# Patient Record
Sex: MALE | Race: WHITE | NOT HISPANIC OR LATINO | Employment: PART TIME | URBAN - METROPOLITAN AREA
[De-identification: names, ages, dates, MRNs, and addresses within clinical notes are randomized per-mention and may not be internally consistent; named-entity substitution may affect disease eponyms.]

---

## 2017-01-13 ENCOUNTER — ALLSCRIPTS OFFICE VISIT (OUTPATIENT)
Dept: OTHER | Facility: OTHER | Age: 62
End: 2017-01-13

## 2017-01-17 ENCOUNTER — ALLSCRIPTS OFFICE VISIT (OUTPATIENT)
Dept: OTHER | Facility: OTHER | Age: 62
End: 2017-01-17

## 2017-01-17 ENCOUNTER — HOSPITAL ENCOUNTER (OUTPATIENT)
Dept: RADIOLOGY | Facility: CLINIC | Age: 62
Discharge: HOME | End: 2017-01-17
Payer: MEDICARE

## 2017-01-17 DIAGNOSIS — M25.511 PAIN IN RIGHT SHOULDER: ICD-10-CM

## 2017-01-17 PROCEDURE — 73030 X-RAY EXAM OF SHOULDER: CPT

## 2017-01-19 ENCOUNTER — APPOINTMENT (OUTPATIENT)
Dept: PHYSICAL THERAPY | Facility: CLINIC | Age: 62
End: 2017-01-19
Payer: MEDICARE

## 2017-01-19 ENCOUNTER — GENERIC CONVERSION - ENCOUNTER (OUTPATIENT)
Dept: OTHER | Facility: OTHER | Age: 62
End: 2017-01-19

## 2017-01-19 PROCEDURE — G8979 MOBILITY GOAL STATUS: HCPCS

## 2017-01-19 PROCEDURE — G8978 MOBILITY CURRENT STATUS: HCPCS

## 2017-01-19 PROCEDURE — 97110 THERAPEUTIC EXERCISES: CPT

## 2017-01-19 PROCEDURE — 97162 PT EVAL MOD COMPLEX 30 MIN: CPT

## 2017-01-20 ENCOUNTER — ALLSCRIPTS OFFICE VISIT (OUTPATIENT)
Dept: OTHER | Facility: OTHER | Age: 62
End: 2017-01-20

## 2017-01-23 ENCOUNTER — APPOINTMENT (OUTPATIENT)
Dept: PHYSICAL THERAPY | Facility: CLINIC | Age: 62
End: 2017-01-23
Payer: MEDICARE

## 2017-01-25 ENCOUNTER — APPOINTMENT (OUTPATIENT)
Dept: PHYSICAL THERAPY | Facility: CLINIC | Age: 62
End: 2017-01-25
Payer: MEDICARE

## 2017-01-27 ENCOUNTER — ALLSCRIPTS OFFICE VISIT (OUTPATIENT)
Dept: OTHER | Facility: OTHER | Age: 62
End: 2017-01-27

## 2017-01-30 ENCOUNTER — APPOINTMENT (OUTPATIENT)
Dept: PHYSICAL THERAPY | Facility: CLINIC | Age: 62
End: 2017-01-30
Payer: MEDICARE

## 2017-02-10 ENCOUNTER — ALLSCRIPTS OFFICE VISIT (OUTPATIENT)
Dept: OTHER | Facility: OTHER | Age: 62
End: 2017-02-10

## 2017-05-15 ENCOUNTER — ALLSCRIPTS OFFICE VISIT (OUTPATIENT)
Dept: OTHER | Facility: OTHER | Age: 62
End: 2017-05-15

## 2017-05-20 ENCOUNTER — ALLSCRIPTS OFFICE VISIT (OUTPATIENT)
Dept: OTHER | Facility: OTHER | Age: 62
End: 2017-05-20

## 2017-05-25 ENCOUNTER — GENERIC CONVERSION - ENCOUNTER (OUTPATIENT)
Dept: OTHER | Facility: OTHER | Age: 62
End: 2017-05-25

## 2017-07-11 ENCOUNTER — HOSPITAL ENCOUNTER (INPATIENT)
Facility: HOSPITAL | Age: 62
LOS: 2 days | Discharge: HOME | DRG: 292 | End: 2017-07-14
Attending: EMERGENCY MEDICINE | Admitting: INTERNAL MEDICINE
Payer: MEDICARE

## 2017-07-11 ENCOUNTER — APPOINTMENT (EMERGENCY)
Dept: RADIOLOGY | Facility: HOSPITAL | Age: 62
DRG: 292 | End: 2017-07-11
Payer: MEDICARE

## 2017-07-11 DIAGNOSIS — R07.9 CHEST PAIN: ICD-10-CM

## 2017-07-11 DIAGNOSIS — R60.0 BILATERAL EDEMA OF LOWER EXTREMITY: ICD-10-CM

## 2017-07-11 DIAGNOSIS — R60.0 LEG EDEMA: ICD-10-CM

## 2017-07-11 DIAGNOSIS — R60.9 EDEMA: ICD-10-CM

## 2017-07-11 DIAGNOSIS — R06.00 DYSPNEA: Primary | ICD-10-CM

## 2017-07-11 LAB
BASOPHILS # BLD AUTO: 0 THOUSANDS/ΜL (ref 0–0.1)
BASOPHILS NFR BLD AUTO: 1 % (ref 0–1)
EOSINOPHIL # BLD AUTO: 0.1 THOUSAND/ΜL (ref 0–0.61)
EOSINOPHIL NFR BLD AUTO: 2 % (ref 0–6)
ERYTHROCYTE [DISTWIDTH] IN BLOOD BY AUTOMATED COUNT: 13.9 % (ref 11.6–15.1)
HCT VFR BLD AUTO: 41.1 % (ref 42–52)
HGB BLD-MCNC: 14.1 G/DL (ref 14–18)
LYMPHOCYTES # BLD AUTO: 2.6 THOUSANDS/ΜL (ref 0.6–4.47)
LYMPHOCYTES NFR BLD AUTO: 33 % (ref 14–44)
MCH RBC QN AUTO: 32.8 PG (ref 27–31)
MCHC RBC AUTO-ENTMCNC: 34.4 G/DL (ref 31.4–37.4)
MCV RBC AUTO: 96 FL (ref 82–98)
MONOCYTES # BLD AUTO: 0.6 THOUSAND/ΜL (ref 0.17–1.22)
MONOCYTES NFR BLD AUTO: 8 % (ref 4–12)
NEUTROPHILS # BLD AUTO: 4.4 THOUSANDS/ΜL (ref 1.85–7.62)
NEUTS SEG NFR BLD AUTO: 57 % (ref 43–75)
NRBC BLD AUTO-RTO: 0 /100 WBCS
PLATELET # BLD AUTO: 208 THOUSANDS/UL (ref 130–400)
PMV BLD AUTO: 7.9 FL (ref 8.9–12.7)
RBC # BLD AUTO: 4.3 MILLION/UL (ref 4.7–6.1)
WBC # BLD AUTO: 7.8 THOUSAND/UL (ref 4.8–10.8)

## 2017-07-11 PROCEDURE — 83735 ASSAY OF MAGNESIUM: CPT | Performed by: EMERGENCY MEDICINE

## 2017-07-11 PROCEDURE — 80053 COMPREHEN METABOLIC PANEL: CPT | Performed by: EMERGENCY MEDICINE

## 2017-07-11 PROCEDURE — 85730 THROMBOPLASTIN TIME PARTIAL: CPT | Performed by: EMERGENCY MEDICINE

## 2017-07-11 PROCEDURE — 71010 HB CHEST X-RAY 1 VIEW FRONTAL (PORTABLE): CPT

## 2017-07-11 PROCEDURE — 83880 ASSAY OF NATRIURETIC PEPTIDE: CPT | Performed by: EMERGENCY MEDICINE

## 2017-07-11 PROCEDURE — 85610 PROTHROMBIN TIME: CPT | Performed by: EMERGENCY MEDICINE

## 2017-07-11 PROCEDURE — 84484 ASSAY OF TROPONIN QUANT: CPT | Performed by: EMERGENCY MEDICINE

## 2017-07-11 PROCEDURE — 36415 COLL VENOUS BLD VENIPUNCTURE: CPT | Performed by: EMERGENCY MEDICINE

## 2017-07-11 PROCEDURE — 85025 COMPLETE CBC W/AUTO DIFF WBC: CPT | Performed by: EMERGENCY MEDICINE

## 2017-07-11 PROCEDURE — 93005 ELECTROCARDIOGRAM TRACING: CPT | Performed by: EMERGENCY MEDICINE

## 2017-07-11 RX ORDER — SERTRALINE HYDROCHLORIDE 100 MG/1
100 TABLET, FILM COATED ORAL 2 TIMES DAILY
COMMUNITY
End: 2018-06-12 | Stop reason: SDUPTHER

## 2017-07-11 RX ORDER — OXCARBAZEPINE 150 MG/1
150 TABLET, FILM COATED ORAL 2 TIMES DAILY
COMMUNITY
End: 2018-04-23 | Stop reason: SDUPTHER

## 2017-07-11 RX ORDER — ASPIRIN 81 MG/1
81 TABLET ORAL EVERY EVENING
COMMUNITY

## 2017-07-11 RX ORDER — ENALAPRIL MALEATE 10 MG/1
10 TABLET ORAL DAILY
COMMUNITY
End: 2018-02-27 | Stop reason: SDUPTHER

## 2017-07-11 RX ORDER — ATENOLOL 50 MG/1
50 TABLET ORAL DAILY
COMMUNITY
End: 2018-05-07 | Stop reason: SDUPTHER

## 2017-07-12 ENCOUNTER — APPOINTMENT (INPATIENT)
Dept: RADIOLOGY | Facility: HOSPITAL | Age: 62
DRG: 292 | End: 2017-07-12
Payer: MEDICARE

## 2017-07-12 ENCOUNTER — APPOINTMENT (INPATIENT)
Dept: NON INVASIVE DIAGNOSTICS | Facility: HOSPITAL | Age: 62
DRG: 292 | End: 2017-07-12
Payer: MEDICARE

## 2017-07-12 PROBLEM — I71.40 AAA (ABDOMINAL AORTIC ANEURYSM): Status: ACTIVE | Noted: 2017-07-12

## 2017-07-12 PROBLEM — I35.1 AORTIC REGURGITATION: Status: ACTIVE | Noted: 2017-07-12

## 2017-07-12 PROBLEM — R73.9 HYPERGLYCEMIA: Status: ACTIVE | Noted: 2017-07-12

## 2017-07-12 PROBLEM — F31.9 BIPOLAR 1 DISORDER (HCC): Status: ACTIVE | Noted: 2017-07-12

## 2017-07-12 PROBLEM — R07.9 CHEST PAIN: Status: ACTIVE | Noted: 2017-07-12

## 2017-07-12 PROBLEM — R60.0 BILATERAL EDEMA OF LOWER EXTREMITY: Status: ACTIVE | Noted: 2017-07-12

## 2017-07-12 PROBLEM — I71.4 AAA (ABDOMINAL AORTIC ANEURYSM) (HCC): Status: ACTIVE | Noted: 2017-07-12

## 2017-07-12 PROBLEM — R06.00 DYSPNEA: Status: ACTIVE | Noted: 2017-07-12

## 2017-07-12 PROBLEM — I10 HTN (HYPERTENSION): Status: ACTIVE | Noted: 2017-07-12

## 2017-07-12 PROBLEM — E66.01 MORBID OBESITY WITH BMI OF 40.0-44.9, ADULT (HCC): Status: ACTIVE | Noted: 2017-07-12

## 2017-07-12 PROBLEM — R60.0 LEG EDEMA: Status: ACTIVE | Noted: 2017-07-12

## 2017-07-12 PROBLEM — R53.81 MALAISE AND FATIGUE: Status: ACTIVE | Noted: 2017-07-12

## 2017-07-12 PROBLEM — E78.5 HYPERLIPIDEMIA: Status: ACTIVE | Noted: 2017-07-12

## 2017-07-12 PROBLEM — R53.83 MALAISE AND FATIGUE: Status: ACTIVE | Noted: 2017-07-12

## 2017-07-12 LAB
ALBUMIN SERPL BCP-MCNC: 3.5 G/DL (ref 3.5–5)
ALP SERPL-CCNC: 164 U/L (ref 46–116)
ALT SERPL W P-5'-P-CCNC: 26 U/L (ref 12–78)
ANION GAP SERPL CALCULATED.3IONS-SCNC: 8 MMOL/L (ref 4–13)
APTT PPP: 25 SECONDS (ref 24–33)
AST SERPL W P-5'-P-CCNC: 18 U/L (ref 5–45)
ATRIAL RATE: 71 BPM
BILIRUB SERPL-MCNC: 0.3 MG/DL (ref 0.2–1)
BILIRUB UR QL STRIP: NEGATIVE
BUN SERPL-MCNC: 15 MG/DL (ref 5–25)
CALCIUM SERPL-MCNC: 8.3 MG/DL (ref 8.3–10.1)
CHLORIDE SERPL-SCNC: 107 MMOL/L (ref 100–108)
CHOLEST SERPL-MCNC: 164 MG/DL (ref 50–200)
CLARITY UR: CLEAR
CO2 SERPL-SCNC: 25 MMOL/L (ref 21–32)
COLOR UR: YELLOW
CREAT SERPL-MCNC: 1.04 MG/DL (ref 0.6–1.3)
EST. AVERAGE GLUCOSE BLD GHB EST-MCNC: 105 MG/DL
GFR SERPL CREATININE-BSD FRML MDRD: >60 ML/MIN/1.73SQ M
GLUCOSE SERPL-MCNC: 154 MG/DL (ref 65–140)
GLUCOSE UR STRIP-MCNC: NEGATIVE MG/DL
HBA1C MFR BLD: 5.3 % (ref 4.2–6.3)
HDLC SERPL-MCNC: 29 MG/DL (ref 40–60)
HGB UR QL STRIP.AUTO: NEGATIVE
INR PPP: 0.94 (ref 0.86–1.16)
KETONES UR STRIP-MCNC: NEGATIVE MG/DL
LDLC SERPL CALC-MCNC: 105 MG/DL (ref 0–100)
LEUKOCYTE ESTERASE UR QL STRIP: NEGATIVE
MAGNESIUM SERPL-MCNC: 2 MG/DL (ref 1.6–2.6)
NITRITE UR QL STRIP: NEGATIVE
NT-PROBNP SERPL-MCNC: 98 PG/ML
P AXIS: -8 DEGREES
PH UR STRIP.AUTO: 5 [PH] (ref 5–9)
POTASSIUM SERPL-SCNC: 3.8 MMOL/L (ref 3.5–5.3)
PR INTERVAL: 160 MS
PROT SERPL-MCNC: 6.5 G/DL (ref 6.4–8.2)
PROT UR STRIP-MCNC: NEGATIVE MG/DL
PROTHROMBIN TIME: 9.9 SECONDS (ref 9.4–11.7)
QRS AXIS: 52 DEGREES
QRSD INTERVAL: 104 MS
QT INTERVAL: 410 MS
QTC INTERVAL: 445 MS
SODIUM SERPL-SCNC: 140 MMOL/L (ref 136–145)
SP GR UR STRIP.AUTO: 1.02 (ref 1–1.03)
T WAVE AXIS: 42 DEGREES
T4 FREE SERPL-MCNC: 0.75 NG/DL (ref 0.76–1.46)
TRIGL SERPL-MCNC: 152 MG/DL
TROPONIN I SERPL-MCNC: <0.02 NG/ML
TSH SERPL DL<=0.05 MIU/L-ACNC: 2.8 UIU/ML (ref 0.36–3.74)
UROBILINOGEN UR QL STRIP.AUTO: 0.2 E.U./DL
VENTRICULAR RATE: 71 BPM

## 2017-07-12 PROCEDURE — 83036 HEMOGLOBIN GLYCOSYLATED A1C: CPT | Performed by: NURSE PRACTITIONER

## 2017-07-12 PROCEDURE — 99285 EMERGENCY DEPT VISIT HI MDM: CPT

## 2017-07-12 PROCEDURE — 81003 URINALYSIS AUTO W/O SCOPE: CPT | Performed by: NURSE PRACTITIONER

## 2017-07-12 PROCEDURE — 87081 CULTURE SCREEN ONLY: CPT | Performed by: NURSE PRACTITIONER

## 2017-07-12 PROCEDURE — G0009 ADMIN PNEUMOCOCCAL VACCINE: HCPCS | Performed by: INTERNAL MEDICINE

## 2017-07-12 PROCEDURE — 84439 ASSAY OF FREE THYROXINE: CPT | Performed by: NURSE PRACTITIONER

## 2017-07-12 PROCEDURE — 84443 ASSAY THYROID STIM HORMONE: CPT | Performed by: NURSE PRACTITIONER

## 2017-07-12 PROCEDURE — 84484 ASSAY OF TROPONIN QUANT: CPT | Performed by: NURSE PRACTITIONER

## 2017-07-12 PROCEDURE — 93970 EXTREMITY STUDY: CPT

## 2017-07-12 PROCEDURE — 90732 PPSV23 VACC 2 YRS+ SUBQ/IM: CPT | Performed by: INTERNAL MEDICINE

## 2017-07-12 PROCEDURE — 80061 LIPID PANEL: CPT | Performed by: NURSE PRACTITIONER

## 2017-07-12 PROCEDURE — 71275 CT ANGIOGRAPHY CHEST: CPT

## 2017-07-12 PROCEDURE — 93306 TTE W/DOPPLER COMPLETE: CPT

## 2017-07-12 RX ORDER — ASPIRIN 81 MG/1
81 TABLET ORAL DAILY
Status: DISCONTINUED | OUTPATIENT
Start: 2017-07-12 | End: 2017-07-14 | Stop reason: HOSPADM

## 2017-07-12 RX ORDER — MAGNESIUM HYDROXIDE/ALUMINUM HYDROXICE/SIMETHICONE 120; 1200; 1200 MG/30ML; MG/30ML; MG/30ML
30 SUSPENSION ORAL EVERY 6 HOURS PRN
Status: DISCONTINUED | OUTPATIENT
Start: 2017-07-12 | End: 2017-07-14 | Stop reason: HOSPADM

## 2017-07-12 RX ORDER — OXCARBAZEPINE 150 MG/1
150 TABLET, FILM COATED ORAL 2 TIMES DAILY
Status: DISCONTINUED | OUTPATIENT
Start: 2017-07-12 | End: 2017-07-14 | Stop reason: HOSPADM

## 2017-07-12 RX ORDER — ACETAMINOPHEN 325 MG/1
650 TABLET ORAL EVERY 6 HOURS PRN
Status: DISCONTINUED | OUTPATIENT
Start: 2017-07-12 | End: 2017-07-14 | Stop reason: HOSPADM

## 2017-07-12 RX ORDER — ENALAPRIL MALEATE 10 MG/1
10 TABLET ORAL DAILY
Status: DISCONTINUED | OUTPATIENT
Start: 2017-07-12 | End: 2017-07-14 | Stop reason: HOSPADM

## 2017-07-12 RX ORDER — ONDANSETRON 2 MG/ML
4 INJECTION INTRAMUSCULAR; INTRAVENOUS EVERY 6 HOURS PRN
Status: DISCONTINUED | OUTPATIENT
Start: 2017-07-12 | End: 2017-07-14 | Stop reason: HOSPADM

## 2017-07-12 RX ORDER — FUROSEMIDE 10 MG/ML
20 INJECTION INTRAMUSCULAR; INTRAVENOUS ONCE
Status: COMPLETED | OUTPATIENT
Start: 2017-07-12 | End: 2017-07-12

## 2017-07-12 RX ORDER — SERTRALINE HYDROCHLORIDE 100 MG/1
100 TABLET, FILM COATED ORAL 2 TIMES DAILY
Status: DISCONTINUED | OUTPATIENT
Start: 2017-07-12 | End: 2017-07-14 | Stop reason: HOSPADM

## 2017-07-12 RX ORDER — FLUTICASONE PROPIONATE 50 MCG
2 SPRAY, SUSPENSION (ML) NASAL DAILY
COMMUNITY
End: 2018-06-04 | Stop reason: SDUPTHER

## 2017-07-12 RX ORDER — ALBUTEROL SULFATE 90 UG/1
2 AEROSOL, METERED RESPIRATORY (INHALATION) EVERY 4 HOURS PRN
COMMUNITY
End: 2018-05-15 | Stop reason: SDUPTHER

## 2017-07-12 RX ORDER — IBUPROFEN 600 MG/1
600 TABLET ORAL EVERY 8 HOURS PRN
COMMUNITY
End: 2017-07-14 | Stop reason: HOSPADM

## 2017-07-12 RX ORDER — ALBUTEROL SULFATE 2.5 MG/3ML
2.5 SOLUTION RESPIRATORY (INHALATION) EVERY 4 HOURS PRN
Status: DISCONTINUED | OUTPATIENT
Start: 2017-07-12 | End: 2017-07-14 | Stop reason: HOSPADM

## 2017-07-12 RX ORDER — ATENOLOL 50 MG/1
50 TABLET ORAL DAILY
Status: DISCONTINUED | OUTPATIENT
Start: 2017-07-12 | End: 2017-07-14 | Stop reason: HOSPADM

## 2017-07-12 RX ADMIN — ACETAMINOPHEN 650 MG: 325 TABLET, FILM COATED ORAL at 20:58

## 2017-07-12 RX ADMIN — ASPIRIN 81 MG: 81 TABLET, COATED ORAL at 10:18

## 2017-07-12 RX ADMIN — ATENOLOL 50 MG: 50 TABLET ORAL at 10:19

## 2017-07-12 RX ADMIN — SERTRALINE HYDROCHLORIDE 100 MG: 100 TABLET, FILM COATED ORAL at 10:19

## 2017-07-12 RX ADMIN — ENOXAPARIN SODIUM 40 MG: 40 INJECTION SUBCUTANEOUS at 10:20

## 2017-07-12 RX ADMIN — OXCARBAZEPINE 150 MG: 150 TABLET ORAL at 10:19

## 2017-07-12 RX ADMIN — SERTRALINE HYDROCHLORIDE 100 MG: 100 TABLET, FILM COATED ORAL at 17:03

## 2017-07-12 RX ADMIN — FUROSEMIDE 20 MG: 10 INJECTION, SOLUTION INTRAMUSCULAR; INTRAVENOUS at 17:03

## 2017-07-12 RX ADMIN — ENALAPRIL MALEATE 10 MG: 10 TABLET ORAL at 10:19

## 2017-07-12 RX ADMIN — IOHEXOL 85 ML: 350 INJECTION, SOLUTION INTRAVENOUS at 02:31

## 2017-07-12 RX ADMIN — OXCARBAZEPINE 150 MG: 150 TABLET ORAL at 17:03

## 2017-07-12 RX ADMIN — PNEUMOCOCCAL VACCINE POLYVALENT 0.5 ML
25; 25; 25; 25; 25; 25; 25; 25; 25; 25; 25; 25; 25; 25; 25; 25; 25; 25; 25; 25; 25; 25; 25 INJECTION, SOLUTION INTRAMUSCULAR; SUBCUTANEOUS at 06:29

## 2017-07-13 ENCOUNTER — APPOINTMENT (INPATIENT)
Dept: RADIOLOGY | Facility: HOSPITAL | Age: 62
DRG: 292 | End: 2017-07-13
Payer: MEDICARE

## 2017-07-13 ENCOUNTER — APPOINTMENT (INPATIENT)
Dept: NON INVASIVE DIAGNOSTICS | Facility: HOSPITAL | Age: 62
DRG: 292 | End: 2017-07-13
Payer: MEDICARE

## 2017-07-13 LAB
ANION GAP SERPL CALCULATED.3IONS-SCNC: 8 MMOL/L (ref 4–13)
BUN SERPL-MCNC: 13 MG/DL (ref 5–25)
CALCIUM SERPL-MCNC: 9.2 MG/DL (ref 8.3–10.1)
CHLORIDE SERPL-SCNC: 107 MMOL/L (ref 100–108)
CO2 SERPL-SCNC: 28 MMOL/L (ref 21–32)
CREAT SERPL-MCNC: 0.91 MG/DL (ref 0.6–1.3)
GFR SERPL CREATININE-BSD FRML MDRD: >60 ML/MIN/1.73SQ M
GLUCOSE SERPL-MCNC: 94 MG/DL (ref 65–140)
MAGNESIUM SERPL-MCNC: 2.2 MG/DL (ref 1.6–2.6)
MRSA NOSE QL CULT: NORMAL
PHOSPHATE SERPL-MCNC: 3.6 MG/DL (ref 2.7–4.5)
POTASSIUM SERPL-SCNC: 3.7 MMOL/L (ref 3.5–5.3)
SODIUM SERPL-SCNC: 143 MMOL/L (ref 136–145)

## 2017-07-13 PROCEDURE — 94760 N-INVAS EAR/PLS OXIMETRY 1: CPT

## 2017-07-13 PROCEDURE — 84100 ASSAY OF PHOSPHORUS: CPT | Performed by: INTERNAL MEDICINE

## 2017-07-13 PROCEDURE — 80048 BASIC METABOLIC PNL TOTAL CA: CPT | Performed by: INTERNAL MEDICINE

## 2017-07-13 PROCEDURE — 83735 ASSAY OF MAGNESIUM: CPT | Performed by: INTERNAL MEDICINE

## 2017-07-13 PROCEDURE — 93017 CV STRESS TEST TRACING ONLY: CPT

## 2017-07-13 PROCEDURE — A9502 TC99M TETROFOSMIN: HCPCS

## 2017-07-13 PROCEDURE — 78452 HT MUSCLE IMAGE SPECT MULT: CPT

## 2017-07-13 RX ORDER — FUROSEMIDE 10 MG/ML
20 INJECTION INTRAMUSCULAR; INTRAVENOUS ONCE
Status: COMPLETED | OUTPATIENT
Start: 2017-07-13 | End: 2017-07-13

## 2017-07-13 RX ORDER — POTASSIUM CHLORIDE 20 MEQ/1
40 TABLET, EXTENDED RELEASE ORAL ONCE
Status: COMPLETED | OUTPATIENT
Start: 2017-07-13 | End: 2017-07-13

## 2017-07-13 RX ADMIN — POTASSIUM CHLORIDE 40 MEQ: 1500 TABLET, EXTENDED RELEASE ORAL at 09:23

## 2017-07-13 RX ADMIN — ENALAPRIL MALEATE 10 MG: 10 TABLET ORAL at 09:25

## 2017-07-13 RX ADMIN — ASPIRIN 81 MG: 81 TABLET, COATED ORAL at 09:23

## 2017-07-13 RX ADMIN — REGADENOSON 0.4 MG: 0.08 INJECTION, SOLUTION INTRAVENOUS at 12:07

## 2017-07-13 RX ADMIN — OXCARBAZEPINE 150 MG: 150 TABLET ORAL at 17:33

## 2017-07-13 RX ADMIN — ATENOLOL 50 MG: 50 TABLET ORAL at 09:25

## 2017-07-13 RX ADMIN — SERTRALINE HYDROCHLORIDE 100 MG: 100 TABLET, FILM COATED ORAL at 17:33

## 2017-07-13 RX ADMIN — ENOXAPARIN SODIUM 40 MG: 40 INJECTION SUBCUTANEOUS at 09:24

## 2017-07-13 RX ADMIN — OXCARBAZEPINE 150 MG: 150 TABLET ORAL at 09:25

## 2017-07-13 RX ADMIN — FUROSEMIDE 20 MG: 10 INJECTION, SOLUTION INTRAMUSCULAR; INTRAVENOUS at 18:16

## 2017-07-13 RX ADMIN — SERTRALINE HYDROCHLORIDE 100 MG: 100 TABLET, FILM COATED ORAL at 09:25

## 2017-07-14 VITALS
TEMPERATURE: 97 F | OXYGEN SATURATION: 95 % | HEIGHT: 76 IN | SYSTOLIC BLOOD PRESSURE: 117 MMHG | DIASTOLIC BLOOD PRESSURE: 57 MMHG | HEART RATE: 66 BPM | RESPIRATION RATE: 16 BRPM | BODY MASS INDEX: 38.36 KG/M2 | WEIGHT: 315 LBS

## 2017-07-14 PROBLEM — I50.31 ACUTE DIASTOLIC CHF (CONGESTIVE HEART FAILURE), NYHA CLASS 2 (HCC): Status: ACTIVE | Noted: 2017-07-14

## 2017-07-14 LAB
ANION GAP SERPL CALCULATED.3IONS-SCNC: 7 MMOL/L (ref 4–13)
BUN SERPL-MCNC: 12 MG/DL (ref 5–25)
CALCIUM SERPL-MCNC: 9 MG/DL (ref 8.3–10.1)
CHLORIDE SERPL-SCNC: 106 MMOL/L (ref 100–108)
CO2 SERPL-SCNC: 28 MMOL/L (ref 21–32)
CREAT SERPL-MCNC: 0.94 MG/DL (ref 0.6–1.3)
GFR SERPL CREATININE-BSD FRML MDRD: >60 ML/MIN/1.73SQ M
GLUCOSE SERPL-MCNC: 92 MG/DL (ref 65–140)
MAX DIASTOLIC BP: 71 MMHG
MAX HEART RATE: 76 BPM
MAX PREDICTED HEART RATE: 161 BPM
MAX. SYSTOLIC BP: 124 MMHG
POTASSIUM SERPL-SCNC: 4 MMOL/L (ref 3.5–5.3)
PROTOCOL NAME: NORMAL
SODIUM SERPL-SCNC: 141 MMOL/L (ref 136–145)
TIME IN EXERCISE PHASE: 60 S

## 2017-07-14 PROCEDURE — 80048 BASIC METABOLIC PNL TOTAL CA: CPT | Performed by: INTERNAL MEDICINE

## 2017-07-14 PROCEDURE — 94760 N-INVAS EAR/PLS OXIMETRY 1: CPT

## 2017-07-14 RX ORDER — FUROSEMIDE 20 MG/1
20 TABLET ORAL DAILY
Status: DISCONTINUED | OUTPATIENT
Start: 2017-07-14 | End: 2017-07-14 | Stop reason: HOSPADM

## 2017-07-14 RX ORDER — PRAVASTATIN SODIUM 20 MG
20 TABLET ORAL
Qty: 30 TABLET | Refills: 0 | Status: SHIPPED | OUTPATIENT
Start: 2017-07-14 | End: 2020-01-10

## 2017-07-14 RX ORDER — FUROSEMIDE 20 MG/1
20 TABLET ORAL DAILY
Qty: 30 TABLET | Refills: 0 | Status: SHIPPED | OUTPATIENT
Start: 2017-07-14 | End: 2017-08-13

## 2017-07-14 RX ORDER — PRAVASTATIN SODIUM 20 MG
20 TABLET ORAL
Status: DISCONTINUED | OUTPATIENT
Start: 2017-07-14 | End: 2017-07-14 | Stop reason: HOSPADM

## 2017-07-14 RX ADMIN — FUROSEMIDE 20 MG: 20 TABLET ORAL at 09:09

## 2017-07-14 RX ADMIN — ENALAPRIL MALEATE 10 MG: 10 TABLET ORAL at 09:07

## 2017-07-14 RX ADMIN — OXCARBAZEPINE 150 MG: 150 TABLET ORAL at 09:08

## 2017-07-14 RX ADMIN — ENOXAPARIN SODIUM 40 MG: 40 INJECTION SUBCUTANEOUS at 09:08

## 2017-07-14 RX ADMIN — ATENOLOL 50 MG: 50 TABLET ORAL at 09:07

## 2017-07-14 RX ADMIN — SERTRALINE HYDROCHLORIDE 100 MG: 100 TABLET, FILM COATED ORAL at 09:08

## 2017-07-14 RX ADMIN — ASPIRIN 81 MG: 81 TABLET, COATED ORAL at 09:07

## 2017-07-19 ENCOUNTER — ALLSCRIPTS OFFICE VISIT (OUTPATIENT)
Dept: OTHER | Facility: OTHER | Age: 62
End: 2017-07-19

## 2017-07-24 ENCOUNTER — TRANSCRIBE ORDERS (OUTPATIENT)
Dept: LAB | Facility: CLINIC | Age: 62
End: 2017-07-24

## 2017-07-24 ENCOUNTER — APPOINTMENT (OUTPATIENT)
Dept: LAB | Facility: CLINIC | Age: 62
End: 2017-07-24
Payer: MEDICARE

## 2017-07-24 ENCOUNTER — GENERIC CONVERSION - ENCOUNTER (OUTPATIENT)
Dept: OTHER | Facility: OTHER | Age: 62
End: 2017-07-24

## 2017-07-24 DIAGNOSIS — I50.33 ACUTE ON CHRONIC DIASTOLIC CONGESTIVE HEART FAILURE (HCC): ICD-10-CM

## 2017-07-24 DIAGNOSIS — R25.2 CRAMP AND SPASM: ICD-10-CM

## 2017-07-24 DIAGNOSIS — Z79.899 OTHER LONG TERM (CURRENT) DRUG THERAPY: ICD-10-CM

## 2017-07-24 DIAGNOSIS — R53.83 OTHER FATIGUE: ICD-10-CM

## 2017-07-24 DIAGNOSIS — R06.09 OTHER FORMS OF DYSPNEA: ICD-10-CM

## 2017-07-24 LAB
ANION GAP SERPL CALCULATED.3IONS-SCNC: 8 MMOL/L (ref 4–13)
BUN SERPL-MCNC: 13 MG/DL (ref 5–25)
CALCIUM SERPL-MCNC: 9 MG/DL (ref 8.3–10.1)
CHLORIDE SERPL-SCNC: 108 MMOL/L (ref 100–108)
CO2 SERPL-SCNC: 25 MMOL/L (ref 21–32)
CREAT SERPL-MCNC: 0.88 MG/DL (ref 0.6–1.3)
GFR SERPL CREATININE-BSD FRML MDRD: 94 ML/MIN/1.73SQ M
GLUCOSE SERPL-MCNC: 125 MG/DL (ref 65–140)
MAGNESIUM SERPL-MCNC: 2.4 MG/DL (ref 1.6–2.6)
POTASSIUM SERPL-SCNC: 4 MMOL/L (ref 3.5–5.3)
SODIUM SERPL-SCNC: 141 MMOL/L (ref 136–145)

## 2017-07-24 PROCEDURE — 36415 COLL VENOUS BLD VENIPUNCTURE: CPT

## 2017-07-24 PROCEDURE — 83735 ASSAY OF MAGNESIUM: CPT

## 2017-07-24 PROCEDURE — 80048 BASIC METABOLIC PNL TOTAL CA: CPT

## 2017-07-27 ENCOUNTER — GENERIC CONVERSION - ENCOUNTER (OUTPATIENT)
Dept: OTHER | Facility: OTHER | Age: 62
End: 2017-07-27

## 2017-07-31 ENCOUNTER — ALLSCRIPTS OFFICE VISIT (OUTPATIENT)
Dept: OTHER | Facility: OTHER | Age: 62
End: 2017-07-31

## 2017-08-08 ENCOUNTER — ALLSCRIPTS OFFICE VISIT (OUTPATIENT)
Dept: OTHER | Facility: OTHER | Age: 62
End: 2017-08-08

## 2017-08-08 ENCOUNTER — TRANSCRIBE ORDERS (OUTPATIENT)
Dept: ADMINISTRATIVE | Facility: HOSPITAL | Age: 62
End: 2017-08-08

## 2017-08-08 ENCOUNTER — HOSPITAL ENCOUNTER (OUTPATIENT)
Dept: RADIOLOGY | Facility: HOSPITAL | Age: 62
Discharge: HOME | End: 2017-08-08
Payer: MEDICARE

## 2017-08-08 DIAGNOSIS — R06.09 OTHER DYSPNEA AND RESPIRATORY ABNORMALITY: Primary | ICD-10-CM

## 2017-08-08 DIAGNOSIS — R09.89 OTHER DYSPNEA AND RESPIRATORY ABNORMALITY: Primary | ICD-10-CM

## 2017-08-08 PROCEDURE — 71020 HB CHEST X-RAY 2VW FRONTAL&LATL: CPT

## 2017-08-09 ENCOUNTER — TRANSCRIBE ORDERS (OUTPATIENT)
Dept: LAB | Facility: CLINIC | Age: 62
End: 2017-08-09

## 2017-08-09 ENCOUNTER — APPOINTMENT (OUTPATIENT)
Dept: LAB | Facility: CLINIC | Age: 62
End: 2017-08-09
Payer: MEDICARE

## 2017-08-09 DIAGNOSIS — R06.09 OTHER FORMS OF DYSPNEA: ICD-10-CM

## 2017-08-09 DIAGNOSIS — R53.83 OTHER FATIGUE: ICD-10-CM

## 2017-08-09 DIAGNOSIS — I50.33 ACUTE ON CHRONIC DIASTOLIC CONGESTIVE HEART FAILURE (HCC): ICD-10-CM

## 2017-08-09 DIAGNOSIS — Z79.899 OTHER LONG TERM (CURRENT) DRUG THERAPY: ICD-10-CM

## 2017-08-09 DIAGNOSIS — R25.2 CRAMP AND SPASM: ICD-10-CM

## 2017-08-09 LAB
ALBUMIN SERPL BCP-MCNC: 3.7 G/DL (ref 3.5–5)
ALP SERPL-CCNC: 138 U/L (ref 46–116)
ALT SERPL W P-5'-P-CCNC: 30 U/L (ref 12–78)
ANION GAP SERPL CALCULATED.3IONS-SCNC: 8 MMOL/L (ref 4–13)
AST SERPL W P-5'-P-CCNC: 21 U/L (ref 5–45)
BILIRUB SERPL-MCNC: 0.45 MG/DL (ref 0.2–1)
BUN SERPL-MCNC: 17 MG/DL (ref 5–25)
CALCIUM SERPL-MCNC: 8.7 MG/DL (ref 8.3–10.1)
CHLORIDE SERPL-SCNC: 110 MMOL/L (ref 100–108)
CO2 SERPL-SCNC: 24 MMOL/L (ref 21–32)
CREAT SERPL-MCNC: 1 MG/DL (ref 0.6–1.3)
GFR SERPL CREATININE-BSD FRML MDRD: 82 ML/MIN/1.73SQ M
GLUCOSE SERPL-MCNC: 123 MG/DL (ref 65–140)
HGB BLD-MCNC: 14.3 G/DL (ref 12–17)
MAGNESIUM SERPL-MCNC: 2.6 MG/DL (ref 1.6–2.6)
NT-PROBNP SERPL-MCNC: 102 PG/ML
POTASSIUM SERPL-SCNC: 4 MMOL/L (ref 3.5–5.3)
PROT SERPL-MCNC: 7.3 G/DL (ref 6.4–8.2)
SODIUM SERPL-SCNC: 142 MMOL/L (ref 136–145)

## 2017-08-09 PROCEDURE — 36415 COLL VENOUS BLD VENIPUNCTURE: CPT

## 2017-08-09 PROCEDURE — 83880 ASSAY OF NATRIURETIC PEPTIDE: CPT

## 2017-08-09 PROCEDURE — 85018 HEMOGLOBIN: CPT

## 2017-08-09 PROCEDURE — 83735 ASSAY OF MAGNESIUM: CPT

## 2017-08-09 PROCEDURE — 80053 COMPREHEN METABOLIC PANEL: CPT

## 2017-08-10 ENCOUNTER — GENERIC CONVERSION - ENCOUNTER (OUTPATIENT)
Dept: OTHER | Facility: OTHER | Age: 62
End: 2017-08-10

## 2017-08-14 ENCOUNTER — ALLSCRIPTS OFFICE VISIT (OUTPATIENT)
Dept: OTHER | Facility: OTHER | Age: 62
End: 2017-08-14

## 2017-08-17 ENCOUNTER — GENERIC CONVERSION - ENCOUNTER (OUTPATIENT)
Dept: OTHER | Facility: OTHER | Age: 62
End: 2017-08-17

## 2017-09-21 ENCOUNTER — APPOINTMENT (OUTPATIENT)
Dept: RADIOLOGY | Facility: CLINIC | Age: 62
End: 2017-09-21
Payer: MEDICARE

## 2017-09-21 ENCOUNTER — GENERIC CONVERSION - ENCOUNTER (OUTPATIENT)
Dept: OTHER | Facility: OTHER | Age: 62
End: 2017-09-21

## 2017-09-21 DIAGNOSIS — I10 ESSENTIAL (PRIMARY) HYPERTENSION: ICD-10-CM

## 2017-09-21 DIAGNOSIS — R06.09 OTHER FORMS OF DYSPNEA: ICD-10-CM

## 2017-09-21 DIAGNOSIS — J44.1 CHRONIC OBSTRUCTIVE PULMONARY DISEASE WITH ACUTE EXACERBATION (HCC): ICD-10-CM

## 2017-09-21 DIAGNOSIS — I50.32 CHRONIC DIASTOLIC HEART FAILURE (HCC): ICD-10-CM

## 2017-09-21 DIAGNOSIS — Z11.59 ENCOUNTER FOR SCREENING FOR OTHER VIRAL DISEASES (CODE): ICD-10-CM

## 2017-09-21 PROCEDURE — 71020 HB CHEST X-RAY 2VW FRONTAL&LATL: CPT

## 2017-09-22 ENCOUNTER — GENERIC CONVERSION - ENCOUNTER (OUTPATIENT)
Dept: OTHER | Facility: OTHER | Age: 62
End: 2017-09-22

## 2017-10-20 ENCOUNTER — LAB REQUISITION (OUTPATIENT)
Dept: LAB | Facility: HOSPITAL | Age: 62
End: 2017-10-20
Payer: MEDICARE

## 2017-10-20 ENCOUNTER — GENERIC CONVERSION - ENCOUNTER (OUTPATIENT)
Dept: OTHER | Facility: OTHER | Age: 62
End: 2017-10-20

## 2017-10-20 DIAGNOSIS — Z11.59 ENCOUNTER FOR SCREENING FOR OTHER VIRAL DISEASES (CODE): ICD-10-CM

## 2017-10-20 DIAGNOSIS — I10 ESSENTIAL (PRIMARY) HYPERTENSION: ICD-10-CM

## 2017-10-20 DIAGNOSIS — I50.32 CHRONIC DIASTOLIC HEART FAILURE (HCC): ICD-10-CM

## 2017-10-20 LAB
ANION GAP SERPL CALCULATED.3IONS-SCNC: 7 MMOL/L (ref 4–13)
BUN SERPL-MCNC: 13 MG/DL (ref 5–25)
CALCIUM SERPL-MCNC: 9.3 MG/DL (ref 8.3–10.1)
CHLORIDE SERPL-SCNC: 106 MMOL/L (ref 100–108)
CO2 SERPL-SCNC: 31 MMOL/L (ref 21–32)
CREAT SERPL-MCNC: 0.93 MG/DL (ref 0.6–1.3)
GFR SERPL CREATININE-BSD FRML MDRD: 90 ML/MIN/1.73SQ M
GLUCOSE SERPL-MCNC: 110 MG/DL (ref 65–140)
HGB BLD-MCNC: 15.1 G/DL (ref 14–18)
MAGNESIUM SERPL-MCNC: 2.1 MG/DL (ref 1.6–2.6)
POTASSIUM SERPL-SCNC: 4.6 MMOL/L (ref 3.5–5.3)
SODIUM SERPL-SCNC: 144 MMOL/L (ref 136–145)

## 2017-10-20 PROCEDURE — 85018 HEMOGLOBIN: CPT | Performed by: NURSE PRACTITIONER

## 2017-10-20 PROCEDURE — 83735 ASSAY OF MAGNESIUM: CPT | Performed by: NURSE PRACTITIONER

## 2017-10-20 PROCEDURE — 80048 BASIC METABOLIC PNL TOTAL CA: CPT | Performed by: NURSE PRACTITIONER

## 2017-10-23 ENCOUNTER — GENERIC CONVERSION - ENCOUNTER (OUTPATIENT)
Dept: OTHER | Facility: OTHER | Age: 62
End: 2017-10-23

## 2017-11-29 ENCOUNTER — LAB REQUISITION (OUTPATIENT)
Dept: LAB | Facility: HOSPITAL | Age: 62
End: 2017-11-29
Payer: MEDICARE

## 2017-11-29 ENCOUNTER — ALLSCRIPTS OFFICE VISIT (OUTPATIENT)
Dept: OTHER | Facility: OTHER | Age: 62
End: 2017-11-29

## 2017-11-29 DIAGNOSIS — Z01.84 ENCOUNTER FOR ANTIBODY RESPONSE EXAMINATION: ICD-10-CM

## 2017-11-29 PROCEDURE — 86706 HEP B SURFACE ANTIBODY: CPT | Performed by: NURSE PRACTITIONER

## 2017-11-29 PROCEDURE — 86762 RUBELLA ANTIBODY: CPT | Performed by: NURSE PRACTITIONER

## 2017-11-29 PROCEDURE — 86787 VARICELLA-ZOSTER ANTIBODY: CPT | Performed by: NURSE PRACTITIONER

## 2017-11-29 PROCEDURE — 86765 RUBEOLA ANTIBODY: CPT | Performed by: NURSE PRACTITIONER

## 2017-11-29 PROCEDURE — 86735 MUMPS ANTIBODY: CPT | Performed by: NURSE PRACTITIONER

## 2017-11-30 LAB
HBV SURFACE AB SER-ACNC: <3.1 MIU/ML
MEV IGG SER QL: NORMAL
MUV IGG SER QL: NORMAL
RUBV IGG SERPL IA-ACNC: >175 IU/ML
VZV IGG SER IA-ACNC: NORMAL

## 2017-12-01 ENCOUNTER — GENERIC CONVERSION - ENCOUNTER (OUTPATIENT)
Dept: OTHER | Facility: OTHER | Age: 62
End: 2017-12-01

## 2017-12-01 ENCOUNTER — ALLSCRIPTS OFFICE VISIT (OUTPATIENT)
Dept: OTHER | Facility: OTHER | Age: 62
End: 2017-12-01

## 2017-12-15 ENCOUNTER — GENERIC CONVERSION - ENCOUNTER (OUTPATIENT)
Dept: OTHER | Facility: OTHER | Age: 62
End: 2017-12-15

## 2017-12-15 ENCOUNTER — GENERIC CONVERSION - ENCOUNTER (OUTPATIENT)
Dept: FAMILY MEDICINE CLINIC | Facility: CLINIC | Age: 62
End: 2017-12-15

## 2017-12-16 ENCOUNTER — ALLSCRIPTS OFFICE VISIT (OUTPATIENT)
Dept: OTHER | Facility: OTHER | Age: 62
End: 2017-12-16

## 2017-12-29 ENCOUNTER — GENERIC CONVERSION - ENCOUNTER (OUTPATIENT)
Dept: OTHER | Facility: OTHER | Age: 62
End: 2017-12-29

## 2018-01-10 NOTE — PROGRESS NOTES
History of Present Illness  Care Coordination Encounter Information:   Type of Encounter: Telephonic   Contact: Initial Contact   Last Office Visit: 8/14/17   Spoke to Patient  Care Coordination SL Nurse [facility]:   The reason for call is to discuss outreach for follow up/needed services and coordination of meeting care plan treatment goals  Evelin Cuevas is now on disability  His breathing is at baseline, staying indoors 2/2 heat  He checks his BP and weight daily  He has been medicated for HTN "for years " He believes 6 months of taking large doses of Ibuprophen and Naprosyn contributed to his current diagnosis of CHF  He denies leg cramps since statins were DC'd  I have suggested Omega, B vitamins  [facility] will go to [facility] 9[facility]  He is agreeable to calls  Active Problems    1  Acute pain of right shoulder (719 41) (M25 511)   2  Aortic aneurysm (441 9) (I71 9)   3  Arthritis (716 90) (M19 90)   4  Asthma (493 90) (J45 909)   5  Asthma (493 90) (J45 909)   6  Bilateral leg cramps (729 82) (R25 2)   7  Chronic diastolic heart failure (200 57) (I50 32)   8  Depression (311) (F32 9)   9  Drug therapy (V58 69) (Z79 899)   10  Encounter for special screening examination for nervous system disorder (V80 09)    (Z13 858)   11  Erectile dysfunction of organic origin (607 84) (N52 9)   12  Exertional dyspnea (786 09) (R06 09)   13  Fatigue (780 79) (R53 83)   14  Heart disorder (429 9) (I51 9)   15  Hyperlipidemia (272 4) (E78 5)   16  Hypertension (401 9) (I10)   17  Ligamentous laxity of right shoulder (728 4) (M24 211)   18  Localized osteoarthritis of right knee (715 36) (M17 11)   19  Muscle spasm (728 85) (M62 838)   20  Muscle strain (848 9) (T14 8)   21  Need for influenza vaccination (V04 81) (Z23)   22  Orthostasis (458 0) (I95 1)   23  Osteoarthritis of right knee (715 96) (M17 11)   24  Otitis media, left (382 9) (H66 92)   25  Postnasal drip (784 91) (R02 82)   26  Right knee pain (362 46) (W52 425)   27  Right shoulder pain (719 41) (M27 511)   28  Screening for colorectal cancer (V76 51) (Z12 11,Z12 12)   29  Screening for depression (V79 0) (Z13 89)   30  Screening for genitourinary condition (V81 6) (Z13 89)   31  Screening for lipoid disorders (V77 91) (Z13 220)   32  URTI (acute upper respiratory infection) (465 9) (J06 9)    Surgical History    1  History of Aortic Valve Replacement   2  History of Foot Surgery   3  History of Knee Surgery   4  History of Rotator Cuff Repair   5  History of Surgery For Abdominal Aortic Aneurysm    Family History  Mother    1  Family history of alcoholism (V17 0) (Z81 1)   2  Family history of hypertension (V17 49) (Z82 49)   3  Family history of malignant neoplasm (V16 9) (Z80 9)  Father    4  Family history of alcoholism (V17 0) (Z81 1)   5  Family history of diabetes mellitus (V18 0) (Z83 3)   6  Family history of hypertension (V17 49) (Z82 49)   7  Family history of malignant neoplasm (V16 9) (Z80 9)  Brother    8  Family history of malignant neoplasm (V16 9) (Z80 9)   9  Family history of Mental disorders and diseases of the nervous system complicating   pregnancy, childbirth and the puerperium    Social History    · Always uses seat belt   · Former consumption of alcohol (V11 3) (Z87 898)   · Former smoker (X61 03) (S70 827)   · Former smoker (V15 82) (W82 127)   · History of crack cocaine use   · History of marijuana use (305 23) (J08 251)    Current Meds    1  Ventolin  (90 Base) MCG/ACT Inhalation Aerosol Solution; INHALE 1 TO 2 PUFFS   EVERY 4 TO 6 HOURS AS NEEDED; Therapy: 98PSN4978 to (Last Rx:78Evk0287)  Requested for: 46WBK7078 Ordered    2  Sertraline HCl - 100 MG Oral Tablet (Zoloft); TAKE 2 TABLET Daily; Therapy: 43Qoa2760 to (Evaluate:68Neg4657); Last Rx:65Lmy6587 Ordered    3  Furosemide 40 MG Oral Tablet; TAKE TABLET Daily  Requested for: 73GMO1467; Last   Rx:12Fyw5045 Ordered   4   Potassium Chloride Roseanne ER 20 MEQ Oral Tablet Extended Release; take 1 tablet by   mouth once daily; Therapy: 66RUZ0543 to (384 6228)  Requested for: 21RLA2850; Last   Rx:86Hgz2958 Ordered    5  Viagra 50 MG Oral Tablet; TAKE 1 TABLET DAILY 1 HOUR BEFORE NEEDED; Therapy: 69GSG5861 to (Evaluate:11Aug2017); Last Rx:68Nni4390 Ordered    6  Aspirin 81 MG TABS; TAKE 1 TABLET DAILY; Therapy: 54Iwn7772 to (Evaluate:09Apr2017); Last Rx:03Jcp5294 Ordered   7  Multivitamins Oral Capsule; TAKE 1 CAPSULE DAILY; Therapy: 48Srr5764 to (Evaluate:10Nov2016); Last Rx:14Apr2016 Ordered    8  [place]-3 1000 MG Oral Capsule; Take 1 capsule twice daily; Therapy: 75IIW4886 to (Evaluate:13Sep2017); Last Rx:24Zpt7155 Ordered    9  Atenolol 50 MG Oral Tablet; TAKE 1 TABLET DAILY; Therapy: 50YKJ2241 to (Evaluate:11Nov2017) Recorded   10  Enalapril Maleate 10 MG Oral Tablet; 1 EVERY MORNING; Therapy: 19RMU9024 to Recorded   11  Trileptal 150 MG Oral Tablet (OXcarbazepine); TAKE 1 TABLET TWICE DAILY; Therapy: 55DFE1194 to Recorded    Allergies    1  Codeine Derivatives   2  Crestor TABS   3  Penicillins   4  Sulfa Drugs   5  Codeine Derivatives   6  Penicillins   7  Sulfa Drugs    End of Encounter Meds    1  Ventolin  (90 Base) MCG/ACT Inhalation Aerosol Solution; INHALE 1 TO 2 PUFFS   EVERY 4 TO 6 HOURS AS NEEDED; Therapy: 78WQB6722 to (Last Rx:55Vkp3317)  Requested for: 54IGG3541 Ordered    2  Sertraline HCl - 100 MG Oral Tablet (Zoloft); TAKE 2 TABLET Daily; Therapy: 14Apr2016 to (Evaluate:14May2016); Last Rx:14Apr2016 Ordered    3  Furosemide 40 MG Oral Tablet; TAKE TABLET Daily  Requested for: 15UHG0063; Last   Rx:45Xqq8178 Ordered   4  Potassium Chloride Roseanne ER 20 MEQ Oral Tablet Extended Release; take 1 tablet by   mouth once daily; Therapy: 46VGD6171 to (728 9182)  Requested for: 52HLH5388; Last   Rx:70Okk7195 Ordered    5  Viagra 50 MG Oral Tablet; TAKE 1 TABLET DAILY 1 HOUR BEFORE NEEDED; Therapy: 07OWM7180 to (Evaluate:11Aug2017);  Last Rx: 00GFX3423 Ordered    6  Aspirin 81 MG TABS; TAKE 1 TABLET DAILY; Therapy: 14Apr2016 to (Evaluate:09Apr2017); Last Rx:87Lun6329 Ordered   7  Multivitamins Oral Capsule; TAKE 1 CAPSULE DAILY; Therapy: 28Zep0363 to (Evaluate:10Nov2016); Last Rx:69Rhb5180 Ordered    8  [place]-3 1000 MG Oral Capsule; Take 1 capsule twice daily; Therapy: 53WTU9404 to (Evaluate:18Elx2171); Last Rx:39Huv5307 Ordered    9  Atenolol 50 MG Oral Tablet; TAKE 1 TABLET DAILY; Therapy: 77UEF8588 to (Evaluate:11Nov2017) Recorded   10  Enalapril Maleate 10 MG Oral Tablet; 1 EVERY MORNING; Therapy: 33MON1945 to Recorded   11  Trileptal 150 MG Oral Tablet (OXcarbazepine); TAKE 1 TABLET TWICE DAILY; Therapy: 59CZN1871 to Recorded    Future Appointments    Date/Time Provider Specialty Site   09/25/2017 08:00 AM TO[facility] [address]     Signatures   Electronically signed by :  Maria C Salmeron RN; Aug 17 2017  2:53PM EST                       (Author)

## 2018-01-11 NOTE — RESULT NOTES
Discussion/Summary   blood work very good  cont with current treatment plan     Verified Results  (1) HEMOGLOBIN, BLOOD 20Oct2017 04:00PM Sylvie Guadalupe     Test Name Result Flag Reference   HEMOGLOBIN 15 1 g/dL  14 0-18 0     (1) MAGNESIUM 20Oct2017 04:00PM Sylvie Guadalupe     Test Name Result Flag Reference   MAGNESIUM 2 1 mg/dL  1 6-2 6     (1) BASIC METABOLIC PROFILE 17DAR1625 04:00PM Sylvie Guadalupe     Test Name Result Flag Reference   GLUCOSE,RANDM 110 mg/dL     If the patient is fasting, the ADA then defines impaired fasting glucose as > 100 mg/dL and diabetes as > or equal to 123 mg/dL  Specimen collection should occur prior to Sulfasalazine administration due to the potential for falsely depressed results  Specimen collection should occur prior to Sulfapyridine administration due to the potential for falsely elevated results  SODIUM 144 mmol/L  136-145   POTASSIUM 4 6 mmol/L  3 5-5 3   CHLORIDE 106 mmol/L  100-108   CARBON DIOXIDE 31 mmol/L  21-32   ANION GAP (CALC) 7 mmol/L  4-13   BLOOD UREA NITROGEN 13 mg/dL  5-25   CREATININE 0 93 mg/dL  0 60-1 30   Standardized to IDMS reference method   CALCIUM 9 3 mg/dL  8 3-10 1   eGFR 90 ml/min/1 73sq [facility] Disease Education Program recommendations are as follows:  GFR calculation is accurate only with a steady state creatinine  Chronic Kidney disease less than 60 ml/min/1 73 sq  meters  Kidney failure less than 15 ml/min/1 73 sq  meters         Signatures   Electronically signed by : TOBY Hopkins; Oct 23 2017  8:59AM EST                       (Author)

## 2018-01-12 VITALS
DIASTOLIC BLOOD PRESSURE: 70 MMHG | TEMPERATURE: 97.8 F | BODY MASS INDEX: 37.19 KG/M2 | SYSTOLIC BLOOD PRESSURE: 110 MMHG | RESPIRATION RATE: 16 BRPM | WEIGHT: 315 LBS | HEIGHT: 77 IN | HEART RATE: 72 BPM

## 2018-01-12 NOTE — MISCELLANEOUS
Message  Return to work or school:   Jennifer Garay is under my professional care  He was seen in my office on 7/19/17   He is able to return to work on  7/27/17       Urban Rout APN        Future Appointments    Signatures   Electronically signed by : TOBY Duckworth; Jul 20 2017  8:11AM EST                       (Author)    Electronically signed by : Susen Koyanagi, M D ; Jul 20 2017  8:58AM EST                       (Author)

## 2018-01-12 NOTE — MISCELLANEOUS
Assessment    1  Diastolic dysfunction with acute on chronic heart failure (428 33) (I50 33)   2  Drug therapy (V58 69) (Z79 899)   3  Hypertension (401 9) (I10)    Plan  Diastolic dysfunction with acute on chronic heart failure    · Furosemide 20 MG Oral Tablet; TAKE 1 TABLET DAILY  TAKE EXTRA DOSE IF  WEIGHT INCREASED BY 2 POUNDS IN 3 DAYS   Rx By: Kristina Longo; Dispense: 30 Days ; #:60 Tablet; Refill: 6; For: Diastolic dysfunction with acute on chronic heart failure; DAVID = N; Verified Transmission to [address] [place] [address] [address]; Last Updated By: System, [employer]; 7/19/2017 11:59:55 AM   · Avoid getting up or changing positions quickly ; Status:Complete;   Done: 96FLH1833   Ordered; For:Diastolic dysfunction with acute on chronic heart failure; Ordered By:Betina Man;   · Please call our office in 1 week to report on how you are doing ; Status:Complete;   Done:  62HXR0958   Ordered; For:Diastolic dysfunction with acute on chronic heart failure; Ordered By:Betina Man;   · Remain active; Status:Complete;   Done: 68DRC3010   Ordered; For:Diastolic dysfunction with acute on chronic heart failure; Ordered By:Betina Man;   · Restrict the salt in your diet by avoiding highly salted foods ; Status:Complete;   Done:  24DSF9643   Ordered; For:Diastolic dysfunction with acute on chronic heart failure; Ordered By:Betina Man;   · Weigh yourself every day ; Status:Complete;   Done: 42TEL6362   Ordered; For:Diastolic dysfunction with acute on chronic heart failure; Ordered By:Betina Man;   · Call [phone] if: You have difficulty breathing while lying down and you are  comfortable only when sitting up ; Status:Complete;   Done: 14WOJ7245   Ordered; For:Diastolic dysfunction with acute on chronic heart failure; Ordered By:Betina Man;   · Call [phone] if: Your ankle swelling is getting worse ; Status:Complete;   Done:  84VUZ8330   Ordered;   Kina Jones dysfunction with acute on chronic heart failure; Ordered By:Betina Man;   · Cardiology Follow Up Evaluation and Treatment  Follow-up  Status: Hold For -  Scheduling  Requested for: 59Kmu5392   Ordered; For: Diastolic dysfunction with acute on chronic heart failure; Ordered By: Tara Noel Performed:  Due: 03GUC0307   · Follow-up visit in 1 month Evaluation and Treatment  Follow-up  Status: Complete  Done:  03BPX5289   Ordered; For: Diastolic dysfunction with acute on chronic heart failure; Ordered By: Tara Noel Performed:  Due: 54QZR1175; Last Updated By: Law Tello; 7/19/2017 59:10:72 PM  Diastolic dysfunction with acute on chronic heart failure, Drug therapy    · (1) BASIC METABOLIC PROFILE; Status:Active; Requested for:03Hzy5574;    Perform:[facility]; SBD:29ZOZ2017; Ordered; For:Diastolic dysfunction with acute on chronic heart failure, Drug therapy; Ordered By:Betina Man;   · (1) MAGNESIUM; Status:Active; Requested for:50Yii0117;    Perform:[facility]; ZRJ:72SDZ3918; Ordered; For:Diastolic dysfunction with acute on chronic heart failure, Drug therapy; Ordered By:Betina Man;  Diastolic dysfunction with acute on chronic heart failure, Drug therapy, Hypertension    · Follow-up PRN Evaluation and Treatment  Follow-up  Status: Complete  Done:  16QDI7721   Ordered; For: Diastolic dysfunction with acute on chronic heart failure, Drug therapy, Hypertension; Ordered By: Tara Noel Performed:  Due: 79CYK4341  Hyperlipidemia    · Pravastatin Sodium 20 MG Oral Tablet; take 1 tablet by mouth once daily at  bedtime   Rx By: Tara Noel; Dispense: 30 Days ; #:30 Tablet; Refill: 6; For: Hyperlipidemia; DAVID = N; Verified Transmission to [facility] [address]; Last Updated By: System, SureScripts; 7/19/2017 11:59:55 AM    Discussion/Summary  Discussion Summary:   1  Daily weights>> if more than 2 pounds in 3 days, take extra lasix   bring record to next appt    2  blood work on monday    3  f/u with cardio    4  call fridays with updates on weights, swelling, sxs    5  rto 1 month and prn    call anytime with questions or concerns  Counseling Documentation With Imm: The patient, patient's family was counseled regarding diagnostic results, instructions for management, risk factor reductions, prognosis, patient and family education, impressions, risks and benefits of treatment options, importance of compliance with treatment  Patient Education: Educational resources provided: CHF  Medication SE Review and Pt Understands Tx: Possible side effects of new medications were reviewed with the patient/guardian today  The treatment plan was reviewed with the patient/guardian  The patient/guardian understands and agrees with the treatment plan      Chief Complaint  Chief Complaint Free Text Note Form: TCM for edema/chf  ck/lpn      History of Present Illness  TCM Communication [facility]: The patient is being contacted for follow-up after hospitalization and GENNA Chavarria LPN [birth year]  He was hospitalized at and [address] [place] [address]  The date of admission: 7/12/17, date of discharge: 7/14/17  Diagnosis: Chest pain  He was discharged to home  Medications reviewed and updated today  He scheduled a follow up appointment  Communication performed and completed by GENNA Chavarria LPN 0/87/18   HPI: Cyrusboubacar Jennifer here with spouse for hosp f/u of new dx diastolic CHF  was admitted to [facility] 7/11-7/14 with severe hand and LE swelling   this is resolved today  records reviewed   he reports feeling better, "just tired"  Echo showed EF 55, bicusp AV  stress no ischemia  taking lasix 20 as rx'd  has been very compliant r/t diet, fluids, daily weights  I reviewed weight record  there has been a steady increase  331>335 6 today  denies chest pain, FERNANDES, orthopnea, palps,   mild dizziness since taking lasix  is following up with cardiologist at [facility]-I provided him with a copy of records      Review of Systems  Complete-Male:   Constitutional: feeling tired, but no fever and not feeling poorly  Eyes: No complaints of eye pain, no red eyes, no discharge from eyes, no itchy eyes  Cardiovascular: No complaints of slow heart rate, no fast heart rate, no chest pain, no palpitations, no leg claudication, no lower extremity and as noted in HPI  Respiratory: cough, shortness of breath during exertion and baseline r/t copd, but no orthopnea  Gastrointestinal: no abdominal pain  Integumentary: no rashes  Neurological: dizziness, but no headache, no confusion and no fainting  Active Problems    1  Acute pain of right shoulder (719 41) (M25 511)   2  Aortic aneurysm (441 9) (I71 9)   3  Arthritis (716 90) (M19 90)   4  Asthma (493 90) (J45 909)   5  Asthma (493 90) (J45 909)   6  Depression (311) (F32 9)   7  Encounter for special screening examination for nervous system disorder (V80 09)   (Z13 858)   8  Erectile dysfunction of organic origin (607 84) (N52 9)   9  Fatigue (780 79) (R53 83)   10  Heart disorder (429 9) (I51 9)   11  Hyperlipidemia (272 4) (E78 5)   12  Hypertension (401 9) (I10)   13  Ligamentous laxity of right shoulder (728 4) (M24 211)   14  Localized osteoarthritis of right knee (715 36) (M17 11)   15  Muscle spasm (728 85) (M62 838)   16  Muscle strain (848 9) (T14 8)   17  Need for influenza vaccination (V04 81) (Z23)   18  Osteoarthritis of right knee (715 96) (M17 11)   19  Otitis media, left (382 9) (H66 92)   20  Postnasal drip (784 91) (R09 82)   21  Right knee pain (719 46) (M25 561)   22  Right shoulder pain (719 41) (M25 511)   23  Screening for colorectal cancer (V76 51) (Z12 11,Z12 12)   24  Screening for depression (V79 0) (Z13 89)   25  Screening for genitourinary condition (V81 6) (Z13 89)   26  Screening for lipoid disorders (V77 91) (Z13 220)   27  URTI (acute upper respiratory infection) (465 9) (J06 9)    Surgical History    1   History of Aortic Valve Replacement   2  History of Foot Surgery   3  History of Knee Surgery   4  History of Rotator Cuff Repair   5  History of Surgery For Abdominal Aortic Aneurysm  Surgical History Reviewed: The surgical history was reviewed and updated today  Family History  Mother    1  Family history of alcoholism (V17 0) (Z81 1)   2  Family history of hypertension (V17 49) (Z82 49)   3  Family history of malignant neoplasm (V16 9) (Z80 9)  Father    4  Family history of alcoholism (V17 0) (Z81 1)   5  Family history of diabetes mellitus (V18 0) (Z83 3)   6  Family history of hypertension (V17 49) (Z82 49)   7  Family history of malignant neoplasm (V16 9) (Z80 9)  Brother    8  Family history of malignant neoplasm (V16 9) (Z80 9)   9  Family history of Mental disorders and diseases of the nervous system complicating   pregnancy, childbirth and the puerperium  Family History Reviewed: The family history was reviewed and updated today  Social History    · Always uses seat belt   · Former consumption of alcohol (V11 3) (Z87 898)   · Former smoker (Q07 42) (D94 186)   · Former smoker (V15 82) (Y93 671)   · History of crack cocaine use   · History of marijuana use (305 23) (Z87 898)  Social History Reviewed: The social history was reviewed and updated today  Current Meds   1  Aspirin 81 MG TABS; TAKE 1 TABLET DAILY; Therapy: 08Ygp7454 to (Evaluate:09Apr2017); Last Rx:14Apr2016 Ordered   2  Atenolol 50 MG Oral Tablet; TAKE 1 TABLET DAILY; Therapy: 98PDU6926 to (Evaluate:11Nov2017) Recorded   3  Azithromycin 250 MG Oral Tablet; Take 2 tablets today, then 1 tablet daily for 4 days; Therapy: 09OUM4003 to (Last Rx:95Cqp0198)  Requested for: 42LQY1153 Ordered   4  Benzonatate 200 MG Oral Capsule; TAKE 1 CAPSULE 3 TIMES DAILY AS NEEDED; Therapy: 59JKZ6243 to (Evaluate:09Jun2017)  Requested for: 40AKQ4018; Last   Rx:69Orm0796 Ordered   5   Doxycycline Hyclate 100 MG Oral Capsule; TAKE 1 CAPSULE EVERY [address];   Therapy: X2761567 to (Evaluate:98Gre5142)  Requested for: 93TRK3466; Last   Rx:13Jlz0531 Ordered   6  Enalapril Maleate 10 MG Oral Tablet; 1 EVERY MORNING; Therapy: 29KGI6484 to Recorded   7  Fluticasone Propionate 50 MCG/ACT Nasal Suspension; USE 2 SPRAYS IN EACH   NOSTRIL ONCE DAILY; Therapy: 43Dep7658 to (Evaluate:35Unx8245)  Requested for: 38Ska3974; Last   Rx:37Rne0132 Ordered   8  Furosemide 20 MG Oral Tablet; Therapy: (Recorded:64Pvw1868) to Recorded   9  Ibuprofen 600 MG Oral Tablet; TAKE 1 TABLET 3 TIMES DAILY WITH FOOD AS NEEDED; Therapy: 47IDB1701 to (Evaluate:01Apr2017)  Requested for: 46Fqo6415; Last   Rx:85Fsd8688 Ordered   10  Multivitamins Oral Capsule; TAKE 1 CAPSULE DAILY; Therapy: 03Egp2842 to (Evaluate:10Nov2016); Last Rx:62Cde3165 Ordered   11  Pravastatin Sodium 20 MG Oral Tablet; Therapy: (Recorded:15Lcj7361) to Recorded   12  Sertraline HCl - 100 MG Oral Tablet; TAKE 2 TABLET Daily; Therapy: 46Bxd6116 to (Evaluate:14May2016); Last Rx:93Vjo8963 Ordered   13  Trileptal 150 MG Oral Tablet; TAKE 1 TABLET TWICE DAILY; Therapy: 66XSA3460 to Recorded   14  Ventolin  (90 Base) MCG/ACT Inhalation Aerosol Solution; INHALE 1 TO 2 PUFFS    EVERY 4 TO 6 HOURS AS NEEDED; Therapy: 76TQL3424 to (Last Rx:58Zgt2657)  Requested for: 46WSC7317 Ordered   15  Viagra 50 MG Oral Tablet; TAKE 1 TABLET DAILY 1 HOUR BEFORE NEEDED; Therapy: 04LPA0277 to (Evaluate:11Aug2017); Last Rx:47Iuk7006 Ordered  Medication List Reviewed: The medication list was reviewed and updated today  Allergies    1  Codeine Derivatives   2  Crestor TABS   3  Penicillins   4  Sulfa Drugs   5  Codeine Derivatives   6  Penicillins   7   Sulfa Drugs    Vitals  Signs   Recorded: 56WEO1579 11:10AM   Temperature: 97 8 F, Tympanic  Heart Rate: 78, L Radial  Pulse Quality: Normal, L Radial  Respiration Quality: Normal  Respiration: 16  Systolic: 244, LUE, Sitting  Diastolic: 70, LUE, Sitting  Height: 6 ft 4 5 in  Weight: 339 lb   BMI Calculated: 40 73  BSA Calculated: 2 79    Physical Exam    Constitutional   General appearance: No acute distress, well appearing and well nourished  Eyes no pallor  Ears, Nose, Mouth, and Throat   Oropharynx: Normal with no erythema, edema, exudate or lesions  Pulmonary   Respiratory effort: No increased work of breathing or signs of respiratory distress  Auscultation of lungs: Clear to auscultation, equal breath sounds bilaterally, no wheezes, no rales, no rhonci  Cardiovascular   Auscultation of heart: Normal rate and rhythm, normal S1 and S2, without murmurs  Examination of extremities for edema and/or varicosities: Normal     Carotid pulses: Normal     Abdomen   Abdomen: Non-tender, no masses  Liver and spleen: No hepatomegaly or splenomegaly  Musculoskeletal   Gait and station: Normal   using cane  Psychiatric   Orientation to person, place and time: Normal     Mood and affect: Normal          Message  Return to work or school:   Ruel Chou is under my professional care  He was seen in my office on 7/19/17   He is able to return to work on  7/27/17       Macario LUIS        Future Appointments    Date/Time Provider Specialty Site   08/16/2017 10:45 AM TO[facility] [address]     Signatures   Electronically signed by : TOBY Mckenna; Jul 20 2017  8:11AM EST                       (Author)    Electronically signed by : NEY Mendieta ; Jul 20 2017  8:58AM EST                       (Author)    Electronically signed by : NEY Mendieta ; Jul 20 2017  8:58AM EST                       (Author)

## 2018-01-13 VITALS
BODY MASS INDEX: 37.19 KG/M2 | TEMPERATURE: 97.6 F | DIASTOLIC BLOOD PRESSURE: 70 MMHG | WEIGHT: 315 LBS | HEIGHT: 77 IN | HEART RATE: 72 BPM | RESPIRATION RATE: 16 BRPM | SYSTOLIC BLOOD PRESSURE: 104 MMHG

## 2018-01-13 VITALS
DIASTOLIC BLOOD PRESSURE: 70 MMHG | TEMPERATURE: 98.5 F | HEART RATE: 78 BPM | WEIGHT: 315 LBS | RESPIRATION RATE: 16 BRPM | BODY MASS INDEX: 37.19 KG/M2 | SYSTOLIC BLOOD PRESSURE: 110 MMHG | HEIGHT: 77 IN

## 2018-01-13 VITALS
HEART RATE: 78 BPM | HEIGHT: 77 IN | TEMPERATURE: 98 F | RESPIRATION RATE: 16 BRPM | DIASTOLIC BLOOD PRESSURE: 80 MMHG | SYSTOLIC BLOOD PRESSURE: 130 MMHG | WEIGHT: 315 LBS | BODY MASS INDEX: 37.19 KG/M2

## 2018-01-13 NOTE — RESULT NOTES
Discussion/Summary   CXR clear  no heart failure or pneumonia     Verified Results  * XR CHEST PA & LATERAL 31Yow3031 02:26PM Jeana Partida Order Number: [id number]     Test Name Result Flag Reference   XR CHEST PA & LATERAL (Report)     CHEST      INDICATION: Dyspnea with COPD  COMPARISON: 8/8/2017  VIEWS: Frontal and lateral projections     IMAGES: 2     FINDINGS:        Cardiomediastinal silhouette appears unremarkable  The lungs are clear  No pneumothorax or pleural effusion  Visualized osseous structures appear within normal limits for the patient's age  IMPRESSION:     No active pulmonary disease         Workstation performed: [id number]     Signed by:   Casandra Lazcano MD   9/22/17       Signatures   Electronically signed by : TOBY Lea; Sep 22 2017  3:08PM EST                       (Author)

## 2018-01-14 VITALS
HEART RATE: 84 BPM | WEIGHT: 315 LBS | DIASTOLIC BLOOD PRESSURE: 62 MMHG | RESPIRATION RATE: 16 BRPM | HEIGHT: 77 IN | BODY MASS INDEX: 37.19 KG/M2 | TEMPERATURE: 97 F | SYSTOLIC BLOOD PRESSURE: 110 MMHG

## 2018-01-14 VITALS
HEART RATE: 72 BPM | TEMPERATURE: 98.6 F | BODY MASS INDEX: 37.19 KG/M2 | RESPIRATION RATE: 16 BRPM | HEIGHT: 77 IN | DIASTOLIC BLOOD PRESSURE: 70 MMHG | SYSTOLIC BLOOD PRESSURE: 122 MMHG | WEIGHT: 315 LBS

## 2018-01-14 VITALS
HEIGHT: 77 IN | RESPIRATION RATE: 16 BRPM | HEART RATE: 78 BPM | TEMPERATURE: 97.8 F | SYSTOLIC BLOOD PRESSURE: 110 MMHG | DIASTOLIC BLOOD PRESSURE: 70 MMHG | WEIGHT: 315 LBS | BODY MASS INDEX: 37.19 KG/M2

## 2018-01-14 NOTE — RESULT NOTES
Discussion/Summary   CXR normal  No fluid in lungs  Labs are good as well  (Please ask how feeling, how swelling  Send me task)     Verified Results  (1) COMPREHENSIVE METABOLIC PANEL 45SAQ0432 89:11IL Roger Henry Order Number: [id number]     Test Name Result Flag Reference   GLUCOSE,RANDM 123 mg/dL     If the patient is fasting, the ADA then defines impaired fasting glucose as > 100 mg/dL and diabetes as > or equal to 123 mg/dL  Specimen collection should occur prior to Sulfasalazine administration due to the potential for falsely depressed results  Specimen collection should occur prior to Sulfapyridine administration due to the potential for falsely elevated results  SODIUM 142 mmol/L  136-145   POTASSIUM 4 0 mmol/L  3 5-5 3   CHLORIDE 110 mmol/L H 100-108   CARBON DIOXIDE 24 mmol/L  21-32   ANION GAP (CALC) 8 mmol/L  4-13   BLOOD UREA NITROGEN 17 mg/dL  5-25   CREATININE 1 00 mg/dL  0 60-1 30   Standardized to IDMS reference method   CALCIUM 8 7 mg/dL  8 3-10 1   BILI, TOTAL 0 45 mg/dL  0 20-1 00   ALK PHOSPHATAS 138 U/L H    ALT (SGPT) 30 U/L  12-78   Specimen collection should occur prior to Sulfasalazine and/or Sulfapyridine administration due to the potential for falsely depressed results  AST(SGOT) 21 U/L  5-45   Specimen collection should occur prior to Sulfasalazine administration due to the potential for falsely depressed results  ALBUMIN 3 7 g/dL  3 5-5 0   TOTAL PROTEIN 7 3 g/dL  6 4-8 2   eGFR 82 ml/min/1 73sq m     National Kidney Disease Education Program recommendations are as follows:  GFR calculation is accurate only with a steady state creatinine  Chronic Kidney disease less than 60 ml/min/1 73 sq  meters  Kidney failure less than 15 ml/min/1 73 sq  meters       (1) HEMOGLOBIN, BLOOD 21Rmg7844 10:06AM Roger Henry Order Number: [id number]     Test Name Result Flag Reference   HEMOGLOBIN 14 3 g/dL  12 0-17 0     (1) NT- BNP (PRO BRAIN NATRIURETIC PEPTIDE) 09QLP5704 10:06AM Samia Rock Order Number: [id number]     Test Name Result Flag Reference   NT-PRO  pg/mL  <125     (1) MAGNESIUM 62Yci0188 10:06AM Samia Rock Order Number: [id number]     Test Name Result Flag Reference   MAGNESIUM 2 6 mg/dL  1 6-2 6     * XR CHEST PA & LATERAL 40Fvs1177 12:00AM Yanely Neff     Test Name Result Flag Reference   XR CHEST PA & LATERAL (Report)     CHEST      INDICATION: Difficulty breathing  COMPARISON: 7/11/2017     VIEWS: Frontal and lateral projections     IMAGES: 3     FINDINGS:        Cardiomediastinal silhouette appears unremarkable  The lungs are clear  No pneumothorax or pleural effusion  No evidence of heart failure  Visualized osseous structures appear within normal limits for the patient's age  IMPRESSION:     No active pulmonary disease         Workstation performed: [id number]     Signed by:   Roger Marcus MD   8/9/17

## 2018-01-15 NOTE — MISCELLANEOUS
Message  Return to work or school:   Dilcia Perdue is under my professional care  He was seen in my office on 8/8/17   He is able to return to work on  8/14/17       H  Nasir/yomaira  Signatures   Electronically signed by : TOBY Gold;  Aug 10 2017  4:36PM EST                       (Author)

## 2018-01-15 NOTE — RESULT NOTES
Verified Results  (1) HEP B SURFACE ANTIBODY [id number] 02:30PM Vicky Ace     Test Name Result Flag Reference   HEPATITIS B SURFACE ANTIBODY <3 10 mIU/mL     Protective Immunity: Hep B Surface Antibody >= 10 mIu/ml (Traceable to [facility] Reference Preparation)     (1) RUBELLA IMMUNITY 87LQI0052 02:30PM Vicky Ace     Test Name Result Flag Reference   RUBELLA (IGG) >175 0 IU/mL  >9 9   Rubella IgG       <5 0 IU/mL     Negative: not immune      5 0-9 9 IU/mL  Equivocal     >9 9 IU/mL     Positive: immune     (1) RUBEOLA ANTIBODIES, IGG [id number] 02:30PM Vicky Ace     Test Name Result Flag Reference   Rubeola AB, IgG IMMUNE  IMMUNE   Interpretation:    IMMUNE     - Presumed immune to Measles infection  EQUIVOCAL  - Suggest repeat in 2-4 weeks  NON-IMMUNE - Presumed non-immune to Measles infection  (1) MUMPS  ANTIBODIES, IGG [id number] 02:30PM Vicky Ace     Test Name Result Flag Reference   MUMPS IgG IMMUNE  IMMUNE   IMMUNE - Presumed immune to mumps infection  INTERPRETATION:    IMMUNE     - Presumed immune to Mumps IgG infection  EQUIVOCAL  - Suggest repeat testing in 2-4 weeks  NON-IMMUNE - Presumed non-immune to Mumps IgG infection  (1) VARICELLA ZOSTER IMMUNITY(IGG) [id number] 02:30PM Vicky Ace     Test Name Result Flag Reference   ELLEN ZOSTER IGG IMMUNE  IMMUNE   INTERPRETATION:    IMMUNE     - Presumed immune to VZV IgG infection  EQUIVOCAL  - Suggest repeat testing in 2-4 weeks  NON-IMMUNE - Presumed non-immune to VZV IgG infection         Signatures   Electronically signed by : TOBY Sandoval; Dec  1 2017  8:14AM EST                       (Author)

## 2018-01-16 NOTE — PROGRESS NOTES
Assessment    1  Physical exam, pre-employment (V70 5) (Z02 1)   2  Need for Tdap vaccination (V06 1) (Z23)   3  Need for influenza vaccination (V04 81) (Z23)   4  Immunity status testing (V72 61) (Z01 84)   5  PPD screening test (V74 1) (Z11 1)    Plan  Immunity status testing    · (1) HEP B SURFACE ANTIBODY; Status:Active; Requested for:29Nov2017;    · (1) MUMPS  ANTIBODIES, IGG; Status:Active; Requested for:29Nov2017;    · (1) RUBELLA IMMUNITY; Status:Active; Requested for:29Nov2017;    · (1) RUBEOLA ANTIBODIES, IGG; Status:Active; Requested for:29Nov2017;    · (1) VARICELLA ZOSTER IMMUNITY(IGG); Status:Active; Requested for:29Nov2017;   Need for Tdap vaccination    · Tdap (Adacel)  PPD screening test    · PPD    Discussion/Summary    Medically cleared for employment pending immunity status testing, ppd  UDS negative  Chief Complaint  PE  nil/lpn      History of Present Illness  HPI: papa here for pre-emp PE  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  in usual state of health      Review of Systems    Constitutional: No fever or chills, feels well, no tiredness, no recent weight gain or weight loss  Eyes: no eyesight problems  ENT: no complaints of earache, no hearing loss, no nosebleeds, no nasal discharge, no sore throat, no hoarseness  Cardiovascular: No complaints of slow heart rate, no fast heart rate, no chest pain, no palpitations, no leg claudication, no lower extremity  Respiratory: No complaints of shortness of breath, no wheezing, no cough, no SOB on exertion, no orthopnea or PND  Gastrointestinal: No complaints of abdominal pain, no constipation, no nausea or vomiting, no diarrhea or bloody stools  Genitourinary: No complaints of dysuria, no incontinence, no hesitancy, no nocturia, no genital lesion, no testicular pain  Musculoskeletal: arthralgias  Integumentary: No complaints of skin rash or skin lesions, no itching, no skin wound, no dry skin  Neurological: No compliants of headache, no confusion, no convulsions, no numbness or tingling, no dizziness or fainting, no limb weakness, no difficulty walking  Psychiatric: Is not suicidal, no sleep disturbances, no anxiety or depression, no change in personality, no emotional problems  Active Problems    1  Acute pain of right shoulder (719 41) (M25 511)   2  Aortic aneurysm (441 9) (I71 9)   3  Arthritis (716 90) (M19 90)   4  Asthma (493 90) (J45 909)   5  Asthma (493 90) (J45 909)   6  Bilateral leg cramps (729 82) (R25 2)   7  Chronic diastolic heart failure (223 63) (I50 32)   8  COPD, moderate (496) (J44 9)   9  Depression (311) (F32 9)   10  Drug therapy (V58 69) (Z79 899)   11  Encounter for special screening examination for nervous system disorder (V80 09)    (Z13 858)   12  Erectile dysfunction of organic origin (607 84) (N52 9)   13  Exertional dyspnea (786 09) (R06 09)   14  Fatigue (780 79) (R53 83)   15  Heart disorder (429 9) (I51 9)   16  Hyperlipidemia (272 4) (E78 5)   17  Hypertension (401 9) (I10)   18  Ligamentous laxity of right shoulder (728 4) (M24 211)   19  Localized osteoarthritis of right knee (715 36) (M17 11)   20  Muscle spasm (728 85) (M62 838)   21  Muscle strain (848 9) (T14 8XXA)   22  Need for hepatitis C screening test (V73 89) (Z11 59)   23  Need for influenza vaccination (V04 81) (Z23)   24  Orthostasis (458 0) (I95 1)   25  Osteoarthritis of right knee (715 96) (M17 11)   26  Otitis media, left (382 9) (H66 92)   27  Postnasal drip (784 91) (R09 82)   28  Right knee pain (719 46) (M25 561)   29  Right shoulder pain (719 41) (M25 511)   30  Screening for colorectal cancer (V76 51) (Z12 11,Z12 12)   31  Screening for depression (V79 0) (Z13 89)   32  Screening for genitourinary condition (V81 6) (Z13 89)   33  Screening for lipoid disorders (V77 91) (Z13 220)   34   URTI (acute upper respiratory infection) (465 9) (J06 9)    Surgical History    · History of Aortic Valve Replacement   · History of Foot Surgery   · History of Knee Surgery   · History of Rotator Cuff Repair   · History of Surgery For Abdominal Aortic Aneurysm    Family History  Mother    · Family history of alcoholism (V17 0) (Z81 1)   · Family history of hypertension (V17 49) (Z82 49)   · Family history of malignant neoplasm (V16 9) (Z80 9)  Father    · Family history of alcoholism (V17 0) (Z81 1)   · Family history of diabetes mellitus (V18 0) (Z83 3)   · Family history of hypertension (V17 49) (Z82 49)   · Family history of malignant neoplasm (V16 9) (Z80 9)  Brother    · Family history of malignant neoplasm (V16 9) (Z80 9)   · Family history of Mental disorders and diseases of the nervous system complicating  pregnancy, childbirth and the puerperium    Social History    · Always uses seat belt   · Former consumption of alcohol (V11 3) (Z87 898)   · Former smoker (V15 82) (Z87 891)   · Former smoker (V15 82) (P48 465)   · History of crack cocaine use   · History of marijuana use (305 23) (B93 392)    Current Meds   1  Aspirin 81 MG TABS; TAKE 1 TABLET DAILY; Therapy: 00Hbk9020 to (Evaluate:55Qwc0892); Last Rx:72Knp2812 Ordered   2  Atenolol 50 MG Oral Tablet; TAKE 1 TABLET DAILY; Therapy: 67ZPB3040 to (Rodrigo Mckeon)  Requested for: 95ANO0060; Last   Rx:06Nov2017 Ordered   3  Enalapril Maleate 10 MG Oral Tablet; 1 EVERY MORNING; Therapy: 82CRQ1950 to Recorded   4  Furosemide 40 MG Oral Tablet; TAKE TABLET Daily  Requested for: 46PEL6671; Last   Rx:20Zjb9892 Ordered   5  GuaiFENesin 400 MG Oral Tablet; TAKE 1 TABLET EVERY 8  HOURS AS NEEDED; Therapy: 25PBM4215 to (Evaluate:00Rla8673)  Requested for: 34IJB6633; Last   Rx:49Lyp2729 Ordered   6  Ipratropium-Albuterol 0 5-2 5 (3) MG/3ML Inhalation Solution; USE 1 UNIT DOSE IN   NEBULIZER EVERY 6 HOURS AS NEEDED; Therapy: 76EDT4722 to (Last Rx:32Ozh4116) Ordered   7  Multivitamins Oral Capsule; TAKE 1 CAPSULE DAILY;    Therapy: 14Apr2016 to (Evaluate:10Nov2016); Last Rx:69Hwc5128 Ordered   8  Nebulizer Device; USE AS DIRECTED; Therapy: 47NUH1910 to (Last Rx:21Sep2017) Ordered   9  Nebulizer/Tubing/Mouthpiece KIT; USE AS DIRECTED; Therapy: 98KGQ6173 to (Last Rx:21Sep2017) Ordered   10  Potassium Chloride Roseanne ER 20 MEQ Oral Tablet Extended Release; take 1 tablet by    mouth once daily; Therapy: 71KXD3450 to ([id number] 8822)  Requested for: 54LNT2798; Last    Rx:75Zpn1270 Ordered   11  Sertraline HCl - 100 MG Oral Tablet; TAKE 2 TABLET Daily; Therapy: 81Sfm5143 to (Evaluate:14May2016); Last Rx:93Mbk8061 Ordered   12  Stiolto Respimat 2 5-2 5 MCG/ACT Inhalation Aerosol Solution; INHALE 2 PUFFS Daily; Therapy: 70HNO6477 to (Last Rx:21Sep2017) Ordered   13  Trileptal 150 MG Oral Tablet; TAKE 1 TABLET TWICE DAILY; Therapy: 55IQW9528 to Recorded   14  Ventolin  (90 Base) MCG/ACT Inhalation Aerosol Solution; INHALE 1 TO 2    PUFFS EVERY 4 TO 6 HOURS AS NEEDED; Therapy: 77DPC4649 to (Last Rx:15May2017)  Requested for: 13TOS3058 Ordered   15  Viagra 50 MG Oral Tablet; TAKE 1 TABLET DAILY 1 HOUR BEFORE NEEDED; Therapy: 59DHP9444 to (Evaluate:11Aug2017); Last Rx:14Acb9462 Ordered    Allergies    1  Codeine Derivatives   2  Crestor TABS   3  Penicillins   4  Sulfa Drugs   5  Codeine Derivatives   6  Penicillins   7  Sulfa Drugs    Vitals   Recorded: 55OYI6565 10:17AM   Temperature 98 6 F   Heart Rate 76   Respiration Quality Normal   Respiration 18   Systolic 509   Diastolic 70   Height 6 ft 4 5 in   Weight 344 lb    BMI Calculated 41 33   BSA Calculated 2 8     Physical Exam    Constitutional   General appearance: No acute distress, well appearing and well nourished  Head and Face   Head and face: Normal     Eyes   Conjunctiva and lids: No erythema, swelling or discharge  Pupils and irises: Equal, round, reactive to light      Ears, Nose, Mouth, and Throat   Otoscopic examination: Tympanic membranes translucent with normal light reflex  Canals patent without erythema  Hearing: Normal     Oropharynx: Normal with no erythema, edema, exudate or lesions  Neck   Thyroid: Normal, no thyromegaly  Pulmonary   Respiratory effort: No increased work of breathing or signs of respiratory distress  Auscultation of lungs: Abnormal   Auscultation of the lungs revealed decreased breath sounds diffusely  no rales or crackles were heard bilaterally  no rhonchi  no wheezing  Cardiovascular   Auscultation of heart: Normal rate and rhythm, normal S1 and S2, no murmurs  Peripheral vascular exam: Normal     Examination of extremities for edema and/or varicosities: Normal     Abdomen   Abdomen: Non-tender, no masses  Lymphatic   Palpation of lymph nodes in neck: No lymphadenopathy      Musculoskeletal   Gait and station: Normal     Psychiatric   Judgment and insight: Normal     Mood and affect: Normal        Future Appointments    Date/Time Provider Specialty Site   01/23/2018 09:45 AM Ardeen Kanner, APN Family Medicine [address]   12/01/2017 09:00 AM [facility], Nurse Schedule  [facility]   12/13/2017 10:00 AM [facility], Nurse Schedule  [facility]   12/15/2017 10:00 AM [facility], Nurse Schedule  [facility]     Signatures   Electronically signed by : TOBY Denise; Nov 29 2017 11:59AM EST                       (Author)    Electronically signed by : NEY Fox ; Nov 29 2017  1:00PM EST                       (Author)

## 2018-01-22 VITALS
BODY MASS INDEX: 37.19 KG/M2 | HEIGHT: 77 IN | DIASTOLIC BLOOD PRESSURE: 70 MMHG | TEMPERATURE: 98.6 F | SYSTOLIC BLOOD PRESSURE: 110 MMHG | HEART RATE: 76 BPM | WEIGHT: 315 LBS | RESPIRATION RATE: 18 BRPM

## 2018-01-22 VITALS
HEART RATE: 72 BPM | OXYGEN SATURATION: 98 % | RESPIRATION RATE: 18 BRPM | BODY MASS INDEX: 37.19 KG/M2 | HEIGHT: 77 IN | WEIGHT: 315 LBS | SYSTOLIC BLOOD PRESSURE: 102 MMHG | TEMPERATURE: 96.8 F | DIASTOLIC BLOOD PRESSURE: 70 MMHG

## 2018-01-22 VITALS
DIASTOLIC BLOOD PRESSURE: 76 MMHG | SYSTOLIC BLOOD PRESSURE: 118 MMHG | TEMPERATURE: 97.6 F | OXYGEN SATURATION: 96 % | RESPIRATION RATE: 20 BRPM | HEART RATE: 64 BPM

## 2018-01-23 ENCOUNTER — GENERIC CONVERSION - ENCOUNTER (OUTPATIENT)
Dept: OTHER | Facility: OTHER | Age: 63
End: 2018-01-23

## 2018-01-23 NOTE — PROGRESS NOTES
Chief Complaint  PPD read negative 0 00 mm bh/lpn      Active Problems    1  Acute pain of right shoulder (719 41) (M25 511)   2  Aortic aneurysm (441 9) (I71 9)   3  Arthritis (716 90) (M19 90)   4  Asthma (493 90) (J45 909)   5  Asthma (493 90) (J45 909)   6  Bilateral leg cramps (729 82) (R25 2)   7  Chronic diastolic heart failure (931 60) (I50 32)   8  COPD, moderate (496) (J44 9)   9  Depression (311) (F32 9)   10  Drug therapy (V58 69) (Z79 899)   11  Encounter for special screening examination for nervous system disorder (V80 09)    (Z13 858)   12  Erectile dysfunction of organic origin (607 84) (N52 9)   13  Exertional dyspnea (786 09) (R06 09)   14  Fatigue (780 79) (R53 83)   15  Heart disorder (429 9) (I51 9)   16  Hyperlipidemia (272 4) (E78 5)   17  Hypertension (401 9) (I10)   18  Immunity status testing (V72 61) (Z01 84)   19  Ligamentous laxity of right shoulder (728 4) (M24 211)   20  Localized osteoarthritis of right knee (715 36) (M17 11)   21  Muscle spasm (728 85) (M62 838)   22  Muscle strain (848 9) (T14 8XXA)   23  Need for hepatitis C screening test (V73 89) (Z11 59)   24  Need for influenza vaccination (V04 81) (Z23)   25  Need for Tdap vaccination (V06 1) (Z23)   26  Orthostasis (458 0) (I95 1)   27  Osteoarthritis of right knee (715 96) (M17 11)   28  Otitis media, left (382 9) (H66 92)   29  Physical exam, pre-employment (V70 5) (Z02 1)   30  Postnasal drip (784 91) (R09 82)   31  PPD screening test (V74 1) (Z11 1)   32  Right knee pain (719 46) (M25 561)   33  Right shoulder pain (719 41) (M25 511)   34  Screening for colorectal cancer (V76 51) (Z12 11,Z12 12)   35  Screening for depression (V79 0) (Z13 89)   36  Screening for genitourinary condition (V81 6) (Z13 89)   37  Screening for lipoid disorders (V77 91) (Z13 220)   38  URTI (acute upper respiratory infection) (465 9) (J06 9)    Current Meds   1  Aspirin 81 MG TABS; TAKE 1 TABLET DAILY;    Therapy: 14Apr2016 to (Evaluate:09Apr2017); Last Rx:14Apr2016 Ordered   2  Atenolol 50 MG Oral Tablet; TAKE 1 TABLET DAILY; Therapy: 40RNN4348 to (Rudy Esquivel)  Requested for: 52HYP9115; Last   Rx:06Nov2017 Ordered   3  Enalapril Maleate 10 MG Oral Tablet; 1 EVERY MORNING; Therapy: 92LHJ7545 to Recorded   4  Furosemide 40 MG Oral Tablet; TAKE TABLET Daily  Requested for: 47AOP6206; Last   Rx:83Eyj5267 Ordered   5  GuaiFENesin 400 MG Oral Tablet; TAKE 1 TABLET EVERY 8  HOURS AS NEEDED; Therapy: 14SEM2837 to (Evaluate:27Sep2017)  Requested for: 78QVR7624; Last   Rx:21Sep2017 Ordered   6  Ipratropium-Albuterol 0 5-2 5 (3) MG/3ML Inhalation Solution; USE 1 UNIT DOSE IN   NEBULIZER EVERY 6 HOURS AS NEEDED; Therapy: 15LJC4389 to (Last Rx:21Sep2017) Ordered   7  Multivitamins Oral Capsule; TAKE 1 CAPSULE DAILY; Therapy: 14Apr2016 to (Evaluate:10Nov2016); Last Rx:14Apr2016 Ordered   8  Nebulizer Device; USE AS DIRECTED; Therapy: 57FZF2725 to (Last Rx:21Sep2017) Ordered   9  Nebulizer/Tubing/Mouthpiece KIT; USE AS DIRECTED; Therapy: 61LGI0981 to (Last Rx:21Sep2017) Ordered   10  Potassium Chloride Roseanne ER 20 MEQ Oral Tablet Extended Release; take 1 tablet by    mouth once daily; Therapy: 13AOL1394 to (22-85-39-05)  Requested for: 20PKW9398; Last    Rx:54Tru4195 Ordered   11  Sertraline HCl - 100 MG Oral Tablet; TAKE 2 TABLET Daily; Therapy: 14Apr2016 to (Evaluate:47Qfb4406); Last Rx:14Apr2016 Ordered   12  Stiolto Respimat 2 5-2 5 MCG/ACT Inhalation Aerosol Solution; INHALE 2 PUFFS Daily; Therapy: 36ATK9606 to (Last Rx:21Sep2017) Ordered   13  Trileptal 150 MG Oral Tablet; TAKE 1 TABLET TWICE DAILY; Therapy: 47JMU5874 to Recorded   14  Ventolin  (90 Base) MCG/ACT Inhalation Aerosol Solution; INHALE 1 TO 2    PUFFS EVERY 4 TO 6 HOURS AS NEEDED; Therapy: 53ARM1991 to (Last Rx:44Bhb1892)  Requested for: 19LRY5830 Ordered   15   Viagra 50 MG Oral Tablet; TAKE 1 TABLET DAILY 1 HOUR BEFORE NEEDED; Therapy: 12HZK3590 to (Evaluate:73Tfe4472); Last Rx:61Ofk1232 Ordered    Allergies    1  Codeine Derivatives   2  Crestor TABS   3  Penicillins   4  Sulfa Drugs   5  Codeine Derivatives   6  Penicillins   7  Sulfa Drugs    Assessment    1   Need for hepatitis B vaccination (V05 3) (Z23)    Plan  Need for hepatitis B vaccination    · Hepatitis B  PPD screening test    · PPD    Future Appointments    Date/Time Provider Specialty Site   01/23/2018 09:45 AM TOBY Larson [facility] [address]   12/13/2017 10:00 AM [facility], Nurse Schedule  [facility]   12/15/2017 10:00 AM [facility], [facility]  [facility]   12/15/2017 10:15 AM [facility], [facility]  [facility]   01/02/2018 09:00 AM [facility], [facility]  [facility]   05/02/2018 09:00 AM [facility], [facility]  [facility]     Signatures   Electronically signed by : TOBY Li; Dec  1 2017  1:06PM EST                       (Author)    Electronically signed by : NEY Tristan ; Dec  1 2017  2:02PM EST                       (Author)

## 2018-01-23 NOTE — PROGRESS NOTES
Chief Complaint  pt here for ppd read  tc/cma      Active Problems    1  Acute pain of right shoulder (719 41) (M25 511)   2  Aortic aneurysm (441 9) (I71 9)   3  Arthritis (716 90) (M19 90)   4  Asthma (493 90) (J45 909)   5  Asthma (493 90) (J45 909)   6  Bilateral leg cramps (729 82) (R25 2)   7  Chronic diastolic heart failure (473 34) (I50 32)   8  COPD, moderate (496) (J44 9)   9  Depression (311) (F32 9)   10  Drug therapy (V58 69) (Z79 899)   11  Encounter for special screening examination for nervous system disorder (V80 09)    (Z13 858)   12  Erectile dysfunction of organic origin (607 84) (N52 9)   13  Exertional dyspnea (786 09) (R06 09)   14  Fatigue (780 79) (R53 83)   15  Heart disorder (429 9) (I51 9)   16  Hyperlipidemia (272 4) (E78 5)   17  Hypertension (401 9) (I10)   18  Immunity status testing (V72 61) (Z01 84)   19  Ligamentous laxity of right shoulder (728 4) (M24 211)   20  Localized osteoarthritis of right knee (715 36) (M17 11)   21  Muscle spasm (728 85) (M62 838)   22  Muscle strain (848 9) (T14 8XXA)   23  Need for hepatitis B vaccination (V05 3) (Z23)   24  Need for hepatitis C screening test (V73 89) (Z11 59)   25  Need for influenza vaccination (V04 81) (Z23)   26  Need for Tdap vaccination (V06 1) (Z23)   27  Orthostasis (458 0) (I95 1)   28  Osteoarthritis of right knee (715 96) (M17 11)   29  Otitis media, left (382 9) (H66 92)   30  Physical exam, pre-employment (V70 5) (Z02 1)   31  Postnasal drip (784 91) (R09 82)   32  PPD screening test (V74 1) (Z11 1)   33  Right knee pain (719 46) (M25 561)   34  Right shoulder pain (719 41) (M25 511)   35  Screening for colorectal cancer (V76 51) (Z12 11,Z12 12)   36  Screening for depression (V79 0) (Z13 89)   37  Screening for genitourinary condition (V81 6) (Z13 89)   38  Screening for lipoid disorders (V77 91) (Z13 220)   39  URTI (acute upper respiratory infection) (465 9) (J06 9)    Current Meds   1   Aspirin 81 MG TABS; TAKE 1 TABLET DAILY; Therapy: 72Hji6768 to (Evaluate:09Apr2017); Last Rx:14Apr2016 Ordered   2  Atenolol 50 MG Oral Tablet; TAKE 1 TABLET DAILY; Therapy: 45IKB9023 to (Kinga Langston)  Requested for: 74VIK8279; Last   Rx:06Nov2017 Ordered   3  Enalapril Maleate 10 MG Oral Tablet; 1 EVERY MORNING; Therapy: 82SMF8609 to Recorded   4  Furosemide 40 MG Oral Tablet; TAKE TABLET Daily  Requested for: 19ZOZ5050; Last   Rx:35Vpz9690 Ordered   5  GuaiFENesin 400 MG Oral Tablet; TAKE 1 TABLET EVERY 8  HOURS AS NEEDED; Therapy: 95QTQ7979 to (Evaluate:27Sep2017)  Requested for: 05JWL5953; Last   Rx:21Sep2017 Ordered   6  Ipratropium-Albuterol 0 5-2 5 (3) MG/3ML Inhalation Solution; USE 1 UNIT DOSE IN   NEBULIZER EVERY 6 HOURS AS NEEDED; Therapy: 02DWX0114 to (Last Rx:21Sep2017) Ordered   7  Multivitamins Oral Capsule; TAKE 1 CAPSULE DAILY; Therapy: 84Ueo2450 to (Evaluate:10Nov2016); Last Rx:14Apr2016 Ordered   8  Nebulizer Device; USE AS DIRECTED; Therapy: 94IGG5644 to (Last Rx:21Sep2017) Ordered   9  Nebulizer/Tubing/Mouthpiece KIT; USE AS DIRECTED; Therapy: 81FZI3324 to (Last Rx:21Sep2017) Ordered   10  Potassium Chloride Roseanne ER 20 MEQ Oral Tablet Extended Release; take 1 tablet by    mouth once daily; Therapy: 24LMT7962 to (25 230615)  Requested for: 55ILE8240; Last    Rx:46Yof7994 Ordered   11  Sertraline HCl - 100 MG Oral Tablet; TAKE 2 TABLET Daily; Therapy: 33Lkg2293 to (Evaluate:38Aue7760); Last Rx:14Apr2016 Ordered   12  Stiolto Respimat 2 5-2 5 MCG/ACT Inhalation Aerosol Solution; INHALE 2 PUFFS Daily; Therapy: 71ZWN3292 to (Last Rx:21Sep2017) Ordered   13  Trileptal 150 MG Oral Tablet; TAKE 1 TABLET TWICE DAILY; Therapy: 44BTF5049 to Recorded   14  Ventolin  (90 Base) MCG/ACT Inhalation Aerosol Solution; INHALE 1 TO 2    PUFFS EVERY 4 TO 6 HOURS AS NEEDED; Therapy: 83HND5734 to (Last Rx:18Qgb9460)  Requested for: 40VYI4589 Ordered   15   Viagra 50 MG Oral Tablet; TAKE 1 TABLET DAILY 1 HOUR BEFORE NEEDED; Therapy: 65KDH7249 to (Evaluate:92Ern1868); Last Rx:24Uwj0465 Ordered    Allergies    1  Codeine Derivatives   2  Crestor TABS   3  Penicillins   4  Sulfa Drugs   5  Codeine Derivatives   6  Penicillins   7   Sulfa Drugs    Plan  PPD screening test    · PPD    Future Appointments    Date/Time Provider Specialty Site   01/23/2018 09:45 AM [facility] [address]   01/02/2018 09:00 AM [facility], Nurse Schedule  [facility]   05/02/2018 09:00 AM [facility], Nurse Schedule  [facility]     Signatures   Electronically signed by : TOBY Chan; Dec 20 2017  5:51PM EST                       (Author)    Electronically signed by : NEY Hampton ; Dec 20 2017  5:58PM EST                       (Author)

## 2018-01-24 VITALS
TEMPERATURE: 97.1 F | BODY MASS INDEX: 37.19 KG/M2 | SYSTOLIC BLOOD PRESSURE: 110 MMHG | RESPIRATION RATE: 16 BRPM | HEIGHT: 77 IN | WEIGHT: 315 LBS | HEART RATE: 68 BPM | DIASTOLIC BLOOD PRESSURE: 66 MMHG

## 2018-02-27 DIAGNOSIS — I50.32 CHRONIC DIASTOLIC CONGESTIVE HEART FAILURE (HCC): Primary | ICD-10-CM

## 2018-02-27 RX ORDER — FUROSEMIDE 40 MG/1
TABLET ORAL
Qty: 30 TABLET | Refills: 6 | Status: SHIPPED | OUTPATIENT
Start: 2018-02-27 | End: 2018-02-28 | Stop reason: SDUPTHER

## 2018-02-27 RX ORDER — ENALAPRIL MALEATE 10 MG/1
TABLET ORAL
Qty: 30 TABLET | Refills: 9 | Status: SHIPPED | OUTPATIENT
Start: 2018-02-27 | End: 2018-02-28 | Stop reason: SDUPTHER

## 2018-02-27 RX ORDER — POTASSIUM CHLORIDE 1500 MG/1
TABLET, EXTENDED RELEASE ORAL
Qty: 30 TABLET | Refills: 6 | Status: SHIPPED | OUTPATIENT
Start: 2018-02-27 | End: 2018-02-28 | Stop reason: SDUPTHER

## 2018-02-28 DIAGNOSIS — I50.32 CHRONIC DIASTOLIC CONGESTIVE HEART FAILURE (HCC): ICD-10-CM

## 2018-02-28 RX ORDER — ENALAPRIL MALEATE 10 MG/1
TABLET ORAL
Qty: 30 TABLET | Refills: 9 | Status: SHIPPED | OUTPATIENT
Start: 2018-02-28 | End: 2018-08-07 | Stop reason: SDUPTHER

## 2018-02-28 RX ORDER — FUROSEMIDE 40 MG/1
TABLET ORAL
Qty: 30 TABLET | Refills: 6 | Status: SHIPPED | OUTPATIENT
Start: 2018-02-28 | End: 2018-11-13 | Stop reason: SDUPTHER

## 2018-02-28 RX ORDER — POTASSIUM CHLORIDE 1500 MG/1
TABLET, EXTENDED RELEASE ORAL
Qty: 30 TABLET | Refills: 6 | Status: SHIPPED | OUTPATIENT
Start: 2018-02-28 | End: 2019-02-19 | Stop reason: SDUPTHER

## 2018-03-06 DIAGNOSIS — B96.89 ACUTE BACTERIAL BRONCHITIS: Primary | ICD-10-CM

## 2018-03-06 DIAGNOSIS — J20.8 ACUTE BACTERIAL BRONCHITIS: Primary | ICD-10-CM

## 2018-03-06 RX ORDER — AZITHROMYCIN 250 MG/1
TABLET, FILM COATED ORAL
Qty: 6 TABLET | Refills: 0 | Status: SHIPPED | OUTPATIENT
Start: 2018-03-06 | End: 2018-03-10

## 2018-03-14 ENCOUNTER — OFFICE VISIT (OUTPATIENT)
Dept: OBGYN CLINIC | Facility: CLINIC | Age: 63
End: 2018-03-14
Payer: MEDICARE

## 2018-03-14 ENCOUNTER — APPOINTMENT (OUTPATIENT)
Dept: RADIOLOGY | Facility: CLINIC | Age: 63
End: 2018-03-14
Payer: MEDICARE

## 2018-03-14 VITALS — HEIGHT: 76 IN | BODY MASS INDEX: 38.36 KG/M2 | WEIGHT: 315 LBS

## 2018-03-14 DIAGNOSIS — M25.561 PAIN IN BOTH KNEES, UNSPECIFIED CHRONICITY: ICD-10-CM

## 2018-03-14 DIAGNOSIS — M25.562 PAIN IN BOTH KNEES, UNSPECIFIED CHRONICITY: ICD-10-CM

## 2018-03-14 DIAGNOSIS — M17.0 BILATERAL PRIMARY OSTEOARTHRITIS OF KNEE: ICD-10-CM

## 2018-03-14 DIAGNOSIS — M25.561 PAIN IN BOTH KNEES, UNSPECIFIED CHRONICITY: Primary | ICD-10-CM

## 2018-03-14 DIAGNOSIS — M25.562 PAIN IN BOTH KNEES, UNSPECIFIED CHRONICITY: Primary | ICD-10-CM

## 2018-03-14 PROCEDURE — 73562 X-RAY EXAM OF KNEE 3: CPT

## 2018-03-14 PROCEDURE — 99214 OFFICE O/P EST MOD 30 MIN: CPT | Performed by: ORTHOPAEDIC SURGERY

## 2018-03-14 NOTE — PROGRESS NOTES
H&P Exam - Orthopedics   Brandee Kline  63 y o  male MRN: [id number]  Unit/Bed#:  Encounter: [id number]    Assessment/Plan     Assessment:  Bilateral knee osteoarthritis  Plan:  Juan Manuel Nixon has bilateral knee osteoarthritis that is fairly severe  He did respond fairly well to the injections of Euflexxa and thus we are going to continue with those  We also spoke at length about knee replacement surgery  He thinks that the right side will be 1st but would like to do that after the summer  I did speak with him about Dr Santhosh Bone for knee replacement surgery  History of Present Illness   HPI:  Brandee Kline  is a 63 y o  male who presents with bilateral knee pain with the right worse than the left  He has been treated previously for bilateral knee osteoarthritis with Euflexxa injections  He has responded well to those  The right knee pain however is a sharp and moderate and constant discomfort that is worse with walking and better with rest   The left knee is more mild and dull discomfort       Review of Systems   Constitutional: Negative for chills, fever and unexpected weight change  HENT: Positive for sore throat  Negative for hearing loss and nosebleeds  Eyes: Negative for pain, redness and visual disturbance  Respiratory: Positive for shortness of breath  Negative for cough and wheezing  Cardiovascular: Negative for chest pain, palpitations and leg swelling  Gastrointestinal: Negative for abdominal pain, nausea and vomiting  Endocrine: Negative for polyphagia and polyuria  Genitourinary: Negative for dysuria and hematuria  Musculoskeletal:        See HPI   Skin: Negative for rash and wound  Neurological: Negative for dizziness, numbness and headaches  Psychiatric/Behavioral: Positive for dysphoric mood  Negative for decreased concentration and suicidal ideas  The patient is not nervous/anxious          Historical Information   Past Medical History:   Diagnosis Date    [facility] abdominal [facility] [address] )     Arthritis of right knee     Asthma     [facility]     Depression     Heart abnormality     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   goes to [facility] (hyperlipidemia)     Hypertension      Past Surgical History:   Procedure Laterality Date    REPLACEMENT TOTAL KNEE BILATERAL      TOE SURGERY       Social History   History   Alcohol Use No     History   Drug Use No     History   Smoking Status    Former Smoker    Packs/day: 2 00    Years: 6 00    Quit date: 8/1/1981   Smokeless Tobacco    Never Used     Comment: quit in 1981     Family History:   Family History   Problem Relation Age of Onset    Heart disease Mother        Meds/Allergies   all medications and allergies reviewed  Allergies   Allergen Reactions    Lipitor [Atorvastatin]      myalgia     Codeine GI Intolerance    Crestor [Rosuvastatin]      myalgia     Penicillins Rash    Sulfa Antibiotics Rash     Tolerates Lasix       Objective   Vitals: Height 6' 3 5" (1 918 m), weight (!) 161 kg (355 lb 12 8 oz)  ,Body mass index is 43 88 kg/m²  [unfilled]    [unfilled]    Invasive Devices          No matching active lines, drains, or airways          Physical Exam   Constitutional: He is oriented to person, place, and time  Vital signs are normal  He appears well-developed and well-nourished  HENT:   Head: Normocephalic and atraumatic  Eyes: Conjunctivae and EOM are normal    Neck: Normal range of motion  Cardiovascular: Intact distal pulses  Pulmonary/Chest: Effort normal  No respiratory distress  Musculoskeletal:        Right knee: He exhibits effusion  Left knee: He exhibits no effusion  Neurological: He is alert and oriented to person, place, and time  Skin: Skin is warm and dry  Psychiatric: He has a normal mood and affect   His behavior is normal  Judgment and thought content normal      Right Knee Exam     Tenderness   The patient is experiencing tenderness in the medial joint line and patellar tendon  Range of Motion   Extension: normal   Flexion:  110 abnormal     Tests   Drawer:       Anterior - negative    Posterior - negative  Varus: negative  Valgus: negative    Other   Pulse: present  Swelling: mild  Other tests: effusion present      Left Knee Exam     Tenderness   The patient is experiencing tenderness in the medial joint line      Range of Motion   Extension: normal   Flexion:  110 abnormal     Tests   Drawer:       Anterior - negative     Posterior - negative  Varus: negative  Valgus: negative    Other   Pulse: present  Swelling: mild  Effusion: no effusion present            Lab Results:   Imaging: I have personally reviewed pertinent films in PACS  EKG, Pathology, and Other Studies: I have personally reviewed pertinent films in PACS bilateral knee x-rays demonstrate severe arthritis about the medial and patellofemoral compartment of the right knee and moderate arthritis about the same compartments in the left knee    Code Status: @[facility]@  Advance Directive and Living Will:      Power of :    POLST:

## 2018-03-15 ENCOUNTER — TELEPHONE (OUTPATIENT)
Dept: OBGYN CLINIC | Facility: HOSPITAL | Age: 63
End: 2018-03-15

## 2018-03-15 ENCOUNTER — TELEPHONE (OUTPATIENT)
Dept: FAMILY MEDICINE CLINIC | Facility: CLINIC | Age: 63
End: 2018-03-15

## 2018-03-15 ENCOUNTER — TELEPHONE (OUTPATIENT)
Dept: OBGYN CLINIC | Facility: CLINIC | Age: 63
End: 2018-03-15

## 2018-03-15 DIAGNOSIS — M25.562 CHRONIC PAIN OF BOTH KNEES: Primary | ICD-10-CM

## 2018-03-15 DIAGNOSIS — M25.561 CHRONIC PAIN OF BOTH KNEES: Primary | ICD-10-CM

## 2018-03-15 DIAGNOSIS — G89.29 CHRONIC PAIN OF BOTH KNEES: Primary | ICD-10-CM

## 2018-03-15 RX ORDER — NAPROXEN 500 MG/1
500 TABLET ORAL 2 TIMES DAILY WITH MEALS
Qty: 60 TABLET | Refills: 0 | Status: SHIPPED | OUTPATIENT
Start: 2018-03-15 | End: 2018-03-15 | Stop reason: SINTOL

## 2018-03-15 NOTE — TELEPHONE ENCOUNTER
Caller: [facility] pharmacy  Call back number: [phone]  Patient's doctor: Dr Luis Dash    Patient is on 2 medications that interact with naproxin  The pharmacy needs to speak to someone about this

## 2018-03-15 NOTE — TELEPHONE ENCOUNTER
It has been nearly 2 years since Dr Jose Henry prescribed motrin for him  Based on my conversation with the pharmacist, I am not comfortable re-initiating NSAIDs for him due to the risk of interactions with his other medications  If he would still like to pursue this, I recommend that he contact his PCP who is more familiar with his medications and chronic medical conditions, who will be able to make a more informed decision about whether or not motrin would be appropriate  Thanks

## 2018-03-15 NOTE — TELEPHONE ENCOUNTER
Patient states he cannot have the sodium  I said it dior says naproxen which is like motrin  I advised to check with the pharmacist  If there is a problem he will call

## 2018-03-15 NOTE — TELEPHONE ENCOUNTER
I spoke with the pharmacist  Naprosyn (or any NSAID) increases the risk of GI bleeding and worsening his hypertension based on his other medication  Therefore, I discontinued the naprosyn prescription  He may take extra strength tylenol (which is over the counter) for his pain, which will not have the same side effects as NSAIDs    Please call him to let him know   Thanks

## 2018-03-15 NOTE — TELEPHONE ENCOUNTER
Miladis Ga    Patient says ortho Dr Sharda Arevalo wants to know if it is ok for him to prescribe motrin for patient for knee pain  Please advise

## 2018-03-16 RX ORDER — IBUPROFEN 600 MG/1
600 TABLET ORAL EVERY 8 HOURS PRN
Qty: 30 TABLET | Refills: 0 | Status: SHIPPED | OUTPATIENT
Start: 2018-03-16 | End: 2018-05-02 | Stop reason: SDUPTHER

## 2018-03-16 NOTE — TELEPHONE ENCOUNTER
Spoke to patient yesterday afternoon to clarify that we cannot give NSAIDs due to concern of interactions and his comorbidities  It has been over 2 years since Dr Rafa Rodas prescribed motrin  I referred him to his PCP as they are more familiar with his chronic medical issues  He expressed understanding

## 2018-03-16 NOTE — TELEPHONE ENCOUNTER
Pt notified and he asked if you would prescribe it   Dr Lexy Ceron did not because he said his pcp should do this   please advise tc/cma

## 2018-03-19 ENCOUNTER — TELEPHONE (OUTPATIENT)
Dept: OBGYN CLINIC | Facility: CLINIC | Age: 63
End: 2018-03-19

## 2018-03-19 NOTE — TELEPHONE ENCOUNTER
Called patient to get him set up with Euflexxa injections for both knees  Left message to call back

## 2018-04-03 DIAGNOSIS — N52.9 ED (ERECTILE DYSFUNCTION) OF ORGANIC ORIGIN: Primary | ICD-10-CM

## 2018-04-03 RX ORDER — SILDENAFIL 50 MG/1
TABLET, FILM COATED ORAL
Qty: 10 TABLET | Refills: 0 | Status: SHIPPED | OUTPATIENT
Start: 2018-04-03 | End: 2019-01-17

## 2018-04-03 NOTE — TELEPHONE ENCOUNTER
Aime requested low dose viagra  He tolerates well> Is aware of adverse effect   His cardiologist ([facility])  ok'd use

## 2018-04-11 ENCOUNTER — OFFICE VISIT (OUTPATIENT)
Dept: OBGYN CLINIC | Facility: CLINIC | Age: 63
End: 2018-04-11
Payer: MEDICARE

## 2018-04-11 VITALS
HEART RATE: 68 BPM | BODY MASS INDEX: 43.74 KG/M2 | WEIGHT: 315 LBS | DIASTOLIC BLOOD PRESSURE: 83 MMHG | SYSTOLIC BLOOD PRESSURE: 124 MMHG

## 2018-04-11 DIAGNOSIS — M17.0 BILATERAL PRIMARY OSTEOARTHRITIS OF KNEE: Primary | ICD-10-CM

## 2018-04-11 PROCEDURE — 20610 DRAIN/INJ JOINT/BURSA W/O US: CPT | Performed by: ORTHOPAEDIC SURGERY

## 2018-04-11 RX ORDER — FUROSEMIDE 20 MG/1
TABLET ORAL
COMMUNITY
Start: 2018-03-30 | End: 2018-10-24

## 2018-04-11 RX ORDER — HYALURONATE SODIUM 10 MG/ML
20 SYRINGE (ML) INTRAARTICULAR
Status: COMPLETED | OUTPATIENT
Start: 2018-04-11 | End: 2018-04-11

## 2018-04-11 RX ADMIN — Medication 20 MG: at 11:58

## 2018-04-11 NOTE — PROGRESS NOTES
Assessment/Plan:  1  Bilateral primary osteoarthritis of knee       Follow-up 1 week for euflexxa #2 bilateral knees  Subjective:   Murali Stock is a 63 y o  male who presents today for euflexxa #2 bilateral knees  Review of Systems      Past Medical History:   Diagnosis Date    Aortic aneurysm, abdominal (HCC)     Arthritis of right knee     Asthma     Bipolar disorder (HCC)     Depression     Heart abnormality     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   [facility]es [facility]    HLD (hyperlipidemia)     Hypertension        Past Surgical History:   Procedure Laterality Date    ABDOMINAL AORTIC ANEURYSM REPAIR      Last assessed: 4/14/16    AORTIC VALVE REPLACEMENT      Last assessed: 4/14/16    FOOT SURGERY      REPLACEMENT TOTAL KNEE BILATERAL      ROTATOR CUFF REPAIR      Last assessed: 4/14/16    TOE SURGERY         Family History   Problem Relation Age of Onset    Heart disease Mother     Alcohol abuse Mother     Hypertension Mother     Cancer Mother     Cancer Father     Hypertension Father     Diabetes Father      Mellitus    Alcohol abuse Father     Mental illness Brother      Diseases of the nervous system complicating pregnancy, childbirth and the peurperium    Cancer Brother        Social History     Occupational History    Not on file       Social History Main Topics    Smoking status: Former Smoker     Packs/day: 2 00     Years: 6 00     Quit date: 8/1/1981    Smokeless tobacco: Never Used      Comment: quit in 1981    Alcohol use No      Comment: Per Allscripts: Former consumption of alcohol    Drug use: No      Comment: Per Allscripts: History of crack cocaine and marijuana use    Sexual activity: Not Currently         Current Outpatient Prescriptions:     albuterol (PROVENTIL HFA,VENTOLIN HFA) 90 mcg/act inhaler, Inhale 2 puffs every 4 (four) hours as needed for wheezing or shortness of breath, Disp: , Rfl:     aspirin (ECOTRIN LOW STRENGTH) 81 mg EC tablet, Take 81 mg by mouth daily, Disp: , Rfl:     atenolol (TENORMIN) 50 mg tablet, Take 50 mg by mouth daily, Disp: , Rfl:     enalapril (VASOTEC) 10 mg tablet, TAKE ONE TABLET BY MOUTH EVERY DAY, Disp: 30 tablet, Rfl: 9    fluticasone (FLONASE) 50 mcg/act nasal spray, 2 sprays into each nostril daily, Disp: , Rfl:     furosemide (LASIX) 20 mg tablet, , Disp: , Rfl:     furosemide (LASIX) 40 mg tablet, TAKE ONE TABLET BY MOUTH EVERY DAY, Disp: 30 tablet, Rfl: 6    ibuprofen (MOTRIN) 600 mg tablet, Take 1 tablet (600 mg total) by mouth every 8 (eight) hours as needed for mild pain or moderate pain, Disp: 30 tablet, Rfl: 0    KLOR-CON M20 20 MEQ tablet, TAKE ONE TABLET BY MOUTH EVERY DAY, Disp: 30 tablet, Rfl: 6    OXcarbazepine (TRILEPTAL) 150 mg tablet, Take 150 mg by mouth 2 (two) times a day, Disp: , Rfl:     sertraline (ZOLOFT) 100 mg tablet, Take 100 mg by mouth 2 (two) times a day, Disp: , Rfl:     sildenafil (VIAGRA) 50 MG tablet, Take 1 hour prior to sexual activity  Do not take more than 2 tabs in 1 week , Disp: 10 tablet, Rfl: 0    furosemide (LASIX) 20 mg tablet, Take 1 tablet by mouth daily for 30 days, Disp: 30 tablet, Rfl: 0    pravastatin (PRAVACHOL) 20 mg tablet, Take 1 tablet by mouth daily with dinner for 30 days Patient would like to try pravastatin 20mg, he does have myalgia from Lipitor/Crestor in the past , Disp: 30 tablet, Rfl: 0    Allergies   Allergen Reactions    Lipitor [Atorvastatin]      myalgia     Codeine GI Intolerance    Crestor [Rosuvastatin]      myalgia     Penicillins Rash    Sulfa Antibiotics Rash     Tolerates Lasix       Objective:  Vitals:    04/11/18 1115   BP: 124/83   Pulse: 68       Ortho Exam    Physical Exam    Large joint arthrocentesis  Date/Time: 4/11/2018 11:58 AM  Consent given by: patient  Site marked: site marked  Supporting Documentation  Indications: pain   Procedure Details  Location: knee - Knee joint: bilateral knees    Needle size: 22 G  Ultrasound guidance: no  Approach: anterolateral  Medications administered: 20 mg Sodium Hyaluronate 20 MG/2ML    Patient tolerance: patient tolerated the procedure well with no immediate complications  Dressing:  Sterile dressing applied

## 2018-04-18 ENCOUNTER — OFFICE VISIT (OUTPATIENT)
Dept: OBGYN CLINIC | Facility: CLINIC | Age: 63
End: 2018-04-18
Payer: MEDICARE

## 2018-04-18 DIAGNOSIS — M17.0 BILATERAL PRIMARY OSTEOARTHRITIS OF KNEE: Primary | ICD-10-CM

## 2018-04-18 PROCEDURE — 20610 DRAIN/INJ JOINT/BURSA W/O US: CPT | Performed by: ORTHOPAEDIC SURGERY

## 2018-04-18 RX ORDER — HYALURONATE SODIUM 10 MG/ML
20 SYRINGE (ML) INTRAARTICULAR
Status: COMPLETED | OUTPATIENT
Start: 2018-04-18 | End: 2018-04-18

## 2018-04-18 RX ADMIN — Medication 20 MG: at 11:34

## 2018-04-18 NOTE — PROGRESS NOTES
Assessment/Plan:  1  Bilateral primary osteoarthritis of knee         Follow-up 1 week for euflexxa #3 bilateral knees  Subjective:   Kamila Araujo is a 63 y o  male who presents today for euflexxa #2 bilateral knees  Review of Systems      Past Medical History:   Diagnosis Date    Aortic aneurysm, abdominal (HCC)     Arthritis of right knee     Asthma     Bipolar disorder (HCC)     Depression     Heart abnormality     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   goes [facility]    HLD (hyperlipidemia)     Hypertension        Past Surgical History:   Procedure Laterality Date    ABDOMINAL AORTIC ANEURYSM REPAIR      Last assessed: 4/14/16    AORTIC VALVE REPLACEMENT      Last assessed: 4/14/16    FOOT SURGERY      REPLACEMENT TOTAL KNEE BILATERAL      ROTATOR CUFF REPAIR      Last assessed: 4/14/16    TOE SURGERY         Family History   Problem Relation Age of Onset    Heart disease Mother     Alcohol abuse Mother     Hypertension Mother     Cancer Mother     Cancer Father     Hypertension Father     Diabetes Father      Mellitus    Alcohol abuse Father     Mental illness Brother      Diseases of the nervous system complicating pregnancy, childbirth and the peurperium    Cancer Brother        Social History     Occupational History    Not on file       Social History Main Topics    Smoking status: Former Smoker     Packs/day: 2 00     Years: 6 00     Quit date: 8/1/1981    Smokeless tobacco: Never Used      Comment: quit in 1981    Alcohol use No      Comment: Per Allscripts: Former consumption of alcohol    Drug use: No      Comment: Per Allscripts: History of crack cocaine and marijuana use    Sexual activity: Not Currently         Current Outpatient Prescriptions:     albuterol (PROVENTIL HFA,VENTOLIN HFA) 90 mcg/act inhaler, Inhale 2 puffs every 4 (four) hours as needed for wheezing or shortness of breath, Disp: , Rfl:     aspirin (ECOTRIN LOW STRENGTH) 81 mg EC tablet, Take 81 mg by mouth daily, Disp: , Rfl:     atenolol (TENORMIN) 50 mg tablet, Take 50 mg by mouth daily, Disp: , Rfl:     enalapril (VASOTEC) 10 mg tablet, TAKE ONE TABLET BY MOUTH EVERY DAY, Disp: 30 tablet, Rfl: 9    fluticasone (FLONASE) 50 mcg/act nasal spray, 2 sprays into each nostril daily, Disp: , Rfl:     furosemide (LASIX) 20 mg tablet, Take 1 tablet by mouth daily for 30 days, Disp: 30 tablet, Rfl: 0    furosemide (LASIX) 20 mg tablet, , Disp: , Rfl:     furosemide (LASIX) 40 mg tablet, TAKE ONE TABLET BY MOUTH EVERY DAY, Disp: 30 tablet, Rfl: 6    ibuprofen (MOTRIN) 600 mg tablet, Take 1 tablet (600 mg total) by mouth every 8 (eight) hours as needed for mild pain or moderate pain, Disp: 30 tablet, Rfl: 0    KLOR-CON M20 20 MEQ tablet, TAKE ONE TABLET BY MOUTH EVERY DAY, Disp: 30 tablet, Rfl: 6    OXcarbazepine (TRILEPTAL) 150 mg tablet, Take 150 mg by mouth 2 (two) times a day, Disp: , Rfl:     pravastatin (PRAVACHOL) 20 mg tablet, Take 1 tablet by mouth daily with dinner for 30 days Patient would like to try pravastatin 20mg, he does have myalgia from Lipitor/Crestor in the past , Disp: 30 tablet, Rfl: 0    sertraline (ZOLOFT) 100 mg tablet, Take 100 mg by mouth 2 (two) times a day, Disp: , Rfl:     sildenafil (VIAGRA) 50 MG tablet, Take 1 hour prior to sexual activity  Do not take more than 2 tabs in 1 week , Disp: 10 tablet, Rfl: 0    Allergies   Allergen Reactions    Lipitor [Atorvastatin]      myalgia     Codeine GI Intolerance    Crestor [Rosuvastatin]      myalgia     Penicillins Rash    Sulfa Antibiotics Rash     Tolerates Lasix       Objective: There were no vitals filed for this visit  Ortho Exam    Physical Exam    Large joint arthrocentesis  Date/Time: 4/18/2018 11:34 AM  Consent given by: patient  Site marked: site marked  Supporting Documentation  Indications: pain   Procedure Details  Location: knee - Knee joint: bilateral knees    Needle size: 22 G  Ultrasound guidance: no  Approach: anterolateral  Medications administered: 20 mg Sodium Hyaluronate 20 MG/2ML    Patient tolerance: patient tolerated the procedure well with no immediate complications  Dressing:  Sterile dressing applied

## 2018-04-18 NOTE — PATIENT INSTRUCTIONS
Iliotibial Band Syndrome Exercises   AMBULATORY CARE:   Iliotibial band syndrome (ITBS) , also known as runner's knee, happens when your iliotibial band (ITB) becomes injured and causes pain  The ITB is a long band of tissue  It extends from the outside of your pelvis (hip bone) to the outside of your tibia (shin bone)  It helps keeps the knee in the correct position when you stand or move  ITBS occurs most often in long distance runners and cyclists  What you need to know about ITB exercises:  ITB exercises help strengthen the muscles around your knee and hip  Strong muscles can help reduce pain and decrease your risk of future injury  Contact your healthcare provider if:   · You have sharp pain during exercise or at rest     · You have questions or concerns about stretches or exercises  Decrease pain and swelling:   · Apply ice  on your hip, knee, or thigh for 15 to 20 minutes every hour or as directed  Use an ice pack, or put crushed ice in a plastic bag  Cover it with a towel  Ice helps prevent tissue damage and decreases swelling and pain  · Apply heat  on your hip, knee, or thigh for 20 to 30 minutes before you stretch or exercise  Use a heat pack or wet a washcloth and heat it for 15 seconds in the microwave  Heat helps decrease pain and makes it easier to stretch your muscles  · Massage painful areas as directed  Use a foam roller to gently massage your painful areas  Place the foam roller on a flat surface  Lie on your side with the foam roller against your painful leg  Move your body so that it rolls up and down from your hip to above your knee  Do not lie with it against the outside of your knee cap  Exercise safety:  Do not start an exercise program before you talk to your healthcare provider  You may need to wait until your swelling and pain have gone down before you start to exercise  · Move slowly and smoothly  Avoid fast or jerky motions  This will help prevent another injury  · Breathe normally  Do not hold your breath  It is important to breathe in and out so you do not tense up during exercise  Tension could prevent you from moving your joint in a full range of motion  · Do the exercises and stretches on both legs  Do this so both ITBs remain strong and flexible  · Stop if you feel sharp pain or an increase in pain  Stop the exercise and contact your healthcare provider if you have these symptoms  It is normal to feel some discomfort, such as a dull ache, during exercise  Regular exercise will help decrease your discomfort over time  · Warm up before you stretch and exercise  This will help prevent an injury  Walk or ride a stationary bike for 5 to 10 minutes  How to perform ITB stretches:  Always stretch before and after you do strengthening exercises  Hold each stretch for 30 seconds to 1 minute  Repeat each stretch 2 to 3 times or as directed  · Standing ITB stretch:  Stand with your injured leg behind your other leg  Cross your front leg over your injured leg  Bend sideways toward the hip that is not injured  Stop when you feel a stretch in the hip of your injured leg  Repeat on the other side  · Lying ITB stretch:  Lie on your back  Bend the knee of your injured leg toward your chest  Place your hand on the outside of your thigh  Slowly pull your knee across your body  Stop when you feel a stretch in your hip and outside of your thigh  Repeat on the other side  · Hip stretch:  Lie on the ground  Place both hands on the shin of 1 leg  Pull your knee toward your chest  Repeat on the other side  · Standing quadriceps stretch:  Stand and place one hand against a wall or hold the back of a chair for balance  With your weight on one leg, bend your other leg and grab your ankle  Pull your heel toward your buttocks  · Sitting hamstring stretch:  Sit with both legs straight in front of you   Place your palms on the floor and slide your hands forward until you feel the stretch  If possible, grab your toes  Do not round your back  How to perform ITB strengthening exercises: Your healthcare provider will tell you how often to do the following exercises:  · Standing half squats:  Stand with your feet shoulder-width apart  Lean your back against a wall or hold the back of a chair for balance, if needed  Slowly sit down about 10 inches, as if you are going to sit in a chair  Put most of your weight in your heels  Hold the squat for 5 seconds, then slowly rise to a standing position  Do 3 sets of 10 squats  · Sitting leg lifts:  Sit in a chair with both feet flat on the floor  Slowly straighten and raise one leg  Squeeze your thigh muscles and hold for 5 seconds  Relax and return your foot to the floor  Do 3 sets of 10 lifts on each leg  · Single leg dips:  Stand on your injured leg, between 2 chairs  The backs of the chairs should be toward you  Put 1 hand on each chair  Straighten your leg that is not injured and lift it off the floor  Use the chairs to hold some of your weight  Bend the knee of your injured leg  Slowly lower your body toward the floor a few inches  Your weight should be in your heel  Hold for 5 seconds  Slowly return to a standing position  Do 3 sets of 10 on each leg  · Standing hamstring curls: Face a wall and place both palms flat on the wall  Instead you can hold the back of a chair for balance  With your weight on 1 leg, lift your other foot as close to your buttocks as you can  Hold for 5 seconds and then lower your leg  Do 3 sets of 10 curls on each leg  · Hip adduction:  Lie on your injured side  Cross your top leg over your injured leg  Put your foot on the floor in front of you  Raise your injured leg until it touches the other leg  Slowly lower the leg to the floor  Do 3 sets of 10 on each leg  · Hip abduction:  Lie on your side that is not injured  Straighten your legs  Slowly raise your injured leg as high as you can  Keep your foot pointing straight  Hold for 5 seconds then slowly lower your leg  Do 3 sets of 10 on each leg  Follow up with your healthcare provider as directed:  Write down your questions so you remember to ask them during your visits  © 2017 [employer] Information is for End User's use only and may not be sold, redistributed or otherwise used for commercial purposes  All illustrations and images included in CareNotes® are the copyrighted property of [employer] , [employer]  or [employer]  The above information is an  only  It is not intended as medical advice for individual conditions or treatments  Talk to your doctor, nurse or pharmacist before following any medical regimen to see if it is safe and effective for you

## 2018-04-23 ENCOUNTER — OFFICE VISIT (OUTPATIENT)
Dept: FAMILY MEDICINE CLINIC | Facility: CLINIC | Age: 63
End: 2018-04-23
Payer: MEDICARE

## 2018-04-23 VITALS
SYSTOLIC BLOOD PRESSURE: 134 MMHG | DIASTOLIC BLOOD PRESSURE: 74 MMHG | TEMPERATURE: 97.9 F | HEART RATE: 68 BPM | OXYGEN SATURATION: 98 % | WEIGHT: 315 LBS | HEIGHT: 76 IN | BODY MASS INDEX: 38.36 KG/M2

## 2018-04-23 DIAGNOSIS — F31.9 BIPOLAR 1 DISORDER (HCC): ICD-10-CM

## 2018-04-23 DIAGNOSIS — R60.0 BILATERAL EDEMA OF LOWER EXTREMITY: Primary | ICD-10-CM

## 2018-04-23 PROBLEM — R06.09 EXERTIONAL DYSPNEA: Status: ACTIVE | Noted: 2017-08-08

## 2018-04-23 PROBLEM — J44.9 COPD, MODERATE (HCC): Status: ACTIVE | Noted: 2017-10-20

## 2018-04-23 PROBLEM — R06.00 EXERTIONAL DYSPNEA: Status: ACTIVE | Noted: 2017-08-08

## 2018-04-23 PROBLEM — I50.32 CHRONIC DIASTOLIC HEART FAILURE (HCC): Status: ACTIVE | Noted: 2017-08-14

## 2018-04-23 PROBLEM — N52.9 ERECTILE DYSFUNCTION OF ORGANIC ORIGIN: Status: ACTIVE | Noted: 2017-07-06

## 2018-04-23 PROCEDURE — 99213 OFFICE O/P EST LOW 20 MIN: CPT | Performed by: NURSE PRACTITIONER

## 2018-04-23 RX ORDER — OXCARBAZEPINE 150 MG/1
300 TABLET, FILM COATED ORAL 2 TIMES DAILY
Qty: 60 TABLET | Refills: 0 | Status: SHIPPED | OUTPATIENT
Start: 2018-04-23 | End: 2018-06-12 | Stop reason: SDUPTHER

## 2018-04-23 NOTE — PATIENT INSTRUCTIONS
Heart Failure   AMBULATORY CARE:   Heart failure (HF) is a condition that does not allow your heart to fill or pump properly  Not enough oxygen in your blood gets to your organs and tissues  HF can occur in the right side, the left side, or both lower chambers of your heart  HF is often caused by damage or injury to your heart  The damage may be caused by heart attack, other heart conditions, or high blood pressure  HF is a long-term condition that tends to get worse over time  It is important to manage your health to improve your quality of life  HF can be worsened by heavy alcohol use, smoking, diabetes that is not controlled, or obesity  Common signs and symptoms:   · Difficulty breathing with activity that worsens to difficulty breathing at rest    · Shortness of breath while lying flat    · Severe shortness of breath and coughing at night that usually wakes you     · Chest pain at night    · Periods of no breathing, then breathing fast    · Fatigue or lack of energy (often worsened by physical activity)     · Swelling in your ankles, legs, or abdomen    · Fast heartbeat, purple color around your mouth and nailbeds    · Fingers and toes cool to the touch  Call 911 if:   · You have any of the following signs of a heart attack:      ¨ Squeezing, pressure, or pain in your chest that lasts longer than 5 minutes or returns    ¨ Discomfort or pain in your back, neck, jaw, stomach, or arm     ¨ Trouble breathing    ¨ Nausea or vomiting    ¨ Lightheadedness or a sudden cold sweat, especially with chest pain or trouble breathing    Seek care immediately if:   · You gain 3 or more pounds (1 4 kg) in a day, or more than your healthcare provider says you should  · Your heartbeat is fast, slow, or uneven all the time    Contact your healthcare provider if:   · You have symptoms of worsening HF:      ¨ Shortness of breath at rest, at night, or that is getting worse in any way     ¨ Weight gain of 5 or more pounds (2 2 kg) in a week     ¨ More swelling in your legs or ankles     ¨ Abdominal pain or swelling     ¨ More coughing     ¨ Loss of appetite     ¨ Feeling tired all the time    · You feel hopeless or depressed, or you have lost interest in things you used to enjoy  · You often feel worried or afraid  · You have questions or concerns about your condition or care  Treatment for HF  may include any of the following:  · Medicines  may be needed to help regulate your heart rhythm  You may also need medicines to lower your blood pressure, and to get rid of extra fluids  · Oxygen  may help you breathe easier if your oxygen level is lower than normal  A CPAP machine may be used to keep your airway open while you sleep  · Surgery  can be done to implant a pacemaker in your chest to regulate your heart rhythm  Other types of surgery can open blocked heart vessels, replace a damaged heart valve, or remove scar tissue  Manage or prevent HF:   · Do not smoke  Nicotine and other chemicals in cigarettes and cigars can cause lung damage and make HF difficult to manage  Ask your healthcare provider for information if you currently smoke and need help to quit  E-cigarettes or smokeless tobacco still contain nicotine  Talk to your healthcare provider before you use these products  · Do not drink alcohol or take illegal drugs  Alcohol and drugs can worsen your symptoms quickly  · Weigh yourself every morning  Use the same scale, in the same spot  Do this after you use the bathroom, but before you eat or drink anything  Wear the same type of clothing  Do not wear shoes  Record your weight each day so you will notice any sudden weight gain  Swelling and weight gain are signs of fluid retention  If you are overweight, ask how to lose weight safely  · Check your blood pressure and heart rate every day  Ask for more information about how to measure your blood pressure and heart rate correctly   Ask what these numbers should be for you  · Manage any chronic health conditions you have  These include high blood pressure, diabetes, obesity, high cholesterol, metabolic syndrome, and COPD  You will have fewer symptoms if you manage these health conditions  Follow your healthcare provider's recommendations and follow up with him or her regularly  · Eat heart-healthy foods and limit sodium (salt)  An easy way to do this is to eat more fresh fruits and vegetables and fewer canned and processed foods  Replace butter and margarine with heart-healthy oils such as olive oil and canola oil  Other heart-healthy foods include walnuts, whole-grain breads, low-fat dairy products, beans, and lean meats  Fatty fish such as salmon and tuna are also heart healthy  Ask how much salt you can eat each day  Do not use salt substitutes  · Drink liquids as directed  You may need to limit the amount of liquids you drink if you retain fluid  Ask how much liquid to drink each day and which liquids are best for you  · Stay active  If you are not active, your symptoms are likely to worsen quickly  Walking, bicycling, and other types of physical activity help maintain your strength and improve your mood  Physical activity also helps you manage your weight  Work with your healthcare provider to create an exercise plan that is right for you  · Get vaccines as directed  Get a flu shot every year  You may also need the pneumonia vaccine  The flu and pneumonia can be severe for a person who has HF  Vaccines protect you from these infections  Join a support group:  Living with HF can be difficult  It may be helpful to talk with others who have HF  You may learn how to better manage your condition or get emotional support  For more information:   · [address] , [place]   Phone: 6- [phone] - [phone]  Web Address: https://[employer]/  org   Follow up with your healthcare provider or cardiologist within 2 weeks or as directed: You may need to return for other tests  You may need home health care  A healthcare provider will monitor your vital signs, weight, and make sure your medicines are working  Write down your questions so you remember to ask them during your visits  © 2017 [employer] Information is for End User's use only and may not be sold, redistributed or otherwise used for commercial purposes  All illustrations and images included in CareNotes® are the copyrighted property of [email or web address] , [employer]  or [facility]  The above information is an  only  It is not intended as medical advice for individual conditions or treatments  Talk to your doctor, nurse or pharmacist before following any medical regimen to see if it is safe and effective for you

## 2018-04-23 NOTE — PROGRESS NOTES
Subjective     Jeremiah Martin Sr  is a 63 y o  male who presents for evaluation of edema in both feet  The edema has been mild  Onset of symptoms was 3 days ago after driving to/from [place], [place] for a   Flanagan Spell to dietary indiscretions=ate 1/2 large pizza  Patient reports symptoms have rapidly improved since that time  The edema is present in the evening  The patient states the problem has been intermittent for a few years  The swelling has been aggravated by dependency of involved area, flying or long car trips, hot weather and increased salt intake  The swelling has been relieved by diuretics, elevation of involved area, low-salt diet  Associated factors include: diagnosis of heart failure  Cardiac risk factors: advanced age (older than 54 for men, 72 for women), dyslipidemia, hypertension, male gender, obesity (BMI >= 30 kg/m2), sedentary lifestyle and smoking/ tobacco exposure  He denies dyspnea, chest pain, palpitations, dizziness  Weight has been consistent     The following portions of the patient's history were reviewed and updated as appropriate: allergies, current medications, past family history, past medical history, past social history, past surgical history and problem list     Review of Systems  Pertinent items are noted in HPI       Objective     /74 (BP Location: Left arm, Patient Position: Sitting, Cuff Size: Large)   Pulse 68   Temp 97 9 °F (36 6 °C) (Temporal)   Ht 6' 3 5" (1 918 m)   Wt (!) 163 kg (358 lb 6 4 oz)   SpO2 98%   BMI 44 21 kg/m²   General appearance: alert and oriented, in no acute distress  Eyes: negative findings: conjunctivae and sclerae normal  Neck: no adenopathy, no carotid bruit, no JVD, supple, symmetrical, trachea midline and thyroid not enlarged, symmetric, no tenderness/mass/nodules  Lungs: clear to auscultation bilaterally  Heart: regular rate and rhythm, S1, S2 normal, no murmur, click, rub or gallop  Extremities: edema mild pedal, Homans sign is negative, no sign of DVT, no ulcers, gangrene or trophic changes and warm and well perfused  Pulses: 2+ and symmetric  Neurologic: Grossly normal     Assessment/Plan     Edema secondary to diet, dependant position during long car ride  No evidence of CHF on exam at this time     Recommendations: decrease sodium in the diet, elevate feet above the level of the heart whenever possible, increase physical activity, use of compression stockings and weight loss  The patient was also instructed to call IMMEDIATELY (i e , day or night) if any cardiopulmonary symptoms occur, especially chest pain, shortness of breath, dyspnea on exertion, paroxysmal nocturnal dyspnea, or orthopnea, and these were explained  Follow up in 2 weeks and as needed

## 2018-04-26 DIAGNOSIS — R60.9 EDEMA, UNSPECIFIED TYPE: Primary | ICD-10-CM

## 2018-04-26 RX ORDER — FUROSEMIDE 20 MG/1
TABLET ORAL
Qty: 30 TABLET | Refills: 3 | Status: SHIPPED | OUTPATIENT
Start: 2018-04-26 | End: 2019-11-06

## 2018-05-02 ENCOUNTER — TELEPHONE (OUTPATIENT)
Dept: OBGYN CLINIC | Facility: CLINIC | Age: 63
End: 2018-05-02

## 2018-05-02 ENCOUNTER — OFFICE VISIT (OUTPATIENT)
Dept: OBGYN CLINIC | Facility: CLINIC | Age: 63
End: 2018-05-02
Payer: MEDICARE

## 2018-05-02 DIAGNOSIS — M25.561 CHRONIC PAIN OF BOTH KNEES: ICD-10-CM

## 2018-05-02 DIAGNOSIS — G89.29 CHRONIC PAIN OF BOTH KNEES: ICD-10-CM

## 2018-05-02 DIAGNOSIS — M17.0 BILATERAL PRIMARY OSTEOARTHRITIS OF KNEE: Primary | ICD-10-CM

## 2018-05-02 DIAGNOSIS — M25.562 CHRONIC PAIN OF BOTH KNEES: ICD-10-CM

## 2018-05-02 PROCEDURE — 20610 DRAIN/INJ JOINT/BURSA W/O US: CPT | Performed by: PHYSICIAN ASSISTANT

## 2018-05-02 RX ORDER — IBUPROFEN 600 MG/1
600 TABLET ORAL EVERY 8 HOURS PRN
Qty: 30 TABLET | Refills: 0 | Status: SHIPPED | OUTPATIENT
Start: 2018-05-02 | End: 2019-01-17

## 2018-05-02 RX ORDER — HYALURONATE SODIUM 10 MG/ML
20 SYRINGE (ML) INTRAARTICULAR
Status: COMPLETED | OUTPATIENT
Start: 2018-05-02 | End: 2018-05-02

## 2018-05-02 RX ADMIN — Medication 20 MG: at 13:31

## 2018-05-02 NOTE — PROGRESS NOTES
Assessment/Plan:  1  Bilateral primary osteoarthritis of knee  ibuprofen (MOTRIN) 600 mg tablet   2  Chronic pain of both knees         Follow-up prn  Subjective:   Maximiliano Harvey is a 63 y o  male who presents today for euflexxa #3 bilateral knees      Review of Systems      Past Medical History:   Diagnosis Date    A[facility] aneurysm[facility] abdominal ([facility] )     Arthritis of right knee     Asthma     Bipolar disorder ([facility] )     Depression     Heart abnormality     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   goes to [address] [place] [address] (hyperlipidemia)     Hypertension        Past Surgical History:   Procedure Laterality Date    ABDOMINAL AORTIC ANEURYSM REPAIR      Last assessed: 4/14/16    AORTIC VALVE REPLACEMENT      Last assessed: 4/14/16    FOOT SURGERY      REPLACEMENT TOTAL KNEE BILATERAL      ROTATOR CUFF REPAIR      Last assessed: 4/14/16    TOE SURGERY         Family History   Problem Relation Age of Onset    Heart disease Mother     Alcohol abuse Mother     Hypertension Mother     Cancer Mother     Cancer Father     Hypertension Father     Diabetes Father      Mellitus    Alcohol abuse Father     Mental illness Brother      Diseases of the nervous system complicating pregnancy, childbirth and the peurperium    Cancer Brother        Social History     Occupational History    Not on file       Social History Main Topics    Smoking status: Former Smoker     Packs/day: 2 00     Years: 6 00     Quit date: 8/1/1981    Smokeless tobacco: Never Used      Comment: quit in 1981    Alcohol use No      Comment: Per Allscripts: Former consumption of alcohol    Drug use: No      Comment: Per Allscripts: History of crack cocaine and marijuana use    Sexual activity: Not Currently         Current Outpatient Prescriptions:     albuterol (PROVENTIL HFA,VENTOLIN HFA) 90 mcg/act inhaler, Inhale 2 puffs every 4 (four) hours as needed for wheezing or shortness of breath, Disp: , Rfl:     aspirin (ECOTRIN LOW STRENGTH) 81 mg EC tablet, Take 81 mg by mouth daily, Disp: , Rfl:     atenolol (TENORMIN) 50 mg tablet, Take 50 mg by mouth daily, Disp: , Rfl:     enalapril (VASOTEC) 10 mg tablet, TAKE ONE TABLET BY MOUTH EVERY DAY, Disp: 30 tablet, Rfl: 9    fluticasone (FLONASE) 50 mcg/act nasal spray, 2 sprays into each nostril daily, Disp: , Rfl:     furosemide (LASIX) 20 mg tablet, , Disp: , Rfl:     furosemide (LASIX) 20 mg tablet, TAKE ONE TABLET BY MOUTH EVERY DAY AS NEEDED FOR LEG SWELLING OR WEIGHT GAIN OF 3 POUND IN DAY, Disp: 30 tablet, Rfl: 3    furosemide (LASIX) 40 mg tablet, TAKE ONE TABLET BY MOUTH EVERY DAY, Disp: 30 tablet, Rfl: 6    ibuprofen (MOTRIN) 600 mg tablet, Take 1 tablet (600 mg total) by mouth every 8 (eight) hours as needed for mild pain or moderate pain, Disp: 30 tablet, Rfl: 0    KLOR-CON M20 20 MEQ tablet, TAKE ONE TABLET BY MOUTH EVERY DAY, Disp: 30 tablet, Rfl: 6    OXcarbazepine (TRILEPTAL) 150 mg tablet, Take 2 tablets (300 mg total) by mouth 2 (two) times a day, Disp: 60 tablet, Rfl: 0    sertraline (ZOLOFT) 100 mg tablet, Take 100 mg by mouth 2 (two) times a day, Disp: , Rfl:     sildenafil (VIAGRA) 50 MG tablet, Take 1 hour prior to sexual activity  Do not take more than 2 tabs in 1 week , Disp: 10 tablet, Rfl: 0    furosemide (LASIX) 20 mg tablet, Take 1 tablet by mouth daily for 30 days, Disp: 30 tablet, Rfl: 0    pravastatin (PRAVACHOL) 20 mg tablet, Take 1 tablet by mouth daily with dinner for 30 days Patient would like to try pravastatin 20mg, he does have myalgia from Lipitor/Crestor in the past , Disp: 30 tablet, Rfl: 0    Allergies   Allergen Reactions    Lipitor [Atorvastatin]      myalgia     Codeine GI Intolerance    Crestor [Rosuvastatin]      myalgia     Penicillins Rash    Sulfa Antibiotics Rash     Tolerates Lasix       Objective: There were no vitals filed for this visit      Ortho Exam    Physical Exam    Large joint arthrocentesis  Date/Time: 5/2/2018 1:31 PM  Consent given by: patient  Site marked: site marked  Supporting Documentation  Indications: pain   Procedure Details  Location: knee - R knee  Needle size: 22 G  Ultrasound guidance: no  Approach: anterolateral  Medications administered: 20 mg Sodium Hyaluronate 20 MG/2ML    Patient tolerance: patient tolerated the procedure well with no immediate complications  Dressing:  Sterile dressing applied  Large joint arthrocentesis  Date/Time: 5/2/2018 1:31 PM  Consent given by: patient  Site marked: site marked  Supporting Documentation  Indications: pain   Procedure Details  Location: knee - L knee  Needle size: 22 G  Ultrasound guidance: no  Approach: anterolateral  Medications administered: 20 mg Sodium Hyaluronate 20 MG/2ML    Patient tolerance: patient tolerated the procedure well with no immediate complications  Dressing:  Sterile dressing applied

## 2018-05-02 NOTE — TELEPHONE ENCOUNTER
Patient called and got someone in the phone room who scheduled him an appt for Monday 5/7 at [phone] for his 3rd euflexxa since he missed his last appt  Patient then called back and was blind transferred to the back line in the office and I asked what he was calling for and he stated he was "talking to a Dixon Turpin who left a message and was returning her call Linwood Larsen is ridiclious" I asked if he needed an appt and he stated " I don't know I just need a needle in my knee"  I looked into this and informed the patient he has an appt on Monday scheduled at 1045 for his last injection and patient got very angry and stated he has work until 12pm I was looking at the schedule to try and get him in on Friday the 4th and patient was very angry and stated "next time I am sick Im coming to my appt and coughing all over you this is ridiculous" and hung up the phone before I could offer him another appt

## 2018-05-07 DIAGNOSIS — I10 ESSENTIAL HYPERTENSION: Primary | ICD-10-CM

## 2018-05-07 RX ORDER — ATENOLOL 50 MG/1
TABLET ORAL
Qty: 90 TABLET | Refills: 1 | Status: SHIPPED | OUTPATIENT
Start: 2018-05-07 | End: 2018-05-08 | Stop reason: SDUPTHER

## 2018-05-08 DIAGNOSIS — I10 ESSENTIAL HYPERTENSION: ICD-10-CM

## 2018-05-08 RX ORDER — ATENOLOL 50 MG/1
TABLET ORAL
Qty: 90 TABLET | Refills: 1 | Status: SHIPPED | OUTPATIENT
Start: 2018-05-08 | End: 2019-01-26 | Stop reason: SDUPTHER

## 2018-05-09 DIAGNOSIS — I10 ESSENTIAL HYPERTENSION: ICD-10-CM

## 2018-05-09 RX ORDER — ATENOLOL 50 MG/1
TABLET ORAL
Qty: 90 TABLET | Refills: 1 | Status: SHIPPED | OUTPATIENT
Start: 2018-05-09 | End: 2020-01-10

## 2018-05-11 ENCOUNTER — OFFICE VISIT (OUTPATIENT)
Dept: OBGYN CLINIC | Facility: CLINIC | Age: 63
End: 2018-05-11
Payer: MEDICARE

## 2018-05-11 VITALS
BODY MASS INDEX: 38.36 KG/M2 | WEIGHT: 315 LBS | HEART RATE: 76 BPM | HEIGHT: 76 IN | SYSTOLIC BLOOD PRESSURE: 115 MMHG | DIASTOLIC BLOOD PRESSURE: 73 MMHG

## 2018-05-11 DIAGNOSIS — M25.562 CHRONIC PAIN OF BOTH KNEES: ICD-10-CM

## 2018-05-11 DIAGNOSIS — G89.29 CHRONIC PAIN OF BOTH KNEES: ICD-10-CM

## 2018-05-11 DIAGNOSIS — E66.01 MORBID OBESITY WITH BMI OF 40.0-44.9, ADULT (HCC): ICD-10-CM

## 2018-05-11 DIAGNOSIS — M25.561 CHRONIC PAIN OF BOTH KNEES: ICD-10-CM

## 2018-05-11 DIAGNOSIS — M17.0 BILATERAL PRIMARY OSTEOARTHRITIS OF KNEE: Primary | ICD-10-CM

## 2018-05-11 PROCEDURE — 99214 OFFICE O/P EST MOD 30 MIN: CPT | Performed by: ORTHOPAEDIC SURGERY

## 2018-05-11 NOTE — PROGRESS NOTES
Assessment/Plan:  1  Bilateral primary osteoarthritis of knee     2  Chronic pain of both knees     3  Morbid obesity with BMI of 40 0-44 9, adult ([facility] [address] )         Evelin Cuevas is a pleasant 53-year-old male with bilateral knee pain attributable to his underlying osteoarthritis  He recently completed a series of viscosupplementation and currently is doing fairly well  He wanted to establish care knowing that he will ultimately require a total knee arthroplasty  We spoke with him at length today about the requirement in order to be a candidate for a total knee arthroplasty  He does not smoke and does not have diabetes  He does have significant cardiovascular and pulmonary comorbidities and would require clearance from those respective specialties  Additionally, his BMI is currently too high to be considered for a total joint replacement  He needs to lose at least 45 lb to be a candidate  He set a goal to lose the 45 lb by next year  In the interim, we are happy to help him with periodic cortisone or viscosupplementation injections to help mitigate his pain and allow him to exercise more  He can tentatively return in 6 months pending the efficacy of the Euflexxa injections that he just finished  He can notify us if he fails to see relief and wishes to return sooner  All of his questions were answered today  Subjective: Bilateral knee pain    Patient ID: Regena Osler  is a 63 y o  male  Evelin Cuevas is a pleasant 53-year-old male with referred to our office from our colleague, Dr Ezekiel Matthew, for are subspecialty of joint replacement and reconstruction  He has had ongoing knee problems for many years, beginning with surgery on his left knee when he was 17 and his right knee when ease 17  Since then, he has had 3 arthroscopies on his right knee  He has been under the care of Dr Ezekiel Matthew, and undergone cortisone and hylaronic acid injections   He completed bilateral knee Euflexxa injections 9 days ago, which he states have helped significantly  He is establishing care with us as he knows that he will require a total knee replacement for his severe osteoarthritis  His right knee is more symptomatic than his left  He is free of mechanical symptoms  He has been working on weight loss  He is not diabetic  He quit smoking in 1981  He has tried NSAIDs, tylenol, and physical therapy in the past with minimal relief  Review of Systems   Constitutional: Negative  HENT: Negative  Eyes: Negative  Respiratory: Negative  Cardiovascular: Negative  Gastrointestinal: Negative  Endocrine: Negative  Genitourinary: Negative  Musculoskeletal: Positive for arthralgias, joint swelling and myalgias  Skin: Negative  Allergic/Immunologic: Negative  Neurological: Negative  Hematological: Negative  Psychiatric/Behavioral: Negative  Past Medical History:   Diagnosis Date    Aortic aneurysm, abdominal (HCC)     Arthritis of right knee     Asthma     Bipolar disorder (HCC)     Depression     Heart abnormality     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   goes to [address] [place] [address] (hyperlipidemia)     Hypertension        Past Surgical History:   Procedure Laterality Date    ABDOMINAL AORTIC ANEURYSM REPAIR      Last assessed: 4/14/16    AORTIC VALVE REPLACEMENT      Last assessed: 4/14/16    FOOT SURGERY      REPLACEMENT TOTAL KNEE BILATERAL      ROTATOR CUFF REPAIR      Last assessed: 4/14/16    TOE SURGERY         Family History   Problem Relation Age of Onset    Heart disease Mother     Alcohol abuse Mother     Hypertension Mother     Cancer Mother     Cancer Father     Hypertension Father     Diabetes Father      Mellitus    Alcohol abuse Father     Mental illness Brother      Diseases of the nervous system complicating pregnancy, childbirth and the peurperium    Cancer Brother        Social History     Occupational History    Not on file       Social History Main Topics    Smoking status: Former Smoker     Packs/day: 2 00     Years: 6 00     Quit date: 8/1/1981    Smokeless tobacco: Never Used      Comment: quit in 1981    Alcohol use No      Comment: Per [employer]: Former consumption of alcohol    Drug use: No      Comment: Per [employer]: History of crack cocaine and marijuana use    Sexual activity: Not Currently         Current Outpatient Prescriptions:     albuterol (PROVENTIL HFA,VENTOLIN HFA) 90 mcg/act inhaler, Inhale 2 puffs every 4 (four) hours as needed for wheezing or shortness of breath, Disp: , Rfl:     aspirin (ECOTRIN LOW STRENGTH) 81 mg EC tablet, Take 81 mg by mouth daily, Disp: , Rfl:     atenolol (TENORMIN) 50 mg tablet, TAKE ONE TABLET BY MOUTH EVERY DAY, Disp: 90 tablet, Rfl: 1    enalapril (VASOTEC) 10 mg tablet, TAKE ONE TABLET BY MOUTH EVERY DAY, Disp: 30 tablet, Rfl: 9    fluticasone (FLONASE) 50 mcg/act nasal spray, 2 sprays into each nostril daily, Disp: , Rfl:     furosemide (LASIX) 20 mg tablet, Take 1 tablet by mouth daily for 30 days, Disp: 30 tablet, Rfl: 0    furosemide (LASIX) 20 mg tablet, , Disp: , Rfl:     furosemide (LASIX) 20 mg tablet, TAKE ONE TABLET BY MOUTH EVERY DAY AS NEEDED FOR LEG SWELLING OR WEIGHT GAIN OF 3 POUND IN DAY, Disp: 30 tablet, Rfl: 3    furosemide (LASIX) 40 mg tablet, TAKE ONE TABLET BY MOUTH EVERY DAY, Disp: 30 tablet, Rfl: 6    ibuprofen (MOTRIN) 600 mg tablet, Take 1 tablet (600 mg total) by mouth every 8 (eight) hours as needed for mild pain or moderate pain, Disp: 30 tablet, Rfl: 0    KLOR-CON M20 20 MEQ tablet, TAKE ONE TABLET BY MOUTH EVERY DAY, Disp: 30 tablet, Rfl: 6    OXcarbazepine (TRILEPTAL) 150 mg tablet, Take 2 tablets (300 mg total) by mouth 2 (two) times a day, Disp: 60 tablet, Rfl: 0    pravastatin (PRAVACHOL) 20 mg tablet, Take 1 tablet by mouth daily with dinner for 30 days Patient would like to try pravastatin 20mg, he does have myalgia from Lipitor/Crestor in the past , Disp: 30 tablet, Rfl: 0    sertraline (ZOLOFT) 100 mg tablet, Take 100 mg by mouth 2 (two) times a day, Disp: , Rfl:     sildenafil (VIAGRA) 50 MG tablet, Take 1 hour prior to sexual activity  Do not take more than 2 tabs in 1 week , Disp: 10 tablet, Rfl: 0    Allergies   Allergen Reactions    Lipitor [Atorvastatin]      myalgia     Codeine GI Intolerance    Crestor [Rosuvastatin]      myalgia     Penicillins Rash    Sulfa Antibiotics Rash     Tolerates Lasix       Objective:  Vitals:    05/11/18 1349   BP: 115/73   Pulse: 76       Body mass index is 44 06 kg/m²  Right Knee Exam     Tenderness   The patient is experiencing tenderness in the patella, lateral joint line and medial joint line  Range of Motion   Extension: 0   Flexion: 110 (patellofemoral crepitus with negative patellar grind  No patellar laxity)     Tests   Jhony:  Medial - negative Lateral - negative  Drawer:       Anterior - negative      Varus: negative  Valgus: negative  Patellar Apprehension: negative    Other   Erythema: absent  Scars: present  Sensation: normal  Pulse: present  Swelling: none  Other tests: no effusion present    Comments:  He has medial scars from his initial surgeries  He ambulates slowly but with a symmetrical gait  His calf is soft and nontender and he reports normal sensation in the L2 through S1 nerve distributions  Left Knee Exam     Tenderness   The patient is experiencing tenderness in the patella  Range of Motion   Extension: 0   Flexion: 110 (patellofemoral crepitus with negative patellar grind  No patellar laxity)     Tests   Jhony:  Medial - negative Lateral - negative  Drawer:       Anterior - negative       Varus: negative  Valgus: negative  Patellar Apprehension: negative    Other   Erythema: absent  Scars: present  Effusion: no effusion present          Observations   Left Knee   Negative for effusion  Right Knee   Negative for effusion         Physical Exam   Constitutional: He is oriented to person, place, and time  He appears well-developed and well-nourished  HENT:   Head: Normocephalic and atraumatic  Eyes: EOM are normal    Glasses    Cardiovascular: Intact distal pulses  Pulmonary/Chest: Effort normal    Musculoskeletal:        Right knee: He exhibits no effusion  Left knee: He exhibits no effusion  See ortho exam   Neurological: He is alert and oriented to person, place, and time  Skin: Skin is warm and dry  Psychiatric: He has a normal mood and affect  His behavior is normal  Judgment and thought content normal      I have personally reviewed pertinent films in PACS of the weight bearing X-rays of his knees which show bilateral tricompartmental degenerative changes  He has severe lateral and patellofemoral joint space narrowing of the right knee with multiple osteophytes  He has mild to moderate narrowing of the other compartments  There are no fractures or loose bodies

## 2018-05-15 ENCOUNTER — HOSPITAL ENCOUNTER (OUTPATIENT)
Dept: RADIOLOGY | Facility: HOSPITAL | Age: 63
Discharge: HOME | End: 2018-05-15
Payer: MEDICARE

## 2018-05-15 ENCOUNTER — OFFICE VISIT (OUTPATIENT)
Dept: FAMILY MEDICINE CLINIC | Facility: CLINIC | Age: 63
End: 2018-05-15
Payer: MEDICARE

## 2018-05-15 ENCOUNTER — TRANSCRIBE ORDERS (OUTPATIENT)
Dept: ADMINISTRATIVE | Facility: HOSPITAL | Age: 63
End: 2018-05-15

## 2018-05-15 VITALS
DIASTOLIC BLOOD PRESSURE: 72 MMHG | WEIGHT: 315 LBS | SYSTOLIC BLOOD PRESSURE: 124 MMHG | OXYGEN SATURATION: 97 % | RESPIRATION RATE: 20 BRPM | HEIGHT: 76 IN | HEART RATE: 80 BPM | TEMPERATURE: 98.1 F | BODY MASS INDEX: 38.36 KG/M2

## 2018-05-15 DIAGNOSIS — J44.1 COPD EXACERBATION (HCC): ICD-10-CM

## 2018-05-15 DIAGNOSIS — R06.01 ORTHOPNEA: ICD-10-CM

## 2018-05-15 DIAGNOSIS — J44.1 COPD EXACERBATION (HCC): Primary | ICD-10-CM

## 2018-05-15 DIAGNOSIS — R09.02 HYPOXIA: ICD-10-CM

## 2018-05-15 PROCEDURE — 99214 OFFICE O/P EST MOD 30 MIN: CPT | Performed by: NURSE PRACTITIONER

## 2018-05-15 PROCEDURE — 71046 X-RAY EXAM CHEST 2 VIEWS: CPT

## 2018-05-15 RX ORDER — ALBUTEROL SULFATE 90 UG/1
2 AEROSOL, METERED RESPIRATORY (INHALATION) EVERY 6 HOURS PRN
Qty: 1 INHALER | Refills: 6 | Status: SHIPPED | OUTPATIENT
Start: 2018-05-15 | End: 2019-07-10 | Stop reason: SDUPTHER

## 2018-05-15 RX ORDER — IPRATROPIUM BROMIDE AND ALBUTEROL SULFATE 2.5; .5 MG/3ML; MG/3ML
3 SOLUTION RESPIRATORY (INHALATION)
Status: DISCONTINUED | OUTPATIENT
Start: 2018-05-15 | End: 2018-05-15

## 2018-05-15 RX ORDER — ALBUTEROL SULFATE 2.5 MG/3ML
2.5 SOLUTION RESPIRATORY (INHALATION) ONCE
Status: COMPLETED | OUTPATIENT
Start: 2018-05-15 | End: 2018-05-15

## 2018-05-15 RX ORDER — METHYLPREDNISOLONE SODIUM SUCCINATE 125 MG/2ML
125 INJECTION, POWDER, LYOPHILIZED, FOR SOLUTION INTRAMUSCULAR; INTRAVENOUS ONCE
Status: COMPLETED | OUTPATIENT
Start: 2018-05-15 | End: 2018-05-15

## 2018-05-15 RX ORDER — PREDNISONE 20 MG/1
40 TABLET ORAL DAILY
Qty: 10 TABLET | Refills: 0 | Status: SHIPPED | OUTPATIENT
Start: 2018-05-15 | End: 2018-05-20

## 2018-05-15 RX ORDER — ALBUTEROL SULFATE 1.25 MG/3ML
1.25 SOLUTION RESPIRATORY (INHALATION) EVERY 6 HOURS PRN
Status: DISCONTINUED | OUTPATIENT
Start: 2018-05-15 | End: 2018-05-15

## 2018-05-15 RX ADMIN — ALBUTEROL SULFATE 2.5 MG: 2.5 SOLUTION RESPIRATORY (INHALATION) at 15:02

## 2018-05-15 RX ADMIN — METHYLPREDNISOLONE SODIUM SUCCINATE 125 MG: 125 INJECTION, POWDER, LYOPHILIZED, FOR SOLUTION INTRAMUSCULAR; INTRAVENOUS at 14:51

## 2018-05-15 NOTE — PATIENT INSTRUCTIONS
COPD Exacerbation, Ambulatory Care   GENERAL INFORMATION:   A COPD (chronic obstructive pulmonary disease ) exacerbation  is a flare up or worsening of COPD  Common symptoms include the following:   · Shortness of breath     · A dry cough     · Coughing fits that bring up mucus from your lungs     · Wheezing and chest tightness  Seek immediate care for the following symptoms:   · Confusion, dizziness, or lightheadedness    · Red, swollen, warm arm or leg    · Shortness of breath or chest pain    · Coughing up blood  Treatment for a COPD exacerbation  may include medicines to help decrease swelling and inflammation in your lungs  Medicines may also help open your airways or treat and infection  You may need pulmonary rehabilitation to help you manage your symptoms and improve your quality of life  You may need extra oxygen to help you breathe easier  Prevent another exacerbation:   · Do not smoke, and avoid others who smoke  If you smoke, it is never too late to quit  You may have fewer exacerbations  Ask for information about medicines and support programs that can help you quit  · Avoid triggers that make your symptoms worse  Cold weather and sudden temperature changes can trigger an exacerbation  Fumes from cars and chemicals, air pollution, and perfume can also increase your symptoms  · Use pursed-lip breathing when you feel short of breath  Take a deep breath in through your nose  Slowly breathe out through your mouth with your lips pursed for twice as long as you inhaled  You can also practice this breathing pattern while you bend, lift, climb stairs, or exercise  Pursed-lip breathing slows down your breathing and helps move more air in and out of your lungs  · Exercise for at least 20 minutes each day  Exercise can help increase your energy and decrease shortness of breath  Ask about the best exercise plan for you  · Prevent infections that can be dangerous when you have COPD    Get a flu vaccine every year as soon as it becomes available  Ask if you should also get other vaccines, such as those given to prevent pneumonia and tetanus  Avoid people who are sick, and wash your hands often  Follow up with your healthcare provider as directed:  Write down your questions so you remember to ask them during your visits  CARE AGREEMENT:   You have the right to help plan your care  Learn about your health condition and how it may be treated  Discuss treatment options with your caregivers to decide what care you want to receive  You always have the right to refuse treatment  The above information is an  only  It is not intended as medical advice for individual conditions or treatments  Talk to your doctor, nurse or pharmacist before following any medical regimen to see if it is safe and effective for you  © 2014 [address] is for End User's use only and may not be sold, redistributed or otherwise used for commercial purposes  All illustrations and images included in CareNotes® are the copyrighted property of [employer]  or Alvin Durham

## 2018-05-15 NOTE — PROGRESS NOTES
Subjective     Gabrielle Mi Sr  is a 63 y o  male  for evaluation of shortness of breath and cough  Symptoms occur at rest  Symptoms began last night, gradually worsening since  Associated symptoms include  dyspnea on exertion, dyspnea when laying down, productive cough, shortness of breath and wheezing  He denies chest pain, located substernal area, constant cough and edema    He has not had recent travel  Weight has been stable  Symptoms are exacerbated by minimal activity and exposure to pollen, outside heat  Symptoms are alleviated by nothing  He has tried albuterol x1, rest  He has seen pulmonologist at [facility] within the year  Per pt, "she told me nothing is wrong with my lungs "    The following portions of the patient's history were reviewed by a provider in this encounter and updated as appropriate: pmh, psh, social hx  Patient has hx of diastolic CHF, COPD, current smoker    Review of Systems  Pertinent items are noted in HPI      Objective     /72 (BP Location: Left arm, Patient Position: Sitting, Cuff Size: Adult)   Pulse 80   Temp 98 1 °F (36 7 °C) (Temporal)   Resp 20   Ht 6' 3 5" (1 918 m)   Wt (!) 161 kg (355 lb)   SpO2 97% Comment: on room air after neb, im solu medrol  BMI 43 79 kg/m²   General appearance: alert, moderate distress and morbidly obese  Head: Normocephalic, without obvious abnormality, atraumatic  Eyes: no conj pallor  Throat: abnormal findings: mild oropharyngeal erythema  Lungs: diminished breath sounds and wheezes  Heart: regular rate and rhythm, S1, S2 normal, no murmur, click, rub or gallop  Extremities: edema trace ankle edema, extremities warm with no cyanosis, palp distal pulses  Pulses: 2+ and symmetric  Skin: diaphoretic  Lymph nodes: Cervical adenopathy: normal  Neurologic: Grossly normal    Diagnostic Review  none pertinent    Assessment/Plan     COPD exacerbation vs CHF exacerbation     Albuterol neb x1  Solumedrol 125mg given x1 (IM)  Patient reports feeling slightly better  O2 sat 97%  Lungs are clear  He appears comfortable  Patient will get XR immediately after OV  Rx escribed for advair, pred taper  RTO tomorrow for recheck  Advised to stay in A/C  Avoid the humid outdoors at this time  Referral to [address] [place] [address] for 2nd opinion, optimization of meds  If condition worsens, he agrees to go to ER for evaluation and treatment

## 2018-05-16 ENCOUNTER — OFFICE VISIT (OUTPATIENT)
Dept: FAMILY MEDICINE CLINIC | Facility: CLINIC | Age: 63
End: 2018-05-16
Payer: MEDICARE

## 2018-05-16 VITALS
DIASTOLIC BLOOD PRESSURE: 72 MMHG | WEIGHT: 315 LBS | HEART RATE: 80 BPM | TEMPERATURE: 97.6 F | RESPIRATION RATE: 20 BRPM | BODY MASS INDEX: 38.36 KG/M2 | OXYGEN SATURATION: 96 % | SYSTOLIC BLOOD PRESSURE: 118 MMHG | HEIGHT: 76 IN

## 2018-05-16 DIAGNOSIS — J44.1 COPD WITH ACUTE EXACERBATION (HCC): Primary | ICD-10-CM

## 2018-05-16 PROCEDURE — 99213 OFFICE O/P EST LOW 20 MIN: CPT | Performed by: NURSE PRACTITIONER

## 2018-05-16 NOTE — PATIENT INSTRUCTIONS
COPD: Prevent Exacerbations   AMBULATORY CARE:   An exacerbation of COPD  means your symptoms get much worse very quickly  Exacerbations can be triggered by infections such as a cold or the flu  Lung irritants such as air pollution, dust, fumes, or smoke can also trigger an exacerbation  Exacerbations of COPD can be life-threatening  Protect yourself from germs:  Germs can get into your lungs and cause an infection  An infection in your lungs can create more mucus and make it harder to breathe  An infection can also create swelling in your airways and prevent air from getting in  You can decrease your risk for infection by doing the following:  · Wash your hands often with soap and water  Carry germ-killing gel with you  You can use the gel to clean your hands when soap and water are not available  · Do not touch your eyes, nose, or mouth unless you have washed your hands first      · Always cover your mouth when you cough  Cough into a tissue or your shirtsleeve so you do not spread germs from your hands  · Try to avoid people who have a cold or the flu  If you are sick, stay away from others as much as possible  Do not smoke, and avoid others who smoke:  Nicotine and other substances can cause lung irritation or damage and make it harder for you to breathe  Do not use e-cigarettes or smokeless tobacco  They still contain nicotine  Ask your healthcare provider for information if you currently smoke and need help to quit  For support and more information:  · [employer]  Phone: 9- 375 - 784-4422  Web Address: [employer]  Use pursed-lip breathing any time you feel short of breath: Take a deep breath in through your nose  Slowly breathe out through your mouth with your lips pursed for twice as long as you inhaled  You can also practice this breathing pattern while you bend, lift, climb stairs, or exercise  It slows down your breathing and helps move more air in and out of your lungs  Take your medicines as directed: Your healthcare provider may prescribe medicine to treat an infection or help you breathe easier  It is important that you take your medicines as directed to prevent an exacerbation  Refill your medicines before you are out so that you do not miss a dose  Ask your healthcare provider if you have any questions on how to take your medicines  Avoid lung irritants:  Stay out of high altitudes and places with high humidity  Stay inside, or cover your mouth and nose with a scarf when you are outside during cold weather  Stay inside on days when air pollution or pollen counts are high  Do not use aerosol sprays such as deodorant, bug spray, and hair spray  Drink more liquids: This will help to keep your air passages moist and help you cough up mucus  Ask how much liquid to drink each day and which liquids are best for you  Ask about vaccines: Your healthcare provider may recommend that you get regular flu and pneumonia vaccines  Pneumonia can become life-threatening for a person who has COPD  Ask about other vaccines you may need  Ask your healthcare provider about the flu and pneumonia vaccines  All adults should get the flu (influenza) vaccine every year as soon as it becomes available  The pneumonia vaccine is given to adults aged 72 or older to prevent pneumococcal disease, such as pneumonia  Adults aged 23 to 59 years who are at high risk for pneumococcal disease also should get the pneumococcal vaccine  It may need to be repeated 1 or 5 years later  © 2017 [facility] [place] [facility] is for End User's use only and may not be sold, redistributed or otherwise used for commercial purposes  All illustrations and images included in CareNotes® are the copyrighted property of [employer]  or Alvin Durham  The above information is an  only  It is not intended as medical advice for individual conditions or treatments   Talk to your doctor, nurse or pharmacist before following any medical regimen to see if it is safe and effective for you

## 2018-05-16 NOTE — PROGRESS NOTES
Subjective     Elis Jimenez Sr  is a 63 y o  male  for re-evaluation of shortness of breath and cough-seen yesterday  He reports significant improvement since steroid injection  CXR clear  NO CHF, pneumonia  Slept well  No dyspnea at rest        The following portions of the patient's history were reviewed by a provider in this encounter and updated as appropriate: pmh, psh, social hx  Patient has hx of diastolic CHF, COPD, current smoker    Review of Systems  Pertinent items are noted in HPI  Objective     /72 (BP Location: Left arm, Patient Position: Sitting, Cuff Size: Adult)   Pulse 80   Temp 97 6 °F (36 4 °C) (Temporal)   Resp 20   Ht 6' 3 5" (1 918 m)   Wt (!) 161 kg (355 lb)   SpO2 96%   BMI 43 79 kg/m²   General appearance: A & o x 3  Appears comfortable  Head: Normocephalic, without obvious abnormality, atraumatic  Eyes: no conj pallor  Throat: abnormal findings: mild oropharyngeal erythema  Lungs: decreased but clear  Heart: regular rate and rhythm, S1, S2 normal, no murmur, click, rub or gallop  Extremities: edema trace ankle edema, extremities warm with no cyanosis, palp distal pulses  Pulses: 2+ and symmetric  Skin: diaphoretic  Lymph nodes: Cervical adenopathy: normal  Neurologic: Grossly normal    Diagnostic Review  Xr Chest Pa & Lateral    Result Date: 5/16/2018  Impression No acute cardiopulmonary disease  Workstation performed: [id number]     Xr Chest Pa & Lateral    Result Date: 9/22/2017  Impression No active pulmonary disease  Workstation performed: [id number]     Xr Chest Pa & Lateral    Result Date: 8/9/2017  Impression No active pulmonary disease  Workstation performed: [id number]       Assessment/Plan     COPD exacerbation    Clinically improved since yesterday    Cont plan of care  advair, pred taper  RTO 2 weeks for recheck or sooner if sxs return  Advised to stay in A/C  Avoid the humid outdoors at this time     Referral to [address]  [facility] for 2nd opinion, optimization of meds  Obtain copies of all pulmonary testing performed at [facility] and [facility]  If condition worsens, he agrees to go to ER for evaluation and treatment

## 2018-06-01 ENCOUNTER — OFFICE VISIT (OUTPATIENT)
Dept: FAMILY MEDICINE CLINIC | Facility: CLINIC | Age: 63
End: 2018-06-01
Payer: MEDICARE

## 2018-06-01 VITALS
TEMPERATURE: 97.2 F | HEART RATE: 74 BPM | WEIGHT: 315 LBS | RESPIRATION RATE: 20 BRPM | SYSTOLIC BLOOD PRESSURE: 120 MMHG | HEIGHT: 76 IN | DIASTOLIC BLOOD PRESSURE: 76 MMHG | OXYGEN SATURATION: 96 % | BODY MASS INDEX: 38.36 KG/M2

## 2018-06-01 DIAGNOSIS — R05.3 CHRONIC COUGH: Primary | ICD-10-CM

## 2018-06-01 DIAGNOSIS — J44.1 COPD WITH ACUTE EXACERBATION (HCC): ICD-10-CM

## 2018-06-01 DIAGNOSIS — R49.0 HOARSENESS: ICD-10-CM

## 2018-06-01 PROCEDURE — 99213 OFFICE O/P EST LOW 20 MIN: CPT | Performed by: NURSE PRACTITIONER

## 2018-06-01 RX ORDER — BENZONATATE 200 MG/1
200 CAPSULE ORAL 3 TIMES DAILY PRN
Qty: 20 CAPSULE | Refills: 0 | Status: SHIPPED | OUTPATIENT
Start: 2018-06-01 | End: 2018-10-24

## 2018-06-01 RX ORDER — OMEPRAZOLE 40 MG/1
CAPSULE, DELAYED RELEASE ORAL
Qty: 37 CAPSULE | Refills: 5 | Status: SHIPPED | OUTPATIENT
Start: 2018-06-01 | End: 2019-01-21 | Stop reason: SDUPTHER

## 2018-06-01 RX ORDER — DEXTROMETHORPHAN HYDROBROMIDE AND PROMETHAZINE HYDROCHLORIDE 15; 6.25 MG/5ML; MG/5ML
5 SYRUP ORAL
Qty: 118 ML | Refills: 0 | Status: SHIPPED | OUTPATIENT
Start: 2018-06-01 | End: 2018-10-24

## 2018-06-01 NOTE — PATIENT INSTRUCTIONS
Chronic Bronchitis   AMBULATORY CARE:   Chronic bronchitis  is a long-term swelling and irritation in the air passages in your lungs  The irritation may damage your lungs  The lung damage often gets worse over time, and it is usually permanent  Chronic bronchitis is part of a group of lung diseases called chronic obstructive pulmonary disease (COPD)  Signs and symptoms include the following:   · Early signs and symptoms  may include shortness of breath, a runny or stuffy nose, or a headache  You may have a morning cough that brings up mucus from the lungs  As time passes, the amount of mucus coughed up begins to increase  The cough also starts to last longer during the day  You may have a bad taste in your mouth or bad breath  · Later signs and symptoms  may include your skin, nail beds, or lips turning dusky or blue  You may become more short of breath and get tired more easily  You may not be able to walk as far as you used to  You may wheeze  You may notice your breathing is faster than it used to be  Call 911 for any of the following:   · You have new chest pain or tightness  · You become confused, dizzy, or feel like you may faint  · You have a new or increased gray or blue tint to your nail beds, fingers or lips  Seek care immediately if:   · You become tired easily from trying to get enough breath  · The amount or color of your sputum changes, or your sputum becomes too hard to cough up  Contact your healthcare provider if:   · You have a fever  · You use your inhalers more often than usual      · You have new or increased swelling in your legs, ankles, or abdomen  · You run out of breath easily when you talk or do your usual exercise or activities  · You have questions or concerns about your condition or care  Treatment for chronic bronchitis  may include medicine to treat infection or help you breathe better  You may need to use oxygen in your home   Ask your healthcare provider if you have any questions on how to take medications or use oxygen  Self-care:   · Sleep with your upper body raised  This will help you breathe easier  You can use foam wedges or elevate the head of your bed  Many devices are available to help raise your upper body while in bed  Use a device that will tilt your whole body, or bend your body at the waist  The device should not bend your body at the upper back or neck  · Prevent the spread of germs:      [facility] [place] [facility] your hands often with soap and water  Carry germ-killing gel with you  You can use the gel to clean your hands when soap and water are not available  ¨ Do not touch your eyes, nose, or mouth unless you have washed your hands first      ¨ Always cover your mouth when you cough  Cough into a tissue or your shirtsleeve so you do not spread germs from your hands  ¨ Try to avoid people who have a cold or the flu  If you are sick, stay away from others as much as possible  · Do not smoke  Nicotine and other substances can cause lung damage  Do not use e-cigarettes or smokeless tobacco without first talking to your healthcare provider  They still contain nicotine  Ask your healthcare provider for information if you currently smoke and need help to quit  · Avoid lung irritants  Stay out of high altitudes and places with high humidity  Stay inside, or cover your mouth and nose with a scarf when you are outside during cold weather  Stay inside on days when air pollution or pollen counts are high  Do not use aerosol sprays such as deodorant, bug spray, and hair spray  · Drink more liquids  This will help to keep your air passages moist and help you cough up mucus  Ask how much liquid to drink each day and which liquids are best for you  · Ask your healthcare provider about the flu and pneumonia vaccines  All adults should get the flu (influenza) vaccine every year as soon as it becomes available   The pneumonia vaccine is given to adults aged 72 or older to prevent pneumococcal disease, such as pneumonia  Adults aged 23 to 59 years who are at high risk for pneumococcal disease also should get the pneumococcal vaccine  It may need to be repeated 1 or 5 years later  Ways to help you breathe better:   · Take deep breaths and cough  10 times each hour  This will decrease your risk for a lung infection  Take a deep breath and hold it for as long as you can  Let the air out and then cough strongly  Deep breaths help open your airway  You may be given an incentive spirometer to help you take deep breaths  Put the plastic piece in your mouth and take a slow, deep breath  Then let the air out and cough  Repeat these steps 10 times every hour  · Pursed-lip breathing  can be used any time you feel short of breath  Pursed-lip breathing can be especially helpful before you start an activity:     ¨ Breathe in through your nose  Use the muscles in your abdomen to help fill your lungs with air  ¨ Slowly breathe out through your mouth with your lips slightly puckered  You should make a quiet hissing sound as you breathe out  ¨ Try to take twice as long to breathe out as it did to breathe in  This helps you get rid of as much air from your lungs as possible  ¨ Repeat this exercise several times  Once you are used to doing pursed-lip breathing, you can do it any time you need more air  · Diaphragmatic breathing  can help strengthen some of the muscles you use to breathe:     ¨ Place one hand on your stomach just below your ribs  Place your other hand in the middle of your chest over your breastbone  ¨ Breathe in slowly through your nose, as deeply as you can  ¨ Breathe out slowly through pursed lips  As you breathe out, tighten the muscles in your stomach  Use your hand to gently push in and up while tightening the muscles  ¨ Diaphragmatic breathing takes practice  You may need to practice this many times a day   Slowly increase the amount of time you spend during each practice session  Follow up with your healthcare provider as directed:  Write down your questions so you remember to ask them during your visits  © 2017 [address] [employer] Information is for End User's use only and may not be sold, redistributed or otherwise used for commercial purposes  All illustrations and images included in CareNotes® are the copyrighted property of [employer]  or Alvin Durham  The above information is an  only  It is not intended as medical advice for individual conditions or treatments  Talk to your doctor, nurse or pharmacist before following any medical regimen to see if it is safe and effective for you

## 2018-06-01 NOTE — PROGRESS NOTES
Subjective     Elly Gil Sr  is a 63 y o  male  for re-evaluation of  cough  Was seen aproxximately 2 weeks ago  Now hoarse  "Not much different since last time I saw you"  He failed to started Advair  "I don't do well with them " would not expand on that statement  Failed to make appt with pulmonologist  "Wife was supposed to do it"  Denies shortness of breath, orthopnea, chest pain  Cough and hoarseness main sxs  Not treating cough  Denies heart burn, sore throat  The following portions of the patient's history were reviewed by a provider in this encounter and updated as appropriate: pmh, psh, social hx  Patient has hx of diastolic CHF, COPD, current smoker    Review of Systems  Pertinent items are noted in HPI  Objective     /76 (BP Location: Left arm, Patient Position: Sitting, Cuff Size: Adult)   Pulse 74   Temp (!) 97 2 °F (36 2 °C) (Temporal)   Resp 20   Ht 6' 3 5" (1 918 m)   Wt (!) 158 kg (349 lb)   SpO2 96%   BMI 43 05 kg/m²   General appearance: A & o x 3  Appears comfortable  Head: Normocephalic, without obvious abnormality, atraumatic  Eyes: no conj pallor  Throat: abnormal findings: mild oropharyngeal erythema  Lungs: decreased but clear  Heart: regular rate and rhythm, S1, S2 normal, no murmur, click, rub or gallop  Extremities: edema trace ankle edema, extremities warm with no cyanosis, palp distal pulses  Pulses: 2+ and symmetric  Skin: diaphoretic  Lymph nodes: Cervical adenopathy: normal  Neurologic: Grossly normal    Diagnostic Review  Xr Chest Pa & Lateral    Result Date: 5/16/2018  Impression No acute cardiopulmonary disease  Workstation performed: [id number]     Xr Chest Pa & Lateral    Result Date: 9/22/2017  Impression No active pulmonary disease  Workstation performed: [id number]     Xr Chest Pa & Lateral    Result Date: 8/9/2017  Impression No active pulmonary disease   Workstation performed: [id number]       Assessment/Plan     COPD   Query silent reflux      Cont plan of care  Spiriva  RTO on Monday for recheck, labs  Discussed the consequences of noncompliance in the progression of health condition  Patients verbalizes understanding of the same  Will follow patient closely  Patient gives verbal understanding that his  decision to not follow provider recommendations related to self-care, medications, and follow-up may endanger his  health or life  Advised to stay in A/C  Avoid the humid outdoors at this time  Referral to [address]  [facility] for 2nd opinion, optimization of meds  Obtain copies of all pulmonary testing performed at [facility]  If condition worsens, he agrees to go to ER for evaluation and treatment

## 2018-06-04 ENCOUNTER — OFFICE VISIT (OUTPATIENT)
Dept: FAMILY MEDICINE CLINIC | Facility: CLINIC | Age: 63
End: 2018-06-04
Payer: MEDICARE

## 2018-06-04 ENCOUNTER — TELEPHONE (OUTPATIENT)
Dept: FAMILY MEDICINE CLINIC | Facility: CLINIC | Age: 63
End: 2018-06-04

## 2018-06-04 VITALS
HEIGHT: 76 IN | RESPIRATION RATE: 16 BRPM | DIASTOLIC BLOOD PRESSURE: 76 MMHG | SYSTOLIC BLOOD PRESSURE: 118 MMHG | BODY MASS INDEX: 38.36 KG/M2 | OXYGEN SATURATION: 98 % | WEIGHT: 315 LBS | HEART RATE: 68 BPM | TEMPERATURE: 97.5 F

## 2018-06-04 DIAGNOSIS — R73.9 HYPERGLYCEMIA: ICD-10-CM

## 2018-06-04 DIAGNOSIS — J41.0 SIMPLE CHRONIC BRONCHITIS (HCC): Primary | ICD-10-CM

## 2018-06-04 DIAGNOSIS — Z79.899 ENCOUNTER FOR LONG-TERM (CURRENT) USE OF HIGH-RISK MEDICATION: ICD-10-CM

## 2018-06-04 DIAGNOSIS — E66.01 MORBID OBESITY WITH BMI OF 40.0-44.9, ADULT (HCC): ICD-10-CM

## 2018-06-04 DIAGNOSIS — J30.1 SEASONAL ALLERGIC RHINITIS DUE TO POLLEN: ICD-10-CM

## 2018-06-04 DIAGNOSIS — I10 ESSENTIAL HYPERTENSION: ICD-10-CM

## 2018-06-04 DIAGNOSIS — J44.9 CHRONIC OBSTRUCTIVE PULMONARY DISEASE, UNSPECIFIED COPD TYPE (HCC): Primary | ICD-10-CM

## 2018-06-04 DIAGNOSIS — E78.2 MIXED HYPERLIPIDEMIA: ICD-10-CM

## 2018-06-04 LAB
ANION GAP SERPL CALCULATED.3IONS-SCNC: 7 MMOL/L (ref 4–13)
BUN SERPL-MCNC: 14 MG/DL (ref 5–25)
CALCIUM SERPL-MCNC: 9 MG/DL (ref 8.3–10.1)
CHLORIDE SERPL-SCNC: 108 MMOL/L (ref 100–108)
CHOLEST SERPL-MCNC: 190 MG/DL (ref 50–200)
CO2 SERPL-SCNC: 25 MMOL/L (ref 21–32)
CREAT SERPL-MCNC: 1.02 MG/DL (ref 0.6–1.3)
ERYTHROCYTE [DISTWIDTH] IN BLOOD BY AUTOMATED COUNT: 13.3 % (ref 11.6–15.1)
EST. AVERAGE GLUCOSE BLD GHB EST-MCNC: 126 MG/DL
GFR SERPL CREATININE-BSD FRML MDRD: 80 ML/MIN/1.73SQ M
GLUCOSE P FAST SERPL-MCNC: 86 MG/DL (ref 65–99)
HBA1C MFR BLD: 6 % (ref 4.2–6.3)
HCT VFR BLD AUTO: 45.6 % (ref 36.5–49.3)
HDLC SERPL-MCNC: 33 MG/DL (ref 40–60)
HGB BLD-MCNC: 14.8 G/DL (ref 12–17)
LDLC SERPL CALC-MCNC: 111 MG/DL (ref 0–100)
MAGNESIUM SERPL-MCNC: 2.5 MG/DL (ref 1.6–2.6)
MCH RBC QN AUTO: 32 PG (ref 26.8–34.3)
MCHC RBC AUTO-ENTMCNC: 32.5 G/DL (ref 31.4–37.4)
MCV RBC AUTO: 99 FL (ref 82–98)
NONHDLC SERPL-MCNC: 157 MG/DL
PLATELET # BLD AUTO: 284 THOUSANDS/UL (ref 149–390)
PMV BLD AUTO: 10.4 FL (ref 8.9–12.7)
POTASSIUM SERPL-SCNC: 4.5 MMOL/L (ref 3.5–5.3)
RBC # BLD AUTO: 4.63 MILLION/UL (ref 3.88–5.62)
SODIUM SERPL-SCNC: 140 MMOL/L (ref 136–145)
TRIGL SERPL-MCNC: 231 MG/DL
TSH SERPL DL<=0.05 MIU/L-ACNC: 1.04 UIU/ML (ref 0.36–3.74)
WBC # BLD AUTO: 7.14 THOUSAND/UL (ref 4.31–10.16)

## 2018-06-04 PROCEDURE — 83036 HEMOGLOBIN GLYCOSYLATED A1C: CPT | Performed by: NURSE PRACTITIONER

## 2018-06-04 PROCEDURE — 84443 ASSAY THYROID STIM HORMONE: CPT | Performed by: NURSE PRACTITIONER

## 2018-06-04 PROCEDURE — 80061 LIPID PANEL: CPT | Performed by: NURSE PRACTITIONER

## 2018-06-04 PROCEDURE — 36415 COLL VENOUS BLD VENIPUNCTURE: CPT | Performed by: NURSE PRACTITIONER

## 2018-06-04 PROCEDURE — 83735 ASSAY OF MAGNESIUM: CPT | Performed by: NURSE PRACTITIONER

## 2018-06-04 PROCEDURE — 80048 BASIC METABOLIC PNL TOTAL CA: CPT | Performed by: NURSE PRACTITIONER

## 2018-06-04 PROCEDURE — 85027 COMPLETE CBC AUTOMATED: CPT | Performed by: NURSE PRACTITIONER

## 2018-06-04 PROCEDURE — 99213 OFFICE O/P EST LOW 20 MIN: CPT | Performed by: NURSE PRACTITIONER

## 2018-06-04 RX ORDER — FLUTICASONE PROPIONATE 50 MCG
2 SPRAY, SUSPENSION (ML) NASAL DAILY
Qty: 16 G | Refills: 0 | Status: SHIPPED | OUTPATIENT
Start: 2018-06-04 | End: 2019-10-04

## 2018-06-04 NOTE — PROGRESS NOTES
Subjective     Bharat Kapadia Sr  is a 63 y o  male  for re-evaluation of  cough  Was seen last Friday with c/o hoarseness, persistent cough  Feeling better since weather change  Did not make pulm appt  Did not start spiriva, omeprazole  "I have to wait until I get paid "        The following portions of the patient's history were reviewed by a provider in this encounter and updated as appropriate: pmh, psh, social hx  Patient has hx of diastolic CHF, COPD, current smoker    Review of Systems  Pertinent items are noted in HPI  Objective     /76 (BP Location: Left arm, Patient Position: Sitting, Cuff Size: Large)   Pulse 68   Temp 97 5 °F (36 4 °C) (Temporal)   Resp 16   Ht 6' 3 5" (1 918 m)   Wt (!) 158 kg (349 lb)   SpO2 98%   BMI 43 05 kg/m²   General appearance: A & o x 3  Appears comfortable  Head: Normocephalic, without obvious abnormality, atraumatic  Eyes: no conj pallor  Throat: abnormal findings: mild oropharyngeal erythema  Lungs: decreased but clear  Heart: regular rate and rhythm, S1, S2 normal, no murmur, click, rub or gallop  Extremities: edema trace ankle edema, extremities warm with no cyanosis, palp distal pulses  Pulses: 2+ and symmetric  Skin: diaphoretic  Lymph nodes: Cervical adenopathy: normal  Neurologic: Grossly normal    Diagnostic Review  Xr Chest Pa & Lateral    Result Date: 5/16/2018  Impression No acute cardiopulmonary disease  Workstation performed: [id number]     Xr Chest Pa & Lateral    Result Date: 9/22/2017  Impression No active pulmonary disease  Workstation performed: [id number]     Xr Chest Pa & Lateral    Result Date: 8/9/2017  Impression No active pulmonary disease  Workstation performed: [id number]       Assessment/Plan     COPD   Query silent reflux      Cont plan of care  Spiriva    Discussed the consequences of noncompliance in the progression of health condition  Patients verbalizes understanding of the same  Will follow patient closely    Patient gives verbal understanding that his  decision to not follow provider recommendations related to self-care, medications, and follow-up may endanger his  health or life  Advised to stay in A/C  Avoid the humid outdoors at this time  Referral to [facility] [address]  [facility] for 2nd opinion, optimization of meds  Obtain copies of all pulmonary testing performed at [facility]  If condition worsens, he agrees to go to ER for evaluation and treatment

## 2018-06-04 NOTE — TELEPHONE ENCOUNTER
EMELYN,   Pharmacist called and said that spiriva is non formulary  He could not give any alternatives

## 2018-06-05 ENCOUNTER — TELEPHONE (OUTPATIENT)
Dept: FAMILY MEDICINE CLINIC | Facility: CLINIC | Age: 63
End: 2018-06-05

## 2018-06-05 NOTE — TELEPHONE ENCOUNTER
----- Message from Marcus Vizcarra, [address] sent at 6/5/2018 12:22 PM EDT -----  Blood work is acceptable  Blood sugars creeping up  Pre-diabetic  Please cut back on carbs, high sugar foods  F/u as scheduled   (Mail copy to pt)

## 2018-06-11 ENCOUNTER — TELEPHONE (OUTPATIENT)
Dept: FAMILY MEDICINE CLINIC | Facility: CLINIC | Age: 63
End: 2018-06-11

## 2018-06-11 NOTE — TELEPHONE ENCOUNTER
Marissa Cardoza pt  Psych meds cant be filled until august when he gets appt can you fill until then

## 2018-06-12 DIAGNOSIS — F31.9 BIPOLAR 1 DISORDER (HCC): ICD-10-CM

## 2018-06-12 RX ORDER — SERTRALINE HYDROCHLORIDE 100 MG/1
100 TABLET, FILM COATED ORAL 2 TIMES DAILY
Qty: 60 TABLET | Refills: 1 | Status: SHIPPED | OUTPATIENT
Start: 2018-06-12 | End: 2019-06-24 | Stop reason: SDUPTHER

## 2018-06-12 RX ORDER — OXCARBAZEPINE 300 MG/1
300 TABLET, FILM COATED ORAL 2 TIMES DAILY
Qty: 60 TABLET | Refills: 1 | Status: SHIPPED | OUTPATIENT
Start: 2018-06-12 | End: 2018-08-31 | Stop reason: SDUPTHER

## 2018-08-07 ENCOUNTER — OFFICE VISIT (OUTPATIENT)
Dept: FAMILY MEDICINE CLINIC | Facility: CLINIC | Age: 63
End: 2018-08-07
Payer: MEDICARE

## 2018-08-07 VITALS
RESPIRATION RATE: 16 BRPM | SYSTOLIC BLOOD PRESSURE: 110 MMHG | DIASTOLIC BLOOD PRESSURE: 64 MMHG | HEIGHT: 76 IN | TEMPERATURE: 97.4 F | BODY MASS INDEX: 38.36 KG/M2 | HEART RATE: 68 BPM | WEIGHT: 315 LBS

## 2018-08-07 DIAGNOSIS — I50.32 CHRONIC DIASTOLIC HEART FAILURE (HCC): ICD-10-CM

## 2018-08-07 DIAGNOSIS — I50.32 CHRONIC DIASTOLIC CONGESTIVE HEART FAILURE (HCC): ICD-10-CM

## 2018-08-07 DIAGNOSIS — I10 ESSENTIAL HYPERTENSION: Primary | ICD-10-CM

## 2018-08-07 DIAGNOSIS — I71.4 ABDOMINAL AORTIC ANEURYSM (AAA) WITHOUT RUPTURE (HCC): ICD-10-CM

## 2018-08-07 DIAGNOSIS — J41.0 SIMPLE CHRONIC BRONCHITIS (HCC): ICD-10-CM

## 2018-08-07 PROCEDURE — 99214 OFFICE O/P EST MOD 30 MIN: CPT | Performed by: NURSE PRACTITIONER

## 2018-08-07 RX ORDER — ENALAPRIL MALEATE 10 MG/1
10 TABLET ORAL DAILY
Qty: 30 TABLET | Refills: 0
Start: 2018-08-07 | End: 2018-11-23 | Stop reason: SDUPTHER

## 2018-08-07 NOTE — PATIENT INSTRUCTIONS
Chronic Bronchitis   AMBULATORY CARE:   Chronic bronchitis  is a long-term swelling and irritation in the air passages in your lungs  The irritation may damage your lungs  The lung damage often gets worse over time, and it is usually permanent  Chronic bronchitis is part of a group of lung diseases called chronic obstructive pulmonary disease (COPD)  Signs and symptoms include the following:   · Early signs and symptoms  may include shortness of breath, a runny or stuffy nose, or a headache  You may have a morning cough that brings up mucus from the lungs  As time passes, the amount of mucus coughed up begins to increase  The cough also starts to last longer during the day  You may have a bad taste in your mouth or bad breath  · Later signs and symptoms  may include your skin, nail beds, or lips turning dusky or blue  You may become more short of breath and get tired more easily  You may not be able to walk as far as you used to  You may wheeze  You may notice your breathing is faster than it used to be  Call 911 for any of the following:   · You have new chest pain or tightness  · You become confused, dizzy, or feel like you may faint  · You have a new or increased gray or blue tint to your nail beds, fingers or lips  Seek care immediately if:   · You become tired easily from trying to get enough breath  · The amount or color of your sputum changes, or your sputum becomes too hard to cough up  Contact your healthcare provider if:   · You have a fever  · You use your inhalers more often than usual      · You have new or increased swelling in your legs, ankles, or abdomen  · You run out of breath easily when you talk or do your usual exercise or activities  · You have questions or concerns about your condition or care  Treatment for chronic bronchitis  may include medicine to treat infection or help you breathe better  You may need to use oxygen in your home   Ask your healthcare provider if you have any questions on how to take medications or use oxygen  Self-care:   · Sleep with your upper body raised  This will help you breathe easier  You can use foam wedges or elevate the head of your bed  Many devices are available to help raise your upper body while in bed  Use a device that will tilt your whole body, or bend your body at the waist  The device should not bend your body at the upper back or neck  · Prevent the spread of germs:      [facility] your hands often with soap and water  Carry germ-killing gel with you  You can use the gel to clean your hands when soap and water are not available  ¨ Do not touch your eyes, nose, or mouth unless you have washed your hands first      ¨ Always cover your mouth when you cough  Cough into a tissue or your shirtsleeve so you do not spread germs from your hands  ¨ Try to avoid people who have a cold or the flu  If you are sick, stay away from others as much as possible  · Do not smoke  Nicotine and other substances can cause lung damage  Do not use e-cigarettes or smokeless tobacco without first talking to your healthcare provider  They still contain nicotine  Ask your healthcare provider for information if you currently smoke and need help to quit  · Avoid lung irritants  Stay out of high altitudes and places with high humidity  Stay inside, or cover your mouth and nose with a scarf when you are outside during cold weather  Stay inside on days when air pollution or pollen counts are high  Do not use aerosol sprays such as deodorant, bug spray, and hair spray  · Drink more liquids  This will help to keep your air passages moist and help you cough up mucus  Ask how much liquid to drink each day and which liquids are best for you  · Ask your healthcare provider about the flu and pneumonia vaccines  All adults should get the flu (influenza) vaccine every year as soon as it becomes available   The pneumonia vaccine is given to adults aged 72 or older to prevent pneumococcal disease, such as pneumonia  Adults aged 23 to 59 years who are at high risk for pneumococcal disease also should get the pneumococcal vaccine  It may need to be repeated 1 or 5 years later  Ways to help you breathe better:   · Take deep breaths and cough  10 times each hour  This will decrease your risk for a lung infection  Take a deep breath and hold it for as long as you can  Let the air out and then cough strongly  Deep breaths help open your airway  You may be given an incentive spirometer to help you take deep breaths  Put the plastic piece in your mouth and take a slow, deep breath  Then let the air out and cough  Repeat these steps 10 times every hour  · Pursed-lip breathing  can be used any time you feel short of breath  Pursed-lip breathing can be especially helpful before you start an activity:     ¨ Breathe in through your nose  Use the muscles in your abdomen to help fill your lungs with air  ¨ Slowly breathe out through your mouth with your lips slightly puckered  You should make a quiet hissing sound as you breathe out  ¨ Try to take twice as long to breathe out as it did to breathe in  This helps you get rid of as much air from your lungs as possible  ¨ Repeat this exercise several times  Once you are used to doing pursed-lip breathing, you can do it any time you need more air  · Diaphragmatic breathing  can help strengthen some of the muscles you use to breathe:     ¨ Place one hand on your stomach just below your ribs  Place your other hand in the middle of your chest over your breastbone  ¨ Breathe in slowly through your nose, as deeply as you can  ¨ Breathe out slowly through pursed lips  As you breathe out, tighten the muscles in your stomach  Use your hand to gently push in and up while tightening the muscles  ¨ Diaphragmatic breathing takes practice  You may need to practice this many times a day   Slowly increase the amount of time you spend during each practice session  Follow up with your healthcare provider as directed:  Write down your questions so you remember to ask them during your visits  © 2017 [address] [employer] Information is for End User's use only and may not be sold, redistributed or otherwise used for commercial purposes  All illustrations and images included in CareNotes® are the copyrighted property of [employer]  or Alvin Durham  The above information is an  only  It is not intended as medical advice for individual conditions or treatments  Talk to your doctor, nurse or pharmacist before following any medical regimen to see if it is safe and effective for you  Chronic Hypertension   WHAT YOU NEED TO KNOW:   Hypertension is high blood pressure (BP)  Your BP is the force of your blood moving against the walls of your arteries  Normal BP is less than 120/80  Prehypertension is between 120/80 and 139/89  Hypertension is 140/90 or higher  Hypertension causes your BP to get so high that your heart has to work much harder than normal  This can damage your heart  Chronic hypertension is a long-term condition that you can control with a healthy lifestyle or medicines  A controlled blood pressure helps protect your organs, such as your heart, lungs, brain, and kidneys  DISCHARGE INSTRUCTIONS:   Call 911 for any of the following:   · You have discomfort in your chest that feels like squeezing, pressure, fullness, or pain  · You become confused or have difficulty speaking  · You suddenly feel lightheaded or have trouble breathing  · You have pain or discomfort in your back, neck, jaw, stomach, or arm  Return to the emergency department if:   · You have a severe headache or vision loss  · You have weakness in an arm or leg  Contact your healthcare provider if:   · You feel faint, dizzy, confused, or drowsy      · You have been taking your BP medicine and your BP is still higher than your healthcare provider says it should be  · You have questions or concerns about your condition or care  Medicines: You may need any of the following:  · Medicine  may be used to help lower your BP  You may need more than one type of medicine  Take the medicine exactly as directed  · Diuretics  help decrease extra fluid that collects in your body  This will help lower your BP  You may urinate more often while you take this medicine  · Cholesterol medicine  helps lower your cholesterol level  A low cholesterol level helps prevent heart disease and makes it easier to control your blood pressure  · Take your medicine as directed  Contact your healthcare provider if you think your medicine is not helping or if you have side effects  Tell him or her if you are allergic to any medicine  Keep a list of the medicines, vitamins, and herbs you take  Include the amounts, and when and why you take them  Bring the list or the pill bottles to follow-up visits  Carry your medicine list with you in case of an emergency  Follow up with your healthcare provider as directed: You will need to return to have your blood pressure checked and to have other lab tests done  Write down your questions so you remember to ask them during your visits  Manage chronic hypertension:  Talk with your healthcare provider about these and other ways to manage hypertension:  · Take your BP at home  Sit and rest for 5 minutes before you take your BP  Extend your arm and support it on a flat surface  Your arm should be at the same level as your heart  Follow the directions that came with your BP monitor  If possible, take at least 2 BP readings each time  Take your BP at least twice a day at the same times each day, such as morning and evening  Keep a record of your BP readings and bring it to your follow-up visits  Ask your healthcare provider what your blood pressure should be             · Limit sodium (salt) as directed  Too much sodium can affect your fluid balance  Check labels to find low-sodium or no-salt-added foods  Some low-sodium foods use potassium salts for flavor  Too much potassium can also cause health problems  Your healthcare provider will tell you how much sodium and potassium are safe for you to have in a day  He or she may recommend that you limit sodium to 2,300 mg a day  · Follow the meal plan recommended by your healthcare provider  A dietitian or your provider can give you more information on low-sodium plans or the DASH (Dietary Approaches to Stop Hypertension) eating plan  The DASH plan is low in sodium, unhealthy fats, and total fat  It is high in potassium, calcium, and fiber  · Exercise to maintain a healthy weight  Exercise at least 30 minutes per day, on most days of the week  This will help decrease your blood pressure  Ask about the best exercise plan for you  · Decrease stress  This may help lower your BP  Learn ways to relax, such as deep breathing or listening to music  · Limit alcohol  Women should limit alcohol to 1 drink a day  Men should limit alcohol to 2 drinks a day  A drink of alcohol is 12 ounces of beer, 5 ounces of wine, or 1½ ounces of liquor  · Do not smoke  Nicotine and other chemicals in cigarettes and cigars can increase your BP and also cause lung damage  Ask your healthcare provider for information if you currently smoke and need help to quit  E-cigarettes or smokeless tobacco still contain nicotine  Talk to your healthcare provider before you use these products  © 2017 [address] [facility] Information is for End User's use only and may not be sold, redistributed or otherwise used for commercial purposes  All illustrations and images included in CareNotes® are the copyrighted property of [employer] M [employer]  or Alvin Durham  The above information is an  only   It is not intended as medical advice for individual conditions or treatments  Talk to your doctor, nurse or pharmacist before following any medical regimen to see if it is safe and effective for you

## 2018-08-07 NOTE — PROGRESS NOTES
Assessment/Plan:    Problem List Items Addressed This Visit     AAA (abdominal aortic aneurysm) (HCC)    Chronic diastolic heart failure (HCC)    Simple chronic bronchitis (HCC)    Hypertension - Primary    Relevant Medications    enalapril (VASOTEC) 10 mg tablet      Other Visit Diagnoses     Chronic diastolic congestive heart failure (HCC)        Relevant Medications    enalapril (VASOTEC) 10 mg tablet        Patient clinically stable at this time  Cont with current plan of care  RTO as recommended and PRN    Patient's blood pressure is controlled  Cardiovascular risk factors include: advanced age (older than 54 for men, 72 for women), dyslipidemia, hypertension, male gender, obesity (BMI >= 30 kg/m2) and sedentary lifestyle  Current plan of care: continue current treatment regimen, dietary sodium restriction, regular aerobic exercise, weight loss and reduce stress  Reviewed risks of hypertension and principles of treatment  Blood pressure goal <140/90  Patient Instructions     Chronic Bronchitis   AMBULATORY CARE:   Chronic bronchitis  is a long-term swelling and irritation in the air passages in your lungs  The irritation may damage your lungs  The lung damage often gets worse over time, and it is usually permanent  Chronic bronchitis is part of a group of lung diseases called chronic obstructive pulmonary disease (COPD)  Signs and symptoms include the following:   · Early signs and symptoms  may include shortness of breath, a runny or stuffy nose, or a headache  You may have a morning cough that brings up mucus from the lungs  As time passes, the amount of mucus coughed up begins to increase  The cough also starts to last longer during the day  You may have a bad taste in your mouth or bad breath  · Later signs and symptoms  may include your skin, nail beds, or lips turning dusky or blue  You may become more short of breath and get tired more easily   You may not be able to walk as far as you used to  You may wheeze  You may notice your breathing is faster than it used to be  Call 911 for any of the following:   · You have new chest pain or tightness  · You become confused, dizzy, or feel like you may faint  · You have a new or increased gray or blue tint to your nail beds, fingers or lips  Seek care immediately if:   · You become tired easily from trying to get enough breath  · The amount or color of your sputum changes, or your sputum becomes too hard to cough up  Contact your healthcare provider if:   · You have a fever  · You use your inhalers more often than usual      · You have new or increased swelling in your legs, ankles, or abdomen  · You run out of breath easily when you talk or do your usual exercise or activities  · You have questions or concerns about your condition or care  Treatment for chronic bronchitis  may include medicine to treat infection or help you breathe better  You may need to use oxygen in your home  Ask your healthcare provider if you have any questions on how to take medications or use oxygen  Self-care:   · Sleep with your upper body raised  This will help you breathe easier  You can use foam wedges or elevate the head of your bed  Many devices are available to help raise your upper body while in bed  Use a device that will tilt your whole body, or bend your body at the waist  The device should not bend your body at the upper back or neck  · Prevent the spread of germs:      [facility] your hands often with soap and water  Carry germ-killing gel with you  You can use the gel to clean your hands when soap and water are not available  ¨ Do not touch your eyes, nose, or mouth unless you have washed your hands first      ¨ Always cover your mouth when you cough  Cough into a tissue or your shirtsleeve so you do not spread germs from your hands  ¨ Try to avoid people who have a cold or the flu   If you are sick, stay away from others as much as possible  · Do not smoke  Nicotine and other substances can cause lung damage  Do not use e-cigarettes or smokeless tobacco without first talking to your healthcare provider  They still contain nicotine  Ask your healthcare provider for information if you currently smoke and need help to quit  · Avoid lung irritants  Stay out of high altitudes and places with high humidity  Stay inside, or cover your mouth and nose with a scarf when you are outside during cold weather  Stay inside on days when air pollution or pollen counts are high  Do not use aerosol sprays such as deodorant, bug spray, and hair spray  · Drink more liquids  This will help to keep your air passages moist and help you cough up mucus  Ask how much liquid to drink each day and which liquids are best for you  · Ask your healthcare provider about the flu and pneumonia vaccines  All adults should get the flu (influenza) vaccine every year as soon as it becomes available  The pneumonia vaccine is given to adults aged 72 or older to prevent pneumococcal disease, such as pneumonia  Adults aged 23 to 59 years who are at high risk for pneumococcal disease also should get the pneumococcal vaccine  It may need to be repeated 1 or 5 years later  Ways to help you breathe better:   · Take deep breaths and cough  10 times each hour  This will decrease your risk for a lung infection  Take a deep breath and hold it for as long as you can  Let the air out and then cough strongly  Deep breaths help open your airway  You may be given an incentive spirometer to help you take deep breaths  Put the plastic piece in your mouth and take a slow, deep breath  Then let the air out and cough  Repeat these steps 10 times every hour  · Pursed-lip breathing  can be used any time you feel short of breath  Pursed-lip breathing can be especially helpful before you start an activity:     ¨ Breathe in through your nose   Use the muscles in your abdomen to help fill your lungs with air  ¨ Slowly breathe out through your mouth with your lips slightly puckered  You should make a quiet hissing sound as you breathe out  ¨ Try to take twice as long to breathe out as it did to breathe in  This helps you get rid of as much air from your lungs as possible  ¨ Repeat this exercise several times  Once you are used to doing pursed-lip breathing, you can do it any time you need more air  · Diaphragmatic breathing  can help strengthen some of the muscles you use to breathe:     ¨ Place one hand on your stomach just below your ribs  Place your other hand in the middle of your chest over your breastbone  ¨ Breathe in slowly through your nose, as deeply as you can  ¨ Breathe out slowly through pursed lips  As you breathe out, tighten the muscles in your stomach  Use your hand to gently push in and up while tightening the muscles  ¨ Diaphragmatic breathing takes practice  You may need to practice this many times a day  Slowly increase the amount of time you spend during each practice session  Follow up with your healthcare provider as directed:  Write down your questions so you remember to ask them during your visits  © 2017 [facility] Information is for End User's use only and may not be sold, redistributed or otherwise used for commercial purposes  All illustrations and images included in CareNotes® are the copyrighted property of [employer]  or [employer]  The above information is an  only  It is not intended as medical advice for individual conditions or treatments  Talk to your doctor, nurse or pharmacist before following any medical regimen to see if it is safe and effective for you  Chronic Hypertension   WHAT YOU NEED TO KNOW:   Hypertension is high blood pressure (BP)  Your BP is the force of your blood moving against the walls of your arteries  Normal BP is less than 120/80   Prehypertension is between 120/80 and 139/89  Hypertension is 140/90 or higher  Hypertension causes your BP to get so high that your heart has to work much harder than normal  This can damage your heart  Chronic hypertension is a long-term condition that you can control with a healthy lifestyle or medicines  A controlled blood pressure helps protect your organs, such as your heart, lungs, brain, and kidneys  DISCHARGE INSTRUCTIONS:   Call 911 for any of the following:   · You have discomfort in your chest that feels like squeezing, pressure, fullness, or pain  · You become confused or have difficulty speaking  · You suddenly feel lightheaded or have trouble breathing  · You have pain or discomfort in your back, neck, jaw, stomach, or arm  Return to the emergency department if:   · You have a severe headache or vision loss  · You have weakness in an arm or leg  Contact your healthcare provider if:   · You feel faint, dizzy, confused, or drowsy  · You have been taking your BP medicine and your BP is still higher than your healthcare provider says it should be  · You have questions or concerns about your condition or care  Medicines: You may need any of the following:  · Medicine  may be used to help lower your BP  You may need more than one type of medicine  Take the medicine exactly as directed  · Diuretics  help decrease extra fluid that collects in your body  This will help lower your BP  You may urinate more often while you take this medicine  · Cholesterol medicine  helps lower your cholesterol level  A low cholesterol level helps prevent heart disease and makes it easier to control your blood pressure  · Take your medicine as directed  Contact your healthcare provider if you think your medicine is not helping or if you have side effects  Tell him or her if you are allergic to any medicine  Keep a list of the medicines, vitamins, and herbs you take   Include the amounts, and when and why you take them  Bring the list or the pill bottles to follow-up visits  Carry your medicine list with you in case of an emergency  Follow up with your healthcare provider as directed: You will need to return to have your blood pressure checked and to have other lab tests done  Write down your questions so you remember to ask them during your visits  Manage chronic hypertension:  Talk with your healthcare provider about these and other ways to manage hypertension:  · Take your BP at home  Sit and rest for 5 minutes before you take your BP  Extend your arm and support it on a flat surface  Your arm should be at the same level as your heart  Follow the directions that came with your BP monitor  If possible, take at least 2 BP readings each time  Take your BP at least twice a day at the same times each day, such as morning and evening  Keep a record of your BP readings and bring it to your follow-up visits  Ask your healthcare provider what your blood pressure should be  · Limit sodium (salt) as directed  Too much sodium can affect your fluid balance  Check labels to find low-sodium or no-salt-added foods  Some low-sodium foods use potassium salts for flavor  Too much potassium can also cause health problems  Your healthcare provider will tell you how much sodium and potassium are safe for you to have in a day  He or she may recommend that you limit sodium to 2,300 mg a day  · Follow the meal plan recommended by your healthcare provider  A dietitian or your provider can give you more information on low-sodium plans or the DASH (Dietary Approaches to Stop Hypertension) eating plan  The DASH plan is low in sodium, unhealthy fats, and total fat  It is high in potassium, calcium, and fiber  · Exercise to maintain a healthy weight  Exercise at least 30 minutes per day, on most days of the week  This will help decrease your blood pressure  Ask about the best exercise plan for you  · Decrease stress  This may help lower your BP  Learn ways to relax, such as deep breathing or listening to music  · Limit alcohol  Women should limit alcohol to 1 drink a day  Men should limit alcohol to 2 drinks a day  A drink of alcohol is 12 ounces of beer, 5 ounces of wine, or 1½ ounces of liquor  · Do not smoke  Nicotine and other chemicals in cigarettes and cigars can increase your BP and also cause lung damage  Ask your healthcare provider for information if you currently smoke and need help to quit  E-cigarettes or smokeless tobacco still contain nicotine  Talk to your healthcare provider before you use these products  © 2017 [facility] Information is for End User's use only and may not be sold, redistributed or otherwise used for commercial purposes  All illustrations and images included in CareNotes® are the copyrighted property of [employer]  or [employer]  The above information is an  only  It is not intended as medical advice for individual conditions or treatments  Talk to your doctor, nurse or pharmacist before following any medical regimen to see if it is safe and effective for you  Return in about 3 months (around 11/7/2018)  Subjective:      Patient ID: Regena Osler  is a 63 y o  male  Chief Complaint   Patient presents with    Multi Issue Follow Up       Evelin Cuevas here for follow up of chronic conditions    1  Allergic sxs=PND, nasal congestion better since switching to Zyzal     2  COPD=breathing better since avoiding heat  Staying in ac    3  Saw CT surg at [facility] last month  Hx of AAA x20 years  Recent size 5 0cm  Has been 4 7cm for years  No surg at this time  Advised to avoid heavy lifting, lose weight, control bp  Will cont to monitor    4  Feeling well   No c/o today        The following portions of the patient's history were reviewed and updated as appropriate: allergies, current medications, past family history, past medical history, past social history, past surgical history and problem list     Review of Systems   Constitutional: Negative for fatigue, fever and unexpected weight change  HENT: Negative  Eyes: Negative for visual disturbance  Respiratory: Positive for cough  Negative for chest tightness, shortness of breath and wheezing  Cardiovascular: Negative  Gastrointestinal: Negative for abdominal pain, blood in stool and diarrhea  Endocrine: Negative  Genitourinary: Negative for difficulty urinating  Musculoskeletal: Positive for arthralgias  Skin: Negative for pallor  Neurological: Negative for dizziness, tremors, weakness and numbness  Psychiatric/Behavioral: Negative for sleep disturbance and suicidal ideas  The patient is not nervous/anxious            Current Outpatient Prescriptions   Medication Sig Dispense Refill    albuterol (PROVENTIL HFA,VENTOLIN HFA) 90 mcg/act inhaler Inhale 2 puffs every 6 (six) hours as needed for wheezing or shortness of breath DO NOT USE MORE THAN DIRECTED 1 Inhaler 6    aspirin (ECOTRIN LOW STRENGTH) 81 mg EC tablet Take 81 mg by mouth daily      atenolol (TENORMIN) 50 mg tablet TAKE ONE TABLET BY MOUTH EVERY DAY 90 tablet 1    benzonatate (TESSALON) 200 MG capsule Take 1 capsule (200 mg total) by mouth 3 (three) times a day as needed for cough 20 capsule 0    enalapril (VASOTEC) 10 mg tablet Take 1 tablet (10 mg total) by mouth daily 30 tablet 0    fluticasone (FLONASE) 50 mcg/act nasal spray 2 sprays into each nostril daily 16 g 0    fluticasone-salmeterol (ADVAIR) 250-50 mcg/dose inhaler Inhale 1 puff every 12 (twelve) hours 1 Inhaler 2    furosemide (LASIX) 20 mg tablet Take 1 tablet by mouth daily for 30 days 30 tablet 0    furosemide (LASIX) 20 mg tablet       furosemide (LASIX) 20 mg tablet TAKE ONE TABLET BY MOUTH EVERY DAY AS NEEDED FOR LEG SWELLING OR WEIGHT GAIN OF 3 POUND IN DAY 30 tablet 3    furosemide (LASIX) 40 mg tablet TAKE ONE TABLET BY MOUTH EVERY DAY 30 tablet 6    ibuprofen (MOTRIN) 600 mg tablet Take 1 tablet (600 mg total) by mouth every 8 (eight) hours as needed for mild pain or moderate pain 30 tablet 0    KLOR-CON M20 20 MEQ tablet TAKE ONE TABLET BY MOUTH EVERY DAY 30 tablet 6    omeprazole (PriLOSEC) 40 MG capsule Take 1 cap twice daily for 7 days, then 1 cap once daily  37 capsule 5    OXcarbazepine (TRILEPTAL) 300 mg tablet Take 1 tablet (300 mg total) by mouth 2 (two) times a day 60 tablet 1    pravastatin (PRAVACHOL) 20 mg tablet Take 1 tablet by mouth daily with dinner for 30 days Patient would like to try pravastatin 20mg, he does have myalgia from Lipitor/Crestor in the past  30 tablet 0    promethazine-dextromethorphan (PHENERGAN-DM) 6 25-15 mg/5 mL oral syrup Take 5 mL by mouth daily at bedtime as needed for cough 118 mL 0    sertraline (ZOLOFT) 100 mg tablet Take 1 tablet (100 mg total) by mouth 2 (two) times a day 60 tablet 1    sildenafil (VIAGRA) 50 MG tablet Take 1 hour prior to sexual activity  Do not take more than 2 tabs in 1 week  10 tablet 0    tiotropium (SPIRIVA) 18 mcg inhalation capsule Place 1 capsule (18 mcg total) into inhaler and inhale daily 30 capsule 0    umeclidinium-vilanterol (ANORO ELLIPTA) 62 5-25 MCG/INH inhaler Inhale 1 puff daily 1 Inhaler 6     No current facility-administered medications for this visit  Objective:    /64 (BP Location: Left arm, Patient Position: Sitting, Cuff Size: Large)   Pulse 68   Temp (!) 97 4 °F (36 3 °C) (Temporal)   Resp 16   Ht 6' 3 5" (1 918 m)   Wt (!) 157 kg (347 lb)   BMI 42 80 kg/m²        Physical Exam   Constitutional: He is oriented to person, place, and time  Vital signs are normal  He appears well-developed and well-nourished  No distress  HENT:   Head: Normocephalic and atraumatic     Mouth/Throat: Oropharynx is clear and moist    Eyes: Conjunctivae and EOM are normal  Pupils are equal, round, and reactive to light  No scleral icterus  Neck: Neck supple  No JVD present  Carotid bruit is not present  No thyromegaly present  Cardiovascular: Normal rate, regular rhythm and intact distal pulses  Murmur heard  Pulmonary/Chest: Effort normal and breath sounds normal  No respiratory distress  Abdominal: Soft  Bowel sounds are normal  He exhibits no abdominal bruit  There is no hepatosplenomegaly  There is no tenderness  Musculoskeletal: He exhibits no edema  Lymphadenopathy:     He has no cervical adenopathy  Neurological: He is alert and oriented to person, place, and time  Coordination normal    Skin: Skin is warm and dry  No pallor  Psychiatric: He has a normal mood and affect  His behavior is normal    Nursing note and vitals reviewed               Astrid Siegel, [address]

## 2018-08-29 DIAGNOSIS — F31.9 BIPOLAR 1 DISORDER (HCC): ICD-10-CM

## 2018-08-29 RX ORDER — OXCARBAZEPINE 300 MG/1
TABLET, FILM COATED ORAL
Qty: 60 TABLET | Refills: 1 | Status: SHIPPED | OUTPATIENT
Start: 2018-08-29 | End: 2018-12-16 | Stop reason: SDUPTHER

## 2018-08-30 DIAGNOSIS — F31.9 BIPOLAR 1 DISORDER (HCC): ICD-10-CM

## 2018-08-30 RX ORDER — OXCARBAZEPINE 300 MG/1
TABLET, FILM COATED ORAL
Qty: 60 TABLET | Refills: 1 | Status: SHIPPED | OUTPATIENT
Start: 2018-08-30 | End: 2018-10-24

## 2018-08-31 DIAGNOSIS — F31.9 BIPOLAR 1 DISORDER (HCC): ICD-10-CM

## 2018-08-31 RX ORDER — OXCARBAZEPINE 300 MG/1
TABLET, FILM COATED ORAL
Qty: 60 TABLET | Refills: 1 | Status: SHIPPED | OUTPATIENT
Start: 2018-08-31 | End: 2018-10-24

## 2018-09-06 ENCOUNTER — TELEPHONE (OUTPATIENT)
Dept: FAMILY MEDICINE CLINIC | Facility: CLINIC | Age: 63
End: 2018-09-06

## 2018-09-06 NOTE — TELEPHONE ENCOUNTER
Betina:    On 8/13 300 mg 1 1/2 tablets 2xs per day was prescribed by Lupillo Lozoya (Dr Elsi Young) office  On 8/29 Rx was sent in 300 mg 2x per day  Tay from [facility] would like to know what his dose should be? Please advise

## 2018-09-24 ENCOUNTER — CLINICAL SUPPORT (OUTPATIENT)
Dept: FAMILY MEDICINE CLINIC | Facility: CLINIC | Age: 63
End: 2018-09-24
Payer: MEDICARE

## 2018-09-24 DIAGNOSIS — Z23 NEED FOR INFLUENZA VACCINATION: Primary | ICD-10-CM

## 2018-09-24 PROCEDURE — 90682 RIV4 VACC RECOMBINANT DNA IM: CPT

## 2018-09-24 PROCEDURE — 90471 IMMUNIZATION ADMIN: CPT

## 2018-10-24 ENCOUNTER — OFFICE VISIT (OUTPATIENT)
Dept: FAMILY MEDICINE CLINIC | Facility: CLINIC | Age: 63
End: 2018-10-24
Payer: MEDICARE

## 2018-10-24 VITALS
WEIGHT: 315 LBS | HEIGHT: 76 IN | DIASTOLIC BLOOD PRESSURE: 72 MMHG | BODY MASS INDEX: 38.36 KG/M2 | SYSTOLIC BLOOD PRESSURE: 140 MMHG | TEMPERATURE: 97.4 F | RESPIRATION RATE: 16 BRPM | HEART RATE: 70 BPM

## 2018-10-24 DIAGNOSIS — I10 ESSENTIAL HYPERTENSION: ICD-10-CM

## 2018-10-24 DIAGNOSIS — J06.9 UPPER RESPIRATORY TRACT INFECTION, UNSPECIFIED TYPE: Primary | ICD-10-CM

## 2018-10-24 DIAGNOSIS — I50.32 CHRONIC DIASTOLIC HEART FAILURE (HCC): ICD-10-CM

## 2018-10-24 LAB — S PYO AG THROAT QL: NEGATIVE

## 2018-10-24 PROCEDURE — 87880 STREP A ASSAY W/OPTIC: CPT | Performed by: FAMILY MEDICINE

## 2018-10-24 PROCEDURE — 99214 OFFICE O/P EST MOD 30 MIN: CPT | Performed by: FAMILY MEDICINE

## 2018-10-24 RX ORDER — CIPROFLOXACIN 500 MG/1
500 TABLET, FILM COATED ORAL EVERY 12 HOURS SCHEDULED
Qty: 14 TABLET | Refills: 0 | Status: SHIPPED | OUTPATIENT
Start: 2018-10-24 | End: 2018-10-31

## 2018-10-24 NOTE — PROGRESS NOTES
Tayler Medina Sr  1955 male MRN: [id number]    FAMILY PRACTICE ACUTE OFFICE VISIT  [facility] - [address]      ASSESSMENT/PLAN  Jamari Kearns  is a 63 y o  male presents to the office for    1  Upper respiratory tract infection w/ right ear erythema? OM  - Started on Cipro 500 mg BID x 7 days; avoid multiple medications 2/2 to allergies and pmhx  - IF symptoms worsen contact your pcp   - POCT rapid strepA  - ciprofloxacin (CIPRO) 500 mg tablet; Take 1 tablet (500 mg total) by mouth every 12 (twelve) hours for 7 days  Dispense: 14 tablet; Refill: 0    2  Chronic diastolic heart failure (HCC)  - Asymptomatic: Continue management    3  Essential hypertension  - Continues on medication; slightly elevated today  Disposition: RTC as needed  Future Appointments  Date Time Provider Fred Nunn   10/24/2018 9:45 AM Roz Barrios MD [facility]   [facility]          SUBJECTIVE  CC: Sore Throat; Nasal Congestion; and Earache      HPI:  Jamari Kearns  is a 63 y o  male who presents for an acute appointment  Last Thursday started with Fevers, sore throat, nasal congestion, and earache Right  Saturday he soaked the bed when his fever broke  Since then no fevers, however symptoms continue to worsen  Hypertension, currently taking medications; always slightly elevated at doc appointment  CHF being managed by his PCP and cardio currently no symptoms of SOB or CP  Review of Systems   Constitutional: Positive for chills and fever  Negative for fatigue  HENT: Positive for congestion, ear pain and sore throat  Eyes: Negative for visual disturbance  Respiratory: Negative for cough, chest tightness and shortness of breath  Cardiovascular: Negative for chest pain and leg swelling     Gastrointestinal: Negative for abdominal distention, abdominal pain, constipation, diarrhea, nausea and vomiting  Allergic/Immunologic: Negative for environmental allergies  Neurological: Negative for dizziness, light-headedness and headaches  All other systems reviewed and are negative        Historical Information   The patient history was reviewed as follows:  Past Medical History:   Diagnosis Date    A[facility] aneurysm[facility] abdominal [facility] [address] )     Arthritis of right knee     Asthma     B[facility]     Depression     Heart abnormality     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   goes to [facility] (hyperlipidemia)     Hypertension          Past Surgical History:   Procedure Laterality Date    ABDOMINAL AORTIC ANEURYSM REPAIR      Last assessed: 4/14/16    AORTIC VALVE REPLACEMENT      Last assessed: 4/14/16    FOOT SURGERY      REPLACEMENT TOTAL KNEE BILATERAL      ROTATOR CUFF REPAIR      Last assessed: 4/14/16    TOE SURGERY       Family History   Problem Relation Age of Onset    Heart disease Mother     Alcohol abuse Mother     Hypertension Mother     Cancer Mother     Cancer Father     Hypertension Father     Diabetes Father         Mellitus    Alcohol abuse Father     Mental illness Brother         Diseases of the nervous system complicating pregnancy, childbirth and the peurperium    Cancer Brother       Social History   History   Alcohol Use No     Comment: Per [employer]: Former consumption of alcohol     History   Drug Use No     Comment: Per [employer]: History of crack cocaine and marijuana use     History   Smoking Status    Former Smoker    Packs/day: 2 00    Years: 6 00    Quit date: 8/1/1981   Smokeless Tobacco    Never Used     Comment: quit in 1981       Medications:     Current Outpatient Prescriptions:     albuterol (PROVENTIL HFA,VENTOLIN HFA) 90 mcg/act inhaler, Inhale 2 puffs every 6 (six) hours as needed for wheezing or shortness of breath DO NOT USE MORE THAN DIRECTED, Disp: 1 Inhaler, Rfl: 6    aspirin (ECOTRIN LOW STRENGTH) 81 mg EC tablet, Take 81 mg by mouth daily, Disp: , Rfl:     atenolol (TENORMIN) 50 mg tablet, TAKE ONE TABLET BY MOUTH EVERY DAY, Disp: 90 tablet, Rfl: 1    enalapril (VASOTEC) 10 mg tablet, Take 1 tablet (10 mg total) by mouth daily, Disp: 30 tablet, Rfl: 0    fluticasone (FLONASE) 50 mcg/act nasal spray, 2 sprays into each nostril daily, Disp: 16 g, Rfl: 0    fluticasone-salmeterol (ADVAIR) 250-50 mcg/dose inhaler, Inhale 1 puff every 12 (twelve) hours, Disp: 1 Inhaler, Rfl: 2    ibuprofen (MOTRIN) 600 mg tablet, Take 1 tablet (600 mg total) by mouth every 8 (eight) hours as needed for mild pain or moderate pain, Disp: 30 tablet, Rfl: 0    KLOR-CON M20 20 MEQ tablet, TAKE ONE TABLET BY MOUTH EVERY DAY, Disp: 30 tablet, Rfl: 6    omeprazole (PriLOSEC) 40 MG capsule, Take 1 cap twice daily for 7 days, then 1 cap once daily  , Disp: 37 capsule, Rfl: 5    OXcarbazepine (TRILEPTAL) 300 mg tablet, TAKE ONE TABLET BY MOUTH TWICE A DAY, Disp: 60 tablet, Rfl: 1    sertraline (ZOLOFT) 100 mg tablet, Take 1 tablet (100 mg total) by mouth 2 (two) times a day, Disp: 60 tablet, Rfl: 1    sildenafil (VIAGRA) 50 MG tablet, Take 1 hour prior to sexual activity   Do not take more than 2 tabs in 1 week , Disp: 10 tablet, Rfl: 0    tiotropium (SPIRIVA) 18 mcg inhalation capsule, Place 1 capsule (18 mcg total) into inhaler and inhale daily, Disp: 30 capsule, Rfl: 0    umeclidinium-vilanterol (ANORO ELLIPTA) 62 5-25 MCG/INH inhaler, Inhale 1 puff daily, Disp: 1 Inhaler, Rfl: 6    furosemide (LASIX) 20 mg tablet, TAKE ONE TABLET BY MOUTH EVERY DAY AS NEEDED FOR LEG SWELLING OR WEIGHT GAIN OF 3 POUND IN DAY, Disp: 30 tablet, Rfl: 3    furosemide (LASIX) 40 mg tablet, TAKE ONE TABLET BY MOUTH EVERY DAY, Disp: 30 tablet, Rfl: 6    pravastatin (PRAVACHOL) 20 mg tablet, Take 1 tablet by mouth daily with dinner for 30 days Patient would like to try pravastatin 20mg, he does have myalgia from Lipitor/Crestor in the past , Disp: 30 tablet, Rfl: 0    Allergies Allergen Reactions    Lipitor [Atorvastatin]      myalgia     Codeine GI Intolerance    Crestor [Rosuvastatin]      myalgia     Penicillins Rash    Sulfa Antibiotics Rash     Tolerates Lasix       OBJECTIVE  Vitals:   Vitals:    10/24/18 0929   BP: 140/72   BP Location: Left arm   Patient Position: Sitting   Cuff Size: Standard   Pulse: 70   Resp: 16   Temp: (!) 97 4 °F (36 3 °C)   Weight: (!) 158 kg (347 lb 6 4 oz)   Height: 6' 3 5" (1 918 m)         Physical Exam   Constitutional: He is oriented to person, place, and time  Vital signs are normal  He appears well-developed and well-nourished  HENT:   Head: Normocephalic and atraumatic  Right Ear: Hearing normal  Tympanic membrane is erythematous and bulging  Left Ear: Hearing normal  Tympanic membrane is not bulging  Mouth/Throat: Posterior oropharyngeal erythema present  Eyes: Pupils are equal, round, and reactive to light  Conjunctivae and EOM are normal    Neck: Normal range of motion  Neck supple  Cardiovascular: Normal rate, regular rhythm, S1 normal, S2 normal, normal heart sounds and intact distal pulses  No murmur heard  Pulmonary/Chest: Effort normal and breath sounds normal  No respiratory distress  He has no wheezes  Abdominal: Soft  Bowel sounds are normal  He exhibits no distension  There is no tenderness  Musculoskeletal: Normal range of motion  He exhibits no edema  Neurological: He is alert and oriented to person, place, and time  He has normal strength  Skin: Skin is warm  No rash noted  Psychiatric: He has a normal mood and affect  His speech is normal and behavior is normal  Judgment and thought content normal    Vitals reviewed                   Torito Carmen MD,   [facility]  10/24/2018

## 2018-10-24 NOTE — PATIENT INSTRUCTIONS
Sinusitis   AMBULATORY CARE:   Sinusitis  is inflammation or infection of your sinuses  It is most often caused by a virus  Acute sinusitis may last up to 12 weeks  Chronic sinusitis lasts longer than 12 weeks  Recurrent sinusitis means you have 4 or more times in 1 year  Common symptoms include the following:   · Fever    · Pain, pressure, redness, or swelling around the forehead, cheeks, or eyes    · Thick yellow or green discharge from your nose    · Tenderness when you touch your face over your sinuses    · Dry cough that happens mostly at night or when you lie down    · Headache and face pain that is worse when you lean forward    · Tooth pain, or pain when you chew  Seek care immediately if:   · Your eye and eyelid are red, swollen, and painful  · You cannot open your eye  · You have vision changes, such as double vision  · Your eyeball bulges out or you cannot move your eye  · You are more sleepy than normal, or you notice changes in your ability to think, move, or talk  · You have a stiff neck, a fever, or a bad headache  · You have swelling of your forehead or scalp  Contact your healthcare provider if:   · Your symptoms do not improve after 3 days  · Your symptoms do not go away after 10 days  · You have nausea and are vomiting  · Your nose is bleeding  · You have questions or concerns about your condition or care  Treatment for sinusitis:  Your symptoms may go away on their own  Your healthcare provider may recommend watchful waiting for up to 10 days before starting antibiotics  You may  need any of the following:  · Acetaminophen  decreases pain and fever  It is available without a doctor's order  Ask how much to take and how often to take it  Follow directions  Read the labels of all other medicines you are using to see if they also contain acetaminophen, or ask your doctor or pharmacist  Acetaminophen can cause liver damage if not taken correctly   Do not use more than 4 grams (4,000 milligrams) total of acetaminophen in one day  · NSAIDs , such as ibuprofen, help decrease swelling, pain, and fever  This medicine is available with or without a doctor's order  NSAIDs can cause stomach bleeding or kidney problems in certain people  If you take blood thinner medicine, always ask your healthcare provider if NSAIDs are safe for you  Always read the medicine label and follow directions  · Nasal steroid sprays  may help decrease inflammation in your nose and sinuses  · Decongestants  help reduce swelling and drain mucus in the nose and sinuses  They may help you breathe easier  · Antihistamines  help dry mucus in the nose and relieve sneezing  · Antibiotics  help treat or prevent a bacterial infection  · Take your medicine as directed  Contact your healthcare provider if you think your medicine is not helping or if you have side effects  Tell him or her if you are allergic to any medicine  Keep a list of the medicines, vitamins, and herbs you take  Include the amounts, and when and why you take them  Bring the list or the pill bottles to follow-up visits  Carry your medicine list with you in case of an emergency  Self-care:   · Rinse your sinuses  Use a sinus rinse device to rinse your nasal passages with a saline (salt water) solution or distilled water  Do not use tap water  This will help thin the mucus in your nose and rinse away pollen and dirt  It will also help reduce swelling so you can breathe normally  Ask your healthcare provider how often to do this  · Breathe in steam   Heat a bowl of water until you see steam  Lean over the bowl and make a tent over your head with a large towel  Breathe deeply for about 20 minutes  Be careful not to get too close to the steam or burn yourself  Do this 3 times a day  You can also breathe deeply when you take a hot shower  · Sleep with your head elevated    Place an extra pillow under your head before you go to sleep to help your sinuses drain  · Drink liquids as directed  Ask your healthcare provider how much liquid to drink each day and which liquids are best for you  Liquids will thin the mucus in your nose and help it drain  Avoid drinks that contain alcohol or caffeine  · Do not smoke, and avoid secondhand smoke  Nicotine and other chemicals in cigarettes and cigars can make your symptoms worse  Ask your healthcare provider for information if you currently smoke and need help to quit  E-cigarettes or smokeless tobacco still contain nicotine  Talk to your healthcare provider before you use these products  Prevent the spread of germs that cause sinusitis:  Wash your hands often with soap and water  Wash your hands after you use the bathroom, change a child's diaper, or sneeze  Wash your hands before you prepare or eat food  Follow up with your healthcare provider as directed: You may be referred to an ear, nose, and throat specialist  Write down your questions so you remember to ask them during your visits  © 2017 [address] [facility] Information is for End User's use only and may not be sold, redistributed or otherwise used for commercial purposes  All illustrations and images included in CareNotes® are the copyrighted property of [employer]  or [employer]  The above information is an  only  It is not intended as medical advice for individual conditions or treatments  Talk to your doctor, nurse or pharmacist before following any medical regimen to see if it is safe and effective for you

## 2018-11-13 ENCOUNTER — OFFICE VISIT (OUTPATIENT)
Dept: FAMILY MEDICINE CLINIC | Facility: CLINIC | Age: 63
End: 2018-11-13
Payer: MEDICARE

## 2018-11-13 ENCOUNTER — TELEPHONE (OUTPATIENT)
Dept: FAMILY MEDICINE CLINIC | Facility: CLINIC | Age: 63
End: 2018-11-13

## 2018-11-13 ENCOUNTER — TRANSCRIBE ORDERS (OUTPATIENT)
Dept: RADIOLOGY | Facility: CLINIC | Age: 63
End: 2018-11-13

## 2018-11-13 ENCOUNTER — HOSPITAL ENCOUNTER (OUTPATIENT)
Dept: RADIOLOGY | Facility: CLINIC | Age: 63
Discharge: HOME | End: 2018-11-13
Payer: MEDICARE

## 2018-11-13 VITALS
BODY MASS INDEX: 42.06 KG/M2 | TEMPERATURE: 97.2 F | RESPIRATION RATE: 14 BRPM | DIASTOLIC BLOOD PRESSURE: 78 MMHG | SYSTOLIC BLOOD PRESSURE: 112 MMHG | HEART RATE: 72 BPM | WEIGHT: 315 LBS | OXYGEN SATURATION: 96 %

## 2018-11-13 DIAGNOSIS — E66.01 MORBID OBESITY WITH BMI OF 40.0-44.9, ADULT (HCC): ICD-10-CM

## 2018-11-13 DIAGNOSIS — R22.1 MASS OF RIGHT SIDE OF NECK: ICD-10-CM

## 2018-11-13 DIAGNOSIS — I50.32 CHRONIC DIASTOLIC HEART FAILURE (HCC): ICD-10-CM

## 2018-11-13 DIAGNOSIS — I50.32 CHRONIC DIASTOLIC CONGESTIVE HEART FAILURE (HCC): ICD-10-CM

## 2018-11-13 DIAGNOSIS — J41.0 SIMPLE CHRONIC BRONCHITIS (HCC): Primary | ICD-10-CM

## 2018-11-13 DIAGNOSIS — Z79.899 HIGH RISK MEDICATION USE: ICD-10-CM

## 2018-11-13 PROCEDURE — 99214 OFFICE O/P EST MOD 30 MIN: CPT | Performed by: NURSE PRACTITIONER

## 2018-11-13 PROCEDURE — 36415 COLL VENOUS BLD VENIPUNCTURE: CPT | Performed by: NURSE PRACTITIONER

## 2018-11-13 PROCEDURE — 76536 US EXAM OF HEAD AND NECK: CPT

## 2018-11-13 RX ORDER — FUROSEMIDE 40 MG/1
40 TABLET ORAL DAILY
Qty: 90 TABLET | Refills: 1 | Status: SHIPPED | OUTPATIENT
Start: 2018-11-13 | End: 2019-09-02 | Stop reason: SDUPTHER

## 2018-11-13 NOTE — TELEPHONE ENCOUNTER
Pt notified    Findings consistent with lipoma vs physiologic node  Advised to monitor it closely   If it does not resolve in timely fashion or if it gets larger, call for re-eval  Questions answered

## 2018-11-13 NOTE — PROGRESS NOTES
Assessment/Plan:    Problem List Items Addressed This Visit     Morbid obesity with BMI of 40 0-44 9, adult ([facility] )    Chronic diastolic heart failure (HCC)    Relevant Orders    Basic metabolic panel    Magnesium    Simple chronic bronchitis ([facility] ) - Primary      Other Visit Diagnoses     Mass of right side of neck        Relevant Orders    US head neck soft tissue (Completed)    CBC and Platelet    Chronic diastolic congestive heart failure (HCC)        Relevant Medications    furosemide (LASIX) 40 mg tablet    High risk medication use        Relevant Orders    Basic metabolic panel    CBC and Platelet    Magnesium        Patient clinically stable at this time  Cont with current plan of care  No evidence of CHF on exam  U/s of neck-right sided neck mass  RTO as recommended and PRN    Patient's blood pressure is controlled  Cardiovascular risk factors include: advanced age (older than 54 for men, 72 for women), dyslipidemia, hypertension, male gender, obesity (BMI >= 30 kg/m2) and sedentary lifestyle  Current plan of care: continue current treatment regimen, dietary sodium restriction, regular aerobic exercise, weight loss and reduce stress  Reviewed risks of hypertension and principles of treatment  Blood pressure goal <140/90  Patient Instructions     Chronic Bronchitis   AMBULATORY CARE:   Chronic bronchitis  is a long-term swelling and irritation in the air passages in your lungs  The irritation may damage your lungs  The lung damage often gets worse over time, and it is usually permanent  Chronic bronchitis is part of a group of lung diseases called chronic obstructive pulmonary disease (COPD)  Signs and symptoms include the following:   · Early signs and symptoms  may include shortness of breath, a runny or stuffy nose, or a headache  You may have a morning cough that brings up mucus from the lungs  As time passes, the amount of mucus coughed up begins to increase   The cough also starts to last longer during the day  You may have a bad taste in your mouth or bad breath  · Later signs and symptoms  may include your skin, nail beds, or lips turning dusky or blue  You may become more short of breath and get tired more easily  You may not be able to walk as far as you used to  You may wheeze  You may notice your breathing is faster than it used to be  Call 911 for any of the following:   · You have new chest pain or tightness  · You become confused, dizzy, or feel like you may faint  · You have a new or increased gray or blue tint to your nail beds, fingers or lips  Seek care immediately if:   · You become tired easily from trying to get enough breath  · The amount or color of your sputum changes, or your sputum becomes too hard to cough up  Contact your healthcare provider if:   · You have a fever  · You use your inhalers more often than usual      · You have new or increased swelling in your legs, ankles, or abdomen  · You run out of breath easily when you talk or do your usual exercise or activities  · You have questions or concerns about your condition or care  Treatment for chronic bronchitis  may include medicine to treat infection or help you breathe better  You may need to use oxygen in your home  Ask your healthcare provider if you have any questions on how to take medications or use oxygen  Self-care:   · Sleep with your upper body raised  This will help you breathe easier  You can use foam wedges or elevate the head of your bed  Many devices are available to help raise your upper body while in bed  Use a device that will tilt your whole body, or bend your body at the waist  The device should not bend your body at the upper back or neck  · Prevent the spread of germs:      [facility] your hands often with soap and water  Carry germ-killing gel with you  You can use the gel to clean your hands when soap and water are not available       ¨ Do not touch your eyes, nose, or mouth unless you have washed your hands first      ¨ Always cover your mouth when you cough  Cough into a tissue or your shirtsleeve so you do not spread germs from your hands  ¨ Try to avoid people who have a cold or the flu  If you are sick, stay away from others as much as possible  · Do not smoke  Nicotine and other substances can cause lung damage  Do not use e-cigarettes or smokeless tobacco without first talking to your healthcare provider  They still contain nicotine  Ask your healthcare provider for information if you currently smoke and need help to quit  · Avoid lung irritants  Stay out of high altitudes and places with high humidity  Stay inside, or cover your mouth and nose with a scarf when you are outside during cold weather  Stay inside on days when air pollution or pollen counts are high  Do not use aerosol sprays such as deodorant, bug spray, and hair spray  · Drink more liquids  This will help to keep your air passages moist and help you cough up mucus  Ask how much liquid to drink each day and which liquids are best for you  · Ask your healthcare provider about the flu and pneumonia vaccines  All adults should get the flu (influenza) vaccine every year as soon as it becomes available  The pneumonia vaccine is given to adults aged 72 or older to prevent pneumococcal disease, such as pneumonia  Adults aged 23 to 59 years who are at high risk for pneumococcal disease also should get the pneumococcal vaccine  It may need to be repeated 1 or 5 years later  Ways to help you breathe better:   · Take deep breaths and cough  10 times each hour  This will decrease your risk for a lung infection  Take a deep breath and hold it for as long as you can  Let the air out and then cough strongly  Deep breaths help open your airway  You may be given an incentive spirometer to help you take deep breaths  Put the plastic piece in your mouth and take a slow, deep breath   Then let the air out and cough  Repeat these steps 10 times every hour  · Pursed-lip breathing  can be used any time you feel short of breath  Pursed-lip breathing can be especially helpful before you start an activity:     ¨ Breathe in through your nose  Use the muscles in your abdomen to help fill your lungs with air  ¨ Slowly breathe out through your mouth with your lips slightly puckered  You should make a quiet hissing sound as you breathe out  ¨ Try to take twice as long to breathe out as it did to breathe in  This helps you get rid of as much air from your lungs as possible  ¨ Repeat this exercise several times  Once you are used to doing pursed-lip breathing, you can do it any time you need more air  · Diaphragmatic breathing  can help strengthen some of the muscles you use to breathe:     ¨ Place one hand on your stomach just below your ribs  Place your other hand in the middle of your chest over your breastbone  ¨ Breathe in slowly through your nose, as deeply as you can  ¨ Breathe out slowly through pursed lips  As you breathe out, tighten the muscles in your stomach  Use your hand to gently push in and up while tightening the muscles  ¨ Diaphragmatic breathing takes practice  You may need to practice this many times a day  Slowly increase the amount of time you spend during each practice session  Follow up with your healthcare provider as directed:  Write down your questions so you remember to ask them during your visits  © 2017 [employer] Information is for End User's use only and may not be sold, redistributed or otherwise used for commercial purposes  All illustrations and images included in CareNotes® are the copyrighted property of [employer]  or Alvin Durham  The above information is an  only  It is not intended as medical advice for individual conditions or treatments   Talk to your doctor, nurse or pharmacist before following any medical regimen to see if it is safe and effective for you  Return in about 3 months (around 2/13/2019)  Subjective:      Patient ID: Brandee Kline  is a 63 y o  male  Chief Complaint   Patient presents with    COPD    Medication Refill     furosemide 40mg       Juan Manuel Nixon here for follow up of chronic conditions    1  Allergic sxs=PND, nasal congestion better since switching to Zyzal     2  COPD=breathing better since following recommendations  Taking antihistamine  Wearing scarf when in cold weather  3  Chf stable on current plan  Denies swelling, orthopnea  Weights stable if not trending down  Eating better    4  Painless Palpable lump right neck present for several weeks  Otherwise Feeling well  The following portions of the patient's history were reviewed and updated as appropriate: allergies, current medications, past family history, past medical history, past social history, past surgical history and problem list     Review of Systems   Constitutional: Negative for fatigue, fever and unexpected weight change  HENT: Negative  Eyes: Negative for visual disturbance  Respiratory: Positive for cough  Negative for chest tightness, shortness of breath and wheezing  Cardiovascular: Negative  Gastrointestinal: Negative for blood in stool and diarrhea  Endocrine: Negative  Genitourinary: Negative for difficulty urinating  Skin: Negative for pallor  Neurological: Negative  Negative for dizziness and tremors  Psychiatric/Behavioral: Negative for sleep disturbance and suicidal ideas  The patient is not nervous/anxious            Current Outpatient Prescriptions   Medication Sig Dispense Refill    albuterol (PROVENTIL HFA,VENTOLIN HFA) 90 mcg/act inhaler Inhale 2 puffs every 6 (six) hours as needed for wheezing or shortness of breath DO NOT USE MORE THAN DIRECTED 1 Inhaler 6    aspirin (ECOTRIN LOW STRENGTH) 81 mg EC tablet Take 81 mg by mouth daily      atenolol (TENORMIN) 50 mg tablet TAKE ONE TABLET BY MOUTH EVERY DAY 90 tablet 1    enalapril (VASOTEC) 10 mg tablet Take 1 tablet (10 mg total) by mouth daily 30 tablet 0    fluticasone (FLONASE) 50 mcg/act nasal spray 2 sprays into each nostril daily 16 g 0    fluticasone-salmeterol (ADVAIR) 250-50 mcg/dose inhaler Inhale 1 puff every 12 (twelve) hours 1 Inhaler 2    furosemide (LASIX) 20 mg tablet TAKE ONE TABLET BY MOUTH EVERY DAY AS NEEDED FOR LEG SWELLING OR WEIGHT GAIN OF 3 POUND IN DAY 30 tablet 3    furosemide (LASIX) 40 mg tablet Take 1 tablet (40 mg total) by mouth daily 90 tablet 1    ibuprofen (MOTRIN) 600 mg tablet Take 1 tablet (600 mg total) by mouth every 8 (eight) hours as needed for mild pain or moderate pain 30 tablet 0    KLOR-CON M20 20 MEQ tablet TAKE ONE TABLET BY MOUTH EVERY DAY 30 tablet 6    omeprazole (PriLOSEC) 40 MG capsule Take 1 cap twice daily for 7 days, then 1 cap once daily  37 capsule 5    OXcarbazepine (TRILEPTAL) 300 mg tablet TAKE ONE TABLET BY MOUTH TWICE A DAY 60 tablet 1    sertraline (ZOLOFT) 100 mg tablet Take 1 tablet (100 mg total) by mouth 2 (two) times a day 60 tablet 1    sildenafil (VIAGRA) 50 MG tablet Take 1 hour prior to sexual activity  Do not take more than 2 tabs in 1 week  10 tablet 0    tiotropium (SPIRIVA) 18 mcg inhalation capsule Place 1 capsule (18 mcg total) into inhaler and inhale daily 30 capsule 0    umeclidinium-vilanterol (ANORO ELLIPTA) 62 5-25 MCG/INH inhaler Inhale 1 puff daily 1 Inhaler 6    pravastatin (PRAVACHOL) 20 mg tablet Take 1 tablet by mouth daily with dinner for 30 days Patient would like to try pravastatin 20mg, he does have myalgia from Lipitor/Crestor in the past  30 tablet 0     No current facility-administered medications for this visit          Objective:    /78 (BP Location: Left arm, Patient Position: Sitting, Cuff Size: Adult)   Pulse 72   Temp (!) 97 2 °F (36 2 °C) (Temporal)   Resp 14   Wt (!) 155 kg (341 lb) SpO2 96%   BMI 42 06 kg/m²        Physical Exam   Constitutional: He is oriented to person, place, and time  Vital signs are normal  He appears well-developed and well-nourished  No distress  HENT:   Head: Normocephalic and atraumatic  Mouth/Throat: Oropharynx is clear and moist    Eyes: Pupils are equal, round, and reactive to light  Conjunctivae and EOM are normal  No scleral icterus  Neck: Neck supple  No JVD present  Carotid bruit is not present  No thyromegaly present  Cardiovascular: Normal rate, regular rhythm and intact distal pulses  Murmur heard  Pulmonary/Chest: Effort normal and breath sounds normal  No respiratory distress  Abdominal: Soft  Bowel sounds are normal  He exhibits no abdominal bruit  There is no hepatosplenomegaly  There is no tenderness  Musculoskeletal: He exhibits no edema  Lymphadenopathy:     He has no cervical adenopathy  Neurological: He is alert and oriented to person, place, and time  Coordination normal    Skin: Skin is warm and dry  No pallor  Psychiatric: He has a normal mood and affect  His behavior is normal    Nursing note and vitals reviewed               Brandy Phoenix, [address] [place] [address]

## 2018-11-13 NOTE — PATIENT INSTRUCTIONS
Chronic Bronchitis   AMBULATORY CARE:   Chronic bronchitis  is a long-term swelling and irritation in the air passages in your lungs  The irritation may damage your lungs  The lung damage often gets worse over time, and it is usually permanent  Chronic bronchitis is part of a group of lung diseases called chronic obstructive pulmonary disease (COPD)  Signs and symptoms include the following:   · Early signs and symptoms  may include shortness of breath, a runny or stuffy nose, or a headache  You may have a morning cough that brings up mucus from the lungs  As time passes, the amount of mucus coughed up begins to increase  The cough also starts to last longer during the day  You may have a bad taste in your mouth or bad breath  · Later signs and symptoms  may include your skin, nail beds, or lips turning dusky or blue  You may become more short of breath and get tired more easily  You may not be able to walk as far as you used to  You may wheeze  You may notice your breathing is faster than it used to be  Call 911 for any of the following:   · You have new chest pain or tightness  · You become confused, dizzy, or feel like you may faint  · You have a new or increased gray or blue tint to your nail beds, fingers or lips  Seek care immediately if:   · You become tired easily from trying to get enough breath  · The amount or color of your sputum changes, or your sputum becomes too hard to cough up  Contact your healthcare provider if:   · You have a fever  · You use your inhalers more often than usual      · You have new or increased swelling in your legs, ankles, or abdomen  · You run out of breath easily when you talk or do your usual exercise or activities  · You have questions or concerns about your condition or care  Treatment for chronic bronchitis  may include medicine to treat infection or help you breathe better  You may need to use oxygen in your home   Ask your healthcare provider if you have any questions on how to take medications or use oxygen  Self-care:   · Sleep with your upper body raised  This will help you breathe easier  You can use foam wedges or elevate the head of your bed  Many devices are available to help raise your upper body while in bed  Use a device that will tilt your whole body, or bend your body at the waist  The device should not bend your body at the upper back or neck  · Prevent the spread of germs:      [facility] [place] your hands often with soap and water  Carry germ-killing gel with you  You can use the gel to clean your hands when soap and water are not available  ¨ Do not touch your eyes, nose, or mouth unless you have washed your hands first      ¨ Always cover your mouth when you cough  Cough into a tissue or your shirtsleeve so you do not spread germs from your hands  ¨ Try to avoid people who have a cold or the flu  If you are sick, stay away from others as much as possible  · Do not smoke  Nicotine and other substances can cause lung damage  Do not use e-cigarettes or smokeless tobacco without first talking to your healthcare provider  They still contain nicotine  Ask your healthcare provider for information if you currently smoke and need help to quit  · Avoid lung irritants  Stay out of high altitudes and places with high humidity  Stay inside, or cover your mouth and nose with a scarf when you are outside during cold weather  Stay inside on days when air pollution or pollen counts are high  Do not use aerosol sprays such as deodorant, bug spray, and hair spray  · Drink more liquids  This will help to keep your air passages moist and help you cough up mucus  Ask how much liquid to drink each day and which liquids are best for you  · Ask your healthcare provider about the flu and pneumonia vaccines  All adults should get the flu (influenza) vaccine every year as soon as it becomes available   The pneumonia vaccine is given to adults aged 72 or older to prevent pneumococcal disease, such as pneumonia  Adults aged 23 to 59 years who are at high risk for pneumococcal disease also should get the pneumococcal vaccine  It may need to be repeated 1 or 5 years later  Ways to help you breathe better:   · Take deep breaths and cough  10 times each hour  This will decrease your risk for a lung infection  Take a deep breath and hold it for as long as you can  Let the air out and then cough strongly  Deep breaths help open your airway  You may be given an incentive spirometer to help you take deep breaths  Put the plastic piece in your mouth and take a slow, deep breath  Then let the air out and cough  Repeat these steps 10 times every hour  · Pursed-lip breathing  can be used any time you feel short of breath  Pursed-lip breathing can be especially helpful before you start an activity:     ¨ Breathe in through your nose  Use the muscles in your abdomen to help fill your lungs with air  ¨ Slowly breathe out through your mouth with your lips slightly puckered  You should make a quiet hissing sound as you breathe out  ¨ Try to take twice as long to breathe out as it did to breathe in  This helps you get rid of as much air from your lungs as possible  ¨ Repeat this exercise several times  Once you are used to doing pursed-lip breathing, you can do it any time you need more air  · Diaphragmatic breathing  can help strengthen some of the muscles you use to breathe:     ¨ Place one hand on your stomach just below your ribs  Place your other hand in the middle of your chest over your breastbone  ¨ Breathe in slowly through your nose, as deeply as you can  ¨ Breathe out slowly through pursed lips  As you breathe out, tighten the muscles in your stomach  Use your hand to gently push in and up while tightening the muscles  ¨ Diaphragmatic breathing takes practice  You may need to practice this many times a day   Slowly increase the amount of time you spend during each practice session  Follow up with your healthcare provider as directed:  Write down your questions so you remember to ask them during your visits  © 2017 [address] [employer] Information is for End User's use only and may not be sold, redistributed or otherwise used for commercial purposes  All illustrations and images included in CareNotes® are the copyrighted property of [employer]  or Alvin Durham  The above information is an  only  It is not intended as medical advice for individual conditions or treatments  Talk to your doctor, nurse or pharmacist before following any medical regimen to see if it is safe and effective for you

## 2018-11-14 ENCOUNTER — TELEPHONE (OUTPATIENT)
Dept: FAMILY MEDICINE CLINIC | Facility: CLINIC | Age: 63
End: 2018-11-14

## 2018-11-14 LAB
BUN SERPL-MCNC: 15 MG/DL (ref 8–27)
BUN/CREAT SERPL: 15 (ref 10–24)
CALCIUM SERPL-MCNC: 9.3 MG/DL (ref 8.6–10.2)
CHLORIDE SERPL-SCNC: 104 MMOL/L (ref 96–106)
CO2 SERPL-SCNC: 22 MMOL/L (ref 20–29)
CREAT SERPL-MCNC: 0.97 MG/DL (ref 0.76–1.27)
ERYTHROCYTE [DISTWIDTH] IN BLOOD BY AUTOMATED COUNT: 14.1 % (ref 12.3–15.4)
GLUCOSE SERPL-MCNC: 140 MG/DL (ref 65–99)
HCT VFR BLD AUTO: 47.1 % (ref 37.5–51)
HGB BLD-MCNC: 15.8 G/DL (ref 13–17.7)
MAGNESIUM SERPL-MCNC: 2.2 MG/DL (ref 1.6–2.3)
MCH RBC QN AUTO: 32.5 PG (ref 26.6–33)
MCHC RBC AUTO-ENTMCNC: 33.5 G/DL (ref 31.5–35.7)
MCV RBC AUTO: 97 FL (ref 79–97)
PLATELET # BLD AUTO: 299 X10E3/UL (ref 150–379)
POTASSIUM SERPL-SCNC: 4.2 MMOL/L (ref 3.5–5.2)
RBC # BLD AUTO: 4.86 X10E6/UL (ref 4.14–5.8)
SL AMB EGFR AFRICAN AMERICAN: 98 ML/MIN/1.73
SL AMB EGFR NON AFRICAN AMERICAN: 84 ML/MIN/1.73
SODIUM SERPL-SCNC: 141 MMOL/L (ref 134–144)
WBC # BLD AUTO: 7.9 X10E3/UL (ref 3.4–10.8)

## 2018-11-19 ENCOUNTER — TELEPHONE (OUTPATIENT)
Dept: FAMILY MEDICINE CLINIC | Facility: CLINIC | Age: 63
End: 2018-11-19

## 2018-11-19 DIAGNOSIS — I50.32 CHRONIC DIASTOLIC CONGESTIVE HEART FAILURE (HCC): ICD-10-CM

## 2018-11-19 NOTE — TELEPHONE ENCOUNTER
[facility] would like a call back [phone]   They want to know if pt is still taking the med enlapril pt wants to get it refill and the last time it was filled in their pharmacy was in april

## 2018-11-19 NOTE — TELEPHONE ENCOUNTER
Called and spoke with patient   He has no idea what medication he is taking    He said I have to speak with Jaimee Milian when she gets home from work cause she handles all the meds

## 2018-11-23 ENCOUNTER — OFFICE VISIT (OUTPATIENT)
Dept: FAMILY MEDICINE CLINIC | Facility: CLINIC | Age: 63
End: 2018-11-23
Payer: MEDICARE

## 2018-11-23 VITALS
BODY MASS INDEX: 42.06 KG/M2 | HEART RATE: 78 BPM | DIASTOLIC BLOOD PRESSURE: 72 MMHG | WEIGHT: 315 LBS | RESPIRATION RATE: 14 BRPM | TEMPERATURE: 98.2 F | SYSTOLIC BLOOD PRESSURE: 120 MMHG

## 2018-11-23 DIAGNOSIS — R05.9 COUGH: ICD-10-CM

## 2018-11-23 DIAGNOSIS — H66.91 ACUTE RIGHT OTITIS MEDIA: Primary | ICD-10-CM

## 2018-11-23 PROCEDURE — 99213 OFFICE O/P EST LOW 20 MIN: CPT | Performed by: NURSE PRACTITIONER

## 2018-11-23 RX ORDER — ENALAPRIL MALEATE 5 MG/1
5 TABLET ORAL DAILY
Qty: 90 TABLET | Refills: 1 | Status: SHIPPED | OUTPATIENT
Start: 2018-11-23 | End: 2019-08-04 | Stop reason: SDUPTHER

## 2018-11-23 RX ORDER — LORATADINE 10 MG/1
10 TABLET ORAL EVERY EVENING
COMMUNITY
End: 2020-01-10

## 2018-11-23 RX ORDER — AZITHROMYCIN 250 MG/1
TABLET, FILM COATED ORAL
Qty: 6 TABLET | Refills: 0 | Status: SHIPPED | OUTPATIENT
Start: 2018-11-23 | End: 2018-11-27

## 2018-11-23 NOTE — PROGRESS NOTES
Assessment:      Acute right otitis media  Sore throat      Plan:      1  Treatment:  Azithromycin  2  OTC analgesics, fluids, rest    3   Follow up  in 1 week if not improving  4  Call in 5-7 days with update of condition  Subjective:       Radha Cooley  is a 63 y o  male who presents for possible ear infection  Symptoms include right ear pain, congestion, cough and sore throat  Onset of symptoms was 3 days ago, gradually worsening since that time  Denies lightheadedness, low grade fever, purulent nasal discharge, sinus pressure and wheezing   He is drinking moderate amounts of fluids  Non smoker  H/o copd, bronchitis, PNA   +sick contacts  The following portions of the patient's history were reviewed and updated as appropriate: allergies, current medications, past family history, past medical history, past social history, past surgical history and problem list     Review of Systems  Pertinent items are noted in HPI        Objective:      /72 (BP Location: Left arm, Patient Position: Sitting, Cuff Size: Adult)   Pulse 78   Temp 98 2 °F (36 8 °C) (Temporal)   Resp 14   Wt (!) 155 kg (341 lb)   BMI 42 06 kg/m²   General appearance: alert and oriented, in no acute distress  Head: Normocephalic, without obvious abnormality, sinuses nontender to percussion  Ears: abnormal TM right ear - erythematous and bulging and abnormal TM left ear - dull  Nose: no discharge, turbinates red  Throat: abnormal findings: mild oropharyngeal erythema  Lungs: clear to auscultation bilaterally  Heart: regular rate and rhythm, S1, S2 normal, no murmur, click, rub or gallop  Pulses: 2+ and symmetric  Lymph nodes: Cervical adenopathy: ++right side  Neurologic: Grossly normal

## 2018-11-23 NOTE — PATIENT INSTRUCTIONS
Otitis Media   AMBULATORY CARE:   Otitis media  is an ear infection  Common symptoms include the following:   · Fever or a headache    · Ear pain    · Trouble hearing    · Ear feels plugged or full or you have ringing or buzzing in your ear    · Dizziness or you lose your balance    · Nausea or vomiting  Seek immediate care for the following symptoms:   · Seizure    · Fever and a stiff neck  Treatment for otitis media  may include any of the following:  · NSAIDs , such as ibuprofen, help decrease swelling, pain, and fever  This medicine is available with or without a doctor's order  NSAIDs can cause stomach bleeding or kidney problems in certain people  If you take blood thinner medicine, always ask your healthcare provider if NSAIDs are safe for you  Always read the medicine label and follow directions  · Ear drops  to help treat your ear pain  · Antibiotics  to help kill the germs that caused your ear infection  Care for otitis media:   · Use heat  Place a warm, moist washcloth on your ear to decrease pain  Apply for 15 to 20 minutes, 3 to 4 times a day    · Use ice  Ice helps decrease swelling and pain  Use an ice pack or put crushed ice in a plastic bag  Cover the ice pack with a towel and place it on your ear for 15 to 20 minutes, 3 to 4 times a day for 2 days  Prevent otitis media:   · Wash your hands often  This will help prevent the spread of germs  Encourage everyone in your house to wash their hands with soap and water after they use the bathroom  Everyone should also wash their hands after they change a child's diaper and before they prepare or eat food  · Stay away from people who are ill  Germs are easily and quickly spread through contact  Follow up with your healthcare provider as directed:  Write down your questions so you remember to ask them during your visits     © 2017 [employer] Information is for End User's use only and may not be sold, redistributed or otherwise used for commercial purposes  All illustrations and images included in CareNotes® are the copyrighted property of [employer]  or Alvin Durham  The above information is an  only  It is not intended as medical advice for individual conditions or treatments  Talk to your doctor, nurse or pharmacist before following any medical regimen to see if it is safe and effective for you

## 2018-11-29 ENCOUNTER — TELEPHONE (OUTPATIENT)
Dept: FAMILY MEDICINE CLINIC | Facility: CLINIC | Age: 63
End: 2018-11-29

## 2018-11-29 NOTE — TELEPHONE ENCOUNTER
Rolando Zamorano    Patient's wife dropped off disability placard form to be completed  Form placed in Betina's folder, copy in drawer up front  Please call when ready for

## 2018-11-30 ENCOUNTER — TELEPHONE (OUTPATIENT)
Dept: FAMILY MEDICINE CLINIC | Facility: CLINIC | Age: 63
End: 2018-11-30

## 2018-11-30 NOTE — TELEPHONE ENCOUNTER
Betina:    Patient called stating that his ear is still bothering him and he finished all of the ABX  Did you want to see him again or give it more time?

## 2018-11-30 NOTE — TELEPHONE ENCOUNTER
It is prob viral, weather related  Give it through weekend  He should be using flonase it may help ear   He should call Monday with update

## 2018-12-03 ENCOUNTER — OFFICE VISIT (OUTPATIENT)
Dept: FAMILY MEDICINE CLINIC | Facility: CLINIC | Age: 63
End: 2018-12-03
Payer: MEDICARE

## 2018-12-03 VITALS
WEIGHT: 315 LBS | TEMPERATURE: 97.4 F | RESPIRATION RATE: 14 BRPM | DIASTOLIC BLOOD PRESSURE: 90 MMHG | SYSTOLIC BLOOD PRESSURE: 122 MMHG | HEART RATE: 78 BPM | BODY MASS INDEX: 41.69 KG/M2

## 2018-12-03 DIAGNOSIS — M25.511 ACUTE PAIN OF RIGHT SHOULDER: ICD-10-CM
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
## 2018-12-03 DIAGNOSIS — M79.10 GENERALIZED MUSCLE ACHE: ICD-10-CM

## 2018-12-03 DIAGNOSIS — S39.012A BACK STRAIN, INITIAL ENCOUNTER: ICD-10-CM

## 2018-12-03 PROCEDURE — 99213 OFFICE O/P EST LOW 20 MIN: CPT | Performed by: NURSE PRACTITIONER

## 2018-12-03 RX ORDER — IBUPROFEN 800 MG/1
800 TABLET ORAL EVERY 8 HOURS PRN
Qty: 90 TABLET | Refills: 0 | Status: SHIPPED | OUTPATIENT
Start: 2018-12-03 | End: 2019-01-17

## 2018-12-03 RX ORDER — CYCLOBENZAPRINE HCL 10 MG
10 TABLET ORAL 3 TIMES DAILY PRN
Qty: 30 TABLET | Refills: 0 | Status: SHIPPED | OUTPATIENT
Start: 2018-12-03 | End: 2018-12-13 | Stop reason: SDUPTHER

## 2018-12-03 NOTE — PROGRESS NOTES
Assessment/Plan:    Problem List Items Addressed This Visit     None      Visit Diagnoses     Accidental fall, initial encounter    -  Primary    Relevant Medications    ibuprofen (MOTRIN) 800 mg tablet    cyclobenzaprine (FLEXERIL) 10 mg tablet    Other Relevant Orders    XR hips bilateral 2 vw w pelvis if performed    Back strain, initial encounter        Relevant Medications    ibuprofen (MOTRIN) 800 mg tablet    cyclobenzaprine (FLEXERIL) 10 mg tablet    Other Relevant Orders    XR hips bilateral 2 vw w pelvis if performed    Generalized muscle ache        Relevant Medications    ibuprofen (MOTRIN) 800 mg tablet    cyclobenzaprine (FLEXERIL) 10 mg tablet    Acute pain of right shoulder        Relevant Medications    ibuprofen (MOTRIN) 800 mg tablet    cyclobenzaprine (FLEXERIL) 10 mg tablet    Other Relevant Orders    Sling      The patient was counseled regarding instructions for management,-- risk factor reductions,-- prognosis,-- impressions,-- risks and benefits of treatment options,-- importance of compliance with treatment  I have reviewed the  instructions with the patient, answering all questions to his satisfaction  Call Friday with update    Patient Instructions     Contusion in Adults   AMBULATORY CARE:   A contusion  is a bruise that appears on your skin after an injury  A bruise happens when small blood vessels tear but skin does not  When blood vessels tear, blood leaks into nearby tissue, such as soft tissue or muscle  Other signs and symptoms you may have with a contusion:   · Pain that increases when you touch the bruise, walk, or use the area around the bruise    · Swelling or a lump at the site of the bruise or near it    · Red, blue, or black skin that may change to green or yellow after a few days           · Stiffness or problems moving the bruised area of your body  Seek care immediately if:   · You have new trouble moving the injured area      · You have tingling or numbness in or near the injured area  · Your hand or foot below the bruise gets cold or turns pale  Contact your healthcare provider if:   · You find a new lump in the injured area  · Your symptoms do not improve with treatment after 4 to 5 days  · You have questions or concerns about your condition or care  Treatment for a contusion  may include any of the following:  · NSAIDs , such as ibuprofen, help decrease swelling, pain, and fever  This medicine is available with or without a doctor's order  NSAIDs can cause stomach bleeding or kidney problems in certain people  If you take blood thinner medicine, always ask your healthcare provider if NSAIDs are safe for you  Always read the medicine label and follow directions  · Prescription pain medicine  may be given  Do not wait until the pain is severe before you take your medicine  · Aspiration  is a procedure used to drain pooled blood in your muscle  This may help prevent increased pressure in the muscle  · Surgery  may be done to repair a tear in the muscle or relieve pressure in the muscle caused by swelling  Help a contusion heal:   · Rest the injured area  or use it less than usual  If you bruised your leg or foot, you may need crutches or a cane to help you walk  This will help you keep weight off your injured body part  · Apply ice  to decrease swelling and pain  Ice may also help prevent tissue damage  Use an ice pack, or put crushed ice in a plastic bag  Cover it with a towel and place it on your bruise for 15 to 20 minutes every hour or as directed  · Use compression  to support the area and decrease swelling  Wrap an elastic bandage around the area over the bruised muscle  Make sure the bandage is not too tight  You should be able to fit 1 finger between the bandage and your skin  · Elevate (raise) your injured body part  above the level of your heart to help decrease pain and swelling   Use pillows, blankets, or rolled towels to elevate the area as often as you can  · Do not drink alcohol  as directed  Alcohol may slow healing  · Do not stretch injured muscles  right after your injury  Ask your healthcare provider when and how you may safely stretch after your injury  Gentle stretches can help increase your flexibility  · Do not massage the area or put heating pads  on the bruise right after your injury  Heat and massage may slow healing  Your healthcare provider may tell you to apply heat after several days  At that time, heat will start to help the injury heal   Follow up with your healthcare provider as directed:  Write down your questions so you remember to ask them during your visits  © 2017 [address] Information is for End User's use only and may not be sold, redistributed or otherwise used for commercial purposes  All illustrations and images included in CareNotes® are the copyrighted property of [employer]  or [employer]  The above information is an  only  It is not intended as medical advice for individual conditions or treatments  Talk to your doctor, nurse or pharmacist before following any medical regimen to see if it is safe and effective for you  Return in about 2 weeks (around 12/17/2018)  Subjective:      Patient ID: Zuleyka Teran  is a 63 y o  male  Chief Complaint   Patient presents with    Fall     pt  states tripped over scale in bathroom yesterday fell in tub c/o pain on rt  side        Fall   The accident occurred 12 to 24 hours ago  Fall occurred: tripped over floor scale in bathroom fell into ceramic bathtub on right side  The point of impact was the right shoulder, right hip and neck  The pain is present in the neck, back, right upper arm and right shoulder  The pain is at a severity of 6/10  The symptoms are aggravated by use of injured limb, movement, pressure on injury and ambulation   Pertinent negatives include no abdominal pain, bowel incontinence, fever, headaches, hematuria, loss of consciousness, nausea, numbness, tingling, visual change or vomiting  He has tried acetaminophen for the symptoms  The treatment provided mild relief  The following portions of the patient's history were reviewed and updated as appropriate: allergies, current medications, past family history, past medical history, past social history, past surgical history and problem list     Review of Systems   Constitutional: Negative for fever  Respiratory: Negative  Cardiovascular: Negative  Gastrointestinal: Negative for abdominal pain, bowel incontinence, nausea and vomiting  Genitourinary: Negative for hematuria  Musculoskeletal: Positive for arthralgias, back pain, myalgias and neck stiffness  Neurological: Negative for dizziness, tingling, tremors, loss of consciousness, weakness, numbness and headaches           Current Outpatient Prescriptions   Medication Sig Dispense Refill    albuterol (PROVENTIL HFA,VENTOLIN HFA) 90 mcg/act inhaler Inhale 2 puffs every 6 (six) hours as needed for wheezing or shortness of breath DO NOT USE MORE THAN DIRECTED 1 Inhaler 6    aspirin (ECOTRIN LOW STRENGTH) 81 mg EC tablet Take 81 mg by mouth daily      atenolol (TENORMIN) 50 mg tablet TAKE ONE TABLET BY MOUTH EVERY DAY 90 tablet 1    enalapril (VASOTEC) 5 mg tablet Take 1 tablet (5 mg total) by mouth daily 90 tablet 1    fluticasone (FLONASE) 50 mcg/act nasal spray 2 sprays into each nostril daily 16 g 0    fluticasone-salmeterol (ADVAIR) 250-50 mcg/dose inhaler Inhale 1 puff every 12 (twelve) hours 1 Inhaler 2    furosemide (LASIX) 20 mg tablet TAKE ONE TABLET BY MOUTH EVERY DAY AS NEEDED FOR LEG SWELLING OR WEIGHT GAIN OF 3 POUND IN DAY 30 tablet 3    furosemide (LASIX) 40 mg tablet Take 1 tablet (40 mg total) by mouth daily 90 tablet 1    ibuprofen (MOTRIN) 600 mg tablet Take 1 tablet (600 mg total) by mouth every 8 (eight) hours as needed for mild pain or moderate pain 30 tablet 0    KLOR-CON M20 20 MEQ tablet TAKE ONE TABLET BY MOUTH EVERY DAY 30 tablet 6    loratadine (CLARITIN) 10 mg tablet Take 10 mg by mouth daily      omeprazole (PriLOSEC) 40 MG capsule Take 1 cap twice daily for 7 days, then 1 cap once daily  37 capsule 5    OXcarbazepine (TRILEPTAL) 300 mg tablet TAKE ONE TABLET BY MOUTH TWICE A DAY 60 tablet 1    sertraline (ZOLOFT) 100 mg tablet Take 1 tablet (100 mg total) by mouth 2 (two) times a day 60 tablet 1    sildenafil (VIAGRA) 50 MG tablet Take 1 hour prior to sexual activity  Do not take more than 2 tabs in 1 week  10 tablet 0    cyclobenzaprine (FLEXERIL) 10 mg tablet Take 1 tablet (10 mg total) by mouth 3 (three) times a day as needed for muscle spasms 30 tablet 0    ibuprofen (MOTRIN) 800 mg tablet Take 1 tablet (800 mg total) by mouth every 8 (eight) hours as needed for mild pain 90 tablet 0    pravastatin (PRAVACHOL) 20 mg tablet Take 1 tablet by mouth daily with dinner for 30 days Patient would like to try pravastatin 20mg, he does have myalgia from Lipitor/Crestor in the past  30 tablet 0    tiotropium (SPIRIVA) 18 mcg inhalation capsule Place 1 capsule (18 mcg total) into inhaler and inhale daily (Patient not taking: Reported on 11/23/2018 ) 30 capsule 0    umeclidinium-vilanterol (ANORO ELLIPTA) 62 5-25 MCG/INH inhaler Inhale 1 puff daily (Patient not taking: Reported on 11/23/2018 ) 1 Inhaler 6     No current facility-administered medications for this visit  Objective:    /90 (BP Location: Left arm, Patient Position: Sitting, Cuff Size: Adult)   Pulse 78   Temp (!) 97 4 °F (36 3 °C) (Temporal)   Resp 14   Wt (!) 153 kg (338 lb)   BMI 41 69 kg/m²        Physical Exam   Constitutional: He is oriented to person, place, and time  Vital signs are normal  He appears well-developed and well-nourished     Mild distress, uncomfortable   HENT:   Mouth/Throat: Oropharynx is clear and moist    Neck: Spinous process tenderness and muscular tenderness present  Decreased range of motion present  Cardiovascular: Normal rate, regular rhythm and intact distal pulses  Pulmonary/Chest: Effort normal and breath sounds normal    Abdominal: There is no tenderness  Musculoskeletal:        Right shoulder: He exhibits decreased range of motion and tenderness  Cervical back: He exhibits decreased range of motion, tenderness and spasm  Lumbar back: He exhibits decreased range of motion, tenderness and spasm  Right trap muscle spasm    Pt declines special exam right shoulder   Neurological: He is alert and oriented to person, place, and time  Gait abnormal    Antalgic gait   Skin: No bruising, no ecchymosis and no laceration noted  Psychiatric: He has a normal mood and affect  Nursing note and vitals reviewed               Jann Martínez, [address]

## 2018-12-03 NOTE — PATIENT INSTRUCTIONS
Contusion in Adults   AMBULATORY CARE:   A contusion  is a bruise that appears on your skin after an injury  A bruise happens when small blood vessels tear but skin does not  When blood vessels tear, blood leaks into nearby tissue, such as soft tissue or muscle  Other signs and symptoms you may have with a contusion:   · Pain that increases when you touch the bruise, walk, or use the area around the bruise    · Swelling or a lump at the site of the bruise or near it    · Red, blue, or black skin that may change to green or yellow after a few days           · Stiffness or problems moving the bruised area of your body  Seek care immediately if:   · You have new trouble moving the injured area  · You have tingling or numbness in or near the injured area  · Your hand or foot below the bruise gets cold or turns pale  Contact your healthcare provider if:   · You find a new lump in the injured area  · Your symptoms do not improve with treatment after 4 to 5 days  · You have questions or concerns about your condition or care  Treatment for a contusion  may include any of the following:  · NSAIDs , such as ibuprofen, help decrease swelling, pain, and fever  This medicine is available with or without a doctor's order  NSAIDs can cause stomach bleeding or kidney problems in certain people  If you take blood thinner medicine, always ask your healthcare provider if NSAIDs are safe for you  Always read the medicine label and follow directions  · Prescription pain medicine  may be given  Do not wait until the pain is severe before you take your medicine  · Aspiration  is a procedure used to drain pooled blood in your muscle  This may help prevent increased pressure in the muscle  · Surgery  may be done to repair a tear in the muscle or relieve pressure in the muscle caused by swelling    Help a contusion heal:   · Rest the injured area  or use it less than usual  If you bruised your leg or foot, you may need crutches or a cane to help you walk  This will help you keep weight off your injured body part  · Apply ice  to decrease swelling and pain  Ice may also help prevent tissue damage  Use an ice pack, or put crushed ice in a plastic bag  Cover it with a towel and place it on your bruise for 15 to 20 minutes every hour or as directed  · Use compression  to support the area and decrease swelling  Wrap an elastic bandage around the area over the bruised muscle  Make sure the bandage is not too tight  You should be able to fit 1 finger between the bandage and your skin  · Elevate (raise) your injured body part  above the level of your heart to help decrease pain and swelling  Use pillows, blankets, or rolled towels to elevate the area as often as you can  · Do not drink alcohol  as directed  Alcohol may slow healing  · Do not stretch injured muscles  right after your injury  Ask your healthcare provider when and how you may safely stretch after your injury  Gentle stretches can help increase your flexibility  · Do not massage the area or put heating pads  on the bruise right after your injury  Heat and massage may slow healing  Your healthcare provider may tell you to apply heat after several days  At that time, heat will start to help the injury heal   Follow up with your healthcare provider as directed:  Write down your questions so you remember to ask them during your visits  © 2017 [address] Information is for End User's use only and may not be sold, redistributed or otherwise used for commercial purposes  All illustrations and images included in CareNotes® are the copyrighted property of [employer] M [employer]  or [employer]  The above information is an  only  It is not intended as medical advice for individual conditions or treatments  Talk to your doctor, nurse or pharmacist before following any medical regimen to see if it is safe and effective for you

## 2018-12-03 NOTE — LETTER
December 3, 2018     Patient: Jensen Cooper  YOB: 1955   Date of Visit: 12/3/2018       To Whom it May Concern:    Kern Litten is under my professional care  He was seen in my office on 12/3/2018  He may return to work with limitations (no lifting >10 pounds) return to work 12/10/18  If you have any questions or concerns, please don't hesitate to call           Sincerely,          AYLA Parker        CC: No Recipients

## 2018-12-11 ENCOUNTER — TELEPHONE (OUTPATIENT)
Dept: OBGYN CLINIC | Facility: CLINIC | Age: 63
End: 2018-12-11

## 2018-12-11 ENCOUNTER — APPOINTMENT (OUTPATIENT)
Dept: RADIOLOGY | Facility: CLINIC | Age: 63
End: 2018-12-11
Payer: MEDICARE

## 2018-12-11 ENCOUNTER — OFFICE VISIT (OUTPATIENT)
Dept: OBGYN CLINIC | Facility: CLINIC | Age: 63
End: 2018-12-11
Payer: MEDICARE

## 2018-12-11 VITALS — BODY MASS INDEX: 38.36 KG/M2 | WEIGHT: 315 LBS | HEIGHT: 76 IN

## 2018-12-11 DIAGNOSIS — M25.512 LEFT SHOULDER PAIN, UNSPECIFIED CHRONICITY: ICD-10-CM

## 2018-12-11 DIAGNOSIS — M25.572 PAIN, JOINT, ANKLE AND FOOT, LEFT: Primary | ICD-10-CM

## 2018-12-11 DIAGNOSIS — M25.572 PAIN, JOINT, ANKLE AND FOOT, LEFT: ICD-10-CM

## 2018-12-11 DIAGNOSIS — M19.172 POST-TRAUMATIC OSTEOARTHRITIS OF LEFT ANKLE: ICD-10-CM

## 2018-12-11 DIAGNOSIS — S42.024A NONDISPLACED FRACTURE OF SHAFT OF RIGHT CLAVICLE, INITIAL ENCOUNTER FOR CLOSED FRACTURE: ICD-10-CM

## 2018-12-11 PROCEDURE — 99214 OFFICE O/P EST MOD 30 MIN: CPT | Performed by: ORTHOPAEDIC SURGERY

## 2018-12-11 PROCEDURE — 73030 X-RAY EXAM OF SHOULDER: CPT

## 2018-12-11 PROCEDURE — 73610 X-RAY EXAM OF ANKLE: CPT

## 2018-12-11 PROCEDURE — 23500 CLTX CLAVICULAR FX W/O MNPJ: CPT | Performed by: ORTHOPAEDIC SURGERY

## 2018-12-11 NOTE — TELEPHONE ENCOUNTER
Patient called asking if he can get an a prescription for muscle relaxer and motrin that was previous prescribed from recent doctor because he is almost out  He was only given ten days he is asking for another 10 day supply?

## 2018-12-11 NOTE — LETTER
December 11, 2018     Jen Rowland, [address]    Patient: Jensen Cooper  YOB: 1955   Date of Visit: 12/11/2018       Dear Dr Sharon Nair: Thank you for referring Kern Litten to me for evaluation  Below are the relevant portions of my assessment and plan of care  Assessment/Plan:  1  Pain, joint, ankle and foot, left  XR ankle 3+ vw left   2  Left shoulder pain, unspecified chronicity  XR shoulder 2+ vw right   3  Nondisplaced fracture of shaft of right clavicle, initial encounter for closed fracture     4  Post-traumatic osteoarthritis of left ankle       Scribe Attestation    I,:   Joy Luz Call am acting as a scribe while in the presence of the attending physician :        I,:   Constanza Pfeiffer MD personally performed the services described in this documentation    as scribed in my presence :              Jaswinder Piña has a nondisplaced fracture to the midshaft of the right clavicle  He was provided a sling  He should wear this when out in public but when home can remove the sling for gentle range of motion  I would like to see him back in 4 weeks for this and have repeat x-rays  The left ankle has arthritic changes throughout  I do not appreciate an acute fracture  I encouraged him to return to his home exercise program   Should the ankle not improve we will reassess at next visit  Should his ankle pain worsen before 4 weeks he can certainly return to see us  If you have questions, please do not hesitate to call me  I look forward to following Jaswinder Piña along with you           Sincerely,        Constanza Pfeiffer MD        CC: No Recipients

## 2018-12-13 ENCOUNTER — TELEPHONE (OUTPATIENT)
Dept: FAMILY MEDICINE CLINIC | Facility: CLINIC | Age: 63
End: 2018-12-13
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
## 2018-12-13 DIAGNOSIS — M79.10 GENERALIZED MUSCLE ACHE: ICD-10-CM

## 2018-12-13 DIAGNOSIS — S39.012A BACK STRAIN, INITIAL ENCOUNTER: ICD-10-CM

## 2018-12-13 DIAGNOSIS — M25.511 ACUTE PAIN OF RIGHT SHOULDER: ICD-10-CM

## 2018-12-13 RX ORDER — CYCLOBENZAPRINE HCL 10 MG
10 TABLET ORAL 3 TIMES DAILY PRN
Qty: 30 TABLET | Refills: 0 | Status: SHIPPED | OUTPATIENT
Start: 2018-12-13 | End: 2019-03-27 | Stop reason: SDUPTHER

## 2018-12-13 NOTE — TELEPHONE ENCOUNTER
[employer],     Patient called and said that he is still experiencing the muscle spasms and was wondering if you can prescribe him 10 more days of the muscle relaxers and the tylenol?  Please call patient back to godfrey, KAYLYN

## 2018-12-15 DIAGNOSIS — F31.9 BIPOLAR 1 DISORDER (HCC): ICD-10-CM

## 2018-12-15 RX ORDER — OXCARBAZEPINE 300 MG/1
TABLET, FILM COATED ORAL
Qty: 90 TABLET | Refills: 1 | Status: SHIPPED | OUTPATIENT
Start: 2018-12-15

## 2018-12-16 DIAGNOSIS — F31.9 BIPOLAR 1 DISORDER (HCC): ICD-10-CM

## 2018-12-16 RX ORDER — OXCARBAZEPINE 300 MG/1
TABLET, FILM COATED ORAL
Qty: 90 TABLET | Refills: 1 | Status: SHIPPED | OUTPATIENT
Start: 2018-12-16 | End: 2019-01-17

## 2019-01-08 ENCOUNTER — APPOINTMENT (OUTPATIENT)
Dept: RADIOLOGY | Facility: CLINIC | Age: 64
End: 2019-01-08
Payer: MEDICARE

## 2019-01-08 ENCOUNTER — OFFICE VISIT (OUTPATIENT)
Dept: OBGYN CLINIC | Facility: CLINIC | Age: 64
End: 2019-01-08

## 2019-01-08 VITALS
WEIGHT: 315 LBS | SYSTOLIC BLOOD PRESSURE: 119 MMHG | BODY MASS INDEX: 38.36 KG/M2 | HEIGHT: 76 IN | DIASTOLIC BLOOD PRESSURE: 73 MMHG | HEART RATE: 65 BPM

## 2019-01-08 DIAGNOSIS — S42.024D CLOSED NONDISPLACED FRACTURE OF SHAFT OF RIGHT CLAVICLE WITH ROUTINE HEALING, SUBSEQUENT ENCOUNTER: Primary | ICD-10-CM

## 2019-01-08 DIAGNOSIS — S42.024D CLOSED NONDISPLACED FRACTURE OF SHAFT OF RIGHT CLAVICLE WITH ROUTINE HEALING, SUBSEQUENT ENCOUNTER: ICD-10-CM

## 2019-01-08 DIAGNOSIS — M19.172 TRAUMATIC OSTEOARTHRITIS OF LEFT ANKLE: ICD-10-CM

## 2019-01-08 PROCEDURE — 99024 POSTOP FOLLOW-UP VISIT: CPT | Performed by: ORTHOPAEDIC SURGERY

## 2019-01-08 PROCEDURE — 73000 X-RAY EXAM OF COLLAR BONE: CPT

## 2019-01-08 NOTE — PROGRESS NOTES
Patient Name:  Omid Garcia  MRN:  [id number]    Assessment     1  Closed nondisplaced fracture of shaft of right clavicle with routine healing, subsequent encounter  XR clavicle right   2  Traumatic osteoarthritis of left ankle         Plan     Corrine Maknati appears to be doing well with this clavicle fracture on the right side  He may continue to treat himself conservatively with his home exercises for shoulder the fracture does not demonstrate any further displacement and appears to be stable  Regards to his left ankle he does demonstrate significant osteoarthritis post dramatically from a crush injury in 2014 I did discuss with him that I can provide him a referral to our foot and ankle specialist in regards to surgical options he declined this today  He did opt for a in lace-up ankle brace that did provided fit for him today in the office  Should rubber continue to improve with his right shoulder is left ankle he continue his path of conservative treatment  However should he experience recurrence of increased symptoms he may follow up with me  Otherwise, he may follow up with me on as-needed basis  Corrine Boss did inquire about Euflexxa injections for both his left and right knees his last injection was in April of 2018  I will put in an order for a new set of Euflexxa series injections for both the left and right knees  I did note to him again that this would require authorization  My office will contact him once his injections are delivered  Donaldsonavinash Nicole is a 62-year-old male who is 4 weeks status post right clavicle fracture  He is also recovering from a an arthritic flare up of the left ankle  He states that he has a doing well overall in regards to his right shoulder however notes that he is experiencing intermittent mild to moderate sharp pain into the upper trapezius muscle I can radiate into the neck  States his range of motion has improved overall    However Corrine Boss notes that his left ankle has worsened since the fall and notes that he is experiencing moderate to severe sharp pain about the anterior medial aspect of his ankle  He does have a history of a crush injury in 2014 to this left ankle  He has been doing ankle range of motion exercises at home to help maintain range of motion however notes that this has not improved his symptoms  He notes this pain is better at rest however will experience pain while trying to sleep at night  Today he notes paresthesias into his right hand this has been ongoing since the time of his clavicle injury  He denies any distal paresthesias into his left foot ankle  Objective     /73 (BP Location: Right arm, Patient Position: Sitting, Cuff Size: Large) Comment (BP Location): lower arm  Pulse 65   Ht 6' 4" (1 93 m)   Wt (!) 156 kg (343 lb 4 8 oz)   BMI 41 79 kg/m²     Does demonstrate tenderness to palpation along the clavicle shaft however does demonstrate 100° of shoulder abduction  There is no ecchymosis formation or swelling in the area of fracture  Left ankle demonstrates mild-to-moderate swelling about the anterior medial aspect with tenderness to palpation in the medial malleolus  Does demonstrate approximately 20° of dorsiflexion and 30° of plantar flexion with 10° of inversion and eversion  Ankle is stable upon examination  His sensation is intact into the foot and ankle as well as into the right upper extremity  Data Review     I have personally reviewed pertinent films in PACS, and my interpretation follows  X-ray of the right clavicle demonstrates a closed nondisplaced fracture of the shaft with no further displacement        Scribe Attestation    I,:   Brent Martinez am acting as a scribe while in the presence of the attending physician :        I,:   Megan Tejeda MD personally performed the services described in this documentation    as scribed in my presence :

## 2019-01-17 ENCOUNTER — OFFICE VISIT (OUTPATIENT)
Dept: FAMILY MEDICINE CLINIC | Facility: CLINIC | Age: 64
End: 2019-01-17
Payer: MEDICARE

## 2019-01-17 VITALS
HEART RATE: 78 BPM | TEMPERATURE: 97.8 F | RESPIRATION RATE: 16 BRPM | DIASTOLIC BLOOD PRESSURE: 80 MMHG | WEIGHT: 315 LBS | BODY MASS INDEX: 38.36 KG/M2 | SYSTOLIC BLOOD PRESSURE: 130 MMHG | HEIGHT: 76 IN

## 2019-01-17 DIAGNOSIS — J06.9 UPPER RESPIRATORY TRACT INFECTION, UNSPECIFIED TYPE: Primary | ICD-10-CM

## 2019-01-17 DIAGNOSIS — I50.32 CHRONIC DIASTOLIC HEART FAILURE (HCC): ICD-10-CM

## 2019-01-17 DIAGNOSIS — I10 ESSENTIAL HYPERTENSION: ICD-10-CM

## 2019-01-17 PROCEDURE — 99213 OFFICE O/P EST LOW 20 MIN: CPT | Performed by: FAMILY MEDICINE

## 2019-01-17 RX ORDER — CIPROFLOXACIN 500 MG/1
500 TABLET, FILM COATED ORAL EVERY 12 HOURS SCHEDULED
Qty: 14 TABLET | Refills: 0 | Status: SHIPPED | OUTPATIENT
Start: 2019-01-17 | End: 2019-01-24

## 2019-01-17 NOTE — PROGRESS NOTES
Susie Crabtree   1955 male MRN: [id number]    [facility] PRACTICE ACUTE OFFICE VISIT  [facility] - [address]      ASSESSMENT/PLAN  Rubens Giraldo  is a 66 y o  male presents to the office for    1  Upper respiratory tract infection, unspecified type  Started the patient on Cipro 500 mg b i d  x7 days  Advised the patient that he should start drinking vitamin C to increase his immune system  Would encourage use of Tylenol for fever control  If symptoms do not improve to contact his PCP  - ciprofloxacin (CIPRO) 500 mg tablet; Take 1 tablet (500 mg total) by mouth every 12 (twelve) hours for 7 days  Dispense: 14 tablet; Refill: 0    2  Chronic diastolic heart failure (HCC)  Currently well controlled  Completely asymptomatic at this time  3  Essential hypertension  Will controlled continue medications as prescribed    Disposition:  Return to the office in 2 weeks if her symptoms do not improve    Future Appointments  Date Time Provider Fred Nunn   1/22/2019 8:15 AM Alejandrina eFlix MD [facility]   1/29/2019 8:15 AM Alejandrina Felix MD [facility]   2/5/2019 8:15 AM Alejandrina Felix MD [facility]   2/12/2019 9:00 [facility]          SUBJECTIVE  CC: Sore Throat (started sunday); URI; and Cough      HPI:  Rubens Giraldo  is a 66 y o  male who presents for an acute appointment  Patient has a significant past medical history of CHF and hypertension with allergies to penicillin and sulfa  Currently the patient is presenting since Sunday with upper respiratory symptoms of cough productive/sore throat/fevers/chills  Denies any GI/ symptoms  Patient states that he is taking his hypertension and CHF medications appropriately  Denies any chest pain or shortness of breath at this time  Review of Systems   Constitutional: Positive for chills, fatigue and fever   Negative for activity change and appetite change  HENT: Positive for congestion and sore throat  Eyes: Negative for visual disturbance  Respiratory: Positive for cough  Negative for chest tightness and shortness of breath  Cardiovascular: Negative for chest pain and leg swelling  Gastrointestinal: Negative for abdominal distention, abdominal pain, constipation, diarrhea, nausea and vomiting  Allergic/Immunologic: Negative for environmental allergies  Neurological: Negative for dizziness, light-headedness and headaches  All other systems reviewed and are negative        Historical Information   The patient history was reviewed as follows:  Past Medical History:   Diagnosis Date    [facility] abdominal ([facility] )     Arthritis of right knee     Asthma     Bipolar disorder ([facility] )     Depression     Heart abnormality     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   goes to [address] [place] [address] (hyperlipidemia)     Hypertension          Past Surgical History:   Procedure Laterality Date    ABDOMINAL AORTIC ANEURYSM REPAIR      Last assessed: 4/14/16    AORTIC VALVE REPLACEMENT      Last assessed: 4/14/16    FOOT SURGERY      REPLACEMENT TOTAL KNEE BILATERAL      ROTATOR CUFF REPAIR      Last assessed: 4/14/16    TOE SURGERY       Family History   Problem Relation Age of Onset    Heart disease Mother     Alcohol abuse Mother     Hypertension Mother     Cancer Mother     Cancer Father     Hypertension Father     Diabetes Father         Mellitus    Alcohol abuse Father     Mental illness Brother         Diseases of the nervous system complicating pregnancy, childbirth and the peurperium    Cancer Brother     No Known Problems Sister     No Known Problems Maternal Aunt     No Known Problems Maternal Uncle     No Known Problems Paternal Aunt     No Known Problems Paternal Uncle     No Known Problems Maternal Grandmother     No Known Problems Maternal Grandfather     No Known Problems Paternal Grandmother     No Known Problems Paternal Grandfather     ADD / ADHD Neg Hx     Anesthesia problems Neg Hx     Clotting disorder Neg Hx     Collagen disease Neg Hx     Dislocations Neg Hx     Learning disabilities Neg Hx     Neurological problems Neg Hx     Osteoporosis Neg Hx     Rheumatologic disease Neg Hx     Scoliosis Neg Hx     Vascular Disease Neg Hx       Social History   History   Alcohol Use No     Comment: Per [employer]: Former consumption of alcohol     History   Drug Use No     Comment: Per [employer]scripts: History of crack cocaine and marijuana use     History   Smoking Status    Former Smoker    Packs/day: 2 00    Years: 6 00    Quit date: 8/1/1981   Smokeless Tobacco    Never Used     Comment: quit in 1981       Medications:     Current Outpatient Prescriptions:     albuterol (PROVENTIL HFA,VENTOLIN HFA) 90 mcg/act inhaler, Inhale 2 puffs every 6 (six) hours as needed for wheezing or shortness of breath DO NOT USE MORE THAN DIRECTED, Disp: 1 Inhaler, Rfl: 6    aspirin (ECOTRIN LOW STRENGTH) 81 mg EC tablet, Take 81 mg by mouth daily, Disp: , Rfl:     atenolol (TENORMIN) 50 mg tablet, TAKE ONE TABLET BY MOUTH EVERY DAY, Disp: 90 tablet, Rfl: 1    cyclobenzaprine (FLEXERIL) 10 mg tablet, Take 1 tablet (10 mg total) by mouth 3 (three) times a day as needed for muscle spasms, Disp: 30 tablet, Rfl: 0    enalapril (VASOTEC) 5 mg tablet, Take 1 tablet (5 mg total) by mouth daily, Disp: 90 tablet, Rfl: 1    fluticasone (FLONASE) 50 mcg/act nasal spray, 2 sprays into each nostril daily, Disp: 16 g, Rfl: 0    fluticasone-salmeterol (ADVAIR) 250-50 mcg/dose inhaler, Inhale 1 puff every 12 (twelve) hours, Disp: 1 Inhaler, Rfl: 2    furosemide (LASIX) 20 mg tablet, TAKE ONE TABLET BY MOUTH EVERY DAY AS NEEDED FOR LEG SWELLING OR WEIGHT GAIN OF 3 POUND IN DAY, Disp: 30 tablet, Rfl: 3    furosemide (LASIX) 40 mg tablet, Take 1 tablet (40 mg total) by mouth daily, Disp: 90 tablet, Rfl: 1    KLOR-CON M20 20 MEQ tablet, TAKE ONE TABLET BY MOUTH EVERY DAY, Disp: 30 tablet, Rfl: 6    loratadine (CLARITIN) 10 mg tablet, Take 10 mg by mouth daily, Disp: , Rfl:     omeprazole (PriLOSEC) 40 MG capsule, Take 1 cap twice daily for 7 days, then 1 cap once daily  , Disp: 37 capsule, Rfl: 5    OXcarbazepine (TRILEPTAL) 300 mg tablet, TAKE ONE AND ONE-HALF TABLETS BY MOUTH TWICE A DAY, Disp: 90 tablet, Rfl: 1    sertraline (ZOLOFT) 100 mg tablet, Take 1 tablet (100 mg total) by mouth 2 (two) times a day, Disp: 60 tablet, Rfl: 1    tiotropium (SPIRIVA) 18 mcg inhalation capsule, Place 1 capsule (18 mcg total) into inhaler and inhale daily, Disp: 30 capsule, Rfl: 0    umeclidinium-vilanterol (ANORO ELLIPTA) 62 5-25 MCG/INH inhaler, Inhale 1 puff daily, Disp: 1 Inhaler, Rfl: 6    pravastatin (PRAVACHOL) 20 mg tablet, Take 1 tablet by mouth daily with dinner for 30 days Patient would like to try pravastatin 20mg, he does have myalgia from Lipitor/Crestor in the past , Disp: 30 tablet, Rfl: 0    Allergies   Allergen Reactions    Lipitor [Atorvastatin]      myalgia     Codeine GI Intolerance    Crestor [Rosuvastatin]      myalgia     Penicillins Rash    Sulfa Antibiotics Rash     Tolerates Lasix       OBJECTIVE  Vitals:   Vitals:    01/17/19 1301   BP: 130/80   BP Location: Left arm   Patient Position: Sitting   Cuff Size: Standard   Pulse: 78   Resp: 16   Temp: 97 8 °F (36 6 °C)   Weight: (!) 157 kg (347 lb)   Height: 6' 4" (1 93 m)         Physical Exam   Constitutional: He is oriented to person, place, and time  Vital signs are normal  He appears well-developed and well-nourished  HENT:   Head: Normocephalic and atraumatic  Right Ear: Hearing normal  A middle ear effusion is present  Left Ear: Hearing normal  A middle ear effusion is present  Mouth/Throat: Uvula is midline and mucous membranes are normal  Posterior oropharyngeal erythema present  No oropharyngeal exudate  Eyes: Pupils are equal, round, and reactive to light  Conjunctivae and EOM are normal    Neck: Normal range of motion  Neck supple  Cardiovascular: Normal rate, regular rhythm, S1 normal, S2 normal, normal heart sounds and intact distal pulses  No murmur heard  Pulmonary/Chest: Effort normal  No respiratory distress  He has no wheezes  He has rhonchi  Abdominal: Soft  Bowel sounds are normal  He exhibits no distension  There is no tenderness  Musculoskeletal: Normal range of motion  He exhibits no edema  Neurological: He is alert and oriented to person, place, and time  He has normal strength  Skin: Skin is warm  No rash noted  Psychiatric: He has a normal mood and affect  His speech is normal and behavior is normal  Judgment and thought content normal    Vitals reviewed                   Camille Jenkins MD,   [facility]  1/17/2019

## 2019-01-18 DIAGNOSIS — R05.3 CHRONIC COUGH: ICD-10-CM

## 2019-01-18 DIAGNOSIS — R49.0 HOARSENESS: ICD-10-CM

## 2019-01-18 RX ORDER — OMEPRAZOLE 40 MG/1
CAPSULE, DELAYED RELEASE ORAL
Qty: 37 CAPSULE | Refills: 5 | Status: SHIPPED | OUTPATIENT
Start: 2019-01-18 | End: 2020-01-10

## 2019-01-19 DIAGNOSIS — R05.3 CHRONIC COUGH: ICD-10-CM

## 2019-01-19 DIAGNOSIS — R49.0 HOARSENESS: ICD-10-CM

## 2019-01-19 RX ORDER — OMEPRAZOLE 40 MG/1
CAPSULE, DELAYED RELEASE ORAL
Qty: 37 CAPSULE | Refills: 5 | Status: SHIPPED | OUTPATIENT
Start: 2019-01-19 | End: 2019-02-19 | Stop reason: SDUPTHER

## 2019-01-20 DIAGNOSIS — R05.3 CHRONIC COUGH: ICD-10-CM

## 2019-01-20 DIAGNOSIS — R49.0 HOARSENESS: ICD-10-CM

## 2019-01-21 RX ORDER — OMEPRAZOLE 40 MG/1
CAPSULE, DELAYED RELEASE ORAL
Qty: 37 CAPSULE | Refills: 5 | Status: SHIPPED | OUTPATIENT
Start: 2019-01-21 | End: 2019-02-19 | Stop reason: SDUPTHER

## 2019-01-22 ENCOUNTER — OFFICE VISIT (OUTPATIENT)
Dept: OBGYN CLINIC | Facility: CLINIC | Age: 64
End: 2019-01-22
Payer: MEDICARE

## 2019-01-22 VITALS
SYSTOLIC BLOOD PRESSURE: 159 MMHG | HEIGHT: 76 IN | HEART RATE: 70 BPM | BODY MASS INDEX: 29.47 KG/M2 | DIASTOLIC BLOOD PRESSURE: 74 MMHG | WEIGHT: 242 LBS

## 2019-01-22 DIAGNOSIS — M17.0 BILATERAL PRIMARY OSTEOARTHRITIS OF KNEE: Primary | ICD-10-CM

## 2019-01-22 PROCEDURE — 20610 DRAIN/INJ JOINT/BURSA W/O US: CPT | Performed by: ORTHOPAEDIC SURGERY

## 2019-01-22 RX ORDER — HYALURONATE SODIUM 10 MG/ML
20 SYRINGE (ML) INTRAARTICULAR
Status: COMPLETED | OUTPATIENT
Start: 2019-01-22 | End: 2019-01-22

## 2019-01-22 RX ADMIN — Medication 20 MG: at 08:20

## 2019-01-22 NOTE — PROGRESS NOTES
Assessment/Plan:  1  Bilateral primary osteoarthritis of knee         Follow-up 1 week  Subjective:   Vicente Ruiz is a 66 y o  male who presents today for euflexxa #1 bilateral knees  Review of Systems      Past Medical History:   Diagnosis Date    Aortic aneurysm, abdominal (HCC)     Arthritis of right knee     Asthma     Bipolar disorder (HCC)     Depression     Heart abnormality     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   goes to [address] [place] [address] (hyperlipidemia)     Hypertension        Past Surgical History:   Procedure Laterality Date    ABDOMINAL AORTIC ANEURYSM REPAIR      Last assessed: 4/14/16    AORTIC VALVE REPLACEMENT      Last assessed: 4/14/16    FOOT SURGERY      REPLACEMENT TOTAL KNEE BILATERAL      ROTATOR CUFF REPAIR      Last assessed: 4/14/16    TOE SURGERY         Family History   Problem Relation Age of Onset    Heart disease Mother     Alcohol abuse Mother     Hypertension Mother     Cancer Mother     Cancer Father     Hypertension Father     Diabetes Father         Mellitus    Alcohol abuse Father     Mental illness Brother         Diseases of the nervous system complicating pregnancy, childbirth and the peurperium    Cancer Brother     No Known Problems Sister     No Known Problems Maternal Aunt     No Known Problems Maternal Uncle     No Known Problems Paternal Aunt     No Known Problems Paternal Uncle     No Known Problems Maternal Grandmother     No Known Problems Maternal Grandfather     No Known Problems Paternal Grandmother     No Known Problems Paternal Grandfather     ADD / ADHD Neg Hx     Anesthesia problems Neg Hx     Clotting disorder Neg Hx     Collagen disease Neg Hx     Dislocations Neg Hx     Learning disabilities Neg Hx     Neurological problems Neg Hx     Osteoporosis Neg Hx     Rheumatologic disease Neg Hx     Scoliosis Neg Hx     Vascular Disease Neg Hx        Social History     Occupational History    Not on file       Social History Main Topics    Smoking status: Former Smoker     Packs/day: 2 00     Years: 6 00     Quit date: 8/1/1981    Smokeless tobacco: Never Used      Comment: quit in 1981    Alcohol use No      Comment: Per Allscripts: Former consumption of alcohol    Drug use: No      Comment: Per Allscripts: History of crack cocaine and marijuana use    Sexual activity: Not Currently         Current Outpatient Prescriptions:     albuterol (PROVENTIL HFA,VENTOLIN HFA) 90 mcg/act inhaler, Inhale 2 puffs every 6 (six) hours as needed for wheezing or shortness of breath DO NOT USE MORE THAN DIRECTED, Disp: 1 Inhaler, Rfl: 6    aspirin (ECOTRIN LOW STRENGTH) 81 mg EC tablet, Take 81 mg by mouth daily, Disp: , Rfl:     atenolol (TENORMIN) 50 mg tablet, TAKE ONE TABLET BY MOUTH EVERY DAY, Disp: 90 tablet, Rfl: 1    ciprofloxacin (CIPRO) 500 mg tablet, Take 1 tablet (500 mg total) by mouth every 12 (twelve) hours for 7 days, Disp: 14 tablet, Rfl: 0    cyclobenzaprine (FLEXERIL) 10 mg tablet, Take 1 tablet (10 mg total) by mouth 3 (three) times a day as needed for muscle spasms, Disp: 30 tablet, Rfl: 0    enalapril (VASOTEC) 5 mg tablet, Take 1 tablet (5 mg total) by mouth daily, Disp: 90 tablet, Rfl: 1    fluticasone (FLONASE) 50 mcg/act nasal spray, 2 sprays into each nostril daily, Disp: 16 g, Rfl: 0    fluticasone-salmeterol (ADVAIR) 250-50 mcg/dose inhaler, Inhale 1 puff every 12 (twelve) hours, Disp: 1 Inhaler, Rfl: 2    furosemide (LASIX) 20 mg tablet, TAKE ONE TABLET BY MOUTH EVERY DAY AS NEEDED FOR LEG SWELLING OR WEIGHT GAIN OF 3 POUND IN DAY, Disp: 30 tablet, Rfl: 3    furosemide (LASIX) 40 mg tablet, Take 1 tablet (40 mg total) by mouth daily, Disp: 90 tablet, Rfl: 1    KLOR-CON M20 20 MEQ tablet, TAKE ONE TABLET BY MOUTH EVERY DAY, Disp: 30 tablet, Rfl: 6    loratadine (CLARITIN) 10 mg tablet, Take 10 mg by mouth daily, Disp: , Rfl:     omeprazole (PriLOSEC) 40 MG capsule, TAKE 1 CAPSULE TWICE A DAY FOR 7 DAYS, THEN 1 CAPSULE DAILY, Disp: 37 capsule, Rfl: 5    omeprazole (PriLOSEC) 40 MG capsule, TAKE 1 CAPSULE TWICE A DAY FOR 7 DAYS, THEN 1 CAPSULE DAILY, Disp: 37 capsule, Rfl: 5    omeprazole (PriLOSEC) 40 MG capsule, TAKE 1 CAPSULE TWICE A DAY FOR 7 DAYS, THEN 1 CAPSULE DAILY, Disp: 37 capsule, Rfl: 5    OXcarbazepine (TRILEPTAL) 300 mg tablet, TAKE ONE AND ONE-HALF TABLETS BY MOUTH TWICE A DAY, Disp: 90 tablet, Rfl: 1    sertraline (ZOLOFT) 100 mg tablet, Take 1 tablet (100 mg total) by mouth 2 (two) times a day, Disp: 60 tablet, Rfl: 1    tiotropium (SPIRIVA) 18 mcg inhalation capsule, Place 1 capsule (18 mcg total) into inhaler and inhale daily, Disp: 30 capsule, Rfl: 0    umeclidinium-vilanterol (ANORO ELLIPTA) 62 5-25 MCG/INH inhaler, Inhale 1 puff daily, Disp: 1 Inhaler, Rfl: 6    pravastatin (PRAVACHOL) 20 mg tablet, Take 1 tablet by mouth daily with dinner for 30 days Patient would like to try pravastatin 20mg, he does have myalgia from Lipitor/Crestor in the past , Disp: 30 tablet, Rfl: 0    Allergies   Allergen Reactions    Lipitor [Atorvastatin]      myalgia     Codeine GI Intolerance    Crestor [Rosuvastatin]      myalgia     Penicillins Rash    Sulfa Antibiotics Rash     Tolerates Lasix       Objective:  Vitals:    01/22/19 0815   BP: 159/74   Pulse: 70       Ortho Exam    Physical Exam    Large joint arthrocentesis  Date/Time: 1/22/2019 8:20 AM  Consent given by: patient  Site marked: site marked  Supporting Documentation  Indications: pain   Procedure Details  Location: knee - R knee  Needle size: 22 G  Ultrasound guidance: no  Approach: anterolateral  Medications administered: 20 mg Sodium Hyaluronate 20 MG/2ML    Patient tolerance: patient tolerated the procedure well with no immediate complications  Dressing:  Sterile dressing applied  Large joint arthrocentesis  Date/Time: 1/22/2019 8:20 AM  Consent given by: patient  Site marked: site marked  Supporting Documentation  Indications: pain   Procedure Details  Location: knee - L knee  Needle size: 22 G  Ultrasound guidance: no  Approach: anterolateral  Medications administered: 20 mg Sodium Hyaluronate 20 MG/2ML    Patient tolerance: patient tolerated the procedure well with no immediate complications  Dressing:  Sterile dressing applied

## 2019-01-25 DIAGNOSIS — I10 ESSENTIAL HYPERTENSION: ICD-10-CM

## 2019-01-25 RX ORDER — ATENOLOL 50 MG/1
TABLET ORAL
Qty: 90 TABLET | Refills: 1 | Status: SHIPPED | OUTPATIENT
Start: 2019-01-25 | End: 2019-09-02 | Stop reason: SDUPTHER

## 2019-01-26 DIAGNOSIS — I10 ESSENTIAL HYPERTENSION: ICD-10-CM

## 2019-01-26 RX ORDER — ATENOLOL 50 MG/1
TABLET ORAL
Qty: 90 TABLET | Refills: 1 | Status: SHIPPED | OUTPATIENT
Start: 2019-01-26 | End: 2019-02-19 | Stop reason: SDUPTHER

## 2019-01-29 ENCOUNTER — OFFICE VISIT (OUTPATIENT)
Dept: OBGYN CLINIC | Facility: CLINIC | Age: 64
End: 2019-01-29
Payer: MEDICARE

## 2019-01-29 VITALS — HEIGHT: 76 IN | BODY MASS INDEX: 38.36 KG/M2 | WEIGHT: 315 LBS

## 2019-01-29 DIAGNOSIS — M17.0 BILATERAL PRIMARY OSTEOARTHRITIS OF KNEE: Primary | ICD-10-CM

## 2019-01-29 PROCEDURE — 20610 DRAIN/INJ JOINT/BURSA W/O US: CPT | Performed by: PHYSICIAN ASSISTANT

## 2019-01-29 RX ORDER — HYALURONATE SODIUM 10 MG/ML
20 SYRINGE (ML) INTRAARTICULAR
Status: COMPLETED | OUTPATIENT
Start: 2019-01-29 | End: 2019-01-29

## 2019-01-29 RX ADMIN — Medication 20 MG: at 08:41

## 2019-01-29 RX ADMIN — Medication 20 MG: at 08:40

## 2019-01-29 NOTE — PROGRESS NOTES
Assessment/Plan:  1  Bilateral primary osteoarthritis of knee         Follow-up 1 week  Subjective:   Hilda Black is a 66 y o  male who presents today for euflexxa #2 bilateral knees  Review of Systems      Past Medical History:   Diagnosis Date    Aortic aneurysm, abdominal (HCC)     Arthritis of right knee     Asthma     Bipolar disorder (HCC)     Depression     Heart abnormality     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   goes to [address] [place] [address] (hyperlipidemia)     Hypertension        Past Surgical History:   Procedure Laterality Date    ABDOMINAL AORTIC ANEURYSM REPAIR      Last assessed: 4/14/16    AORTIC VALVE REPLACEMENT      Last assessed: 4/14/16    FOOT SURGERY      REPLACEMENT TOTAL KNEE BILATERAL      ROTATOR CUFF REPAIR      Last assessed: 4/14/16    TOE SURGERY         Family History   Problem Relation Age of Onset    Heart disease Mother     Alcohol abuse Mother     Hypertension Mother     Cancer Mother     Cancer Father     Hypertension Father     Diabetes Father         Mellitus    Alcohol abuse Father     Mental illness Brother         Diseases of the nervous system complicating pregnancy, childbirth and the peurperium    Cancer Brother     No Known Problems Sister     No Known Problems Maternal Aunt     No Known Problems Maternal Uncle     No Known Problems Paternal Aunt     No Known Problems Paternal Uncle     No Known Problems Maternal Grandmother     No Known Problems Maternal Grandfather     No Known Problems Paternal Grandmother     No Known Problems Paternal Grandfather     ADD / ADHD Neg Hx     Anesthesia problems Neg Hx     Clotting disorder Neg Hx     Collagen disease Neg Hx     Dislocations Neg Hx     Learning disabilities Neg Hx     Neurological problems Neg Hx     Osteoporosis Neg Hx     Rheumatologic disease Neg Hx     Scoliosis Neg Hx     Vascular Disease Neg Hx        Social History     Occupational History    Not on file       Social History Main Topics    Smoking status: Former Smoker     Packs/day: 2 00     Years: 6 00     Quit date: 8/1/1981    Smokeless tobacco: Never Used      Comment: quit in 1981    Alcohol use No      Comment: Per Allscripts: Former consumption of alcohol    Drug use: No      Comment: Per Allscripts: History of crack cocaine and marijuana use    Sexual activity: Not Currently         Current Outpatient Prescriptions:     albuterol (PROVENTIL HFA,VENTOLIN HFA) 90 mcg/act inhaler, Inhale 2 puffs every 6 (six) hours as needed for wheezing or shortness of breath DO NOT USE MORE THAN DIRECTED, Disp: 1 Inhaler, Rfl: 6    aspirin (ECOTRIN LOW STRENGTH) 81 mg EC tablet, Take 81 mg by mouth daily, Disp: , Rfl:     atenolol (TENORMIN) 50 mg tablet, TAKE ONE TABLET BY MOUTH EVERY DAY, Disp: 90 tablet, Rfl: 1    atenolol (TENORMIN) 50 mg tablet, TAKE ONE TABLET BY MOUTH EVERY DAY, Disp: 90 tablet, Rfl: 1    atenolol (TENORMIN) 50 mg tablet, TAKE ONE TABLET BY MOUTH EVERY DAY, Disp: 90 tablet, Rfl: 1    cyclobenzaprine (FLEXERIL) 10 mg tablet, Take 1 tablet (10 mg total) by mouth 3 (three) times a day as needed for muscle spasms, Disp: 30 tablet, Rfl: 0    enalapril (VASOTEC) 5 mg tablet, Take 1 tablet (5 mg total) by mouth daily, Disp: 90 tablet, Rfl: 1    fluticasone (FLONASE) 50 mcg/act nasal spray, 2 sprays into each nostril daily, Disp: 16 g, Rfl: 0    fluticasone-salmeterol (ADVAIR) 250-50 mcg/dose inhaler, Inhale 1 puff every 12 (twelve) hours, Disp: 1 Inhaler, Rfl: 2    furosemide (LASIX) 20 mg tablet, TAKE ONE TABLET BY MOUTH EVERY DAY AS NEEDED FOR LEG SWELLING OR WEIGHT GAIN OF 3 POUND IN DAY, Disp: 30 tablet, Rfl: 3    furosemide (LASIX) 40 mg tablet, Take 1 tablet (40 mg total) by mouth daily, Disp: 90 tablet, Rfl: 1    KLOR-CON M20 20 MEQ tablet, TAKE ONE TABLET BY MOUTH EVERY DAY, Disp: 30 tablet, Rfl: 6    loratadine (CLARITIN) 10 mg tablet, Take 10 mg by mouth daily, Disp: , Rfl:     omeprazole (PriLOSEC) 40 MG capsule, TAKE 1 CAPSULE TWICE A DAY FOR 7 DAYS, THEN 1 CAPSULE DAILY, Disp: 37 capsule, Rfl: 5    omeprazole (PriLOSEC) 40 MG capsule, TAKE 1 CAPSULE TWICE A DAY FOR 7 DAYS, THEN 1 CAPSULE DAILY, Disp: 37 capsule, Rfl: 5    omeprazole (PriLOSEC) 40 MG capsule, TAKE 1 CAPSULE TWICE A DAY FOR 7 DAYS, THEN 1 CAPSULE DAILY, Disp: 37 capsule, Rfl: 5    OXcarbazepine (TRILEPTAL) 300 mg tablet, TAKE ONE AND ONE-HALF TABLETS BY MOUTH TWICE A DAY, Disp: 90 tablet, Rfl: 1    pravastatin (PRAVACHOL) 20 mg tablet, Take 1 tablet by mouth daily with dinner for 30 days Patient would like to try pravastatin 20mg, he does have myalgia from Lipitor/Crestor in the past , Disp: 30 tablet, Rfl: 0    sertraline (ZOLOFT) 100 mg tablet, Take 1 tablet (100 mg total) by mouth 2 (two) times a day, Disp: 60 tablet, Rfl: 1    tiotropium (SPIRIVA) 18 mcg inhalation capsule, Place 1 capsule (18 mcg total) into inhaler and inhale daily, Disp: 30 capsule, Rfl: 0    umeclidinium-vilanterol (ANORO ELLIPTA) 62 5-25 MCG/INH inhaler, Inhale 1 puff daily, Disp: 1 Inhaler, Rfl: 6    Allergies   Allergen Reactions    Lipitor [Atorvastatin]      myalgia     Codeine GI Intolerance    Crestor [Rosuvastatin]      myalgia     Penicillins Rash    Sulfa Antibiotics Rash     Tolerates Lasix       Objective: There were no vitals filed for this visit      Ortho Exam    Physical Exam    Large joint arthrocentesis  Date/Time: 1/29/2019 8:40 AM  Consent given by: patient  Site marked: site marked  Supporting Documentation  Indications: pain   Procedure Details  Location: knee - R knee  Needle size: 22 G  Ultrasound guidance: no  Approach: anterolateral  Medications administered: 20 mg Sodium Hyaluronate 20 MG/2ML    Patient tolerance: patient tolerated the procedure well with no immediate complications  Dressing:  Sterile dressing applied  Large joint arthrocentesis  Date/Time: 1/29/2019 8:41 AM  Consent given by: patient  Site marked: site marked  Supporting Documentation  Indications: pain   Procedure Details  Location: knee - L knee  Needle size: 22 G  Ultrasound guidance: no  Approach: anterolateral  Medications administered: 20 mg Sodium Hyaluronate 20 MG/2ML    Patient tolerance: patient tolerated the procedure well with no immediate complications  Dressing:  Sterile dressing applied

## 2019-02-05 ENCOUNTER — TELEPHONE (OUTPATIENT)
Dept: FAMILY MEDICINE CLINIC | Facility: CLINIC | Age: 64
End: 2019-02-05

## 2019-02-05 ENCOUNTER — OFFICE VISIT (OUTPATIENT)
Dept: OBGYN CLINIC | Facility: CLINIC | Age: 64
End: 2019-02-05
Payer: MEDICARE

## 2019-02-05 DIAGNOSIS — M17.0 BILATERAL PRIMARY OSTEOARTHRITIS OF KNEE: Primary | ICD-10-CM

## 2019-02-05 PROCEDURE — 20610 DRAIN/INJ JOINT/BURSA W/O US: CPT | Performed by: ORTHOPAEDIC SURGERY

## 2019-02-05 RX ORDER — HYALURONATE SODIUM 10 MG/ML
20 SYRINGE (ML) INTRAARTICULAR
Status: COMPLETED | OUTPATIENT
Start: 2019-02-05 | End: 2019-02-05

## 2019-02-05 RX ADMIN — Medication 20 MG: at 09:08

## 2019-02-05 NOTE — TELEPHONE ENCOUNTER
He can try allegra, Claritin, zyrtec  Generic fine   Zyzal does not have generic at the present time unfortunately

## 2019-02-05 NOTE — PROGRESS NOTES
Assessment/Plan:  1  Bilateral primary osteoarthritis of knee         Follow-up prn  Subjective:   Marce Frye is a 66 y o  male who presents today for euflexxa #3 bilateral knees  Review of Systems      Past Medical History:   Diagnosis Date    Aortic aneurysm, abdominal (HCC)     Arthritis of right knee     Asthma     Bipolar disorder (HCC)     Depression     Heart abnormality     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   goes to [address] [place] [address] (hyperlipidemia)     Hypertension        Past Surgical History:   Procedure Laterality Date    ABDOMINAL AORTIC ANEURYSM REPAIR      Last assessed: 4/14/16    AORTIC VALVE REPLACEMENT      Last assessed: 4/14/16    FOOT SURGERY      REPLACEMENT TOTAL KNEE BILATERAL      ROTATOR CUFF REPAIR      Last assessed: 4/14/16    TOE SURGERY         Family History   Problem Relation Age of Onset    Heart disease Mother     Alcohol abuse Mother     Hypertension Mother     Cancer Mother     Cancer Father     Hypertension Father     Diabetes Father         Mellitus    Alcohol abuse Father     Mental illness Brother         Diseases of the nervous system complicating pregnancy, childbirth and the peurperium    Cancer Brother     No Known Problems Sister     No Known Problems Maternal Aunt     No Known Problems Maternal Uncle     No Known Problems Paternal Aunt     No Known Problems Paternal Uncle     No Known Problems Maternal Grandmother     No Known Problems Maternal Grandfather     No Known Problems Paternal Grandmother     No Known Problems Paternal Grandfather     ADD / ADHD Neg Hx     Anesthesia problems Neg Hx     Clotting disorder Neg Hx     Collagen disease Neg Hx     Dislocations Neg Hx     Learning disabilities Neg Hx     Neurological problems Neg Hx     Osteoporosis Neg Hx     Rheumatologic disease Neg Hx     Scoliosis Neg Hx     Vascular Disease Neg Hx        Social History     Occupational History    Not on file       Social History Main Topics    Smoking status: Former Smoker     Packs/day: 2 00     Years: 6 00     Quit date: 8/1/1981    Smokeless tobacco: Never Used      Comment: quit in 1981    Alcohol use No      Comment: Per [employer]: Former consumption of alcohol    Drug use: No      Comment: Per [employer]scripts: History of crack cocaine and marijuana use    Sexual activity: Not Currently         Current Outpatient Prescriptions:     albuterol (PROVENTIL HFA,VENTOLIN HFA) 90 mcg/act inhaler, Inhale 2 puffs every 6 (six) hours as needed for wheezing or shortness of breath DO NOT USE MORE THAN DIRECTED, Disp: 1 Inhaler, Rfl: 6    aspirin (ECOTRIN LOW STRENGTH) 81 mg EC tablet, Take 81 mg by mouth daily, Disp: , Rfl:     atenolol (TENORMIN) 50 mg tablet, TAKE ONE TABLET BY MOUTH EVERY DAY, Disp: 90 tablet, Rfl: 1    atenolol (TENORMIN) 50 mg tablet, TAKE ONE TABLET BY MOUTH EVERY DAY, Disp: 90 tablet, Rfl: 1    atenolol (TENORMIN) 50 mg tablet, TAKE ONE TABLET BY MOUTH EVERY DAY, Disp: 90 tablet, Rfl: 1    cyclobenzaprine (FLEXERIL) 10 mg tablet, Take 1 tablet (10 mg total) by mouth 3 (three) times a day as needed for muscle spasms, Disp: 30 tablet, Rfl: 0    enalapril (VASOTEC) 5 mg tablet, Take 1 tablet (5 mg total) by mouth daily, Disp: 90 tablet, Rfl: 1    fluticasone (FLONASE) 50 mcg/act nasal spray, 2 sprays into each nostril daily, Disp: 16 g, Rfl: 0    fluticasone-salmeterol (ADVAIR) 250-50 mcg/dose inhaler, Inhale 1 puff every 12 (twelve) hours, Disp: 1 Inhaler, Rfl: 2    furosemide (LASIX) 20 mg tablet, TAKE ONE TABLET BY MOUTH EVERY DAY AS NEEDED FOR LEG SWELLING OR WEIGHT GAIN OF 3 POUND IN DAY, Disp: 30 tablet, Rfl: 3    furosemide (LASIX) 40 mg tablet, Take 1 tablet (40 mg total) by mouth daily, Disp: 90 tablet, Rfl: 1    KLOR-CON M20 20 MEQ tablet, TAKE ONE TABLET BY MOUTH EVERY DAY, Disp: 30 tablet, Rfl: 6    loratadine (CLARITIN) 10 mg tablet, Take 10 mg by mouth daily, Disp: , Rfl:     omeprazole (PriLOSEC) 40 MG capsule, TAKE 1 CAPSULE TWICE A DAY FOR 7 DAYS, THEN 1 CAPSULE DAILY, Disp: 37 capsule, Rfl: 5    omeprazole (PriLOSEC) 40 MG capsule, TAKE 1 CAPSULE TWICE A DAY FOR 7 DAYS, THEN 1 CAPSULE DAILY, Disp: 37 capsule, Rfl: 5    omeprazole (PriLOSEC) 40 MG capsule, TAKE 1 CAPSULE TWICE A DAY FOR 7 DAYS, THEN 1 CAPSULE DAILY, Disp: 37 capsule, Rfl: 5    OXcarbazepine (TRILEPTAL) 300 mg tablet, TAKE ONE AND ONE-HALF TABLETS BY MOUTH TWICE A DAY, Disp: 90 tablet, Rfl: 1    sertraline (ZOLOFT) 100 mg tablet, Take 1 tablet (100 mg total) by mouth 2 (two) times a day, Disp: 60 tablet, Rfl: 1    tiotropium (SPIRIVA) 18 mcg inhalation capsule, Place 1 capsule (18 mcg total) into inhaler and inhale daily, Disp: 30 capsule, Rfl: 0    umeclidinium-vilanterol (ANORO ELLIPTA) 62 5-25 MCG/INH inhaler, Inhale 1 puff daily, Disp: 1 Inhaler, Rfl: 6    pravastatin (PRAVACHOL) 20 mg tablet, Take 1 tablet by mouth daily with dinner for 30 days Patient would like to try pravastatin 20mg, he does have myalgia from Lipitor/Crestor in the past , Disp: 30 tablet, Rfl: 0    Allergies   Allergen Reactions    Lipitor [Atorvastatin]      myalgia     Codeine GI Intolerance    Crestor [Rosuvastatin]      myalgia     Penicillins Rash    Sulfa Antibiotics Rash     Tolerates Lasix       Objective: There were no vitals filed for this visit      Ortho Exam    Physical Exam    Large joint arthrocentesis  Date/Time: 2/5/2019 9:08 AM  Consent given by: patient  Site marked: site marked  Supporting Documentation  Indications: pain   Procedure Details  Location: knee - R knee  Needle size: 22 G  Ultrasound guidance: no  Approach: anterolateral  Medications administered: 20 mg Sodium Hyaluronate 20 MG/2ML    Patient tolerance: patient tolerated the procedure well with no immediate complications  Dressing:  Sterile dressing applied  Large joint arthrocentesis  Date/Time: 2/5/2019 9:08 AM  Consent given by: patient  Site marked: site marked  Supporting Documentation  Indications: pain   Procedure Details  Location: knee - L knee  Needle size: 22 G  Ultrasound guidance: no  Approach: anterolateral  Medications administered: 20 mg Sodium Hyaluronate 20 MG/2ML    Patient tolerance: patient tolerated the procedure well with no immediate complications  Dressing:  Sterile dressing applied

## 2019-02-05 NOTE — TELEPHONE ENCOUNTER
Anaya Lincoln,  Is there something similar to zyzal that you can prescribe?   Zyzal is too expensive

## 2019-02-19 ENCOUNTER — OFFICE VISIT (OUTPATIENT)
Dept: FAMILY MEDICINE CLINIC | Facility: CLINIC | Age: 64
End: 2019-02-19
Payer: MEDICARE

## 2019-02-19 VITALS
RESPIRATION RATE: 14 BRPM | DIASTOLIC BLOOD PRESSURE: 70 MMHG | HEIGHT: 76 IN | WEIGHT: 315 LBS | BODY MASS INDEX: 38.36 KG/M2 | SYSTOLIC BLOOD PRESSURE: 110 MMHG | TEMPERATURE: 97.2 F | HEART RATE: 70 BPM | OXYGEN SATURATION: 99 %

## 2019-02-19 DIAGNOSIS — R06.83 SNORING: Primary | ICD-10-CM

## 2019-02-19 DIAGNOSIS — Z11.1 ENCOUNTER FOR PPD TEST: ICD-10-CM

## 2019-02-19 DIAGNOSIS — I50.32 CHRONIC DIASTOLIC CONGESTIVE HEART FAILURE (HCC): ICD-10-CM

## 2019-02-19 DIAGNOSIS — R06.02 SHORTNESS OF BREATH: ICD-10-CM

## 2019-02-19 DIAGNOSIS — E66.01 MORBID OBESITY WITH BMI OF 40.0-44.9, ADULT (HCC): ICD-10-CM

## 2019-02-19 DIAGNOSIS — I71.4 ABDOMINAL AORTIC ANEURYSM (AAA) WITHOUT RUPTURE (HCC): ICD-10-CM

## 2019-02-19 DIAGNOSIS — R29.818 SUSPECTED SLEEP APNEA: ICD-10-CM

## 2019-02-19 DIAGNOSIS — G47.19 DAYTIME HYPERSOMNOLENCE: ICD-10-CM

## 2019-02-19 DIAGNOSIS — R06.81 WITNESSED EPISODE OF APNEA: ICD-10-CM

## 2019-02-19 PROCEDURE — 99214 OFFICE O/P EST MOD 30 MIN: CPT | Performed by: NURSE PRACTITIONER

## 2019-02-19 PROCEDURE — 86580 TB INTRADERMAL TEST: CPT | Performed by: NURSE PRACTITIONER

## 2019-02-19 RX ORDER — POTASSIUM CHLORIDE 20 MEQ/1
20 TABLET, EXTENDED RELEASE ORAL DAILY
Qty: 30 TABLET | Refills: 6 | Status: SHIPPED | OUTPATIENT
Start: 2019-02-19

## 2019-02-19 NOTE — PATIENT INSTRUCTIONS
Sleep Apnea   AMBULATORY CARE:   Sleep apnea  is also called obstructive sleep apnea  It is a condition that causes you to stop breathing for 10 seconds or more while you are sleeping  During normal sleep, your throat is kept open by muscles that let air pass through easily  Sleep apnea causes the muscles and tissues around your throat to relax and block air from passing through  Sleep apnea may happen many times while you are asleep  Common symptoms include the following:   · No signs of breathing for 10 seconds or more while you sleep    · Snoring loudly, snorting, gasping or choking while you sleep, and waking up suddenly because of these    · A hard time thinking, remembering things, or focusing on your tasks the following day    · Headache or nausea    · Bedwetting or waking up often during the night to urinate    · Feeling sleepy, slow, and tired during the day  Seek care immediately if:   · You have chest pain or trouble breathing  Contact your healthcare provider if:   · You feel tired or depressed  · You have trouble staying awake during the day  · You have trouble thinking clearly  · You have questions or concerns about your condition or care  Treatment for sleep apnea  includes using a continuous positive airway pressure (CPAP) machine to keep your airway open during sleep  A mask is placed over your nose and mouth, or just your nose  The mask is hooked to the CPAP machine that blows a gentle stream of air into the mask when you breathe  This helps keep your airway open so you can breathe more regularly  Extra oxygen may be given to you through the machine  You may be given a mouth device  It looks like a mouth guard or dental retainer and stops your tongue and mouth tissues from blocking your throat while you sleep  Surgery may be needed to remove extra tissues that block your mouth, throat, or nose  Manage sleep apnea:   · Do not smoke    Nicotine and other chemicals in cigarettes and cigars can cause lung damage  Ask your healthcare provider for information if you currently smoke and need help to quit  E-cigarettes or smokeless tobacco still contain nicotine  Talk to your healthcare provider before you use these products  · Do not drink alcohol or take sedative medicine before you go to sleep  Alcohol and sedatives can relax the muscles and tissues around your throat  This can block the airflow to your lungs  · Maintain a healthy weight  Excess tissue around your throat may restrict your breathing  Ask your healthcare provider for information if you need to lose weight  · Sleep on your side or use pillows designed to prevent sleep apnea  This prevents your tongue or other tissues from blocking your throat  You can also raise the head of your bed  Follow up with your healthcare provider as directed:  Write down your questions so you remember to ask them during your visits  © 2017 [employer] Information is for End User's use only and may not be sold, redistributed or otherwise used for commercial purposes  All illustrations and images included in CareNotes® are the copyrighted property of A D A M , Inc  or [employer]  The above information is an  only  It is not intended as medical advice for individual conditions or treatments  Talk to your doctor, nurse or pharmacist before following any medical regimen to see if it is safe and effective for you

## 2019-02-19 NOTE — PROGRESS NOTES
Assessment/Plan:    Problem List Items Addressed This Visit        Cardiovascular and Mediastinum    AAA (abdominal aortic aneurysm) ([facility] [address] )       Other    Morbid obesity with BMI of 40 0-44 9, adult ([facility] )    Relevant Orders    Diagnostic Sleep Study      Other Visit Diagnoses     Snoring    -  Primary    Relevant Orders    Diagnostic Sleep Study    Witnessed episode of apnea        Relevant Orders    Diagnostic Sleep Study    Daytime hypersomnolence        Relevant Orders    Ambulatory referral to Pulmonology    Diagnostic Sleep Study    Suspected sleep apnea        Relevant Orders    Ambulatory referral to Pulmonology    Diagnostic Sleep Study    Encounter for PPD test        Relevant Orders    TB Skin Test (Completed)    Diagnostic Sleep Study    Shortness of breath        Relevant Orders    Ambulatory referral to Pulmonology    Chronic diastolic congestive heart failure (HCC)        Relevant Medications    potassium chloride (KLOR-CON M20) 20 mEq tablet        Symptoms concerning for PHONG  No evidence of CHF, PNA, respiratory compromise on exam      BMI Counseling: Body mass index is 42 12 kg/m²  Discussed the patient's BMI with him  The BMI is above average  BMI counseling and education was provided to the patient  Nutrition recommendations include reducing portion sizes, decreasing overall calorie intake, 3-5 servings of fruits/vegetables daily, reducing fast food intake, consuming healthier snacks and moderation in carbohydrate intake  Exercise recommendations include exercising 3-5 times per week  Patient Instructions   Sleep Apnea   AMBULATORY CARE:   Sleep apnea  is also called obstructive sleep apnea  It is a condition that causes you to stop breathing for 10 seconds or more while you are sleeping  During normal sleep, your throat is kept open by muscles that let air pass through easily  Sleep apnea causes the muscles and tissues around your throat to relax and block air from passing through   Sleep apnea may happen many times while you are asleep  Common symptoms include the following:   · No signs of breathing for 10 seconds or more while you sleep    · Snoring loudly, snorting, gasping or choking while you sleep, and waking up suddenly because of these    · A hard time thinking, remembering things, or focusing on your tasks the following day    · Headache or nausea    · Bedwetting or waking up often during the night to urinate    · Feeling sleepy, slow, and tired during the day  Seek care immediately if:   · You have chest pain or trouble breathing  Contact your healthcare provider if:   · You feel tired or depressed  · You have trouble staying awake during the day  · You have trouble thinking clearly  · You have questions or concerns about your condition or care  Treatment for sleep apnea  includes using a continuous positive airway pressure (CPAP) machine to keep your airway open during sleep  A mask is placed over your nose and mouth, or just your nose  The mask is hooked to the CPAP machine that blows a gentle stream of air into the mask when you breathe  This helps keep your airway open so you can breathe more regularly  Extra oxygen may be given to you through the machine  You may be given a mouth device  It looks like a mouth guard or dental retainer and stops your tongue and mouth tissues from blocking your throat while you sleep  Surgery may be needed to remove extra tissues that block your mouth, throat, or nose  Manage sleep apnea:   · Do not smoke  Nicotine and other chemicals in cigarettes and cigars can cause lung damage  Ask your healthcare provider for information if you currently smoke and need help to quit  E-cigarettes or smokeless tobacco still contain nicotine  Talk to your healthcare provider before you use these products  · Do not drink alcohol or take sedative medicine before you go to sleep    Alcohol and sedatives can relax the muscles and tissues around your throat  This can block the airflow to your lungs  · Maintain a healthy weight  Excess tissue around your throat may restrict your breathing  Ask your healthcare provider for information if you need to lose weight  · Sleep on your side or use pillows designed to prevent sleep apnea  This prevents your tongue or other tissues from blocking your throat  You can also raise the head of your bed  Follow up with your healthcare provider as directed:  Write down your questions so you remember to ask them during your visits  © 2017 [employer]  Information is for End User's use only and may not be sold, redistributed or otherwise used for commercial purposes  All illustrations and images included in CareNotes® are the copyrighted property of A D A M , Inc  or [employer]  The above information is an  only  It is not intended as medical advice for individual conditions or treatments  Talk to your doctor, nurse or pharmacist before following any medical regimen to see if it is safe and effective for you  Return in about 3 months (around 5/19/2019)  Subjective:      Patient ID: Rahul Crane  is a 66 y o  male  Chief Complaint   Patient presents with    Fatigue     f/u       Sleep Apnea  He presents for evaluation of severe daytime fatigue that has been getting worse  He complains of snoring, snorting, tossing and turning, excessive daytime sleepiness, falling asleep while reading, watching television, during conversation, feels sleepy during the day, take naps during the day  Symptoms began several  months ago, gradually worsening since that time  He denies noisy environment, uncomfortable room temperature, uncomfortable bedding  Previous evaluation and treatment has included nothing  BMI 42  Comorbid COPD, HTN  Tram Sleepiness Scale 11  STOP BANG 8    Has had comprehensive testing with [facility] r/t COPD, AAA   However, has never had sleep study  Requesting pulmonologist closer to home      The following portions of the patient's history were reviewed and updated as appropriate: allergies, current medications, past family history, past medical history, past social history, past surgical history and problem list     Review of Systems   Constitutional: Positive for fatigue  Negative for activity change, fever and unexpected weight change  HENT: Negative for trouble swallowing  Respiratory: Positive for apnea, cough and wheezing  FERNANDES r/t COPD, worse recently with weather change   Cardiovascular: Negative  Neurological: Negative            Current Outpatient Medications   Medication Sig Dispense Refill    albuterol (PROVENTIL HFA,VENTOLIN HFA) 90 mcg/act inhaler Inhale 2 puffs every 6 (six) hours as needed for wheezing or shortness of breath DO NOT USE MORE THAN DIRECTED 1 Inhaler 6    aspirin (ECOTRIN LOW STRENGTH) 81 mg EC tablet Take 81 mg by mouth daily      atenolol (TENORMIN) 50 mg tablet TAKE ONE TABLET BY MOUTH EVERY DAY 90 tablet 1    cyclobenzaprine (FLEXERIL) 10 mg tablet Take 1 tablet (10 mg total) by mouth 3 (three) times a day as needed for muscle spasms 30 tablet 0    enalapril (VASOTEC) 5 mg tablet Take 1 tablet (5 mg total) by mouth daily 90 tablet 1    fluticasone (FLONASE) 50 mcg/act nasal spray 2 sprays into each nostril daily 16 g 0    fluticasone-salmeterol (ADVAIR) 250-50 mcg/dose inhaler Inhale 1 puff every 12 (twelve) hours 1 Inhaler 2    furosemide (LASIX) 20 mg tablet TAKE ONE TABLET BY MOUTH EVERY DAY AS NEEDED FOR LEG SWELLING OR WEIGHT GAIN OF 3 POUND IN DAY 30 tablet 3    loratadine (CLARITIN) 10 mg tablet Take 10 mg by mouth daily      omeprazole (PriLOSEC) 40 MG capsule TAKE 1 CAPSULE TWICE A DAY FOR 7 DAYS, THEN 1 CAPSULE DAILY 37 capsule 5    OXcarbazepine (TRILEPTAL) 300 mg tablet TAKE ONE AND ONE-HALF TABLETS BY MOUTH TWICE A DAY 90 tablet 1    sertraline (ZOLOFT) 100 mg tablet Take 1 tablet (100 mg total) by mouth 2 (two) times a day 60 tablet 1    tiotropium (SPIRIVA) 18 mcg inhalation capsule Place 1 capsule (18 mcg total) into inhaler and inhale daily 30 capsule 0    umeclidinium-vilanterol (ANORO ELLIPTA) 62 5-25 MCG/INH inhaler Inhale 1 puff daily 1 Inhaler 6    furosemide (LASIX) 40 mg tablet Take 1 tablet (40 mg total) by mouth daily (Patient not taking: Reported on 2/19/2019) 90 tablet 1    potassium chloride (KLOR-CON M20) 20 mEq tablet Take 1 tablet (20 mEq total) by mouth daily 30 tablet 6    pravastatin (PRAVACHOL) 20 mg tablet Take 1 tablet by mouth daily with dinner for 30 days Patient would like to try pravastatin 20mg, he does have myalgia from Lipitor/Crestor in the past  30 tablet 0     No current facility-administered medications for this visit  Objective:    /70 (BP Location: Left arm, Patient Position: Sitting, Cuff Size: Adult)   Pulse 70   Temp (!) 97 2 °F (36 2 °C) (Tympanic)   Resp 14   Ht 6' 4" (1 93 m)   Wt (!) 157 kg (346 lb)   SpO2 99%   BMI 42 12 kg/m²        Physical Exam   Constitutional: He is oriented to person, place, and time  Vital signs are normal  He appears well-developed and well-nourished  No distress  HENT:   Mouth/Throat: Oropharynx is clear and moist    Eyes: Pupils are equal, round, and reactive to light  EOM are normal    Neck: No thyromegaly present  Cardiovascular: Normal rate, regular rhythm and intact distal pulses  Murmur heard  Pulmonary/Chest: Effort normal  No tachypnea  No respiratory distress  He has decreased breath sounds  Abdominal: Soft  Bowel sounds are normal  There is no tenderness  Musculoskeletal: He exhibits no edema  Neurological: He is alert and oriented to person, place, and time  Coordination normal    Skin: Capillary refill takes less than 2 seconds  No pallor  Psychiatric: He has a normal mood and affect  Nursing note and vitals reviewed               [facility]

## 2019-03-07 ENCOUNTER — TELEPHONE (OUTPATIENT)
Dept: FAMILY MEDICINE CLINIC | Facility: CLINIC | Age: 64
End: 2019-03-07

## 2019-03-11 ENCOUNTER — HOSPITAL ENCOUNTER (OUTPATIENT)
Dept: SLEEP CENTER | Facility: CLINIC | Age: 64
Discharge: HOME | End: 2019-03-11
Payer: MEDICARE

## 2019-03-11 ENCOUNTER — CLINICAL SUPPORT (OUTPATIENT)
Dept: FAMILY MEDICINE CLINIC | Facility: CLINIC | Age: 64
End: 2019-03-11
Payer: MEDICARE

## 2019-03-11 DIAGNOSIS — G47.19 DAYTIME HYPERSOMNOLENCE: ICD-10-CM

## 2019-03-11 DIAGNOSIS — R06.81 WITNESSED EPISODE OF APNEA: ICD-10-CM

## 2019-03-11 DIAGNOSIS — E66.01 MORBID OBESITY WITH BMI OF 40.0-44.9, ADULT (HCC): ICD-10-CM

## 2019-03-11 DIAGNOSIS — Z11.1 ENCOUNTER FOR PPD TEST: ICD-10-CM

## 2019-03-11 DIAGNOSIS — Z11.1 ENCOUNTER FOR PPD TEST: Primary | ICD-10-CM

## 2019-03-11 DIAGNOSIS — R29.818 SUSPECTED SLEEP APNEA: ICD-10-CM

## 2019-03-11 DIAGNOSIS — R06.83 SNORING: ICD-10-CM

## 2019-03-11 PROCEDURE — 86580 TB INTRADERMAL TEST: CPT

## 2019-03-11 PROCEDURE — 95810 POLYSOM 6/> YRS 4/> PARAM: CPT

## 2019-03-11 PROCEDURE — 95810 POLYSOM 6/> YRS 4/> PARAM: CPT | Performed by: INTERNAL MEDICINE

## 2019-03-12 NOTE — PROGRESS NOTES
Sleep Study Documentation    Pre-Sleep Study       Sleep testing procedure explained to patient:YES    Patient napped prior to study:YES- less than 30 minutes  Napped after 2PM: yes    Caffeine:Dayshift worker after 12PM   Caffeine use:NO    Alcohol:Dayshift workers after 5PM: Alcohol use:NO    Typical day for patient:YES       Study Documentation    Sleep Study Indications:     Diagnostic   Snore:Severe  Supplemental O2: no    O2 flow rate (L/min) range   O2 flow rate (L/min) final   Minimum SaO2 80%  Baseline SaO2 95 4%      EKG abnormalities: no     EEG abnormalities: no    Sleep Study Recorded < 2 hours: N/A    Sleep Study Recorded > 2 hours but incomplete study: N/A    Sleep Study Recorded 6 hours but no sleep obtained: NO    Patient classification: disabled       Post-Sleep Study    Medication used at bedtime or during sleep study:NO    Patient reports time it took to fall asleep:20 to 30 minutes    Patient reports waking up during study:1 to 2 times  Patient reports returning to sleep in 10 to 30 minutes  Patient reports sleeping 2 to 4 hours without dreaming  Patient reports sleep during study:better than usual    Patient rated sleepiness: Somewhat sleepy or tired    PAP treatment:no

## 2019-03-13 ENCOUNTER — CLINICAL SUPPORT (OUTPATIENT)
Dept: FAMILY MEDICINE CLINIC | Facility: CLINIC | Age: 64
End: 2019-03-13

## 2019-03-13 DIAGNOSIS — Z11.1 ENCOUNTER FOR PPD SKIN TEST READING: Primary | ICD-10-CM

## 2019-03-13 PROCEDURE — 99024 POSTOP FOLLOW-UP VISIT: CPT

## 2019-03-15 ENCOUNTER — TELEPHONE (OUTPATIENT)
Dept: FAMILY MEDICINE CLINIC | Facility: CLINIC | Age: 64
End: 2019-03-15

## 2019-03-15 ENCOUNTER — TELEPHONE (OUTPATIENT)
Dept: SLEEP CENTER | Facility: CLINIC | Age: 64
End: 2019-03-15

## 2019-03-15 NOTE — TELEPHONE ENCOUNTER
Advised patient his sleep study shows PHONG   Patient has follow up appointment on 3/19/2019 in [facility] office

## 2019-03-15 NOTE — TELEPHONE ENCOUNTER
Willian Telles pt call to say sleep center is done and there is a report   Pt does have sleep aptena

## 2019-03-19 ENCOUNTER — CONSULT (OUTPATIENT)
Dept: PULMONOLOGY | Facility: MEDICAL CENTER | Age: 64
End: 2019-03-19
Payer: MEDICARE

## 2019-03-19 VITALS
OXYGEN SATURATION: 95 % | DIASTOLIC BLOOD PRESSURE: 76 MMHG | RESPIRATION RATE: 12 BRPM | TEMPERATURE: 97.8 F | WEIGHT: 315 LBS | BODY MASS INDEX: 38.36 KG/M2 | SYSTOLIC BLOOD PRESSURE: 122 MMHG | HEART RATE: 70 BPM | HEIGHT: 76 IN

## 2019-03-19 DIAGNOSIS — I50.32 CHRONIC DIASTOLIC HEART FAILURE (HCC): ICD-10-CM

## 2019-03-19 DIAGNOSIS — R29.818 SUSPECTED SLEEP APNEA: ICD-10-CM

## 2019-03-19 DIAGNOSIS — G47.33 OBSTRUCTIVE SLEEP APNEA: Primary | ICD-10-CM

## 2019-03-19 DIAGNOSIS — G47.33 OSA (OBSTRUCTIVE SLEEP APNEA): ICD-10-CM

## 2019-03-19 DIAGNOSIS — G47.19 DAYTIME HYPERSOMNOLENCE: ICD-10-CM

## 2019-03-19 DIAGNOSIS — R06.02 SHORTNESS OF BREATH: ICD-10-CM

## 2019-03-19 PROCEDURE — 99214 OFFICE O/P EST MOD 30 MIN: CPT | Performed by: NURSE PRACTITIONER

## 2019-03-19 NOTE — PATIENT INSTRUCTIONS
I have  Ordered a treatment study for you  You now have a diagnosis of severe obstructive sleep apnea

## 2019-03-19 NOTE — PROGRESS NOTES
Assessment/Plan:  HPI:  Josey Bone is a 59-year-old male who had a sleep study  This was ordered by his primary care physician as patient had history of loud snoring and excessive daytime hypersomnolence  Patient is here today fo consultation and to review results of his diagnostic test   According to patient, he has a history of aortic aneurysm, regurgitation of the aortic valve and bicuspid aortic valve disorder  He does follow at [facility] and [address] [facility]  Problem List Items Addressed This Visit        Respiratory    PHONG (obstructive sleep apnea)     Josey Bone is here today to review the results of his sleep study  Done March 11, 2019 apneic hypopnea index was 58 1  He did achieve REM sleep  He also had hypoxemia with lowest oxygen at 81%  Was noted that the amount of sleep time less than equal to 89% was 104 minutes  I did briefly review the anatomy the upper airway diagnosis and treatment of obstructive sleep apnea  Patient is is accompanied by his wife today  He has agreed to have a treatment study  Cardiovascular and Mediastinum    Chronic diastolic heart failure ([facility] [address] )     Patient has history of chronic diastolic heart failure  He did have a 2D echocardiogram ordered by his primary care provider Cheri Flanagan, advanced practice nurse  It is noted that his ejection fraction was 55%  He had grade 2 diastolic dysfunction noted  There is trace mitral regurgitation  The aortic valve was not well visualized  There was no evidence for stenosis but was mild to moderate regurgitation  Mild regurgitation was noted the tricuspid valve an estimated PA pressure was 33 mm of mercury  According to patient, he does see a pulmonologist at [facility]    Overall treatment of severe PHONG would be of great benefit to the patient            Other    RESOLVED: Suspected sleep apnea    RESOLVED: Daytime hypersomnolence      Other Visit Diagnoses     Obstructive sleep apnea    - Primary    Relevant Orders    Polysomnography 4 or more parameters with CPAP    Shortness of breath                Return in about 1 month (around 4/16/2019)  All questions are answered to the patient's satisfaction and understanding  He verbalizes understanding  He is encouraged to call with any further questions or concerns  Portions of the record may have been created with voice recognition software  Occasional wrong word or "sound a like" substitutions may have occurred due to the inherent limitations of voice recognition software  Read the chart carefully and recognize, using context, where substitutions have occurred  a    Electronically Signed by AYLA Castro    ______________________________________________________________________    Chief Complaint:   Chief Complaint   Patient presents with    Sleep Apnea     consult    Sleeping Problem     tired  during the day, pt wife states that he snore at night    Shortness of Breath     pt states occurs with cold weather  pt has sore throat and lung pain        Patient ID: Kevin Cruz is a 66 y o  y o  male has a past medical history of Aortic aneurysm, abdominal ([address] ), Arthritis of right knee, Asthma, Bipolar disorder ([facility] [address] ), Depression, Heart abnormality, HLD (hyperlipidemia), and Hypertension  3/19/2019  Patient presents today for initial visit  Fatigue   This is a chronic problem  The current episode started more than 1 year ago  The problem occurs daily  The problem has been rapidly worsening  Associated symptoms include fatigue  The symptoms are aggravated by exertion  He has tried rest and sleep for the symptoms  The treatment provided mild relief  Occupational/Exposure history:  Pets/Enviroment:  Travel history:  Review of Systems   Constitutional: Positive for fatigue  HENT: Negative  Eyes: Negative  Respiratory: Positive for shortness of breath  Cardiovascular: Negative  Gastrointestinal: Negative  Endocrine: Negative  Genitourinary: Negative  Musculoskeletal: Negative  Skin: Negative  Allergic/Immunologic: Negative  Neurological: Negative  Hematological: Negative  Psychiatric/Behavioral: Negative  Social history: He reports that he quit smoking about 37 years ago  He has a 12 00 pack-year smoking history  He has never used smokeless tobacco  He reports that he does not drink alcohol or use drugs      Past surgical history:   Past Surgical History:   Procedure Laterality Date    ABDOMINAL AORTIC ANEURYSM REPAIR      Last assessed: 4/14/16    AORTIC VALVE REPLACEMENT      Last assessed: 4/14/16    FOOT SURGERY      REPLACEMENT TOTAL KNEE BILATERAL      ROTATOR CUFF REPAIR      Last assessed: 4/14/16    TOE SURGERY       Family history:   Family History   Problem Relation Age of Onset    Heart disease Mother     Alcohol abuse Mother     Hypertension Mother     Cancer Mother     Cancer Father     Hypertension Father     Diabetes Father         Mellitus    Alcohol abuse Father     Mental illness Brother         Diseases of the nervous system complicating pregnancy, childbirth and the peurperium    Cancer Brother     No Known Problems Sister     No Known Problems Maternal Aunt     No Known Problems Maternal Uncle     No Known Problems Paternal Aunt     No Known Problems Paternal Uncle     No Known Problems Maternal Grandmother     No Known Problems Maternal Grandfather     No Known Problems Paternal Grandmother     No Known Problems Paternal Grandfather     ADD / ADHD Neg Hx     Anesthesia problems Neg Hx     Clotting disorder Neg Hx     Collagen disease Neg Hx     Dislocations Neg Hx     Learning disabilities Neg Hx     Neurological problems Neg Hx     Osteoporosis Neg Hx     Rheumatologic disease Neg Hx     Scoliosis Neg Hx     Vascular Disease Neg Hx        Immunization History   Administered Date(s) Administered    Hep B, adult 12/01/2017, 01/02/2018    Influenza Quadrivalent Preservative Free 3 years and older IM 10/20/2017    Influenza TIV (IM) 09/20/2016    Influenza, recombinant, quadrivalent,injectable, preservative free 09/24/2018    Pneumococcal Polysaccharide PPV23 07/12/2017    Tdap 11/29/2017    Tuberculin Skin Test-PPD Intradermal 11/29/2017, 12/13/2017, 02/19/2019, 03/11/2019     Current Outpatient Medications   Medication Sig Dispense Refill    albuterol (PROVENTIL HFA,VENTOLIN HFA) 90 mcg/act inhaler Inhale 2 puffs every 6 (six) hours as needed for wheezing or shortness of breath DO NOT USE MORE THAN DIRECTED 1 Inhaler 6    aspirin (ECOTRIN LOW STRENGTH) 81 mg EC tablet Take 81 mg by mouth daily      atenolol (TENORMIN) 50 mg tablet TAKE ONE TABLET BY MOUTH EVERY DAY 90 tablet 1    cyclobenzaprine (FLEXERIL) 10 mg tablet Take 1 tablet (10 mg total) by mouth 3 (three) times a day as needed for muscle spasms 30 tablet 0    enalapril (VASOTEC) 5 mg tablet Take 1 tablet (5 mg total) by mouth daily 90 tablet 1    fluticasone (FLONASE) 50 mcg/act nasal spray 2 sprays into each nostril daily 16 g 0    fluticasone-salmeterol (ADVAIR) 250-50 mcg/dose inhaler Inhale 1 puff every 12 (twelve) hours 1 Inhaler 2    furosemide (LASIX) 20 mg tablet TAKE ONE TABLET BY MOUTH EVERY DAY AS NEEDED FOR LEG SWELLING OR WEIGHT GAIN OF 3 POUND IN DAY 30 tablet 3    furosemide (LASIX) 40 mg tablet Take 1 tablet (40 mg total) by mouth daily 90 tablet 1    loratadine (CLARITIN) 10 mg tablet Take 10 mg by mouth daily      omeprazole (PriLOSEC) 40 MG capsule TAKE 1 CAPSULE TWICE A DAY FOR 7 DAYS, THEN 1 CAPSULE DAILY 37 capsule 5    OXcarbazepine (TRILEPTAL) 300 mg tablet TAKE ONE AND ONE-HALF TABLETS BY MOUTH TWICE A DAY 90 tablet 1    potassium chloride (KLOR-CON M20) 20 mEq tablet Take 1 tablet (20 mEq total) by mouth daily 30 tablet 6    sertraline (ZOLOFT) 100 mg tablet Take 1 tablet (100 mg total) by mouth 2 (two) times a day 60 tablet 1    tiotropium (SPIRIVA) 18 mcg inhalation capsule Place 1 capsule (18 mcg total) into inhaler and inhale daily 30 capsule 0    umeclidinium-vilanterol (ANORO ELLIPTA) 62 5-25 MCG/INH inhaler Inhale 1 puff daily 1 Inhaler 6    pravastatin (PRAVACHOL) 20 mg tablet Take 1 tablet by mouth daily with dinner for 30 days Patient would like to try pravastatin 20mg, he does have myalgia from Lipitor/Crestor in the past  30 tablet 0     No current facility-administered medications for this visit  Allergies: Lipitor [atorvastatin]; Codeine; Crestor [rosuvastatin]; Penicillins; and Sulfa antibiotics    Objective:  Vitals:    03/19/19 1101   BP: 122/76   BP Location: Left arm   Patient Position: Sitting   Cuff Size: Extra-Large   Pulse: 70   Resp: 12   Temp: 97 8 °F (36 6 °C)   TempSrc: Oral   SpO2: 95%   Weight: (!) 157 kg (346 lb)   Height: 6' 4" (1 93 m)   Oxygen Therapy  SpO2: 95 %    Wt Readings from Last 3 Encounters:   03/19/19 (!) 157 kg (346 lb)   02/19/19 (!) 157 kg (346 lb)   01/29/19 (!) 154 kg (338 lb 9 6 oz)     Body mass index is 42 12 kg/m²  Physical Exam   Constitutional: He is oriented to person, place, and time  He appears well-developed and well-nourished  HENT:   Head: Normocephalic and atraumatic  Mouth/Throat: Oropharynx is clear and moist    Mallampati 4   Eyes: Pupils are equal, round, and reactive to light  EOM are normal    Neck: Normal range of motion  Cardiovascular: Normal rate and regular rhythm  Pulmonary/Chest: Effort normal and breath sounds normal    Abdominal: Soft  Musculoskeletal: Normal range of motion  Right lower leg: Normal         Left lower leg: Normal    Neurological: He is alert and oriented to person, place, and time  Skin: Skin is warm and dry  Capillary refill takes less than 2 seconds  Psychiatric: He has a normal mood and affect  His behavior is normal    Vitals reviewed        Lab Review:   Orders Only on 11/13/2018   Component Date Value    White Blood Cell Count 11/13/2018 7 9     Red Blood Cell Count 11/13/2018 4 86     Hemoglobin 11/13/2018 15 8     HCT 11/13/2018 47 1     MCV 11/13/2018 97     MCH 11/13/2018 32 5     MCHC 11/13/2018 33 5     RDW 11/13/2018 14 1     Platelet Count 91/08/1913 299     Glucose, Random 11/13/2018 140*    BUN 11/13/2018 15     Creatinine 11/13/2018 0 97     eGFR Non  11/13/2018 84     eGFR  11/13/2018 98     SL AMB BUN/CREATININE RA* 11/13/2018 15     Sodium 11/13/2018 141     Potassium 11/13/2018 4 2     Chloride 11/13/2018 104     CO2 11/13/2018 22     CALCIUM 11/13/2018 9 3     Magnesium, Serum 11/13/2018 2 2    Office Visit on 10/24/2018   Component Date Value     RAPID STREP A 10/24/2018 Negative        Diagnostics:  I have personally reviewed pertinent reports  Office Spirometry Results:     ESS: Total score: 8  No results found

## 2019-03-19 NOTE — ASSESSMENT & PLAN NOTE
Dennise Hernandez is here today to review the results of his sleep study  Done March 11, 2019 apneic hypopnea index was 58 1  He did achieve REM sleep  He also had hypoxemia with lowest oxygen at 81%  Was noted that the amount of sleep time less than equal to 89% was 104 minutes  I did briefly review the anatomy the upper airway diagnosis and treatment of obstructive sleep apnea  Patient is is accompanied by his wife today  He has agreed to have a treatment study

## 2019-03-19 NOTE — ASSESSMENT & PLAN NOTE
Patient has history of chronic diastolic heart failure  He did have a 2D echocardiogram ordered by his primary care provider Sandeep Nazario advanced practice nurse  It is noted that his ejection fraction was 55%  He had grade 2 diastolic dysfunction noted  There is trace mitral regurgitation  The aortic valve was not well visualized  There was no evidence for stenosis but was mild to moderate regurgitation  Mild regurgitation was noted the tricuspid valve an estimated PA pressure was 33 mm of mercury  According to patient, he does see a pulmonologist at the [facility]    Overall treatment of severe PHONG would be of great benefit to the patient

## 2019-03-27 ENCOUNTER — APPOINTMENT (OUTPATIENT)
Dept: RADIOLOGY | Facility: CLINIC | Age: 64
End: 2019-03-27
Payer: MEDICARE

## 2019-03-27 ENCOUNTER — TRANSCRIBE ORDERS (OUTPATIENT)
Dept: RADIOLOGY | Facility: CLINIC | Age: 64
End: 2019-03-27

## 2019-03-27 ENCOUNTER — OFFICE VISIT (OUTPATIENT)
Dept: FAMILY MEDICINE CLINIC | Facility: CLINIC | Age: 64
End: 2019-03-27
Payer: MEDICARE

## 2019-03-27 VITALS
WEIGHT: 315 LBS | RESPIRATION RATE: 16 BRPM | SYSTOLIC BLOOD PRESSURE: 122 MMHG | HEIGHT: 76 IN | DIASTOLIC BLOOD PRESSURE: 74 MMHG | BODY MASS INDEX: 38.36 KG/M2 | HEART RATE: 72 BPM | TEMPERATURE: 98.4 F

## 2019-03-27 DIAGNOSIS — M25.551 RIGHT HIP PAIN: ICD-10-CM

## 2019-03-27 DIAGNOSIS — M79.10 GENERALIZED MUSCLE ACHE: ICD-10-CM
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
## 2019-03-27 PROCEDURE — 99213 OFFICE O/P EST LOW 20 MIN: CPT | Performed by: FAMILY MEDICINE

## 2019-03-27 PROCEDURE — 73502 X-RAY EXAM HIP UNI 2-3 VIEWS: CPT

## 2019-03-27 RX ORDER — CYCLOBENZAPRINE HCL 10 MG
10 TABLET ORAL 3 TIMES DAILY PRN
Qty: 30 TABLET | Refills: 0 | Status: SHIPPED | OUTPATIENT
Start: 2019-03-27 | End: 2019-04-03 | Stop reason: SDUPTHER

## 2019-03-27 NOTE — PROGRESS NOTES
Cynthia Reynoso   1955 male MRN: [id number]    [address] [facility] - [address]      ASSESSMENT/PLAN  Clair Kearns  is a 66 y o  male presents to the office for aware of our plan    Diagnoses and all orders for this visit:    Accidental fall, initial encounter  -     cyclobenzaprine (FLEXERIL) 10 mg tablet; Take 1 tablet (10 mg total) by mouth 3 (three) times a day as needed for muscle spasms    Generalized muscle ache  -     cyclobenzaprine (FLEXERIL) 10 mg tablet; Take 1 tablet (10 mg total) by mouth 3 (three) times a day as needed for muscle spasms    Right hip pain  -     XR hip/pelv 2-3 vws right if performed; Future     patient with poor ambulation and needing a cane which is not his baseline  Patient had an accidental fall  Will start the patient on a muscle relaxant given that there was a significant muscle spasm felt on his right calf  Patient did have limited range of motion of his right hip therefore will obtain to be sure that there is no fracture which is least likely  Will consider physical therapy and Ortho referral if it does not improve  If his muscle spasm has not resolved by 1 week would consider for the patient to have an ultrasound performed of his muscle to rule out tear  Patient aware of the plan    Apply Heat  NO work till improvement    -consider steroids if symptoms do not improve  Disposition: Return to the office in 1 week if symptoms worsen  Future Appointments   Date Time Provider Fred Nunn   4/3/2019  8:00 AM AYLA Zuluaga [facility]          SUBJECTIVE  CC: Back Pain (and right leg  due to fall )      HPI:  Clair Kearns  is a 66 y o  male who presents for an acute appointment  Yesterday the patient was cleaning out a building and had a PSI of 120 directed to his face  Patient held on to a board till the water pressure stopped    He states that he hyperextended his right leg never actually fell  States that since that he has had a severe state pain in his right calf; and right hip  Using a cane for aid  Currently the pain is 10/10  Had to drive himself given that his wife is working  Has not taken anything over-the-counter to help with pain  Review of Systems   Constitutional: Negative for activity change, appetite change, chills, fatigue and fever  HENT: Negative for congestion  Respiratory: Negative for cough, chest tightness and shortness of breath  Cardiovascular: Negative for chest pain and leg swelling  Gastrointestinal: Negative for abdominal distention, abdominal pain, constipation, diarrhea, nausea and vomiting  Musculoskeletal: Positive for arthralgias, back pain, gait problem and myalgias  All other systems reviewed and are negative        Historical Information   The patient history was reviewed as follows:  Past Medical History:   Diagnosis Date    Aortic aneurysm, abdominal ([facility] )     Arthritis of right knee     Asthma     Bipolar disorder ([facility] )     Depression     Heart abnormality     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   [facility]es to [facility] (hyperlipidemia)     Hypertension          Medications:     Current Outpatient Medications:     albuterol (PROVENTIL HFA,VENTOLIN HFA) 90 mcg/act inhaler, Inhale 2 puffs every 6 (six) hours as needed for wheezing or shortness of breath DO NOT USE MORE THAN DIRECTED, Disp: 1 Inhaler, Rfl: 6    aspirin (ECOTRIN LOW STRENGTH) 81 mg EC tablet, Take 81 mg by mouth daily, Disp: , Rfl:     atenolol (TENORMIN) 50 mg tablet, TAKE ONE TABLET BY MOUTH EVERY DAY, Disp: 90 tablet, Rfl: 1    cyclobenzaprine (FLEXERIL) 10 mg tablet, Take 1 tablet (10 mg total) by mouth 3 (three) times a day as needed for muscle spasms, Disp: 30 tablet, Rfl: 0    enalapril (VASOTEC) 5 mg tablet, Take 1 tablet (5 mg total) by mouth daily, Disp: 90 tablet, Rfl: 1    fluticasone (FLONASE) 50 mcg/act nasal spray, 2 sprays into each nostril daily, Disp: 16 g, Rfl: 0    fluticasone-salmeterol (ADVAIR) 250-50 mcg/dose inhaler, Inhale 1 puff every 12 (twelve) hours, Disp: 1 Inhaler, Rfl: 2    furosemide (LASIX) 20 mg tablet, TAKE ONE TABLET BY MOUTH EVERY DAY AS NEEDED FOR LEG SWELLING OR WEIGHT GAIN OF 3 POUND IN DAY, Disp: 30 tablet, Rfl: 3    furosemide (LASIX) 40 mg tablet, Take 1 tablet (40 mg total) by mouth daily, Disp: 90 tablet, Rfl: 1    loratadine (CLARITIN) 10 mg tablet, Take 10 mg by mouth daily, Disp: , Rfl:     omeprazole (PriLOSEC) 40 MG capsule, TAKE 1 CAPSULE TWICE A DAY FOR 7 DAYS, THEN 1 CAPSULE DAILY, Disp: 37 capsule, Rfl: 5    OXcarbazepine (TRILEPTAL) 300 mg tablet, TAKE ONE AND ONE-HALF TABLETS BY MOUTH TWICE A DAY, Disp: 90 tablet, Rfl: 1    potassium chloride (KLOR-CON M20) 20 mEq tablet, Take 1 tablet (20 mEq total) by mouth daily, Disp: 30 tablet, Rfl: 6    sertraline (ZOLOFT) 100 mg tablet, Take 1 tablet (100 mg total) by mouth 2 (two) times a day, Disp: 60 tablet, Rfl: 1    tiotropium (SPIRIVA) 18 mcg inhalation capsule, Place 1 capsule (18 mcg total) into inhaler and inhale daily, Disp: 30 capsule, Rfl: 0    umeclidinium-vilanterol (ANORO ELLIPTA) 62 5-25 MCG/INH inhaler, Inhale 1 puff daily, Disp: 1 Inhaler, Rfl: 6    pravastatin (PRAVACHOL) 20 mg tablet, Take 1 tablet by mouth daily with dinner for 30 days Patient would like to try pravastatin 20mg, he does have myalgia from Lipitor/Crestor in the past , Disp: 30 tablet, Rfl: 0    Allergies   Allergen Reactions    Lipitor [Atorvastatin]      myalgia     Codeine GI Intolerance    Crestor [Rosuvastatin]      myalgia     Penicillins Rash    Sulfa Antibiotics Rash     Tolerates Lasix       OBJECTIVE  Vitals:   Vitals:    03/27/19 0824   BP: 122/74   BP Location: Left arm   Patient Position: Sitting   Cuff Size: Standard   Pulse: 72   Resp: 16   Temp: 98 4 °F (36 9 °C)   Weight: (!) 158 kg (349 lb)   Height: 6' 4" (1 93 m)         Physical Exam   Constitutional: He is oriented to person, place, and time  Vital signs are normal  He appears well-developed and well-nourished  HENT:   Head: Normocephalic and atraumatic  Right Ear: Tympanic membrane, external ear and ear canal normal    Left Ear: Tympanic membrane, external ear and ear canal normal    Nose: Nose normal    Mouth/Throat: Oropharynx is clear and moist    Eyes: Pupils are equal, round, and reactive to light  Conjunctivae and EOM are normal    Neck: Normal range of motion  Neck supple  Cardiovascular: Normal rate, regular rhythm, S1 normal, S2 normal, normal heart sounds and intact distal pulses  No murmur heard  Pulmonary/Chest: Effort normal and breath sounds normal  No respiratory distress  He has no wheezes  Musculoskeletal: He exhibits no edema  Right hip: He exhibits decreased range of motion (Secondary to pain) and swelling  Right lower leg: He exhibits tenderness and swelling  Legs:  Neurological: He is alert and oriented to person, place, and time  He has normal strength  Skin: Skin is warm  Psychiatric: He has a normal mood and affect  His speech is normal and behavior is normal  Judgment and thought content normal    Vitals reviewed                   Loren Davis MD,   [facility]  3/27/2019

## 2019-03-28 ENCOUNTER — TELEPHONE (OUTPATIENT)
Dept: FAMILY MEDICINE CLINIC | Facility: CLINIC | Age: 64
End: 2019-03-28

## 2019-03-28 NOTE — TELEPHONE ENCOUNTER
----- Message from Shruthi Wen MD sent at 3/28/2019  3:43 PM EDT -----  Please obtain Xray of hip results

## 2019-03-29 ENCOUNTER — TELEPHONE (OUTPATIENT)
Dept: FAMILY MEDICINE CLINIC | Facility: CLINIC | Age: 64
End: 2019-03-29

## 2019-03-29 NOTE — TELEPHONE ENCOUNTER
CALLED PT ADVISED SAME, HE SAID HE FEELS TERRIBLE, HE IS WRAPPING THE CALF AND THE BRUISE IS LIKE A YELLOW COLOR, I ADVISED IF ANYTHING CHANGES TO CALL US OR GO DIRECT TO [facility]

## 2019-03-29 NOTE — TELEPHONE ENCOUNTER
----- Message from Cleo Wiggins MD sent at 3/28/2019  3:43 PM EDT -----  Please obtain Xray of hip results

## 2019-03-29 NOTE — TELEPHONE ENCOUNTER
----- Message from Lupillo Welsh MD sent at 3/28/2019  3:43 PM EDT -----  Please obtain Xray of hip results

## 2019-03-29 NOTE — TELEPHONE ENCOUNTER
Ok good about directing to ER! I worry about muscle tear  given the swelling but yellow means its in recovery stage compared to our last appointment!  Thank you

## 2019-04-03 ENCOUNTER — OFFICE VISIT (OUTPATIENT)
Dept: FAMILY MEDICINE CLINIC | Facility: CLINIC | Age: 64
End: 2019-04-03
Payer: MEDICARE

## 2019-04-03 VITALS
TEMPERATURE: 97.3 F | HEIGHT: 76 IN | BODY MASS INDEX: 38.36 KG/M2 | RESPIRATION RATE: 12 BRPM | WEIGHT: 315 LBS | SYSTOLIC BLOOD PRESSURE: 120 MMHG | HEART RATE: 60 BPM | DIASTOLIC BLOOD PRESSURE: 70 MMHG

## 2019-04-03 DIAGNOSIS — M62.830 BACK SPASM: ICD-10-CM

## 2019-04-03 DIAGNOSIS — M79.10 GENERALIZED MUSCLE ACHE: ICD-10-CM
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
## 2019-04-03 DIAGNOSIS — M79.661 PAIN OF RIGHT CALF: Primary | ICD-10-CM

## 2019-04-03 DIAGNOSIS — S89.91XD RIGHT LEG INJURY, SUBSEQUENT ENCOUNTER: ICD-10-CM

## 2019-04-03 PROCEDURE — 99213 OFFICE O/P EST LOW 20 MIN: CPT | Performed by: NURSE PRACTITIONER

## 2019-04-03 RX ORDER — CYCLOBENZAPRINE HCL 10 MG
10 TABLET ORAL 3 TIMES DAILY PRN
Qty: 60 TABLET | Refills: 1 | Status: SHIPPED | OUTPATIENT
Start: 2019-04-03 | End: 2020-01-10

## 2019-04-09 ENCOUNTER — TELEPHONE (OUTPATIENT)
Dept: OBGYN CLINIC | Facility: HOSPITAL | Age: 64
End: 2019-04-09

## 2019-04-09 ENCOUNTER — APPOINTMENT (OUTPATIENT)
Dept: RADIOLOGY | Facility: CLINIC | Age: 64
End: 2019-04-09
Payer: MEDICARE

## 2019-04-09 ENCOUNTER — OFFICE VISIT (OUTPATIENT)
Dept: OBGYN CLINIC | Facility: CLINIC | Age: 64
End: 2019-04-09
Payer: MEDICARE

## 2019-04-09 VITALS
HEART RATE: 68 BPM | HEIGHT: 76 IN | SYSTOLIC BLOOD PRESSURE: 109 MMHG | DIASTOLIC BLOOD PRESSURE: 75 MMHG | WEIGHT: 315 LBS | BODY MASS INDEX: 38.36 KG/M2

## 2019-04-09 DIAGNOSIS — S76.301A RIGHT HAMSTRING INJURY, INITIAL ENCOUNTER: Primary | ICD-10-CM

## 2019-04-09 DIAGNOSIS — M25.561 RIGHT KNEE PAIN, UNSPECIFIED CHRONICITY: ICD-10-CM

## 2019-04-09 DIAGNOSIS — S89.91XD RIGHT LEG INJURY, SUBSEQUENT ENCOUNTER: ICD-10-CM

## 2019-04-09 DIAGNOSIS — M79.661 PAIN OF RIGHT CALF: ICD-10-CM

## 2019-04-09 PROCEDURE — 73562 X-RAY EXAM OF KNEE 3: CPT

## 2019-04-09 PROCEDURE — 99213 OFFICE O/P EST LOW 20 MIN: CPT | Performed by: ORTHOPAEDIC SURGERY

## 2019-04-09 RX ORDER — IBUPROFEN 600 MG/1
600 TABLET ORAL EVERY 6 HOURS PRN
Qty: 30 TABLET | Refills: 0 | Status: SHIPPED | OUTPATIENT
Start: 2019-04-09 | End: 2021-05-11 | Stop reason: SDUPTHER

## 2019-05-13 DIAGNOSIS — I50.32 CHRONIC DIASTOLIC CONGESTIVE HEART FAILURE (HCC): ICD-10-CM

## 2019-05-13 RX ORDER — FUROSEMIDE 40 MG/1
TABLET ORAL
Qty: 90 TABLET | Refills: 1 | Status: SHIPPED | OUTPATIENT
Start: 2019-05-13 | End: 2020-05-29

## 2019-05-14 ENCOUNTER — OFFICE VISIT (OUTPATIENT)
Dept: FAMILY MEDICINE CLINIC | Facility: CLINIC | Age: 64
End: 2019-05-14
Payer: MEDICARE

## 2019-05-14 ENCOUNTER — TRANSCRIBE ORDERS (OUTPATIENT)
Dept: ADMINISTRATIVE | Facility: HOSPITAL | Age: 64
End: 2019-05-14

## 2019-05-14 VITALS
BODY MASS INDEX: 38.36 KG/M2 | RESPIRATION RATE: 14 BRPM | HEIGHT: 76 IN | TEMPERATURE: 98.8 F | HEART RATE: 72 BPM | SYSTOLIC BLOOD PRESSURE: 110 MMHG | DIASTOLIC BLOOD PRESSURE: 70 MMHG | WEIGHT: 315 LBS

## 2019-05-14 DIAGNOSIS — G47.33 OBSTRUCTIVE SLEEP APNEA: Primary | ICD-10-CM

## 2019-05-14 DIAGNOSIS — R59.0 CERVICAL ADENOPATHY: Primary | ICD-10-CM

## 2019-05-14 PROCEDURE — 99213 OFFICE O/P EST LOW 20 MIN: CPT | Performed by: NURSE PRACTITIONER

## 2019-06-11 ENCOUNTER — OFFICE VISIT (OUTPATIENT)
Dept: OTOLARYNGOLOGY | Facility: CLINIC | Age: 64
End: 2019-06-11
Payer: MEDICARE

## 2019-06-11 VITALS
DIASTOLIC BLOOD PRESSURE: 68 MMHG | WEIGHT: 315 LBS | SYSTOLIC BLOOD PRESSURE: 110 MMHG | HEIGHT: 76 IN | BODY MASS INDEX: 38.36 KG/M2

## 2019-06-11 DIAGNOSIS — J34.2 DNS (DEVIATED NASAL SEPTUM): ICD-10-CM

## 2019-06-11 DIAGNOSIS — R22.1 MASS OF RIGHT SIDE OF NECK: Primary | ICD-10-CM

## 2019-06-11 PROCEDURE — 99214 OFFICE O/P EST MOD 30 MIN: CPT | Performed by: SPECIALIST

## 2019-06-11 RX ORDER — LEVOCETIRIZINE DIHYDROCHLORIDE 5 MG/1
5 TABLET, FILM COATED ORAL EVERY EVENING
COMMUNITY
End: 2020-01-10

## 2019-06-17 ENCOUNTER — HOSPITAL ENCOUNTER (OUTPATIENT)
Dept: SLEEP CENTER | Facility: CLINIC | Age: 64
Discharge: HOME | End: 2019-06-17
Payer: MEDICARE

## 2019-06-17 DIAGNOSIS — G47.33 OBSTRUCTIVE SLEEP APNEA: ICD-10-CM

## 2019-06-17 PROCEDURE — 95811 POLYSOM 6/>YRS CPAP 4/> PARM: CPT

## 2019-06-17 PROCEDURE — 95811 POLYSOM 6/>YRS CPAP 4/> PARM: CPT | Performed by: INTERNAL MEDICINE

## 2019-06-20 DIAGNOSIS — R22.1 NECK MASS: Primary | ICD-10-CM

## 2019-06-21 DIAGNOSIS — G47.33 OBSTRUCTIVE SLEEP APNEA: Primary | ICD-10-CM

## 2019-06-24 DIAGNOSIS — F31.9 BIPOLAR 1 DISORDER (HCC): ICD-10-CM

## 2019-06-24 RX ORDER — SERTRALINE HYDROCHLORIDE 100 MG/1
100 TABLET, FILM COATED ORAL 2 TIMES DAILY
Qty: 60 TABLET | Refills: 1 | Status: SHIPPED | OUTPATIENT
Start: 2019-06-24

## 2019-07-01 ENCOUNTER — TELEPHONE (OUTPATIENT)
Dept: SLEEP CENTER | Facility: CLINIC | Age: 64
End: 2019-07-01

## 2019-07-01 NOTE — TELEPHONE ENCOUNTER
Spoke with patient, advised sleep study resulted and will be discussed at office visit with pulmonary 7/10/19

## 2019-07-02 ENCOUNTER — HOSPITAL ENCOUNTER (OUTPATIENT)
Dept: RADIOLOGY | Facility: HOSPITAL | Age: 64
Discharge: HOME | End: 2019-07-02
Payer: MEDICARE

## 2019-07-02 DIAGNOSIS — R22.1 MASS OF RIGHT SIDE OF NECK: ICD-10-CM

## 2019-07-02 PROCEDURE — 70491 CT SOFT TISSUE NECK W/DYE: CPT

## 2019-07-02 RX ADMIN — IOHEXOL 85 ML: 350 INJECTION, SOLUTION INTRAVENOUS at 08:48

## 2019-07-10 ENCOUNTER — OFFICE VISIT (OUTPATIENT)
Dept: PULMONOLOGY | Facility: MEDICAL CENTER | Age: 64
End: 2019-07-10
Payer: MEDICARE

## 2019-07-10 VITALS
HEIGHT: 76 IN | HEART RATE: 72 BPM | SYSTOLIC BLOOD PRESSURE: 122 MMHG | TEMPERATURE: 98.8 F | RESPIRATION RATE: 12 BRPM | DIASTOLIC BLOOD PRESSURE: 62 MMHG | OXYGEN SATURATION: 95 % | BODY MASS INDEX: 38.36 KG/M2 | WEIGHT: 315 LBS

## 2019-07-10 DIAGNOSIS — G47.33 OBSTRUCTIVE SLEEP APNEA: ICD-10-CM

## 2019-07-10 DIAGNOSIS — R06.02 SHORTNESS OF BREATH: Primary | ICD-10-CM

## 2019-07-10 DIAGNOSIS — R06.01 ORTHOPNEA: ICD-10-CM

## 2019-07-10 DIAGNOSIS — J41.0 SIMPLE CHRONIC BRONCHITIS (HCC): ICD-10-CM

## 2019-07-10 DIAGNOSIS — K86.2 PANCREATIC CYST: ICD-10-CM

## 2019-07-10 DIAGNOSIS — J44.1 COPD EXACERBATION (HCC): ICD-10-CM

## 2019-07-10 DIAGNOSIS — R09.02 HYPOXIA: ICD-10-CM

## 2019-07-10 PROCEDURE — 99214 OFFICE O/P EST MOD 30 MIN: CPT | Performed by: NURSE PRACTITIONER

## 2019-07-10 PROCEDURE — 94010 BREATHING CAPACITY TEST: CPT | Performed by: NURSE PRACTITIONER

## 2019-07-10 RX ORDER — ALBUTEROL SULFATE 90 UG/1
2 AEROSOL, METERED RESPIRATORY (INHALATION) EVERY 6 HOURS PRN
Qty: 1 INHALER | Refills: 6 | Status: SHIPPED | OUTPATIENT
Start: 2019-07-10 | End: 2019-10-25 | Stop reason: SDUPTHER

## 2019-07-10 NOTE — PROGRESS NOTES
Assessment/Plan:     Problem List Items Addressed This Visit        Digestive    Pancreatic cyst     Patient had a CTA of the chest done in July of 2017 apparently he was hospitalized at that time  There was a 4 7 x 4 3 cm cystic lesion the pancreatic tail  He also to 4 9 ascending thoracic aortic aneurysm  There was a 5 mm right lower lobe pulmonary nodule  However she smoked for such short time that I do not feel a follow-up CT is needed at this time  I will speak with Radiology/primary care physician regarding follow-up of this and let patient know what the next step will be  Respiratory    Simple chronic bronchitis (HCC)     Patient has a history of simple chronic bronchitis  He smoked for approximately 5 years  He had a PFT done today that shows equivocal results  Forced vital capacity was 3 68 L or 65% of predicted, FEV1 was 3 L or 71% obstruction  Ratio is 83%  He does follow with cardiologist Cece Haque heart instead 2  He is willing to try Anoro 1 puff daily  This is an inhaled anticholinergic and long-acting beta agonist   I will also re-order his Proventil that he uses on occasion  Relevant Medications    umeclidinium-vilanterol (ANORO ELLIPTA) 62 5-25 MCG/INH inhaler    albuterol (PROVENTIL HFA,VENTOLIN HFA) 90 mcg/act inhaler    Obstructive sleep apnea     Tl Bailey did have titration study  This was done June 17, 2019  I reviewed results of test with patient and his wife  He is agreeable to have room mediation of his severe obstructive sleep apnea with auto CPAP 11-20 cm water pressure  I have ordered this  Once he remains sees the machine, he will come for compliance visit in approximately 45 days             Relevant Orders    CPAP Auto New DME      Other Visit Diagnoses     Shortness of breath    -  Primary    Relevant Orders    POCT spirometry (Completed)    COPD exacerbation (HCC)        Relevant Medications    umeclidinium-vilanterol (ANORO ELLIPTA) 62 5-25 MCG/INH inhaler    albuterol (PROVENTIL HFA,VENTOLIN HFA) 90 mcg/act inhaler    Orthopnea        Hypoxia                Return in about 6 weeks (around 8/21/2019)  All questions are answered to the patient's satisfaction and understanding  He verbalizes understanding  He is encouraged to call with any further questions or concerns  Portions of the record may have been created with voice recognition software  Occasional wrong word or "sound a like" substitutions may have occurred due to the inherent limitations of voice recognition software  Read the chart carefully and recognize, using context, where substitutions have occurred  Electronically Signed by AYLA Marroquin  HPI:  Karmen Rivera is here today to review his sleep study  He was seen March 19, 2019  His AHI was 58 1  He did not achieve REM sleep and also had hypoxia  He was titrated to CPAP at 11  At that pressure there was still a few respiratory events  It was recommended the patient be given auto titration of CPAP 11-20 cm of water pressure  Karmen Rivera is a former smoker  He smoked for 5 years  He smoked 1 pack per day  Karmen Rivera also has history of diastolic heart failure  2D echo was done and ejection fraction was 55% is weight is grade 2 diastolic dysfunction  He follows with cardiologist at  [facility]    ______________________________________________________________________    Chief Complaint:   Chief Complaint   Patient presents with    Sleep Apnea     pt is here today for sleep results       Patient ID: Karmen Rivera is a 66 y o  y o  male has a past medical history of Aortic aneurysm, abdominal ([address] ), Arthritis of right knee, Asthma, Bipolar disorder ([facility] [address] ), Depression, Heart abnormality, HLD (hyperlipidemia), and Hypertension  7/10/2019  Patient presents today for follow-up visit  Shortness of Breath   This is a chronic problem  The current episode started more than 1 year ago  The problem occurs daily   The problem has been unchanged  The symptoms are aggravated by exercise  The patient has no known risk factors for DVT/PE  He has tried beta agonist inhalers for the symptoms  The treatment provided mild relief  His past medical history is significant for COPD  Review of Systems   Constitutional: Negative  HENT: Negative  Eyes: Negative  Respiratory: Positive for shortness of breath  Cardiovascular: Negative  Gastrointestinal: Negative  Endocrine: Negative  Genitourinary: Negative  Musculoskeletal: Negative  Skin: Negative  Allergic/Immunologic: Negative  Neurological: Negative  Hematological: Negative  Psychiatric/Behavioral: Negative  Smoking history: He reports that he quit smoking about 37 years ago  He has a 12 00 pack-year smoking history   He has never used smokeless tobacco     The following portions of the patient's history were reviewed and updated as appropriate: allergies, current medications, past family history, past medical history, past social history, past surgical history and problem list     Immunization History   Administered Date(s) Administered    Hep B, adult 12/01/2017, 01/02/2018    Influenza Quadrivalent Preservative Free 3 years and older IM 10/20/2017    Influenza TIV (IM) 09/20/2016    Influenza, recombinant, quadrivalent,injectable, preservative free 09/24/2018    Pneumococcal Polysaccharide PPV23 07/12/2017    Tdap 11/29/2017    Tuberculin Skin Test-PPD Intradermal 11/29/2017, 12/13/2017, 02/19/2019, 03/11/2019     Current Outpatient Medications   Medication Sig Dispense Refill    albuterol (PROVENTIL HFA,VENTOLIN HFA) 90 mcg/act inhaler Inhale 2 puffs every 6 (six) hours as needed for wheezing or shortness of breath DO NOT USE MORE THAN DIRECTED 1 Inhaler 6    aspirin (ECOTRIN LOW STRENGTH) 81 mg EC tablet Take 81 mg by mouth daily      atenolol (TENORMIN) 50 mg tablet TAKE ONE TABLET BY MOUTH EVERY DAY 90 tablet 1    cyclobenzaprine (FLEXERIL) 10 mg tablet Take 1 tablet (10 mg total) by mouth 3 (three) times a day as needed for muscle spasms 60 tablet 1    enalapril (VASOTEC) 5 mg tablet Take 1 tablet (5 mg total) by mouth daily 90 tablet 1    furosemide (LASIX) 40 mg tablet TAKE ONE TABLET BY MOUTH EVERY DAY 90 tablet 1    levocetirizine (XYZAL ALLERGY 24HR) 5 MG tablet Take 5 mg by mouth every evening      loratadine (CLARITIN) 10 mg tablet Take 10 mg by mouth daily      OXcarbazepine (TRILEPTAL) 300 mg tablet TAKE ONE AND ONE-HALF TABLETS BY MOUTH TWICE A DAY 90 tablet 1    potassium chloride (KLOR-CON M20) 20 mEq tablet Take 1 tablet (20 mEq total) by mouth daily 30 tablet 6    sertraline (ZOLOFT) 100 mg tablet Take 1 tablet (100 mg total) by mouth 2 (two) times a day 60 tablet 1    fluticasone (FLONASE) 50 mcg/act nasal spray 2 sprays into each nostril daily (Patient not taking: Reported on 5/14/2019) 16 g 0    fluticasone-salmeterol (ADVAIR) 250-50 mcg/dose inhaler Inhale 1 puff every 12 (twelve) hours (Patient not taking: Reported on 7/10/2019) 1 Inhaler 2    furosemide (LASIX) 20 mg tablet TAKE ONE TABLET BY MOUTH EVERY DAY AS NEEDED FOR LEG SWELLING OR WEIGHT GAIN OF 3 POUND IN DAY 30 tablet 3    ibuprofen (MOTRIN) 600 mg tablet Take 1 tablet (600 mg total) by mouth every 6 (six) hours as needed for mild pain (Patient not taking: Reported on 7/10/2019) 30 tablet 0    omeprazole (PriLOSEC) 40 MG capsule TAKE 1 CAPSULE TWICE A DAY FOR 7 DAYS, THEN 1 CAPSULE DAILY (Patient not taking: Reported on 6/11/2019) 37 capsule 5    pravastatin (PRAVACHOL) 20 mg tablet Take 1 tablet by mouth daily with dinner for 30 days Patient would like to try pravastatin 20mg, he does have myalgia from Lipitor/Crestor in the past  30 tablet 0    tiotropium (SPIRIVA) 18 mcg inhalation capsule Place 1 capsule (18 mcg total) into inhaler and inhale daily (Patient not taking: Reported on 6/11/2019) 30 capsule 0    umeclidinium-vilanterol (ANORO ELLIPTA) 62 5-25 MCG/INH inhaler Inhale 1 puff daily (Patient not taking: Reported on 6/11/2019) 1 Inhaler 6    umeclidinium-vilanterol (ANORO ELLIPTA) 62 5-25 MCG/INH inhaler Inhale 1 puff daily 1 Inhaler 5     No current facility-administered medications for this visit  Allergies: Lipitor [atorvastatin]; Codeine; Crestor [rosuvastatin]; Penicillins; and Sulfa antibiotics    Objective:  Vitals:    07/10/19 1022   BP: 122/62   BP Location: Left arm   Patient Position: Sitting   Cuff Size: Standard   Pulse: 72   Resp: 12   Temp: 98 8 °F (37 1 °C)   TempSrc: Tympanic   SpO2: 95%   Weight: (!) 160 kg (352 lb)   Height: 6' 3 5" (1 918 m)   Oxygen Therapy  SpO2: 95 %    Wt Readings from Last 3 Encounters:   07/10/19 (!) 160 kg (352 lb)   06/11/19 (!) 156 kg (345 lb)   05/14/19 (!) 160 kg (353 lb)     Body mass index is 43 42 kg/m²  Physical Exam   Constitutional: He is oriented to person, place, and time  He appears well-developed and well-nourished  HENT:   Head: Normocephalic and atraumatic  Mallampati 3-4   Eyes: Pupils are equal, round, and reactive to light  EOM are normal    Neck: Normal range of motion  Neck supple  Cardiovascular: Normal rate and regular rhythm  Murmur heard  Pulmonary/Chest: Effort normal    Abdominal: Soft  Bowel sounds are normal    Musculoskeletal: Normal range of motion  Neurological: He is alert and oriented to person, place, and time  Skin: Skin is warm and dry  Capillary refill takes less than 2 seconds  Psychiatric: He has a normal mood and affect  His behavior is normal        Lab Review:   No visits with results within 6 Month(s) from this visit     Latest known visit with results is:   Orders Only on 11/13/2018   Component Date Value    White Blood Cell Count 11/13/2018 7 9     Red Blood Cell Count 11/13/2018 4 86     Hemoglobin 11/13/2018 15 8     HCT 11/13/2018 47 1     MCV 11/13/2018 97     MCH 11/13/2018 32 5     MCHC 11/13/2018 33 5     RDW 11/13/2018 14 1     Platelet Count 94/84/9965 299     Glucose, Random 11/13/2018 140*    BUN 11/13/2018 15     Creatinine 11/13/2018 0 97     eGFR Non  11/13/2018 84     eGFR  11/13/2018 98     SL AMB BUN/CREATININE RA* 11/13/2018 15     Sodium 11/13/2018 141     Potassium 11/13/2018 4 2     Chloride 11/13/2018 104     CO2 11/13/2018 22     CALCIUM 11/13/2018 9 3     Magnesium, Serum 11/13/2018 2 2        Diagnostics:  I have personally reviewed pertinent films in PACS  Office Spirometry Results:  FEV1: 3 06 liters(71%)  FVC: 3 68 liters(65%)  FEV1/FVC: 83 2 %  ESS:    Ct Soft Tissue Neck With Contrast    Result Date: 7/3/2019  Narrative: CT NECK WITH CONTRAST INDICATION:   R22 1: Localized swelling, mass and lump, neck  Difficulty swallowing  Hoarseness  Mass on right side of neck  COMPARISON:  None  TECHNIQUE:  Axial, sagittal, and coronal 2D reformatted images were created from the axial source data and submitted for interpretation  Radiation dose length product (DLP) for this visit:  994 51 mGy-cm   This examination, like all CT scans performed in the [facility], was performed utilizing techniques to minimize radiation dose exposure, including the use of iterative  reconstruction and automated exposure control  IV Contrast:  85 mL of iohexol (OMNIPAQUE) IMAGE QUALITY:  Diagnostic  FINDINGS: VISUALIZED BRAIN PARENCHYMA:  No acute intracranial pathology of the visualized brain parenchyma  VISUALIZED ORBITS AND PARANASAL SINUSES:  Normal  NASAL CAVITY AND NASOPHARYNX:  Normal  SUPRAHYOID NECK:  Normal oral cavity, tongue base, tonsillar fossa and epiglottis  INFRAHYOID NECK:  Aryepiglottic folds and piriform sinuses are normal   Normal glottis and subglottic airway  THYROID GLAND:  Unremarkable  PAROTID AND SUBMANDIBULAR GLANDS:  Normal  LYMPH NODES:  Numerous normal sized lymph nodes are seen in the neck  There are no pathologic appearing nodes identified   VASCULAR STRUCTURES:  Normal enhancement of the cervical vasculature  THORACIC INLET:  Mildly enlarged anterior mediastinal lymph node identified on series etiology  BONY STRUCTURES: No acute fracture or destructive osseous lesion  Impression: No radiographic abnormality identified on long the right side of the neck at the patient's site of palpable abnormality which is located just inferior to the right submandibular gland  Mildly enlarged anterior mediastinal lymph node identified measuring 12 x 13 x 9 mm of uncertain etiology  Consider CT chest for further evaluation   Workstation performed: [id number]

## 2019-07-16 ENCOUNTER — OFFICE VISIT (OUTPATIENT)
Dept: OTOLARYNGOLOGY | Facility: CLINIC | Age: 64
End: 2019-07-16
Payer: MEDICARE

## 2019-07-16 VITALS
SYSTOLIC BLOOD PRESSURE: 120 MMHG | BODY MASS INDEX: 38.36 KG/M2 | WEIGHT: 315 LBS | DIASTOLIC BLOOD PRESSURE: 72 MMHG | HEIGHT: 76 IN

## 2019-07-16 DIAGNOSIS — R22.1 MASS OF RIGHT SIDE OF NECK: Primary | ICD-10-CM

## 2019-07-16 PROCEDURE — 99213 OFFICE O/P EST LOW 20 MIN: CPT | Performed by: SPECIALIST

## 2019-07-16 NOTE — PROGRESS NOTES
Assessment/Plan:    Mass of right side of neck  Reviewed Ct scan neck report and actual images indicating no significant findings except elevation muscles and soft tissue right neck  On exam tender palpable superfiical neck mass on right near parotid tail  Reviewed treatment options including FNA in office, further imaging, vs surgical interventions  Discussed excisional biopsy in OR setting in detail including risk impact facial nerve weakness  Discussed actual surgical procedure as well as risks of infection, anesthesia, bleeding, and treatment failure  Reviewed post operative expectations  Pt agreed to proceed with surgical intervention and will follow up with surgical scheduling in the near future for excisional biopsy of right neck mass under local sedation with local anesthetic  Diagnoses and all orders for this visit:    Mass of right side of neck          Subjective:      Patient ID: Pau Abdul  is a 66 y o  male  Presents today for follow up due to right neck mass  Recent Ct scan and here to review results  Right neck mass unchanged since last visit  Remains tender to touch  Slowly increasing in size over past several months  The following portions of the patient's history were reviewed and updated as appropriate: allergies, current medications, past family history, past medical history, past social history, past surgical history and problem list     Review of Systems   Constitutional: Negative  HENT: Negative for congestion, ear discharge, ear pain, hearing loss, nosebleeds, postnasal drip, rhinorrhea, sinus pressure, sinus pain, sore throat, tinnitus and voice change  Eyes: Negative  Respiratory: Negative for chest tightness and shortness of breath  Cardiovascular: Negative  Gastrointestinal: Negative  Endocrine: Negative  Musculoskeletal: Positive for neck pain  Skin: Negative for color change     Neurological: Negative for dizziness, numbness and headaches  Psychiatric/Behavioral: Negative  Objective:      /72 (BP Location: Left arm, Patient Position: Sitting, Cuff Size: Large)   Ht 6' 3 5" (1 918 m)   Wt (!) 158 kg (349 lb)   BMI 43 05 kg/m²          Physical Exam   Constitutional: He is oriented to person, place, and time  He appears well-developed and well-nourished  He is cooperative  HENT:   Head: Normocephalic  Right Ear: Hearing, tympanic membrane, external ear and ear canal normal  No drainage or tenderness  Tympanic membrane is not perforated and not erythematous  No decreased hearing is noted  Left Ear: Hearing, tympanic membrane, external ear and ear canal normal  No drainage or tenderness  Tympanic membrane is not perforated and not erythematous  No decreased hearing is noted  Nose: Nose normal  No sinus tenderness, nasal deformity or septal deviation  Mouth/Throat: Uvula is midline, oropharynx is clear and moist and mucous membranes are normal  Mucous membranes are not pale and not dry  No oral lesions  Normal dentition  No oropharyngeal exudate  Neck: Normal range of motion and full passive range of motion without pain  Neck supple  No tracheal deviation present  Palpable right neck mass     Cardiovascular: Normal rate  Pulmonary/Chest: Effort normal  No accessory muscle usage  No respiratory distress  Musculoskeletal:        Right shoulder: He exhibits normal range of motion  Lymphadenopathy:     He has no cervical adenopathy  Neurological: He is alert and oriented to person, place, and time  No cranial nerve deficit or sensory deficit  Skin: Skin is warm, dry and intact  Psychiatric: He has a normal mood and affect         Scribe Attestation    I,:   AYLA Souza am acting as a scribe while in the presence of the attending physician :        I,:   Trev Ronquillo MD personally performed the services described in this documentation    as scribed in my presence :

## 2019-07-16 NOTE — ASSESSMENT & PLAN NOTE
Reviewed Ct scan neck report and actual images indicating no significant findings except elevation muscles and soft tissue right neck  On exam tender palpable superfiical neck mass on right near parotid tail  Reviewed treatment options including FNA in office, further imaging, vs surgical interventions  Discussed excisional biopsy in OR setting in detail including risk impact facial nerve weakness  Discussed actual surgical procedure as well as risks of infection, anesthesia, bleeding, and treatment failure  Reviewed post operative expectations  Pt agreed to proceed with surgical intervention and will follow up with surgical scheduling in the near future for excisional biopsy of right neck mass under local sedation with local anesthetic

## 2019-07-25 NOTE — PROGRESS NOTES
I called pt and he stated that he forgot but was driving at the moment and would call back to get scheduling number

## 2019-08-04 DIAGNOSIS — I50.32 CHRONIC DIASTOLIC CONGESTIVE HEART FAILURE (HCC): ICD-10-CM

## 2019-08-05 RX ORDER — ENALAPRIL MALEATE 5 MG/1
TABLET ORAL
Qty: 90 TABLET | Refills: 1 | Status: SHIPPED | OUTPATIENT
Start: 2019-08-05 | End: 2020-02-28

## 2019-08-13 ENCOUNTER — OFFICE VISIT (OUTPATIENT)
Dept: FAMILY MEDICINE CLINIC | Facility: CLINIC | Age: 64
End: 2019-08-13
Payer: MEDICARE

## 2019-08-13 ENCOUNTER — HOSPITAL ENCOUNTER (OUTPATIENT)
Dept: RADIOLOGY | Facility: HOSPITAL | Age: 64
Discharge: HOME | End: 2019-08-13
Attending: FAMILY MEDICINE
Payer: MEDICARE

## 2019-08-13 VITALS
BODY MASS INDEX: 38.36 KG/M2 | DIASTOLIC BLOOD PRESSURE: 84 MMHG | HEART RATE: 76 BPM | TEMPERATURE: 97.7 F | WEIGHT: 315 LBS | SYSTOLIC BLOOD PRESSURE: 146 MMHG | HEIGHT: 76 IN | RESPIRATION RATE: 18 BRPM

## 2019-08-13 DIAGNOSIS — M79.89 PAIN AND SWELLING OF RIGHT LOWER LEG: ICD-10-CM

## 2019-08-13 DIAGNOSIS — M79.661 PAIN AND SWELLING OF RIGHT LOWER LEG: ICD-10-CM

## 2019-08-13 DIAGNOSIS — R60.0 LEG EDEMA: Primary | ICD-10-CM

## 2019-08-13 DIAGNOSIS — R60.0 LEG EDEMA: ICD-10-CM

## 2019-08-13 PROCEDURE — 99214 OFFICE O/P EST MOD 30 MIN: CPT | Performed by: FAMILY MEDICINE

## 2019-08-13 PROCEDURE — 93971 EXTREMITY STUDY: CPT

## 2019-08-13 PROCEDURE — 93971 EXTREMITY STUDY: CPT | Performed by: SURGERY

## 2019-08-13 NOTE — PROGRESS NOTES
Chief Complaint   Patient presents with    Leg Swelling        Patient ID: Tram Malone is a 66 y o  male  HPI  Pt is seeing for acute swelling with pain in R leg noticed 2 days ago -  No injury, has chronic b/l legs edema -  On Lasix -  R knee severe OA with multiple surgeries   -  No SOB     The following portions of the patient's history were reviewed and updated as appropriate: allergies, current medications, past family history, past medical history, past social history, past surgical history and problem list     Review of Systems   Respiratory: Negative  Negative for cough, chest tightness, shortness of breath and wheezing  Cardiovascular: Negative for chest pain and palpitations  Gastrointestinal: Negative  Musculoskeletal: Positive for gait problem (2 to pain )  Neurological: Negative for dizziness and light-headedness  Hematological: Does not bruise/bleed easily         Current Outpatient Medications   Medication Sig Dispense Refill    albuterol (PROVENTIL HFA,VENTOLIN HFA) 90 mcg/act inhaler Inhale 2 puffs every 6 (six) hours as needed for wheezing or shortness of breath DO NOT USE MORE THAN DIRECTED 1 Inhaler 6    aspirin (ECOTRIN LOW STRENGTH) 81 mg EC tablet Take 81 mg by mouth daily      atenolol (TENORMIN) 50 mg tablet TAKE ONE TABLET BY MOUTH EVERY DAY 90 tablet 1    cyclobenzaprine (FLEXERIL) 10 mg tablet Take 1 tablet (10 mg total) by mouth 3 (three) times a day as needed for muscle spasms 60 tablet 1    enalapril (VASOTEC) 5 mg tablet TAKE ONE TABLET BY MOUTH EVERY DAY 90 tablet 1    furosemide (LASIX) 20 mg tablet TAKE ONE TABLET BY MOUTH EVERY DAY AS NEEDED FOR LEG SWELLING OR WEIGHT GAIN OF 3 POUND IN DAY 30 tablet 3    furosemide (LASIX) 40 mg tablet TAKE ONE TABLET BY MOUTH EVERY DAY 90 tablet 1    levocetirizine (XYZAL ALLERGY 24HR) 5 MG tablet Take 5 mg by mouth every evening      loratadine (CLARITIN) 10 mg tablet Take 10 mg by mouth daily      OXcarbazepine (TRILEPTAL) 300 mg tablet TAKE ONE AND ONE-HALF TABLETS BY MOUTH TWICE A DAY 90 tablet 1    potassium chloride (KLOR-CON M20) 20 mEq tablet Take 1 tablet (20 mEq total) by mouth daily 30 tablet 6    sertraline (ZOLOFT) 100 mg tablet Take 1 tablet (100 mg total) by mouth 2 (two) times a day 60 tablet 1    umeclidinium-vilanterol (ANORO ELLIPTA) 62 5-25 MCG/INH inhaler Inhale 1 puff daily 1 Inhaler 5    fluticasone (FLONASE) 50 mcg/act nasal spray 2 sprays into each nostril daily (Patient not taking: Reported on 8/13/2019) 16 g 0    fluticasone-salmeterol (ADVAIR) 250-50 mcg/dose inhaler Inhale 1 puff every 12 (twelve) hours (Patient not taking: Reported on 8/13/2019) 1 Inhaler 2    ibuprofen (MOTRIN) 600 mg tablet Take 1 tablet (600 mg total) by mouth every 6 (six) hours as needed for mild pain (Patient not taking: Reported on 7/10/2019) 30 tablet 0    omeprazole (PriLOSEC) 40 MG capsule TAKE 1 CAPSULE TWICE A DAY FOR 7 DAYS, THEN 1 CAPSULE DAILY (Patient not taking: Reported on 6/11/2019) 37 capsule 5    pravastatin (PRAVACHOL) 20 mg tablet Take 1 tablet by mouth daily with dinner for 30 days Patient would like to try pravastatin 20mg, he does have myalgia from Lipitor/Crestor in the past  30 tablet 0    tiotropium (SPIRIVA) 18 mcg inhalation capsule Place 1 capsule (18 mcg total) into inhaler and inhale daily (Patient not taking: Reported on 7/16/2019) 30 capsule 0     No current facility-administered medications for this visit  Objective:    /84 (BP Location: Left arm, Patient Position: Sitting, Cuff Size: Large)   Pulse 76   Temp 97 7 °F (36 5 °C) (Tympanic)   Resp 18   Ht 6' 3 5" (1 918 m)   Wt (!) 158 kg (349 lb)   BMI 43 05 kg/m²        Physical Exam   Constitutional: No distress  Cardiovascular: Normal rate  Musculoskeletal: He exhibits edema (+ 2 not pitting edema RLE up to the knee level and + 1 pitting edema of LLE ) and tenderness (over R calf )  He exhibits no deformity  R limp               Assessment/Plan:         Diagnoses and all orders for this visit:    Leg edema  -     VAS lower limb venous duplex study, unilateral/limited; Future    Pain and swelling of right lower leg  -     VAS lower limb venous duplex study, unilateral/limited;  Future      will r/o DVT  Will send to ortho 2 to R knee issues     rto prishan Wolf MD

## 2019-08-27 ENCOUNTER — TELEPHONE (OUTPATIENT)
Dept: FAMILY MEDICINE CLINIC | Facility: CLINIC | Age: 64
End: 2019-08-27

## 2019-08-27 NOTE — TELEPHONE ENCOUNTER
Unfortunately it will probably be difficult to change surgeons and maintain same surg date (so close)  Dr Galvin Beams very good surg  Can Olga Clemente try to change work schedule? Or he can try his partner Dr Yarelis Milner, same phone number  If he switches practice the new surgeon will probably restart the process  He can try [facility]

## 2019-08-27 NOTE — TELEPHONE ENCOUNTER
Betina:    Patient was scheduled with Dr Anna Doss on 9/17 for surgery and he got a call today that his appointment was moved to 9/6  He planned vacation for the week of the 17th so that he could take time off to recover  He is asking if there is another doctor that you could recommend? Please c/b to discuss further

## 2019-08-31 DIAGNOSIS — I10 ESSENTIAL HYPERTENSION: ICD-10-CM

## 2019-08-31 DIAGNOSIS — I50.32 CHRONIC DIASTOLIC CONGESTIVE HEART FAILURE (HCC): ICD-10-CM

## 2019-09-02 RX ORDER — FUROSEMIDE 40 MG/1
TABLET ORAL
Qty: 90 TABLET | Refills: 1 | Status: SHIPPED | OUTPATIENT
Start: 2019-09-02 | End: 2019-11-06

## 2019-09-02 RX ORDER — ATENOLOL 50 MG/1
TABLET ORAL
Qty: 90 TABLET | Refills: 1 | Status: SHIPPED | OUTPATIENT
Start: 2019-09-02 | End: 2020-02-03

## 2019-09-10 ENCOUNTER — APPOINTMENT (OUTPATIENT)
Dept: RADIOLOGY | Facility: CLINIC | Age: 64
End: 2019-09-10
Payer: MEDICARE

## 2019-09-10 ENCOUNTER — OFFICE VISIT (OUTPATIENT)
Dept: FAMILY MEDICINE CLINIC | Facility: CLINIC | Age: 64
End: 2019-09-10

## 2019-09-10 VITALS
TEMPERATURE: 96 F | RESPIRATION RATE: 14 BRPM | HEART RATE: 76 BPM | SYSTOLIC BLOOD PRESSURE: 140 MMHG | BODY MASS INDEX: 43.05 KG/M2 | DIASTOLIC BLOOD PRESSURE: 80 MMHG | HEIGHT: 76 IN

## 2019-09-10 DIAGNOSIS — S99.922A FOOT INJURY, LEFT, INITIAL ENCOUNTER: Primary | ICD-10-CM

## 2019-09-10 DIAGNOSIS — Z87.81 HISTORY OF FRACTURE OF FOOT: ICD-10-CM

## 2019-09-10 DIAGNOSIS — S99.922A FOOT INJURY, LEFT, INITIAL ENCOUNTER: ICD-10-CM

## 2019-09-10 PROCEDURE — 73630 X-RAY EXAM OF FOOT: CPT

## 2019-09-10 PROCEDURE — 73610 X-RAY EXAM OF ANKLE: CPT

## 2019-09-10 PROCEDURE — 99213 OFFICE O/P EST LOW 20 MIN: CPT | Performed by: NURSE PRACTITIONER

## 2019-09-10 NOTE — PROGRESS NOTES
Subjective     Dieter Shafer Sr  is a 66 y o  male who presents with left foot and ankle pain  Onset of the symptoms was yesterday  Precipitating event: injured by accidently kicking curb, then turned ankle  Current symptoms include: ability to bear weight, but with some pain, bruising, pain at the medial aspect of the ankle and swelling  Aggravating factors: any weight bearing  Symptoms have progressed to a point and plateaued  Patient has had prior foot problems-h/o multiple stress fractures in left foot several years ago (I have no record)  Evaluation to date: none r/t current injury  has seen ortho in [place] for past issues    Treatment to date: avoidance of offending activity, brace which is somewhat effective, ice, OTC analgesics which are somewhat effective and rest     The following portions of the patient's history were reviewed and updated as appropriate: allergies, current medications, past family history, past medical history, past social history, past surgical history and problem list     Review of Systems:  Pertinent items are noted in HPI       Objective     /80 (BP Location: Left arm, Patient Position: Sitting, Cuff Size: Large)   Pulse 76   Temp (!) 96 °F (35 6 °C)   Resp 14   Ht 6' 3 5" (1 918 m)   BMI 43 05 kg/m²   Right foot:  normal exam, no swelling, tenderness, instability; ligaments intact, full range of motion of all ankle/foot joints   Left foot:  soft tissue swelling noted over the inner aspect foot and ankle and inner aspect of left foot and ankle     Imaging:  X-ray of the left foot: ordered, but results not yet available  Assessment/Plan     Non-specific foot pain  R/o fracture    The case discussed with patient using patient centered shared decision making  The patient was counseled regarding instructions for management,-- risk factor reductions,-- prognosis,-- impressions,-- risks and benefits of treatment options,-- importance of compliance with treatment   I have reviewed the instructions with the patient, answering all questions to his satisfaction  Natural history and expected course discussed  Questions answered  Rest, ice, compression, and elevation (RICE) therapy  OTC analgesics as needed  will call later today with xr results-will determine treatment plan at that time

## 2019-09-10 NOTE — LETTER
September 10, 2019     Patient: Rabia Yo  YOB: 1955   Date of Visit: 9/10/2019       To Whom it May Concern:    Siva Victor is under my professional care  He was seen in my office on 9/10/2019  He may return to work on 9/12/19  excused 9/11/19  If you have any questions or concerns, please don't hesitate to call           Sincerely,          AYLA Eastman        CC: No Recipients

## 2019-09-11 ENCOUNTER — TELEPHONE (OUTPATIENT)
Dept: FAMILY MEDICINE CLINIC | Facility: CLINIC | Age: 64
End: 2019-09-11

## 2019-09-11 NOTE — TELEPHONE ENCOUNTER
Please advise Mendoza Garcia that the XRs show old arthritic changes, bone spurs  No new fractures  Cont plan of care as we discussed   If pain does not dissipate as we discussed, he should follow up with podiatry or ortho

## 2019-09-17 ENCOUNTER — OFFICE VISIT (OUTPATIENT)
Dept: FAMILY MEDICINE CLINIC | Facility: CLINIC | Age: 64
End: 2019-09-17
Payer: MEDICARE

## 2019-09-17 VITALS
WEIGHT: 315 LBS | DIASTOLIC BLOOD PRESSURE: 76 MMHG | BODY MASS INDEX: 38.36 KG/M2 | TEMPERATURE: 95 F | SYSTOLIC BLOOD PRESSURE: 120 MMHG | OXYGEN SATURATION: 95 % | RESPIRATION RATE: 16 BRPM | HEIGHT: 76 IN | HEART RATE: 74 BPM

## 2019-09-17 DIAGNOSIS — Z09 FOLLOW UP: Primary | ICD-10-CM

## 2019-09-17 DIAGNOSIS — Z79.899 HIGH RISK MEDICATION USE: ICD-10-CM

## 2019-09-17 DIAGNOSIS — J06.9 ACUTE URI: ICD-10-CM

## 2019-09-17 DIAGNOSIS — I10 ESSENTIAL HYPERTENSION: ICD-10-CM

## 2019-09-17 DIAGNOSIS — Z13.29 SCREENING FOR THYROID DISORDER: ICD-10-CM

## 2019-09-17 DIAGNOSIS — Z12.11 COLON CANCER SCREENING: ICD-10-CM

## 2019-09-17 DIAGNOSIS — Z12.5 PROSTATE CANCER SCREENING: ICD-10-CM

## 2019-09-17 DIAGNOSIS — R73.9 HYPERGLYCEMIA: ICD-10-CM

## 2019-09-17 DIAGNOSIS — E78.2 MIXED HYPERLIPIDEMIA: ICD-10-CM

## 2019-09-17 PROCEDURE — 99214 OFFICE O/P EST MOD 30 MIN: CPT | Performed by: NURSE PRACTITIONER

## 2019-09-17 RX ORDER — AZITHROMYCIN 250 MG/1
TABLET, FILM COATED ORAL
Qty: 6 TABLET | Refills: 0 | Status: SHIPPED | OUTPATIENT
Start: 2019-09-17 | End: 2019-09-21

## 2019-09-17 RX ORDER — BENZONATATE 200 MG/1
200 CAPSULE ORAL 3 TIMES DAILY PRN
Qty: 20 CAPSULE | Refills: 0 | Status: SHIPPED | OUTPATIENT
Start: 2019-09-17 | End: 2019-10-25 | Stop reason: SDUPTHER

## 2019-09-17 NOTE — PROGRESS NOTES
Assessment/Plan:    1  Follow up    2  Essential hypertension  -     Comprehensive metabolic panel; Future  -     CBC and Platelet; Future  -     TSH, 3rd generation with Free T4 reflex; Future    3  Hyperglycemia  -     TSH, 3rd generation with Free T4 reflex; Future  -     Hemoglobin A1C; Future    4  Mixed hyperlipidemia  -     Lipid panel; Future    5  Acute URI  -     azithromycin (ZITHROMAX) 250 mg tablet; Take 2 tablets today then 1 tablet daily x 4 days  -     benzonatate (TESSALON) 200 MG capsule; Take 1 capsule (200 mg total) by mouth 3 (three) times a day as needed for cough    6  Screening for thyroid disorder  -     TSH, 3rd generation with Free T4 reflex; Future    7  Prostate cancer screening  -     PSA, Total Screen; Future    8  Colon cancer screening  -     Cologuard; Future    9  High risk medication use  -     Comprehensive metabolic panel; Future  -     CBC and Platelet; Future  -     TSH, 3rd generation with Free T4 reflex; Future  -     Hemoglobin A1C; Future      The case discussed with patient using patient centered shared decision making  The patient was counseled regarding instructions for management,-- risk factor reductions,-- prognosis,-- impressions,-- risks and benefits of treatment options,-- importance of compliance with treatment  I have reviewed the instructions with the patient, answering all questions to his satisfaction  Supportive care, abx for URI  Will call with results  Advised to wait to get labs until feeling well  Advised to call in 1 week with update  Return in about 3 months (around 12/17/2019)  Subjective:      Patient ID: Aneta Goodpasture  is a 66 y o  male  Chief Complaint   Patient presents with    Follow-up     3 month f/u       Also wants to review chronic conditions    Due for blood work    Seeing thoracic surg at [facility] in 2 months  Has f/u ct scan schedule RE: AAA    Foot and ankle pain (seen by me last week) largely resolved  Only c/o today is new URI    URI    This is a new problem  The current episode started in the past 7 days  The problem has been gradually worsening  There has been no fever  Associated symptoms include congestion, coughing, headaches, sinus pain, a sore throat and wheezing  He has tried nothing for the symptoms  The following portions of the patient's history were reviewed and updated as appropriate: allergies, current medications, past family history, past medical history, past social history, past surgical history and problem list     Review of Systems   Constitutional: Positive for chills and fatigue  Negative for fever  HENT: Positive for congestion, sinus pain and sore throat  Negative for trouble swallowing  Respiratory: Positive for cough and wheezing  Negative for shortness of breath  Cardiovascular: Negative  Gastrointestinal: Negative  Neurological: Positive for headaches  Negative for dizziness, tremors, syncope and weakness  Hematological: Negative            Current Outpatient Medications   Medication Sig Dispense Refill    albuterol (PROVENTIL HFA,VENTOLIN HFA) 90 mcg/act inhaler Inhale 2 puffs every 6 (six) hours as needed for wheezing or shortness of breath DO NOT USE MORE THAN DIRECTED 1 Inhaler 6    aspirin (ECOTRIN LOW STRENGTH) 81 mg EC tablet Take 81 mg by mouth daily      atenolol (TENORMIN) 50 mg tablet TAKE ONE TABLET BY MOUTH EVERY DAY 90 tablet 1    atenolol (TENORMIN) 50 mg tablet TAKE ONE TABLET BY MOUTH EVERY DAY 90 tablet 1    cyclobenzaprine (FLEXERIL) 10 mg tablet Take 1 tablet (10 mg total) by mouth 3 (three) times a day as needed for muscle spasms 60 tablet 1    enalapril (VASOTEC) 5 mg tablet TAKE ONE TABLET BY MOUTH EVERY DAY 90 tablet 1    fluticasone (FLONASE) 50 mcg/act nasal spray 2 sprays into each nostril daily 16 g 0    fluticasone-salmeterol (ADVAIR) 250-50 mcg/dose inhaler Inhale 1 puff every 12 (twelve) hours 1 Inhaler 2    furosemide (LASIX) 20 mg tablet TAKE ONE TABLET BY MOUTH EVERY DAY AS NEEDED FOR LEG SWELLING OR WEIGHT GAIN OF 3 POUND IN DAY 30 tablet 3    furosemide (LASIX) 40 mg tablet TAKE ONE TABLET BY MOUTH EVERY DAY 90 tablet 1    furosemide (LASIX) 40 mg tablet TAKE ONE TABLET BY MOUTH EVERY DAY 90 tablet 1    ibuprofen (MOTRIN) 600 mg tablet Take 1 tablet (600 mg total) by mouth every 6 (six) hours as needed for mild pain 30 tablet 0    levocetirizine (XYZAL ALLERGY 24HR) 5 MG tablet Take 5 mg by mouth every evening      loratadine (CLARITIN) 10 mg tablet Take 10 mg by mouth daily      omeprazole (PriLOSEC) 40 MG capsule TAKE 1 CAPSULE TWICE A DAY FOR 7 DAYS, THEN 1 CAPSULE DAILY 37 capsule 5    OXcarbazepine (TRILEPTAL) 300 mg tablet TAKE ONE AND ONE-HALF TABLETS BY MOUTH TWICE A DAY 90 tablet 1    potassium chloride (KLOR-CON M20) 20 mEq tablet Take 1 tablet (20 mEq total) by mouth daily 30 tablet 6    sertraline (ZOLOFT) 100 mg tablet Take 1 tablet (100 mg total) by mouth 2 (two) times a day 60 tablet 1    tiotropium (SPIRIVA) 18 mcg inhalation capsule Place 1 capsule (18 mcg total) into inhaler and inhale daily 30 capsule 0    umeclidinium-vilanterol (ANORO ELLIPTA) 62 5-25 MCG/INH inhaler Inhale 1 puff daily 1 Inhaler 5    azithromycin (ZITHROMAX) 250 mg tablet Take 2 tablets today then 1 tablet daily x 4 days 6 tablet 0    benzonatate (TESSALON) 200 MG capsule Take 1 capsule (200 mg total) by mouth 3 (three) times a day as needed for cough 20 capsule 0    pravastatin (PRAVACHOL) 20 mg tablet Take 1 tablet by mouth daily with dinner for 30 days Patient would like to try pravastatin 20mg, he does have myalgia from Lipitor/Crestor in the past  30 tablet 0     No current facility-administered medications for this visit          Objective:    /76 (BP Location: Left arm, Patient Position: Sitting, Cuff Size: Large)   Pulse 74   Temp (!) 95 °F (35 °C)   Resp 16   Ht 6' 4" (1 93 m)   Wt (!) 160 kg (352 lb 9 6 oz)   SpO2 95%   BMI 42 92 kg/m²        Physical Exam   Constitutional: He is oriented to person, place, and time  He appears well-developed and well-nourished  No distress  HENT:   Head: Normocephalic and atraumatic  Nose: Mucosal edema and rhinorrhea present  Right sinus exhibits no maxillary sinus tenderness and no frontal sinus tenderness  Left sinus exhibits no maxillary sinus tenderness and no frontal sinus tenderness  Mouth/Throat: Mucous membranes are normal  Posterior oropharyngeal erythema present  No oropharyngeal exudate  TMs dull    Post nasal drip   Eyes: Pupils are equal, round, and reactive to light  Conjunctivae and EOM are normal    Neck: Carotid bruit is not present  No thyromegaly present  Cardiovascular: Normal rate, regular rhythm and intact distal pulses  Murmur heard  Pulmonary/Chest: Effort normal  He has decreased breath sounds  He has wheezes  Abdominal: Soft  Bowel sounds are normal  He exhibits no abdominal bruit  There is no hepatosplenomegaly  There is no tenderness  Musculoskeletal: He exhibits no edema  Lymphadenopathy:     He has cervical adenopathy  Neurological: He is alert and oriented to person, place, and time  Coordination normal    Skin: Skin is warm and dry  Capillary refill takes less than 2 seconds  No pallor  Psychiatric: He has a normal mood and affect  His behavior is normal    Nursing note and vitals reviewed               Lakshmi Sharma, [facility]

## 2019-09-18 ENCOUNTER — TELEPHONE (OUTPATIENT)
Dept: FAMILY MEDICINE CLINIC | Facility: CLINIC | Age: 64
End: 2019-09-18

## 2019-10-04 ENCOUNTER — OFFICE VISIT (OUTPATIENT)
Dept: FAMILY MEDICINE CLINIC | Facility: CLINIC | Age: 64
End: 2019-10-04
Payer: MEDICARE

## 2019-10-04 VITALS
HEIGHT: 76 IN | RESPIRATION RATE: 16 BRPM | WEIGHT: 315 LBS | BODY MASS INDEX: 38.36 KG/M2 | OXYGEN SATURATION: 96 % | TEMPERATURE: 98.4 F | DIASTOLIC BLOOD PRESSURE: 80 MMHG | HEART RATE: 86 BPM | SYSTOLIC BLOOD PRESSURE: 124 MMHG

## 2019-10-04 DIAGNOSIS — J06.9 UPPER RESPIRATORY TRACT INFECTION, UNSPECIFIED TYPE: Primary | ICD-10-CM

## 2019-10-04 PROCEDURE — 99213 OFFICE O/P EST LOW 20 MIN: CPT | Performed by: FAMILY MEDICINE

## 2019-10-04 RX ORDER — CIPROFLOXACIN 500 MG/1
500 TABLET, FILM COATED ORAL EVERY 12 HOURS SCHEDULED
Qty: 14 TABLET | Refills: 0 | Status: SHIPPED | OUTPATIENT
Start: 2019-10-04 | End: 2019-10-11

## 2019-10-04 NOTE — PROGRESS NOTES
Hoa Susan Cortez  1955 male MRN: [id number]    FAMILY PRACTICE OFFICE VISIT  [facility] - [address]      ASSESSMENT/PLAN  Vicky Arriaga  is a 66 y o  male presents to the office for    1  Upper respiratory tract infection, unspecified type  Likely viral infection however given the persistent for 1 month  Will try Cipro 500 twice a day given that gave him relief in the past   Advised the patient to use Flonase daily till acutely better  Continue home inhalers  - ciprofloxacin (CIPRO) 500 mg tablet; Take 1 tablet (500 mg total) by mouth every 12 (twelve) hours for 7 days  Dispense: 14 tablet; Refill: 0         Disposition: Return to the office in 1 week if symptoms worsen  No future appointments  SUBJECTIVE  CC: Cough (Congestion )      HPI:  Vicky Arriaga  is a 66 y o  male who presents for an acute appointment  Burning nose, and throat  Cough productive; shortness of breath  Symptoms have been worsening to the point where he is sitting on allow and she chair to sleep  Patient has been taking all his inhalers as directed  Appropriately taking fluids  Not taking anything over-the-counter  States that Z-Kale did not work on him last month and has never improved since the last visit  Denies any fevers or chills  Denies any GI or  symptoms  Review of Systems   Constitutional: Positive for fatigue  Negative for activity change, appetite change, chills and fever  HENT: Positive for congestion  Respiratory: Positive for cough and shortness of breath  Negative for chest tightness  Cardiovascular: Negative for chest pain and leg swelling  Gastrointestinal: Negative for abdominal distention, abdominal pain, constipation, diarrhea, nausea and vomiting  All other systems reviewed and are negative        Historical Information   The patient history was reviewed as follows:  Past Medical History:   Diagnosis Date    Aortic aneurysm, abdominal ([address] )  Arthritis of right knee     Asthma     Bipolar disorder (HCC)     Depression     Heart abnormality     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   goes to [facility] (hyperlipidemia)     Hypertension          Medications:     Current Outpatient Medications:     albuterol (PROVENTIL HFA,VENTOLIN HFA) 90 mcg/act inhaler, Inhale 2 puffs every 6 (six) hours as needed for wheezing or shortness of breath DO NOT USE MORE THAN DIRECTED, Disp: 1 Inhaler, Rfl: 6    aspirin (ECOTRIN LOW STRENGTH) 81 mg EC tablet, Take 81 mg by mouth daily, Disp: , Rfl:     atenolol (TENORMIN) 50 mg tablet, TAKE ONE TABLET BY MOUTH EVERY DAY, Disp: 90 tablet, Rfl: 1    atenolol (TENORMIN) 50 mg tablet, TAKE ONE TABLET BY MOUTH EVERY DAY, Disp: 90 tablet, Rfl: 1    benzonatate (TESSALON) 200 MG capsule, Take 1 capsule (200 mg total) by mouth 3 (three) times a day as needed for cough, Disp: 20 capsule, Rfl: 0    cyclobenzaprine (FLEXERIL) 10 mg tablet, Take 1 tablet (10 mg total) by mouth 3 (three) times a day as needed for muscle spasms, Disp: 60 tablet, Rfl: 1    enalapril (VASOTEC) 5 mg tablet, TAKE ONE TABLET BY MOUTH EVERY DAY, Disp: 90 tablet, Rfl: 1    fluticasone-salmeterol (ADVAIR) 250-50 mcg/dose inhaler, Inhale 1 puff every 12 (twelve) hours, Disp: 1 Inhaler, Rfl: 2    furosemide (LASIX) 20 mg tablet, TAKE ONE TABLET BY MOUTH EVERY DAY AS NEEDED FOR LEG SWELLING OR WEIGHT GAIN OF 3 POUND IN DAY, Disp: 30 tablet, Rfl: 3    furosemide (LASIX) 40 mg tablet, TAKE ONE TABLET BY MOUTH EVERY DAY, Disp: 90 tablet, Rfl: 1    furosemide (LASIX) 40 mg tablet, TAKE ONE TABLET BY MOUTH EVERY DAY, Disp: 90 tablet, Rfl: 1    ibuprofen (MOTRIN) 600 mg tablet, Take 1 tablet (600 mg total) by mouth every 6 (six) hours as needed for mild pain, Disp: 30 tablet, Rfl: 0    levocetirizine (XYZAL ALLERGY 24HR) 5 MG tablet, Take 5 mg by mouth every evening, Disp: , Rfl:     loratadine (CLARITIN) 10 mg tablet, Take 10 mg by mouth daily, Disp: , Rfl:     omeprazole (PriLOSEC) 40 MG capsule, TAKE 1 CAPSULE TWICE A DAY FOR 7 DAYS, THEN 1 CAPSULE DAILY, Disp: 37 capsule, Rfl: 5    OXcarbazepine (TRILEPTAL) 300 mg tablet, TAKE ONE AND ONE-HALF TABLETS BY MOUTH TWICE A DAY, Disp: 90 tablet, Rfl: 1    potassium chloride (KLOR-CON M20) 20 mEq tablet, Take 1 tablet (20 mEq total) by mouth daily, Disp: 30 tablet, Rfl: 6    sertraline (ZOLOFT) 100 mg tablet, Take 1 tablet (100 mg total) by mouth 2 (two) times a day, Disp: 60 tablet, Rfl: 1    tiotropium (SPIRIVA) 18 mcg inhalation capsule, Place 1 capsule (18 mcg total) into inhaler and inhale daily, Disp: 30 capsule, Rfl: 0    umeclidinium-vilanterol (ANORO ELLIPTA) 62 5-25 MCG/INH inhaler, Inhale 1 puff daily, Disp: 1 Inhaler, Rfl: 5    pravastatin (PRAVACHOL) 20 mg tablet, Take 1 tablet by mouth daily with dinner for 30 days Patient would like to try pravastatin 20mg, he does have myalgia from Lipitor/Crestor in the past , Disp: 30 tablet, Rfl: 0    Allergies   Allergen Reactions    Lipitor [Atorvastatin]      myalgia     Codeine GI Intolerance    Crestor [Rosuvastatin]      myalgia     Penicillins Rash    Sulfa Antibiotics Rash     Tolerates Lasix       OBJECTIVE  Vitals:   Vitals:    10/04/19 1102   BP: 124/80   BP Location: Left arm   Patient Position: Sitting   Cuff Size: Standard   Pulse: 86   Resp: 16   Temp: 98 4 °F (36 9 °C)   SpO2: 96%   Weight: (!) 160 kg (352 lb 12 8 oz)   Height: 6' 4" (1 93 m)         Physical Exam   Constitutional: He is oriented to person, place, and time  Vital signs are normal  He appears well-developed and well-nourished  HENT:   Head: Normocephalic and atraumatic  Eyes: Pupils are equal, round, and reactive to light  Conjunctivae and EOM are normal    Neck: Normal range of motion  Neck supple  Cardiovascular: Normal rate, regular rhythm, S1 normal, S2 normal, normal heart sounds and intact distal pulses  No murmur heard    Pulmonary/Chest: Effort normal  No respiratory distress  He has no wheezes  He has rhonchi  Musculoskeletal: Normal range of motion  He exhibits no edema  Neurological: He is alert and oriented to person, place, and time  He has normal strength  Skin: Skin is warm  Psychiatric: He has a normal mood and affect  His speech is normal and behavior is normal  Judgment and thought content normal    Vitals reviewed                   Ruma New MD,   [facility]  10/4/2019

## 2019-10-21 ENCOUNTER — TELEPHONE (OUTPATIENT)
Dept: FAMILY MEDICINE CLINIC | Facility: CLINIC | Age: 64
End: 2019-10-21

## 2019-10-22 ENCOUNTER — CLINICAL SUPPORT (OUTPATIENT)
Dept: FAMILY MEDICINE CLINIC | Facility: CLINIC | Age: 64
End: 2019-10-22
Payer: MEDICARE

## 2019-10-22 ENCOUNTER — TELEPHONE (OUTPATIENT)
Dept: OBGYN CLINIC | Facility: MEDICAL CENTER | Age: 64
End: 2019-10-22

## 2019-10-22 DIAGNOSIS — Z23 NEEDS FLU SHOT: Primary | ICD-10-CM

## 2019-10-22 PROCEDURE — 90682 RIV4 VACC RECOMBINANT DNA IM: CPT

## 2019-10-22 PROCEDURE — G0008 ADMIN INFLUENZA VIRUS VAC: HCPCS

## 2019-10-25 ENCOUNTER — APPOINTMENT (OUTPATIENT)
Dept: RADIOLOGY | Facility: CLINIC | Age: 64
End: 2019-10-25
Payer: MEDICARE

## 2019-10-25 ENCOUNTER — OFFICE VISIT (OUTPATIENT)
Dept: FAMILY MEDICINE CLINIC | Facility: CLINIC | Age: 64
End: 2019-10-25
Payer: MEDICARE

## 2019-10-25 ENCOUNTER — OFFICE VISIT (OUTPATIENT)
Dept: OBGYN CLINIC | Facility: CLINIC | Age: 64
End: 2019-10-25
Payer: MEDICARE

## 2019-10-25 VITALS
DIASTOLIC BLOOD PRESSURE: 72 MMHG | TEMPERATURE: 98.5 F | WEIGHT: 249 LBS | HEIGHT: 75 IN | HEART RATE: 70 BPM | RESPIRATION RATE: 16 BRPM | SYSTOLIC BLOOD PRESSURE: 118 MMHG | BODY MASS INDEX: 30.96 KG/M2

## 2019-10-25 VITALS
WEIGHT: 315 LBS | DIASTOLIC BLOOD PRESSURE: 78 MMHG | SYSTOLIC BLOOD PRESSURE: 131 MMHG | HEART RATE: 69 BPM | BODY MASS INDEX: 38.36 KG/M2 | HEIGHT: 76 IN

## 2019-10-25 DIAGNOSIS — J40 BRONCHITIS: Primary | ICD-10-CM

## 2019-10-25 DIAGNOSIS — J06.9 ACUTE URI: ICD-10-CM

## 2019-10-25 DIAGNOSIS — R06.02 SHORTNESS OF BREATH: ICD-10-CM

## 2019-10-25 DIAGNOSIS — J41.0 SIMPLE CHRONIC BRONCHITIS (HCC): ICD-10-CM

## 2019-10-25 DIAGNOSIS — J40 BRONCHITIS: ICD-10-CM

## 2019-10-25 DIAGNOSIS — M17.0 BILATERAL PRIMARY OSTEOARTHRITIS OF KNEE: Primary | ICD-10-CM

## 2019-10-25 PROCEDURE — 99213 OFFICE O/P EST LOW 20 MIN: CPT | Performed by: NURSE PRACTITIONER

## 2019-10-25 PROCEDURE — 20610 DRAIN/INJ JOINT/BURSA W/O US: CPT | Performed by: PHYSICIAN ASSISTANT

## 2019-10-25 PROCEDURE — 71046 X-RAY EXAM CHEST 2 VIEWS: CPT

## 2019-10-25 RX ORDER — AZITHROMYCIN 250 MG/1
TABLET, FILM COATED ORAL
Qty: 6 TABLET | Refills: 0 | Status: SHIPPED | OUTPATIENT
Start: 2019-10-25 | End: 2019-10-29

## 2019-10-25 RX ORDER — PREDNISONE 10 MG/1
TABLET ORAL
Qty: 12 TABLET | Refills: 0 | Status: SHIPPED | OUTPATIENT
Start: 2019-10-25 | End: 2019-11-06

## 2019-10-25 RX ORDER — BENZONATATE 200 MG/1
200 CAPSULE ORAL 3 TIMES DAILY PRN
Qty: 60 CAPSULE | Refills: 0 | Status: SHIPPED | OUTPATIENT
Start: 2019-10-25 | End: 2019-11-06

## 2019-10-25 RX ORDER — HYALURONATE SODIUM 10 MG/ML
20 SYRINGE (ML) INTRAARTICULAR
Status: COMPLETED | OUTPATIENT
Start: 2019-10-25 | End: 2019-10-25

## 2019-10-25 RX ORDER — ALBUTEROL SULFATE 90 UG/1
2 AEROSOL, METERED RESPIRATORY (INHALATION) EVERY 6 HOURS PRN
Qty: 1 INHALER | Refills: 6 | Status: SHIPPED | OUTPATIENT
Start: 2019-10-25 | End: 2019-11-06

## 2019-10-25 RX ADMIN — Medication 20 MG: at 09:24

## 2019-10-25 NOTE — PROGRESS NOTES
Juanita Farley Sr  is a 66 y o  male  for evaluation of shortness of breath and cough  Symptoms occur at rest  Symptoms began last night, gradually worsening since  Associated symptoms include  dyspnea on exertion, dyspnea when laying down, productive cough, shortness of breath and wheezing  He denies chest pain, located substernal area, constant cough and edema    He has not had recent travel  Weight has been stable  Symptoms are exacerbated by activity and lying down  Symptoms are alleviated by nothing  He has tried albuterol x1, rest      The following portions of the patient's history were reviewed by a provider in this encounter and updated as appropriate: pmh, psh, social hx  Patient has hx of diastolic CHF, COPD    Review of Systems  Pertinent items are noted in HPI  Objective     /72 (BP Location: Left arm, Patient Position: Sitting, Cuff Size: Large)   Pulse 70   Temp 98 5 °F (36 9 °C)   Resp 16   Ht 6' 3" (1 905 m)   Wt 113 kg (249 lb)   BMI 31 12 kg/m²   General appearance: alert, moderate distress and morbidly obese  Head: Normocephalic, without obvious abnormality, atraumatic  Eyes: no conj pallor  Throat: abnormal findings: mild oropharyngeal erythema  Lungs: diminished breath sounds and wheezes  Heart: regular rate and rhythm, S1, S2 normal, no murmur, click, rub or gallop  Extremities: edema trace ankle edema, extremities warm with no cyanosis, palp distal pulses  Pulses: 2+ and symmetric  Skin: diaphoretic  Lymph nodes: Cervical adenopathy: normal  Neurologic: Grossly normal    Diagnostic Review  none pertinent    Assessment/Plan     Bronchitis vs CHF exacerbation   The case discussed with patient using patient centered shared decision making  The patient was counseled regarding instructions for management,-- risk factor reductions,-- prognosis,-- impressions,-- risks and benefits of treatment options,-- importance of compliance with treatment   I have reviewed the instructions with the patient, answering all questions to his satisfaction  Patient will get XR immediately after OV  Rx escribed for abx, pred taper  RTO monday  for recheck  If condition worsens, he agrees to go to ER for evaluation and treatment

## 2019-10-25 NOTE — PROGRESS NOTES
Assessment/Plan:  1  Bilateral primary osteoarthritis of knee       Follow-up 1 week    Subjective:   Irvin Dempsey  is a 66 y o  male who presents today for euflexxa #1 bilateral knees         Review of Systems      Past Medical History:   Diagnosis Date    Aortic aneurysm, abdominal (HCC)     Arthritis of right knee     Asthma     Bipolar disorder (HCC)     Depression     Heart abnormality     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   goes to [address] [place] [address] (hyperlipidemia)     Hypertension        Past Surgical History:   Procedure Laterality Date    ABDOMINAL AORTIC ANEURYSM REPAIR      Last assessed: 4/14/16    AORTIC VALVE REPLACEMENT      Last assessed: 4/14/16    FOOT SURGERY      REPLACEMENT TOTAL KNEE BILATERAL      ROTATOR CUFF REPAIR      Last assessed: 4/14/16    SHOULDER SURGERY      TOE SURGERY         Family History   Problem Relation Age of Onset    Heart disease Mother     Alcohol abuse Mother     Hypertension Mother     Cancer Mother     Arthritis Mother     Cancer Father     Hypertension Father     Diabetes Father         Mellitus    Alcohol abuse Father     Arthritis Father     Mental illness Brother         Diseases of the nervous system complicating pregnancy, childbirth and the peurperium    Cancer Brother     No Known Problems Sister     No Known Problems Maternal Aunt     No Known Problems Maternal Uncle     No Known Problems Paternal Aunt     No Known Problems Paternal Uncle     No Known Problems Maternal Grandmother     No Known Problems Maternal Grandfather     No Known Problems Paternal Grandmother     No Known Problems Paternal Grandfather     ADD / ADHD Neg Hx     Anesthesia problems Neg Hx     Clotting disorder Neg Hx     Collagen disease Neg Hx     Dislocations Neg Hx     Learning disabilities Neg Hx     Neurological problems Neg Hx     Osteoporosis Neg Hx     Rheumatologic disease Neg Hx     Scoliosis Neg Hx     Vascular Disease Neg Hx Social History     Occupational History    Not on file   Tobacco Use    Smoking status: Former Smoker     Packs/day: 2 00     Years: 6 00     Pack years: 12 00     Last attempt to quit: 1981     Years since quittin 2    Smokeless tobacco: Never Used    Tobacco comment: quit in    Substance and Sexual Activity    Alcohol use: No     Comment: Per Allscripts: Former consumption of alcohol    Drug use: No     Comment: Per Allscripts: History of crack cocaine and marijuana use    Sexual activity: Not Currently         Current Outpatient Medications:     albuterol (PROVENTIL HFA,VENTOLIN HFA) 90 mcg/act inhaler, Inhale 2 puffs every 6 (six) hours as needed for wheezing or shortness of breath DO NOT USE MORE THAN DIRECTED, Disp: 1 Inhaler, Rfl: 6    aspirin (ECOTRIN LOW STRENGTH) 81 mg EC tablet, Take 81 mg by mouth daily, Disp: , Rfl:     atenolol (TENORMIN) 50 mg tablet, TAKE ONE TABLET BY MOUTH EVERY DAY, Disp: 90 tablet, Rfl: 1    atenolol (TENORMIN) 50 mg tablet, TAKE ONE TABLET BY MOUTH EVERY DAY, Disp: 90 tablet, Rfl: 1    benzonatate (TESSALON) 200 MG capsule, Take 1 capsule (200 mg total) by mouth 3 (three) times a day as needed for cough, Disp: 20 capsule, Rfl: 0    cyclobenzaprine (FLEXERIL) 10 mg tablet, Take 1 tablet (10 mg total) by mouth 3 (three) times a day as needed for muscle spasms, Disp: 60 tablet, Rfl: 1    enalapril (VASOTEC) 5 mg tablet, TAKE ONE TABLET BY MOUTH EVERY DAY, Disp: 90 tablet, Rfl: 1    fluticasone-salmeterol (ADVAIR) 250-50 mcg/dose inhaler, Inhale 1 puff every 12 (twelve) hours, Disp: 1 Inhaler, Rfl: 2    furosemide (LASIX) 20 mg tablet, TAKE ONE TABLET BY MOUTH EVERY DAY AS NEEDED FOR LEG SWELLING OR WEIGHT GAIN OF 3 POUND IN DAY, Disp: 30 tablet, Rfl: 3    furosemide (LASIX) 40 mg tablet, TAKE ONE TABLET BY MOUTH EVERY DAY, Disp: 90 tablet, Rfl: 1    furosemide (LASIX) 40 mg tablet, TAKE ONE TABLET BY MOUTH EVERY DAY, Disp: 90 tablet, Rfl: 1   ibuprofen (MOTRIN) 600 mg tablet, Take 1 tablet (600 mg total) by mouth every 6 (six) hours as needed for mild pain, Disp: 30 tablet, Rfl: 0    levocetirizine (XYZAL ALLERGY 24HR) 5 MG tablet, Take 5 mg by mouth every evening, Disp: , Rfl:     loratadine (CLARITIN) 10 mg tablet, Take 10 mg by mouth daily, Disp: , Rfl:     omeprazole (PriLOSEC) 40 MG capsule, TAKE 1 CAPSULE TWICE A DAY FOR 7 DAYS, THEN 1 CAPSULE DAILY, Disp: 37 capsule, Rfl: 5    OXcarbazepine (TRILEPTAL) 300 mg tablet, TAKE ONE AND ONE-HALF TABLETS BY MOUTH TWICE A DAY, Disp: 90 tablet, Rfl: 1    potassium chloride (KLOR-CON M20) 20 mEq tablet, Take 1 tablet (20 mEq total) by mouth daily, Disp: 30 tablet, Rfl: 6    sertraline (ZOLOFT) 100 mg tablet, Take 1 tablet (100 mg total) by mouth 2 (two) times a day, Disp: 60 tablet, Rfl: 1    tiotropium (SPIRIVA) 18 mcg inhalation capsule, Place 1 capsule (18 mcg total) into inhaler and inhale daily, Disp: 30 capsule, Rfl: 0    umeclidinium-vilanterol (ANORO ELLIPTA) 62 5-25 MCG/INH inhaler, Inhale 1 puff daily, Disp: 1 Inhaler, Rfl: 5    pravastatin (PRAVACHOL) 20 mg tablet, Take 1 tablet by mouth daily with dinner for 30 days Patient would like to try pravastatin 20mg, he does have myalgia from Lipitor/Crestor in the past , Disp: 30 tablet, Rfl: 0    Allergies   Allergen Reactions    Lipitor [Atorvastatin]      myalgia     Codeine GI Intolerance    Crestor [Rosuvastatin]      myalgia     Penicillins Rash    Sulfa Antibiotics Rash     Tolerates Lasix       Objective:  Vitals:    10/25/19 0912   BP: 131/78   Pulse: 69       Ortho Exam    Physical Exam    Large joint arthrocentesis: L knee  Date/Time: 10/25/2019 9:24 AM  Consent given by: patient  Site marked: site marked  Supporting Documentation  Indications: pain   Procedure Details  Location: knee - L knee  Preparation: Patient was prepped and draped in the usual sterile fashion (Alcohol prep)  Needle size: 22 G  Ultrasound guidance: no  Approach: anterolateral  Medications administered: 20 mg Sodium Hyaluronate 20 MG/2ML    Patient tolerance: patient tolerated the procedure well with no immediate complications  Dressing:  Sterile dressing applied    Large joint arthrocentesis: R knee  Date/Time: 10/25/2019 9:24 AM  Consent given by: patient  Site marked: site marked  Supporting Documentation  Indications: pain   Procedure Details  Location: knee - R knee  Preparation: Patient was prepped and draped in the usual sterile fashion (Alcohol prep)  Needle size: 22 G  Ultrasound guidance: no  Approach: anterolateral  Medications administered: 20 mg Sodium Hyaluronate 20 MG/2ML    Patient tolerance: patient tolerated the procedure well with no immediate complications  Dressing:  Sterile dressing applied

## 2019-10-28 ENCOUNTER — TELEPHONE (OUTPATIENT)
Dept: FAMILY MEDICINE CLINIC | Facility: CLINIC | Age: 64
End: 2019-10-28

## 2019-10-28 NOTE — TELEPHONE ENCOUNTER
Pt notified   He said he is still coughing and his chest hurts but it is getting better slowly  tc/cma

## 2019-10-30 ENCOUNTER — OFFICE VISIT (OUTPATIENT)
Dept: OBGYN CLINIC | Facility: CLINIC | Age: 64
End: 2019-10-30
Payer: MEDICARE

## 2019-10-30 DIAGNOSIS — M17.0 BILATERAL PRIMARY OSTEOARTHRITIS OF KNEE: Primary | ICD-10-CM

## 2019-10-30 PROCEDURE — 20610 DRAIN/INJ JOINT/BURSA W/O US: CPT | Performed by: PHYSICIAN ASSISTANT

## 2019-10-30 RX ORDER — HYALURONATE SODIUM 10 MG/ML
20 SYRINGE (ML) INTRAARTICULAR
Status: COMPLETED | OUTPATIENT
Start: 2019-10-30 | End: 2019-10-30

## 2019-10-30 RX ADMIN — Medication 20 MG: at 14:41

## 2019-10-30 NOTE — PROGRESS NOTES
Assessment/Plan:  1  Bilateral primary osteoarthritis of knee       Follow-up 1 week  Subjective:   Leno Riddle is a 66 y o  male who presents today for euflexxa #2 bilateral knees         Review of Systems      Past Medical History:   Diagnosis Date    Aortic aneurysm, abdominal (HCC)     Arthritis of right knee     Asthma     Bipolar disorder (HCC)     Depression     Heart abnormality     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   goes to [address] [place] [address] (hyperlipidemia)     Hypertension        Past Surgical History:   Procedure Laterality Date    ABDOMINAL AORTIC ANEURYSM REPAIR      Last assessed: 4/14/16    AORTIC VALVE REPLACEMENT      Last assessed: 4/14/16    FOOT SURGERY      ROTATOR CUFF REPAIR      Last assessed: 4/14/16    SHOULDER SURGERY      TOE SURGERY         Family History   Problem Relation Age of Onset    Heart disease Mother     Alcohol abuse Mother     Hypertension Mother     Cancer Mother     Arthritis Mother     Cancer Father     Hypertension Father     Diabetes Father         Mellitus    Alcohol abuse Father     Arthritis Father     Mental illness Brother         Diseases of the nervous system complicating pregnancy, childbirth and the peurperium    Cancer Brother     No Known Problems Sister     No Known Problems Maternal Aunt     No Known Problems Maternal Uncle     No Known Problems Paternal Aunt     No Known Problems Paternal Uncle     No Known Problems Maternal Grandmother     No Known Problems Maternal Grandfather     No Known Problems Paternal Grandmother     No Known Problems Paternal Grandfather     ADD / ADHD Neg Hx     Anesthesia problems Neg Hx     Clotting disorder Neg Hx     Collagen disease Neg Hx     Dislocations Neg Hx     Learning disabilities Neg Hx     Neurological problems Neg Hx     Osteoporosis Neg Hx     Rheumatologic disease Neg Hx     Scoliosis Neg Hx     Vascular Disease Neg Hx        Social History     Occupational History  Not on file   Tobacco Use    Smoking status: Former Smoker     Packs/day: 2 00     Years: 6 00     Pack years: 12 00     Last attempt to quit: 1981     Years since quittin 2    Smokeless tobacco: Never Used    Tobacco comment: quit in    Substance and Sexual Activity    Alcohol use: No     Comment: Per Allscripts: Former consumption of alcohol    Drug use: No     Comment: Per Allscripts: History of crack cocaine and marijuana use    Sexual activity: Not Currently         Current Outpatient Medications:     albuterol (PROVENTIL HFA,VENTOLIN HFA) 90 mcg/act inhaler, Inhale 2 puffs every 6 (six) hours as needed for wheezing or shortness of breath DO NOT USE MORE THAN DIRECTED, Disp: 1 Inhaler, Rfl: 6    aspirin (ECOTRIN LOW STRENGTH) 81 mg EC tablet, Take 81 mg by mouth daily, Disp: , Rfl:     atenolol (TENORMIN) 50 mg tablet, TAKE ONE TABLET BY MOUTH EVERY DAY, Disp: 90 tablet, Rfl: 1    atenolol (TENORMIN) 50 mg tablet, TAKE ONE TABLET BY MOUTH EVERY DAY (Patient not taking: Reported on 10/25/2019), Disp: 90 tablet, Rfl: 1    benzonatate (TESSALON) 200 MG capsule, Take 1 capsule (200 mg total) by mouth 3 (three) times a day as needed for cough, Disp: 60 capsule, Rfl: 0    cyclobenzaprine (FLEXERIL) 10 mg tablet, Take 1 tablet (10 mg total) by mouth 3 (three) times a day as needed for muscle spasms, Disp: 60 tablet, Rfl: 1    enalapril (VASOTEC) 5 mg tablet, TAKE ONE TABLET BY MOUTH EVERY DAY, Disp: 90 tablet, Rfl: 1    fluticasone-salmeterol (ADVAIR) 250-50 mcg/dose inhaler, Inhale 1 puff every 12 (twelve) hours, Disp: 1 Inhaler, Rfl: 2    furosemide (LASIX) 20 mg tablet, TAKE ONE TABLET BY MOUTH EVERY DAY AS NEEDED FOR LEG SWELLING OR WEIGHT GAIN OF 3 POUND IN DAY (Patient not taking: Reported on 10/25/2019), Disp: 30 tablet, Rfl: 3    furosemide (LASIX) 40 mg tablet, TAKE ONE TABLET BY MOUTH EVERY DAY, Disp: 90 tablet, Rfl: 1    furosemide (LASIX) 40 mg tablet, TAKE ONE TABLET BY MOUTH EVERY DAY, Disp: 90 tablet, Rfl: 1    ibuprofen (MOTRIN) 600 mg tablet, Take 1 tablet (600 mg total) by mouth every 6 (six) hours as needed for mild pain, Disp: 30 tablet, Rfl: 0    levocetirizine (XYZAL ALLERGY 24HR) 5 MG tablet, Take 5 mg by mouth every evening, Disp: , Rfl:     loratadine (CLARITIN) 10 mg tablet, Take 10 mg by mouth daily, Disp: , Rfl:     omeprazole (PriLOSEC) 40 MG capsule, TAKE 1 CAPSULE TWICE A DAY FOR 7 DAYS, THEN 1 CAPSULE DAILY, Disp: 37 capsule, Rfl: 5    OXcarbazepine (TRILEPTAL) 300 mg tablet, TAKE ONE AND ONE-HALF TABLETS BY MOUTH TWICE A DAY, Disp: 90 tablet, Rfl: 1    potassium chloride (KLOR-CON M20) 20 mEq tablet, Take 1 tablet (20 mEq total) by mouth daily, Disp: 30 tablet, Rfl: 6    pravastatin (PRAVACHOL) 20 mg tablet, Take 1 tablet by mouth daily with dinner for 30 days Patient would like to try pravastatin 20mg, he does have myalgia from Lipitor/Crestor in the past , Disp: 30 tablet, Rfl: 0    predniSONE 10 mg tablet, Take 3 tabs daily for 2 days, then 2 tabs daily for 2 days, then 1 tab daily for 2 days, Disp: 12 tablet, Rfl: 0    sertraline (ZOLOFT) 100 mg tablet, Take 1 tablet (100 mg total) by mouth 2 (two) times a day, Disp: 60 tablet, Rfl: 1    tiotropium (SPIRIVA) 18 mcg inhalation capsule, Place 1 capsule (18 mcg total) into inhaler and inhale daily, Disp: 30 capsule, Rfl: 0    umeclidinium-vilanterol (ANORO ELLIPTA) 62 5-25 MCG/INH inhaler, Inhale 1 puff daily, Disp: 1 Inhaler, Rfl: 5    Allergies   Allergen Reactions    Lipitor [Atorvastatin]      myalgia     Codeine GI Intolerance    Crestor [Rosuvastatin]      myalgia     Penicillins Rash    Sulfa Antibiotics Rash     Tolerates Lasix       Objective: There were no vitals filed for this visit      Ortho Exam    Physical Exam    Large joint arthrocentesis: L knee  Date/Time: 10/30/2019 2:41 PM  Consent given by: patient  Site marked: site marked  Supporting Documentation  Indications: pain Procedure Details  Location: knee - L knee  Preparation: Patient was prepped and draped in the usual sterile fashion (Alcohol prep)  Needle size: 22 G  Ultrasound guidance: no  Approach: anterolateral  Medications administered: 20 mg Sodium Hyaluronate 20 MG/2ML    Patient tolerance: patient tolerated the procedure well with no immediate complications  Dressing:  Sterile dressing applied    Large joint arthrocentesis: R knee  Date/Time: 10/30/2019 2:41 PM  Consent given by: patient  Site marked: site marked  Supporting Documentation  Indications: pain   Procedure Details  Location: knee - R knee  Preparation: Patient was prepped and draped in the usual sterile fashion (Alcohol prep)  Needle size: 22 G  Ultrasound guidance: no  Approach: anterolateral  Medications administered: 20 mg Sodium Hyaluronate 20 MG/2ML    Patient tolerance: patient tolerated the procedure well with no immediate complications  Dressing:  Sterile dressing applied

## 2019-11-05 ENCOUNTER — OFFICE VISIT (OUTPATIENT)
Dept: OBGYN CLINIC | Facility: CLINIC | Age: 64
End: 2019-11-05
Payer: MEDICARE

## 2019-11-05 VITALS
HEIGHT: 76 IN | DIASTOLIC BLOOD PRESSURE: 83 MMHG | BODY MASS INDEX: 30.32 KG/M2 | WEIGHT: 249 LBS | HEART RATE: 69 BPM | SYSTOLIC BLOOD PRESSURE: 148 MMHG

## 2019-11-05 DIAGNOSIS — M76.31 IT BAND SYNDROME, RIGHT: ICD-10-CM

## 2019-11-05 DIAGNOSIS — M70.61 GREATER TROCHANTERIC BURSITIS OF RIGHT HIP: ICD-10-CM

## 2019-11-05 DIAGNOSIS — S76.301D RIGHT HAMSTRING INJURY, SUBSEQUENT ENCOUNTER: Primary | ICD-10-CM

## 2019-11-05 PROCEDURE — 99213 OFFICE O/P EST LOW 20 MIN: CPT | Performed by: ORTHOPAEDIC SURGERY

## 2019-11-05 NOTE — PROGRESS NOTES
Assessment/Plan:  1  Right hamstring injury, subsequent encounter  Ambulatory referral to Physical Therapy   2  It band syndrome, right  Ambulatory referral to Physical Therapy   3  Greater trochanteric bursitis of right hip  Ambulatory referral to Physical Therapy       Scribe Attestation    I,:   Micki Bach am acting as a scribe while in the presence of the attending physician :        I,:   Belen Lewis, DO personally performed the services described in this documentation    as scribed in my presence :          Titus Jones has right-sided leg pain consistent with ITB band syndrome, greater trochanteric bursitis, and right hamstring insertion pain  We discussed that he has a few different issues going on  I am less concerned for an avulsion of the hamstring tendon at this time and believe more of his pain is coming from his ITB band and greater trochanteric bursitis  We discussed that the best form of treatment at this time is to begin physical therapy  He agrees with this treatment plan  He was given a prescription for physical therapy at today's appointment  He can continue with the over-the-counter Aspercreme, Tylenol and ice the area as needed  He will follow up once physical therapy is completed in about 6 weeks  If he is having continued pain at that time we could discuss if a greater trochanteric injection is appropriate at that time or if we should take a more in-depth picture with an MRI  Subjective:   Chanelle Gallegos is a 66 y o  male who presents to the office today for follow up right sided hamstring pain  He was last seen back in April with hamstring pain after felling a sharp pull in the back of his right leg after a fall  I was concerned with his direct tenderness over the his ischial tuberosity and ordered an MRI at that time   At today's appointment he states he never received the MRI because his pain started to improve and he was extremely nervous about getting the MRI due to claustrophobia  About a month or so later he states his pain returned and was a severe pain that increased with walking, movement, going up and down stairs and driving  He is currently getting Euflexxa injections with Dr Paula Navarro and he believes this is helping his hamstring pain but he is still having pain  At today's appointment his pain is a mild pain that increases with going up and down stairs and driving  He has been using Aspercreme as needed as well as Tylenol and Motrin  He denies any radiating pain, numbness or tingling into his lower leg or toes  Review of Systems   Constitutional: Negative for chills, fever and unexpected weight change  HENT: Negative for hearing loss, nosebleeds and sore throat  Eyes: Negative for pain, redness and visual disturbance  Respiratory: Positive for cough  Negative for shortness of breath and wheezing  Cardiovascular: Negative for chest pain, palpitations and leg swelling  Gastrointestinal: Negative for abdominal pain, nausea and vomiting  Endocrine: Negative for polyphagia and polyuria  Genitourinary: Negative for dysuria and hematuria  Musculoskeletal: Positive for arthralgias, joint swelling and myalgias  See HPI   Skin: Negative for rash and wound  Neurological: Negative for dizziness, numbness and headaches  Psychiatric/Behavioral: Negative for decreased concentration and suicidal ideas  The patient is not nervous/anxious            Past Medical History:   Diagnosis Date    Aortic aneurysm, abdominal (HCC)     Arthritis of right knee     Asthma     Bipolar disorder (HCC)     Depression     Heart abnormality     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   goes to [address] [place] [address] (hyperlipidemia)     Hypertension        Past Surgical History:   Procedure Laterality Date    ABDOMINAL AORTIC ANEURYSM REPAIR      Last assessed: 4/14/16    AORTIC VALVE REPLACEMENT      Last assessed: 4/14/16    FOOT SURGERY      ROTATOR CUFF REPAIR Last assessed: 16    SHOULDER SURGERY      TOE SURGERY         Family History   Problem Relation Age of Onset    Heart disease Mother     Alcohol abuse Mother     Hypertension Mother     Cancer Mother     Arthritis Mother     Cancer Father     Hypertension Father     Diabetes Father         Mellitus    Alcohol abuse Father     Arthritis Father     Mental illness Brother         Diseases of the nervous system complicating pregnancy, childbirth and the peurperium    Cancer Brother     No Known Problems Sister     No Known Problems Maternal Aunt     No Known Problems Maternal Uncle     No Known Problems Paternal Aunt     No Known Problems Paternal Uncle     No Known Problems Maternal Grandmother     No Known Problems Maternal Grandfather     No Known Problems Paternal Grandmother     No Known Problems Paternal Grandfather     ADD / ADHD Neg Hx     Anesthesia problems Neg Hx     Clotting disorder Neg Hx     Collagen disease Neg Hx     Dislocations Neg Hx     Learning disabilities Neg Hx     Neurological problems Neg Hx     Osteoporosis Neg Hx     Rheumatologic disease Neg Hx     Scoliosis Neg Hx     Vascular Disease Neg Hx        Social History     Occupational History    Not on file   Tobacco Use    Smoking status: Former Smoker     Packs/day: 2 00     Years: 6 00     Pack years: 12 00     Last attempt to quit: 1981     Years since quittin 2    Smokeless tobacco: Never Used    Tobacco comment: quit in    Substance and Sexual Activity    Alcohol use: No     Comment: Per [employer]: Former consumption of alcohol    Drug use: No     Comment: Per [employer]scripts: History of crack cocaine and marijuana use    Sexual activity: Not Currently         Current Outpatient Medications:     albuterol (PROVENTIL HFA,VENTOLIN HFA) 90 mcg/act inhaler, Inhale 2 puffs every 6 (six) hours as needed for wheezing or shortness of breath DO NOT USE MORE THAN DIRECTED, Disp: 1 Inhaler, Rfl: 6    aspirin (ECOTRIN LOW STRENGTH) 81 mg EC tablet, Take 81 mg by mouth daily, Disp: , Rfl:     atenolol (TENORMIN) 50 mg tablet, TAKE ONE TABLET BY MOUTH EVERY DAY, Disp: 90 tablet, Rfl: 1    benzonatate (TESSALON) 200 MG capsule, Take 1 capsule (200 mg total) by mouth 3 (three) times a day as needed for cough, Disp: 60 capsule, Rfl: 0    cyclobenzaprine (FLEXERIL) 10 mg tablet, Take 1 tablet (10 mg total) by mouth 3 (three) times a day as needed for muscle spasms, Disp: 60 tablet, Rfl: 1    enalapril (VASOTEC) 5 mg tablet, TAKE ONE TABLET BY MOUTH EVERY DAY, Disp: 90 tablet, Rfl: 1    fluticasone-salmeterol (ADVAIR) 250-50 mcg/dose inhaler, Inhale 1 puff every 12 (twelve) hours, Disp: 1 Inhaler, Rfl: 2    furosemide (LASIX) 40 mg tablet, TAKE ONE TABLET BY MOUTH EVERY DAY, Disp: 90 tablet, Rfl: 1    furosemide (LASIX) 40 mg tablet, TAKE ONE TABLET BY MOUTH EVERY DAY, Disp: 90 tablet, Rfl: 1    ibuprofen (MOTRIN) 600 mg tablet, Take 1 tablet (600 mg total) by mouth every 6 (six) hours as needed for mild pain, Disp: 30 tablet, Rfl: 0    levocetirizine (XYZAL ALLERGY 24HR) 5 MG tablet, Take 5 mg by mouth every evening, Disp: , Rfl:     loratadine (CLARITIN) 10 mg tablet, Take 10 mg by mouth daily, Disp: , Rfl:     omeprazole (PriLOSEC) 40 MG capsule, TAKE 1 CAPSULE TWICE A DAY FOR 7 DAYS, THEN 1 CAPSULE DAILY, Disp: 37 capsule, Rfl: 5    OXcarbazepine (TRILEPTAL) 300 mg tablet, TAKE ONE AND ONE-HALF TABLETS BY MOUTH TWICE A DAY, Disp: 90 tablet, Rfl: 1    potassium chloride (KLOR-CON M20) 20 mEq tablet, Take 1 tablet (20 mEq total) by mouth daily, Disp: 30 tablet, Rfl: 6    predniSONE 10 mg tablet, Take 3 tabs daily for 2 days, then 2 tabs daily for 2 days, then 1 tab daily for 2 days, Disp: 12 tablet, Rfl: 0    sertraline (ZOLOFT) 100 mg tablet, Take 1 tablet (100 mg total) by mouth 2 (two) times a day, Disp: 60 tablet, Rfl: 1    tiotropium (SPIRIVA) 18 mcg inhalation capsule, Place 1 capsule (18 mcg total) into inhaler and inhale daily, Disp: 30 capsule, Rfl: 0    umeclidinium-vilanterol (ANORO ELLIPTA) 62 5-25 MCG/INH inhaler, Inhale 1 puff daily, Disp: 1 Inhaler, Rfl: 5    atenolol (TENORMIN) 50 mg tablet, TAKE ONE TABLET BY MOUTH EVERY DAY (Patient not taking: Reported on 10/25/2019), Disp: 90 tablet, Rfl: 1    furosemide (LASIX) 20 mg tablet, TAKE ONE TABLET BY MOUTH EVERY DAY AS NEEDED FOR LEG SWELLING OR WEIGHT GAIN OF 3 POUND IN DAY (Patient not taking: Reported on 10/25/2019), Disp: 30 tablet, Rfl: 3    pravastatin (PRAVACHOL) 20 mg tablet, Take 1 tablet by mouth daily with dinner for 30 days Patient would like to try pravastatin 20mg, he does have myalgia from Lipitor/Crestor in the past , Disp: 30 tablet, Rfl: 0    Allergies   Allergen Reactions    Lipitor [Atorvastatin]      myalgia     Codeine GI Intolerance    Crestor [Rosuvastatin]      myalgia     Penicillins Rash    Sulfa Antibiotics Rash     Tolerates Lasix       Objective:  Vitals:    11/05/19 1016   BP: 148/83   Pulse: 69       Right Hip Exam     Tenderness   The patient is experiencing tenderness in the ischial tuberosity, greater trochanter and posterior (It band, It band insertion over Gerdy's tubercle)  Range of Motion   The patient has normal right hip ROM  Muscle Strength   The patient has normal right hip strength  Other   Sensation: normal    Comments:  5/5 knee flexion and extension   Palpable tightness over length of IT - Band with discomfort             Physical Exam   Constitutional: He is oriented to person, place, and time  He appears well-developed and well-nourished  HENT:   Head: Normocephalic and atraumatic  Eyes: Pupils are equal, round, and reactive to light  Conjunctivae are normal    Neck: Normal range of motion  Neck supple  Cardiovascular: Normal rate and intact distal pulses  Pulmonary/Chest: Effort normal  No respiratory distress     Musculoskeletal:   As noted in HPI   Neurological: He is alert and oriented to person, place, and time  Skin: Skin is warm and dry  Psychiatric: He has a normal mood and affect  His behavior is normal    Vitals reviewed

## 2019-11-06 ENCOUNTER — OFFICE VISIT (OUTPATIENT)
Dept: OBGYN CLINIC | Facility: CLINIC | Age: 64
End: 2019-11-06
Payer: MEDICARE

## 2019-11-06 ENCOUNTER — OFFICE VISIT (OUTPATIENT)
Dept: FAMILY MEDICINE CLINIC | Facility: CLINIC | Age: 64
End: 2019-11-06
Payer: MEDICARE

## 2019-11-06 VITALS
TEMPERATURE: 98.6 F | BODY MASS INDEX: 38.36 KG/M2 | RESPIRATION RATE: 16 BRPM | HEART RATE: 70 BPM | WEIGHT: 315 LBS | SYSTOLIC BLOOD PRESSURE: 122 MMHG | DIASTOLIC BLOOD PRESSURE: 78 MMHG | HEIGHT: 76 IN

## 2019-11-06 DIAGNOSIS — J44.1 CHRONIC OBSTRUCTIVE PULMONARY DISEASE WITH ACUTE EXACERBATION (HCC): Primary | ICD-10-CM

## 2019-11-06 DIAGNOSIS — M17.0 BILATERAL PRIMARY OSTEOARTHRITIS OF KNEE: Primary | ICD-10-CM

## 2019-11-06 PROCEDURE — 99214 OFFICE O/P EST MOD 30 MIN: CPT | Performed by: FAMILY MEDICINE

## 2019-11-06 PROCEDURE — 20610 DRAIN/INJ JOINT/BURSA W/O US: CPT | Performed by: PHYSICIAN ASSISTANT

## 2019-11-06 RX ORDER — HYALURONATE SODIUM 10 MG/ML
20 SYRINGE (ML) INTRAARTICULAR
Status: COMPLETED | OUTPATIENT
Start: 2019-11-06 | End: 2019-11-06

## 2019-11-06 RX ORDER — CIPROFLOXACIN 500 MG/1
500 TABLET, FILM COATED ORAL EVERY 12 HOURS SCHEDULED
Qty: 14 TABLET | Refills: 0 | Status: SHIPPED | OUTPATIENT
Start: 2019-11-06 | End: 2019-11-13

## 2019-11-06 RX ORDER — ALBUTEROL SULFATE 90 UG/1
2 AEROSOL, METERED RESPIRATORY (INHALATION) EVERY 6 HOURS PRN
Qty: 1 INHALER | Refills: 0 | Status: SHIPPED | OUTPATIENT
Start: 2019-11-06

## 2019-11-06 RX ORDER — PREDNISONE 20 MG/1
20 TABLET ORAL DAILY
Qty: 5 TABLET | Refills: 0 | Status: SHIPPED | OUTPATIENT
Start: 2019-11-06 | End: 2020-01-10

## 2019-11-06 RX ADMIN — Medication 20 MG: at 13:24

## 2019-11-06 RX ADMIN — Medication 20 MG: at 13:25

## 2019-11-06 NOTE — PROGRESS NOTES
Randolph Chance Sr  1955 male MRN: [id number]    FAMILY PRACTICE OFFICE VISIT  [facility] - [address]      ASSESSMENT/PLAN  Alma Carrillo  is a 66 y o  male presents to the office for    1  Chronic obstructive pulmonary disease with acute exacerbation [facility])  Patient is an acute flare  Will start the patient on prednisone 20 mg daily for 5 days  Patient was given Cipro twice a day for 7 days advised him that this is likely viral bronchitis  Encouraged him to continue to use his controlled inhaler as well as albuterol as needed  Printed script given to the patient is that he is able to shop around and find a cheaper price at the local pharmacies  Recommend the patient to follow up with his pulmonologist as well  High risk of conversion to pneumonia advised the patient if he develops fevers or chills to please contact our office           ciprofloxacin (CIPRO) 500 mg tablet; Take 1 tablet (500 mg total) by mouth every 12 (twelve) hours for 7 days  Dispense: 14 tablet; Refill: 0  - predniSONE 20 mg tablet; Take 1 tablet (20 mg total) by mouth daily  Dispense: 5 tablet; Refill: 0  - albuterol (PROVENTIL HFA,VENTOLIN HFA) 90 mcg/act inhaler; Inhale 2 puffs every 6 (six) hours as needed for wheezing or shortness of breath  Dispense: 1 Inhaler; Refill: 0     Disposition: Return to the office in 1 week if symptoms worsen  Future Appointments   Date Time Provider Fred Nunn   11/6/2019  2:45 PM Evelyn Ybarra PA-C [facility]   11/12/2019  2:00 PM Verdis Fleischer Fischer, [place] [facility]  [place]          SUBJECTIVE  CC: Cough      HPI:  Alma Carrillo  is a 66 y o  male who presents for an acute appointment  Patient recently seen by his PCP on October 25, 2019 and was diagnosed with bronchitis at that time  Patient does have a significant past medical history of chronic bronchitis being managed by Pulmonary    Patient does not have a rescue inhaler given the fact that he is unable to afford it but does uses controlled inhaler as prescribed when he remembers  Patient recently given a Z-Kale and prednisone with no relief  He states that he would like to be placed on Cipro given that that has helped him in the past   Patient states that he is short of breath with mild chest tightness secondary to the wheezes at nighttime  The cough is keeping up specifically at bedtime  Has not smoked in multiple years  Denies any other sick contacts  Review of Systems   Constitutional: Negative for activity change, appetite change, chills, fatigue and fever  HENT: Positive for congestion  Respiratory: Positive for cough and wheezing  Negative for chest tightness and shortness of breath  Cardiovascular: Negative for chest pain and leg swelling  Gastrointestinal: Negative for abdominal distention, abdominal pain, constipation, diarrhea, nausea and vomiting  All other systems reviewed and are negative        Historical Information   The patient history was reviewed as follows:  Past Medical History:   Diagnosis Date    Aortic aneurysm, abdominal ([facility] )     Arthritis of right knee     Asthma     Bipolar disorder ([facility] )     Depression     Heart abnormality     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   goes to [address] [place] [address] (hyperlipidemia)     Hypertension          Medications:     Current Outpatient Medications:     albuterol (PROVENTIL HFA,VENTOLIN HFA) 90 mcg/act inhaler, Inhale 2 puffs every 6 (six) hours as needed for wheezing or shortness of breath DO NOT USE MORE THAN DIRECTED, Disp: 1 Inhaler, Rfl: 6    aspirin (ECOTRIN LOW STRENGTH) 81 mg EC tablet, Take 81 mg by mouth daily, Disp: , Rfl:     atenolol (TENORMIN) 50 mg tablet, TAKE ONE TABLET BY MOUTH EVERY DAY, Disp: 90 tablet, Rfl: 1    atenolol (TENORMIN) 50 mg tablet, TAKE ONE TABLET BY MOUTH EVERY DAY, Disp: 90 tablet, Rfl: 1    cyclobenzaprine (FLEXERIL) 10 mg tablet, Take 1 tablet (10 mg total) by mouth 3 (three) times a day as needed for muscle spasms, Disp: 60 tablet, Rfl: 1    enalapril (VASOTEC) 5 mg tablet, TAKE ONE TABLET BY MOUTH EVERY DAY, Disp: 90 tablet, Rfl: 1    fluticasone-salmeterol (ADVAIR) 250-50 mcg/dose inhaler, Inhale 1 puff every 12 (twelve) hours, Disp: 1 Inhaler, Rfl: 2    furosemide (LASIX) 40 mg tablet, TAKE ONE TABLET BY MOUTH EVERY DAY, Disp: 90 tablet, Rfl: 1    ibuprofen (MOTRIN) 600 mg tablet, Take 1 tablet (600 mg total) by mouth every 6 (six) hours as needed for mild pain, Disp: 30 tablet, Rfl: 0    levocetirizine (XYZAL ALLERGY 24HR) 5 MG tablet, Take 5 mg by mouth every evening, Disp: , Rfl:     loratadine (CLARITIN) 10 mg tablet, Take 10 mg by mouth daily, Disp: , Rfl:     omeprazole (PriLOSEC) 40 MG capsule, TAKE 1 CAPSULE TWICE A DAY FOR 7 DAYS, THEN 1 CAPSULE DAILY, Disp: 37 capsule, Rfl: 5    OXcarbazepine (TRILEPTAL) 300 mg tablet, TAKE ONE AND ONE-HALF TABLETS BY MOUTH TWICE A DAY, Disp: 90 tablet, Rfl: 1    potassium chloride (KLOR-CON M20) 20 mEq tablet, Take 1 tablet (20 mEq total) by mouth daily, Disp: 30 tablet, Rfl: 6    sertraline (ZOLOFT) 100 mg tablet, Take 1 tablet (100 mg total) by mouth 2 (two) times a day, Disp: 60 tablet, Rfl: 1    tiotropium (SPIRIVA) 18 mcg inhalation capsule, Place 1 capsule (18 mcg total) into inhaler and inhale daily, Disp: 30 capsule, Rfl: 0    umeclidinium-vilanterol (ANORO ELLIPTA) 62 5-25 MCG/INH inhaler, Inhale 1 puff daily, Disp: 1 Inhaler, Rfl: 5    pravastatin (PRAVACHOL) 20 mg tablet, Take 1 tablet by mouth daily with dinner for 30 days Patient would like to try pravastatin 20mg, he does have myalgia from Lipitor/Crestor in the past , Disp: 30 tablet, Rfl: 0    Allergies   Allergen Reactions    Lipitor [Atorvastatin]      myalgia     Codeine GI Intolerance    Crestor [Rosuvastatin]      myalgia     Penicillins Rash    Sulfa Antibiotics Rash     Tolerates Lasix       OBJECTIVE  Vitals:   Vitals:    11/06/19 0914   BP: 122/78 BP Location: Left arm   Patient Position: Sitting   Cuff Size: Standard   Pulse: 70   Resp: 16   Temp: 98 6 °F (37 °C)   Weight: (!) 157 kg (346 lb)   Height: 6' 4" (1 93 m)         Physical Exam   Constitutional: He is oriented to person, place, and time  Vital signs are normal  He appears well-developed and well-nourished  HENT:   Head: Normocephalic and atraumatic  Right Ear: External ear normal    Left Ear: External ear normal    Nose: Nose normal    Mouth/Throat: Oropharynx is clear and moist    Ear cerumen impacted in the right ear   Eyes: Pupils are equal, round, and reactive to light  Conjunctivae and EOM are normal    Neck: Normal range of motion  Neck supple  Cardiovascular: Normal rate, regular rhythm, S1 normal, S2 normal, normal heart sounds and intact distal pulses  No murmur heard  Pulmonary/Chest: Effort normal  No respiratory distress  He has wheezes (Diffuse wheezing worsen over the lower bases)  Musculoskeletal: Normal range of motion  He exhibits no edema  Neurological: He is alert and oriented to person, place, and time  He has normal strength  Skin: Skin is warm  Psychiatric: He has a normal mood and affect  His speech is normal and behavior is normal  Judgment and thought content normal    Vitals reviewed                   Bunny Patel MD,   [facility]  11/6/2019

## 2019-11-06 NOTE — PROGRESS NOTES
Assessment/Plan:  1  Bilateral primary osteoarthritis of knee       Follow-up prn  Subjective:   Lavon Cruz is a 66 y o  male who presents today for euflexxa #3 bilateral knees        Review of Systems      Past Medical History:   Diagnosis Date    Aortic aneurysm, abdominal ([facility]CC)     Arthritis of right knee     Asthma     Bipolar disorder (HCC)     Depression     Heart abnormality     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   goes to [address] [place] [address] (hyperlipidemia)     Hypertension        Past Surgical History:   Procedure Laterality Date    ABDOMINAL AORTIC ANEURYSM REPAIR      Last assessed: 4/14/16    AORTIC VALVE REPLACEMENT      Last assessed: 4/14/16    FOOT SURGERY      ROTATOR CUFF REPAIR      Last assessed: 4/14/16    SHOULDER SURGERY      TOE SURGERY         Family History   Problem Relation Age of Onset    Heart disease Mother     Alcohol abuse Mother     Hypertension Mother     Cancer Mother     Arthritis Mother     Cancer Father     Hypertension Father     Diabetes Father         Mellitus    Alcohol abuse Father     Arthritis Father     Mental illness Brother         Diseases of the nervous system complicating pregnancy, childbirth and the peurperium    Cancer Brother     No Known Problems Sister     No Known Problems Maternal Aunt     No Known Problems Maternal Uncle     No Known Problems Paternal Aunt     No Known Problems Paternal Uncle     No Known Problems Maternal Grandmother     No Known Problems Maternal Grandfather     No Known Problems Paternal Grandmother     No Known Problems Paternal Grandfather     ADD / ADHD Neg Hx     Anesthesia problems Neg Hx     Clotting disorder Neg Hx     Collagen disease Neg Hx     Dislocations Neg Hx     Learning disabilities Neg Hx     Neurological problems Neg Hx     Osteoporosis Neg Hx     Rheumatologic disease Neg Hx     Scoliosis Neg Hx     Vascular Disease Neg Hx        Social History     Occupational History    Not on file   Tobacco Use    Smoking status: Former Smoker     Packs/day: 2 00     Years: 6 00     Pack years: 12 00     Last attempt to quit: 1981     Years since quittin 2    Smokeless tobacco: Never Used    Tobacco comment: quit in    Substance and Sexual Activity    Alcohol use: No     Comment: Per Allscripts: Former consumption of alcohol    Drug use: No     Comment: Per Allscripts: History of crack cocaine and marijuana use    Sexual activity: Not Currently         Current Outpatient Medications:     albuterol (PROVENTIL HFA,VENTOLIN HFA) 90 mcg/act inhaler, Inhale 2 puffs every 6 (six) hours as needed for wheezing or shortness of breath, Disp: 1 Inhaler, Rfl: 0    aspirin (ECOTRIN LOW STRENGTH) 81 mg EC tablet, Take 81 mg by mouth daily, Disp: , Rfl:     atenolol (TENORMIN) 50 mg tablet, TAKE ONE TABLET BY MOUTH EVERY DAY, Disp: 90 tablet, Rfl: 1    atenolol (TENORMIN) 50 mg tablet, TAKE ONE TABLET BY MOUTH EVERY DAY, Disp: 90 tablet, Rfl: 1    ciprofloxacin (CIPRO) 500 mg tablet, Take 1 tablet (500 mg total) by mouth every 12 (twelve) hours for 7 days, Disp: 14 tablet, Rfl: 0    cyclobenzaprine (FLEXERIL) 10 mg tablet, Take 1 tablet (10 mg total) by mouth 3 (three) times a day as needed for muscle spasms, Disp: 60 tablet, Rfl: 1    enalapril (VASOTEC) 5 mg tablet, TAKE ONE TABLET BY MOUTH EVERY DAY, Disp: 90 tablet, Rfl: 1    fluticasone-salmeterol (ADVAIR) 250-50 mcg/dose inhaler, Inhale 1 puff every 12 (twelve) hours, Disp: 1 Inhaler, Rfl: 2    furosemide (LASIX) 40 mg tablet, TAKE ONE TABLET BY MOUTH EVERY DAY, Disp: 90 tablet, Rfl: 1    ibuprofen (MOTRIN) 600 mg tablet, Take 1 tablet (600 mg total) by mouth every 6 (six) hours as needed for mild pain, Disp: 30 tablet, Rfl: 0    levocetirizine (XYZAL ALLERGY 24HR) 5 MG tablet, Take 5 mg by mouth every evening, Disp: , Rfl:     loratadine (CLARITIN) 10 mg tablet, Take 10 mg by mouth daily, Disp: , Rfl:     omeprazole (PriLOSEC) 40 MG capsule, TAKE 1 CAPSULE TWICE A DAY FOR 7 DAYS, THEN 1 CAPSULE DAILY, Disp: 37 capsule, Rfl: 5    OXcarbazepine (TRILEPTAL) 300 mg tablet, TAKE ONE AND ONE-HALF TABLETS BY MOUTH TWICE A DAY, Disp: 90 tablet, Rfl: 1    potassium chloride (KLOR-CON M20) 20 mEq tablet, Take 1 tablet (20 mEq total) by mouth daily, Disp: 30 tablet, Rfl: 6    pravastatin (PRAVACHOL) 20 mg tablet, Take 1 tablet by mouth daily with dinner for 30 days Patient would like to try pravastatin 20mg, he does have myalgia from Lipitor/Crestor in the past , Disp: 30 tablet, Rfl: 0    predniSONE 20 mg tablet, Take 1 tablet (20 mg total) by mouth daily, Disp: 5 tablet, Rfl: 0    sertraline (ZOLOFT) 100 mg tablet, Take 1 tablet (100 mg total) by mouth 2 (two) times a day, Disp: 60 tablet, Rfl: 1    tiotropium (SPIRIVA) 18 mcg inhalation capsule, Place 1 capsule (18 mcg total) into inhaler and inhale daily, Disp: 30 capsule, Rfl: 0    umeclidinium-vilanterol (ANORO ELLIPTA) 62 5-25 MCG/INH inhaler, Inhale 1 puff daily, Disp: 1 Inhaler, Rfl: 5    Allergies   Allergen Reactions    Lipitor [Atorvastatin]      myalgia     Codeine GI Intolerance    Crestor [Rosuvastatin]      myalgia     Penicillins Rash    Sulfa Antibiotics Rash     Tolerates Lasix       Objective: There were no vitals filed for this visit      Ortho Exam    Physical Exam    Large joint arthrocentesis: L knee  Date/Time: 11/6/2019 1:24 PM  Consent given by: patient  Site marked: site marked  Supporting Documentation  Indications: pain   Procedure Details  Location: knee - L knee  Preparation: Patient was prepped and draped in the usual sterile fashion (Alcohol prep)  Needle size: 22 G  Ultrasound guidance: no  Approach: anterolateral  Medications administered: 20 mg Sodium Hyaluronate 20 MG/2ML    Patient tolerance: patient tolerated the procedure well with no immediate complications  Dressing:  Sterile dressing applied    Large joint arthrocentesis: R knee  Date/Time: 11/6/2019 1:25 PM  Consent given by: patient  Site marked: site marked  Supporting Documentation  Indications: pain   Procedure Details  Location: knee - R knee  Preparation: Patient was prepped and draped in the usual sterile fashion (Alcohol prep)  Needle size: 22 G  Ultrasound guidance: no  Approach: anterolateral  Medications administered: 20 mg Sodium Hyaluronate 20 MG/2ML    Patient tolerance: patient tolerated the procedure well with no immediate complications  Dressing:  Sterile dressing applied

## 2019-11-12 ENCOUNTER — EVALUATION (OUTPATIENT)
Dept: PHYSICAL THERAPY | Facility: CLINIC | Age: 64
End: 2019-11-12
Payer: MEDICARE

## 2019-11-12 DIAGNOSIS — S76.301D RIGHT HAMSTRING INJURY, SUBSEQUENT ENCOUNTER: ICD-10-CM

## 2019-11-12 DIAGNOSIS — M70.61 GREATER TROCHANTERIC BURSITIS OF RIGHT HIP: Primary | ICD-10-CM

## 2019-11-12 DIAGNOSIS — M76.31 IT BAND SYNDROME, RIGHT: ICD-10-CM

## 2019-11-12 PROCEDURE — 97161 PT EVAL LOW COMPLEX 20 MIN: CPT | Performed by: PHYSICAL THERAPIST

## 2019-11-12 NOTE — PROGRESS NOTES
PT Evaluation     Today's date: 2019  Patient name: Pavithra Love  : 1955  MRN: [id number]  Referring provider: Virgil Colon DO  Dx:   Encounter Diagnosis     ICD-10-CM    1  Greater trochanteric bursitis of right hip M70 61 Ambulatory referral to Physical Therapy   2  Right hamstring injury, subsequent encounter S76 301D Ambulatory referral to Physical Therapy   3  It band syndrome, right M76 31 Ambulatory referral to Physical Therapy       Start Time: 0200  Stop Time: 240  Total time in clinic (min): 40 minutes    Assessment  Assessment details: Pavithra Love  is a 66 y o  male who presents to physical therapy with pain, decreased LE range of motion, decreased LE strength, fair balance, impaired function and decreased activity tolerance  Patient's clinical presentation is consistent with their referring diagnosis of Greater trochanteric bursitis of right hip  (primary encounter diagnosis)  Right hamstring injury, subsequent encounter  It band syndrome, right  The pt presents with functional limitations of ADLs, recreational activities, ambulation, performing household chores and stair negotiation  Pt would benefit from physical therapy services to address these limitations and maximize function  Pt was instructed and educated on home exercise program today and demonstrates understanding  Impairments: abnormal gait, abnormal muscle firing, abnormal muscle tone, abnormal or restricted ROM, abnormal movement, activity intolerance, impaired balance, impaired physical strength, lacks appropriate home exercise program, pain with function, weight-bearing intolerance, poor posture  and poor body mechanics  Understanding of Dx/Px/POC: good   Prognosis: good    Goals  Short term goals  (3 weeks)  1  Patient will have no pain right knee and hip  2   Patient will have full range of motion right knee and hip  3  Patient will report a 50% improvement with activities of daily living   4    Patient will decrease girth of right knee by 1 to 2 cm  Long term goals - (6 weeks)  1  Patient will be independent with home exercise program  2  Patient will have no gait deviations ambulating on level surfaces  3   Patient will report 80 % improvement with activity of daily living function  4   Patient will negotiate stairs up and down pain free with use of one railing  Plan  Patient would benefit from: PT eval and skilled physical therapy  Planned modality interventions: cryotherapy  Planned therapy interventions: ADL training, balance/weight bearing training, joint mobilization, manual therapy, massage, neuromuscular re-education, patient education, postural training, strengthening, stretching, functional ROM exercises, therapeutic activities, therapeutic exercise, gait training and home exercise program  Frequency: 2x week  Duration in visits: 12  Duration in weeks: 6  Treatment plan discussed with: patient        Subjective Evaluation    History of Present Illness  Mechanism of injury: He reports Right lateral thigh pain for 6 months which began insidiously  He went to Dr Leana Guerra  He was then referred to PT  Pain  Current pain ratin  At best pain rating: 3  At worst pain rating: 10  Location: Lateral right knee up to lateral right hip  Quality: dull ache and sharp  Relieving factors: medications  Aggravating factors: walking and stair climbing    Social Support    Employment status: not working  Exercise history: Fishing    Patient Goals  Patient goals for therapy: decreased pain          Objective     Palpation     Right   Hypertonic in the distal semimembranosus, distal semitendinosus, rectus femoris and vastus lateralis  Tenderness of the distal semimembranosus, distal semitendinosus and vastus lateralis  Tenderness     Right Knee   Tenderness in the ITB and popliteal fossa       Active Range of Motion   Left Knee   Flexion: 117 degrees   Extension: 0 degrees     Right Knee Flexion: 86 degrees   Extension: -5 degrees     Mobility   Patellar Mobility:     Right Knee   Hypomobile: medial, lateral, superior and inferior     Strength/Myotome Testing     Left Knee   Flexion: 5  Extension: 5    Right Knee   Flexion: 4  Extension: 4    Swelling     Left Knee Girth Measurement (cm)   Joint line: 43 5 cm    Right Knee Girth Measurement (cm)   Joint line: 44 9 cm    Ambulation     Observational Gait   Gait: antalgic              Precautions: Multiple surgeries bilateral leg , AAA ,  CHF , Hypertension ,  MT x 1, TE x 1      Specialty Daily Treatment Diary     Manual  11/12/19       PF mobs        Knee PROM        STM HS and quad        Stretch HS and quad                    Exercise Diary  11/12       Bike        TG squat        Side step        Knee flex        Knee ext        SLR        [place]        SLR                                                                                                    Modalities 11/12       CP                Visit # 1

## 2019-11-19 ENCOUNTER — OFFICE VISIT (OUTPATIENT)
Dept: PHYSICAL THERAPY | Facility: CLINIC | Age: 64
End: 2019-11-19
Payer: MEDICARE

## 2019-11-19 DIAGNOSIS — S76.301D RIGHT HAMSTRING INJURY, SUBSEQUENT ENCOUNTER: Primary | ICD-10-CM

## 2019-11-19 DIAGNOSIS — M76.31 IT BAND SYNDROME, RIGHT: ICD-10-CM

## 2019-11-19 DIAGNOSIS — M70.61 GREATER TROCHANTERIC BURSITIS OF RIGHT HIP: ICD-10-CM

## 2019-11-19 PROCEDURE — 97110 THERAPEUTIC EXERCISES: CPT | Performed by: PHYSICAL THERAPIST

## 2019-11-19 PROCEDURE — 97140 MANUAL THERAPY 1/> REGIONS: CPT | Performed by: PHYSICAL THERAPIST

## 2019-11-19 NOTE — PROGRESS NOTES
Daily Note     Today's date: 2019  Patient name: Kat Oneill  : 1955  MRN: [id number]  Referring provider: Linda Perdue DO  Dx:   Encounter Diagnosis     ICD-10-CM    1  Right hamstring injury, subsequent encounter S76 301D    2  It band syndrome, right M76 31    3  Greater trochanteric bursitis of right hip M70 61                   Subjective:  Pain is 7/10  He felt Right HS strain this weekend and is still sore from it  Objective: See treatment diary below      Assessment: Tolerated treatment well  Patient would benefit from continued PT      Plan: Continue per plan of care        Precautions: Multiple surgeries bilateral leg , AAA ,  CHF , Hypertension ,  MT x 1, TE x 1      Specialty Daily Treatment Diary     Manual  19      PF mobs        Knee PROM  2 min      STM HS and quad  5 min      Stretch HS and quad  3 min                  Exercise Diary        NuStep  10 min      TG squat        Side step        Knee flex  20      Knee ext  20      SLR  20      Heelslides  20      Clamshells  20                                                                                                          Modalities       CP  10 min              Visit # 1 2

## 2019-11-22 ENCOUNTER — OFFICE VISIT (OUTPATIENT)
Dept: PHYSICAL THERAPY | Facility: CLINIC | Age: 64
End: 2019-11-22
Payer: MEDICARE

## 2019-11-22 DIAGNOSIS — M76.31 IT BAND SYNDROME, RIGHT: ICD-10-CM

## 2019-11-22 DIAGNOSIS — S76.301D RIGHT HAMSTRING INJURY, SUBSEQUENT ENCOUNTER: Primary | ICD-10-CM

## 2019-11-22 DIAGNOSIS — M70.61 GREATER TROCHANTERIC BURSITIS OF RIGHT HIP: ICD-10-CM

## 2019-11-22 PROCEDURE — 97110 THERAPEUTIC EXERCISES: CPT | Performed by: PHYSICAL THERAPIST

## 2019-11-22 PROCEDURE — 97140 MANUAL THERAPY 1/> REGIONS: CPT | Performed by: PHYSICAL THERAPIST

## 2019-11-22 NOTE — PROGRESS NOTES
Daily Note     Today's date: 2019  Patient name: Jennifer Hammonds  : 1955  MRN: [id number]  Referring provider: Flora Holland DO  Dx:   Encounter Diagnosis     ICD-10-CM    1  Right hamstring injury, subsequent encounter S76 301D    2  It band syndrome, right M76 31    3  Greater trochanteric bursitis of right hip M70 61                   Subjective:  Pain is 6/10  He felt no pain for about 6 hours following the last treatment  Objective: See treatment diary below      Assessment:  He has significant VL and TFL tightness and tenderness with palpation  Plan: Continue per plan of care        Precautions: Multiple surgeries bilateral leg , AAA ,  CHF , Hypertension ,  MT x 1, TE x 1      Specialty Daily Treatment Diary     Manual  19     PF mobs        Knee PROM  2 min 2 min     STM HS and quad  5 min 5 min     Stretch HS and quad  3 min 3 min                 Exercise Diary       NuStep  10 min 10 min     TG squat        Side step   4 x 10 ft     Knee flex  20 20     Knee ext  20 20     SLR  20 20     Heelslides  20 20     Clamshells  20 20   green                                                                                                         Modalities      CP  10 min 10 min             Visit # 1 2 3

## 2019-11-26 ENCOUNTER — OFFICE VISIT (OUTPATIENT)
Dept: PHYSICAL THERAPY | Facility: CLINIC | Age: 64
End: 2019-11-26
Payer: MEDICARE

## 2019-11-26 DIAGNOSIS — M76.31 IT BAND SYNDROME, RIGHT: ICD-10-CM

## 2019-11-26 DIAGNOSIS — S76.301D RIGHT HAMSTRING INJURY, SUBSEQUENT ENCOUNTER: Primary | ICD-10-CM

## 2019-11-26 DIAGNOSIS — M70.61 GREATER TROCHANTERIC BURSITIS OF RIGHT HIP: ICD-10-CM

## 2019-11-26 PROCEDURE — 97140 MANUAL THERAPY 1/> REGIONS: CPT | Performed by: PHYSICAL THERAPIST

## 2019-11-26 PROCEDURE — 97110 THERAPEUTIC EXERCISES: CPT | Performed by: PHYSICAL THERAPIST

## 2019-11-26 NOTE — PROGRESS NOTES
Daily Note     Today's date: 2019  Patient name: Humberto Obregon  : 1955  MRN: [id number]  Referring provider: Sanjana Anand DO  Dx:   Encounter Diagnosis     ICD-10-CM    1  Right hamstring injury, subsequent encounter S76 301D    2  It band syndrome, right M76 31    3  Greater trochanteric bursitis of right hip M70 61                   Subjective:  Pain is 6/10 at right knee laterally  Objective: See treatment diary below      Assessment:   He has significant right psoas tightness and some soreness with Gorge test stretching  Right TFL significantly sore with MFR  Plan: Continue per plan of care        Precautions: Multiple surgeries bilateral leg , AAA ,  CHF , Hypertension ,  MT x 1, TE x 1      Specialty Daily Treatment Diary     Manual  19    PF mobs        Knee PROM  2 min 2 min 2 min    STM HS and quad  5 min 5 min 5 min    Stretch HS and quad  3 min 3 min 3 min                Exercise Diary      NuStep  10 min 10 min 10 min    TG squat        Side step   4 x 10 ft 5 x 10 ft    Knee flex  20 20 20    Knee ext  20 20 20    SLR  20 20 20    Heelslides  20 20 20    Clamshells  20 20   green 30   blue                                                                        Modalities     CP  10 min 10 min 10 min            Visit # 1 2 3 4

## 2019-11-29 ENCOUNTER — OFFICE VISIT (OUTPATIENT)
Dept: PHYSICAL THERAPY | Facility: CLINIC | Age: 64
End: 2019-11-29
Payer: MEDICARE

## 2019-11-29 DIAGNOSIS — M70.61 GREATER TROCHANTERIC BURSITIS OF RIGHT HIP: ICD-10-CM

## 2019-11-29 DIAGNOSIS — M76.31 IT BAND SYNDROME, RIGHT: ICD-10-CM

## 2019-11-29 DIAGNOSIS — S76.301D RIGHT HAMSTRING INJURY, SUBSEQUENT ENCOUNTER: Primary | ICD-10-CM

## 2019-11-29 PROCEDURE — 97140 MANUAL THERAPY 1/> REGIONS: CPT | Performed by: PHYSICAL THERAPIST

## 2019-11-29 PROCEDURE — 97110 THERAPEUTIC EXERCISES: CPT | Performed by: PHYSICAL THERAPIST

## 2019-11-29 NOTE — PROGRESS NOTES
Daily Note     Today's date: 2019  Patient name: Parker Valentine  : 1955  MRN: [id number]  Referring provider: Monisha Smith DO  Dx:   Encounter Diagnosis     ICD-10-CM    1  Right hamstring injury, subsequent encounter S76 301D    2  Greater trochanteric bursitis of right hip M70 61    3  It band syndrome, right M76 31                   Subjective:  Pain is 5/10 at right knee laterally  Objective: See treatment diary below      Assessment:   Some improvement in Passive knee flexion right side  Right HS remains tight  Plan: Continue per plan of care        Precautions: Multiple surgeries bilateral leg , AAA ,  CHF , Hypertension ,  MT x 1, TE x 1      Specialty Daily Treatment Diary     Manual  19   PF mobs        Knee PROM  2 min 2 min 2 min 2 min   STM HS and quad  5 min 5 min 5 min 5 min   Stretch HS and quad  3 min 3 min 3 min 3 min               Exercise Diary     NuStep  10 min 10 min 10 min 10 min   TG squat        Side step   4 x 10 ft 5 x 10 ft 6 x  10  ft   Knee flex  20 20 20 20   Knee ext  20 20 20 20   SLR  20 20 20 20   Heelslides  20 20 20 20   Clamshells  20 20   green 30   blue 20   black                                                                       Modalities    CP  10 min 10 min 10 min 10 min           Visit # 1 2 3 4 5

## 2019-12-03 ENCOUNTER — OFFICE VISIT (OUTPATIENT)
Dept: PHYSICAL THERAPY | Facility: CLINIC | Age: 64
End: 2019-12-03
Payer: MEDICARE

## 2019-12-03 DIAGNOSIS — M70.61 GREATER TROCHANTERIC BURSITIS OF RIGHT HIP: ICD-10-CM

## 2019-12-03 DIAGNOSIS — M76.31 IT BAND SYNDROME, RIGHT: ICD-10-CM

## 2019-12-03 DIAGNOSIS — S76.301D RIGHT HAMSTRING INJURY, SUBSEQUENT ENCOUNTER: Primary | ICD-10-CM

## 2019-12-03 PROCEDURE — 97110 THERAPEUTIC EXERCISES: CPT | Performed by: PHYSICAL THERAPIST

## 2019-12-03 PROCEDURE — 97140 MANUAL THERAPY 1/> REGIONS: CPT | Performed by: PHYSICAL THERAPIST

## 2019-12-03 NOTE — PROGRESS NOTES
Daily Note     Today's date: 12/3/2019  Patient name: Alfredo Ball   : 1955  MRN: [id number]  Referring provider: Flora Holland DO  Dx:   Encounter Diagnosis     ICD-10-CM    1  Right hamstring injury, subsequent encounter S76 301D    2  Greater trochanteric bursitis of right hip M70 61    3  It band syndrome, right M76 31                   Subjective:  Pain is 6 or 7/10 at right knee laterally  Objective: See treatment diary below      Assessment:   AROM flexion is improving  Plan: Continue per plan of care  Attempt mini squat next session now that ROM has improved       Precautions: Multiple surgeries bilateral leg , AAA ,  CHF , Hypertension ,  MT x 1, TE x 1      Specialty Daily Treatment Diary     Manual  12/3/19       PF mobs        Knee PROM 2 min       STM HS and quad 5 min       Stretch HS and quad 3 min                   Exercise Diary  12/3       NuStep 10 min       TG squat        Side step 8 x 10 ft       Knee flex 40       Knee ext 40       SLR 20       Heelslides 40       Clamshells 30   black                                                                           Modalities 12/3       CP 10 min               Visit # 6

## 2019-12-06 ENCOUNTER — OFFICE VISIT (OUTPATIENT)
Dept: PHYSICAL THERAPY | Facility: CLINIC | Age: 64
End: 2019-12-06
Payer: MEDICARE

## 2019-12-06 DIAGNOSIS — M70.61 GREATER TROCHANTERIC BURSITIS OF RIGHT HIP: ICD-10-CM

## 2019-12-06 DIAGNOSIS — S76.301D RIGHT HAMSTRING INJURY, SUBSEQUENT ENCOUNTER: Primary | ICD-10-CM

## 2019-12-06 PROCEDURE — 97110 THERAPEUTIC EXERCISES: CPT

## 2019-12-06 PROCEDURE — 97140 MANUAL THERAPY 1/> REGIONS: CPT

## 2019-12-06 NOTE — PROGRESS NOTES
Daily Note     Today's date: 2019  Patient name: Viry Willis   : 1955  MRN: [id number]  Referring provider: Jann Wu DO  Dx:   Encounter Diagnosis     ICD-10-CM    1  Right hamstring injury, subsequent encounter S76 301D    2  Greater trochanteric bursitis of right hip M70 61                   Subjective:  Pain is 5/10 at right knee laterally  Objective: See treatment diary below      Assessment:   AROM flexion is improving but still very limited  Much Patella tightness showing difficulty with mobs today      Plan: Continue per plan of care  Attempt mini squat next session now that ROM has improved       Precautions: Multiple surgeries bilateral leg , AAA ,  CHF , Hypertension ,  MT x 1, TE x 1      Specialty Daily Treatment Diary     Manual  12/3/19 12/6/19      PF mobs        Knee PROM 2 min 2 min      STM HS and quad 5 min 5 min      Stretch HS and quad 3 min 3 min                  Exercise Diary  12/3 12/6      NuStep 10 min 10 min      TG squat        Side step 8 x 10 ft 12 ft x 8      Knee flex 40 40      Knee ext 40 40      SLR 20 20      Heelslides 40 40      Clamshells 30   black 30 x evangelina      Standing hip Abd  20                                                                  Modalities 12/3 12/6      CP 10 min 10 mi              Visit # 6 7

## 2019-12-10 ENCOUNTER — APPOINTMENT (OUTPATIENT)
Dept: RADIOLOGY | Facility: CLINIC | Age: 64
End: 2019-12-10
Payer: MEDICARE

## 2019-12-10 ENCOUNTER — OFFICE VISIT (OUTPATIENT)
Dept: OBGYN CLINIC | Facility: CLINIC | Age: 64
End: 2019-12-10
Payer: MEDICARE

## 2019-12-10 VITALS — WEIGHT: 315 LBS | BODY MASS INDEX: 38.36 KG/M2 | HEIGHT: 76 IN

## 2019-12-10 DIAGNOSIS — M75.42 IMPINGEMENT SYNDROME OF LEFT SHOULDER: Primary | ICD-10-CM

## 2019-12-10 DIAGNOSIS — M25.511 BILATERAL SHOULDER PAIN, UNSPECIFIED CHRONICITY: ICD-10-CM

## 2019-12-10 DIAGNOSIS — M25.512 BILATERAL SHOULDER PAIN, UNSPECIFIED CHRONICITY: ICD-10-CM

## 2019-12-10 DIAGNOSIS — M19.012 PRIMARY OSTEOARTHRITIS OF LEFT SHOULDER: ICD-10-CM

## 2019-12-10 DIAGNOSIS — M19.011 PRIMARY OSTEOARTHRITIS OF RIGHT SHOULDER: ICD-10-CM

## 2019-12-10 DIAGNOSIS — M75.41 IMPINGEMENT SYNDROME OF RIGHT SHOULDER: ICD-10-CM

## 2019-12-10 PROCEDURE — 20610 DRAIN/INJ JOINT/BURSA W/O US: CPT | Performed by: PHYSICIAN ASSISTANT

## 2019-12-10 PROCEDURE — 99214 OFFICE O/P EST MOD 30 MIN: CPT | Performed by: PHYSICIAN ASSISTANT

## 2019-12-10 PROCEDURE — 73030 X-RAY EXAM OF SHOULDER: CPT

## 2019-12-10 RX ORDER — BUPIVACAINE HYDROCHLORIDE 2.5 MG/ML
4 INJECTION, SOLUTION INFILTRATION; PERINEURAL
Status: COMPLETED | OUTPATIENT
Start: 2019-12-10 | End: 2019-12-10

## 2019-12-10 RX ORDER — DEXAMETHASONE SODIUM PHOSPHATE 100 MG/10ML
40 INJECTION INTRAMUSCULAR; INTRAVENOUS
Status: COMPLETED | OUTPATIENT
Start: 2019-12-10 | End: 2019-12-10

## 2019-12-10 RX ADMIN — BUPIVACAINE HYDROCHLORIDE 4 ML: 2.5 INJECTION, SOLUTION INFILTRATION; PERINEURAL at 10:56

## 2019-12-10 RX ADMIN — DEXAMETHASONE SODIUM PHOSPHATE 40 MG: 100 INJECTION INTRAMUSCULAR; INTRAVENOUS at 10:56

## 2019-12-10 NOTE — PROGRESS NOTES
Assessment/Plan:  1  Impingement syndrome of left shoulder  Ambulatory referral to Physical Therapy   2  Bilateral shoulder pain, unspecified chronicity  XR shoulder 2+ vw left    XR shoulder 2+ vw right   3  Impingement syndrome of right shoulder  Ambulatory referral to Physical Therapy   4  Primary osteoarthritis of right shoulder  Ambulatory referral to Physical Therapy   5  Primary osteoarthritis of left shoulder  Ambulatory referral to Physical Therapy       Scribe Attestation    I,:   Franklin Chin am acting as a scribe while in the presence of the attending physician :        I,:   Hill Chowdhury MD personally performed the services described in this documentation    as scribed in my presence :              Ciarra Wu upon examination and review the x-rays of his left and right shoulders does demonstrate mild to moderate glenohumeral joint osteoarthritis  He does impinge on clinical exam today, and does demonstrate weakness secondary to pain into abduction  I do feel that he will benefit from conservative measures of treatment with a referral to physical therapy that to work on range of motion strength of his shoulder with hopes of alleviating his painful symptoms  As he does impinge I did offer him a corticosteroid injection into the subacromial space  He was amenable to this treatment option and tolerated the injections well with no complications  He requested that my physician assistant gave him the injections today, which she did under my supervision  He is to ice his shoulders tonight as he may be sore from the injections themselves and is to allow approximately 2 weeks for the corticosteroid to take full anti-inflammatory effect  Ciarra Wu is agreeable to all the treatment options before form today and had no further questions  Ciarra Wu may follow up with me on as-needed basis      Large joint arthrocentesis: R subacromial bursa  Date/Time: 12/10/2019 10:56 AM  Consent given by: patient  Supporting Documentation  Indications: pain   Procedure Details  Location: shoulder - R subacromial bursa  Needle size: 22 G  Ultrasound guidance: no  Approach: anterior  Medications administered: 4 mL bupivacaine 0 25 %; 40 mg dexamethasone 100 mg/10 mL    Patient tolerance: patient tolerated the procedure well with no immediate complications  Dressing:  Sterile dressing applied    Large joint arthrocentesis: L subacromial bursa  Date/Time: 12/10/2019 10:56 AM  Consent given by: patient  Supporting Documentation  Indications: pain   Procedure Details  Location: shoulder - L subacromial bursa  Needle size: 22 G  Ultrasound guidance: no  Approach: anterior  Medications administered: 4 mL bupivacaine 0 25 %; 40 mg dexamethasone 100 mg/10 mL    Patient tolerance: patient tolerated the procedure well with no immediate complications  Dressing:  Sterile dressing applied            Subjective:   Denia Silva  is a 60 y o  male who presents to the office today for initial evaluation of his left and right shoulders  He states that he has had activity related pain many years into his shoulders and notes that his left shoulder is more painful today than his right  He does remark that he had a rotator cuff repair and biceps tenodesis approximately 6-8 years ago in his right shoulder  He notes that he is experiencing intermittent and moderate sharp pain into the superior aspect of both the left and right shoulders  He does remark that his left shoulder will experience posterior pain as well  He notes this pain is better while at rest   He has not undergone any formal treatment such as physical therapy recently for his shoulders and is currently in physical therapy for his knees  Today he denies any distal paresthesias  However notes that he will need intermittent numbness sensation to the palmar aspect of his left hand  Review of Systems   Constitutional: Negative for chills, fever and unexpected weight change     HENT: Negative for hearing loss, nosebleeds and sore throat  Eyes: Negative for pain, redness and visual disturbance  Respiratory: Negative for cough, shortness of breath and wheezing  Cardiovascular: Negative for chest pain, palpitations and leg swelling  Gastrointestinal: Negative for abdominal pain, nausea and vomiting  Endocrine: Negative for polyphagia and polyuria  Genitourinary: Negative for dysuria and hematuria  Musculoskeletal: Positive for arthralgias and myalgias  See HPI   Skin: Negative for rash and wound  Neurological: Negative for dizziness, numbness and headaches  Psychiatric/Behavioral: Negative for decreased concentration and suicidal ideas  The patient is not nervous/anxious            Past Medical History:   Diagnosis Date    Aortic aneurysm, abdominal (HCC)     Arthritis of right knee     Asthma     Bipolar disorder (HCC)     Depression     Heart abnormality     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   goes to [address] [place] [address] (hyperlipidemia)     Hypertension        Past Surgical History:   Procedure Laterality Date    ABDOMINAL AORTIC ANEURYSM REPAIR      Last assessed: 4/14/16    AORTIC VALVE REPLACEMENT      Last assessed: 4/14/16    FOOT SURGERY      ROTATOR CUFF REPAIR      Last assessed: 4/14/16    SHOULDER SURGERY      TOE SURGERY         Family History   Problem Relation Age of Onset    Heart disease Mother     Alcohol abuse Mother     Hypertension Mother     Cancer Mother     Arthritis Mother     Cancer Father     Hypertension Father     Diabetes Father         Mellitus    Alcohol abuse Father     Arthritis Father     Mental illness Brother         Diseases of the nervous system complicating pregnancy, childbirth and the peurperium    Cancer Brother     No Known Problems Sister     No Known Problems Maternal Aunt     No Known Problems Maternal Uncle     No Known Problems Paternal Aunt     No Known Problems Paternal Uncle     No Known Problems Maternal Grandmother     No Known Problems Maternal Grandfather     No Known Problems Paternal Grandmother     No Known Problems Paternal Grandfather     ADD / ADHD Neg Hx     Anesthesia problems Neg Hx     Clotting disorder Neg Hx     Collagen disease Neg Hx     Dislocations Neg Hx     Learning disabilities Neg Hx     Neurological problems Neg Hx     Osteoporosis Neg Hx     Rheumatologic disease Neg Hx     Scoliosis Neg Hx     Vascular Disease Neg Hx        Social History     Occupational History    Not on file   Tobacco Use    Smoking status: Former Smoker     Packs/day: 2 00     Years: 6 00     Pack years: 12 00     Last attempt to quit: 1981     Years since quittin 3    Smokeless tobacco: Never Used    Tobacco comment: quit in    Substance and Sexual Activity    Alcohol use: No     Comment: Per [employer]: Former consumption of alcohol    Drug use: No     Comment: Per [employer]: History of crack cocaine and marijuana use    Sexual activity: Not Currently         Current Outpatient Medications:     albuterol (PROVENTIL HFA,VENTOLIN HFA) 90 mcg/act inhaler, Inhale 2 puffs every 6 (six) hours as needed for wheezing or shortness of breath, Disp: 1 Inhaler, Rfl: 0    aspirin (ECOTRIN LOW STRENGTH) 81 mg EC tablet, Take 81 mg by mouth daily, Disp: , Rfl:     atenolol (TENORMIN) 50 mg tablet, TAKE ONE TABLET BY MOUTH EVERY DAY, Disp: 90 tablet, Rfl: 1    atenolol (TENORMIN) 50 mg tablet, TAKE ONE TABLET BY MOUTH EVERY DAY, Disp: 90 tablet, Rfl: 1    cyclobenzaprine (FLEXERIL) 10 mg tablet, Take 1 tablet (10 mg total) by mouth 3 (three) times a day as needed for muscle spasms, Disp: 60 tablet, Rfl: 1    enalapril (VASOTEC) 5 mg tablet, TAKE ONE TABLET BY MOUTH EVERY DAY, Disp: 90 tablet, Rfl: 1    fluticasone-salmeterol (ADVAIR) 250-50 mcg/dose inhaler, Inhale 1 puff every 12 (twelve) hours, Disp: 1 Inhaler, Rfl: 2    furosemide (LASIX) 40 mg tablet, TAKE ONE TABLET BY MOUTH EVERY DAY, Disp: 90 tablet, Rfl: 1    ibuprofen (MOTRIN) 600 mg tablet, Take 1 tablet (600 mg total) by mouth every 6 (six) hours as needed for mild pain, Disp: 30 tablet, Rfl: 0    levocetirizine (XYZAL ALLERGY 24HR) 5 MG tablet, Take 5 mg by mouth every evening, Disp: , Rfl:     loratadine (CLARITIN) 10 mg tablet, Take 10 mg by mouth daily, Disp: , Rfl:     omeprazole (PriLOSEC) 40 MG capsule, TAKE 1 CAPSULE TWICE A DAY FOR 7 DAYS, THEN 1 CAPSULE DAILY, Disp: 37 capsule, Rfl: 5    OXcarbazepine (TRILEPTAL) 300 mg tablet, TAKE ONE AND ONE-HALF TABLETS BY MOUTH TWICE A DAY, Disp: 90 tablet, Rfl: 1    potassium chloride (KLOR-CON M20) 20 mEq tablet, Take 1 tablet (20 mEq total) by mouth daily, Disp: 30 tablet, Rfl: 6    predniSONE 20 mg tablet, Take 1 tablet (20 mg total) by mouth daily, Disp: 5 tablet, Rfl: 0    sertraline (ZOLOFT) 100 mg tablet, Take 1 tablet (100 mg total) by mouth 2 (two) times a day, Disp: 60 tablet, Rfl: 1    tiotropium (SPIRIVA) 18 mcg inhalation capsule, Place 1 capsule (18 mcg total) into inhaler and inhale daily, Disp: 30 capsule, Rfl: 0    umeclidinium-vilanterol (ANORO ELLIPTA) 62 5-25 MCG/INH inhaler, Inhale 1 puff daily, Disp: 1 Inhaler, Rfl: 5    pravastatin (PRAVACHOL) 20 mg tablet, Take 1 tablet by mouth daily with dinner for 30 days Patient would like to try pravastatin 20mg, he does have myalgia from Lipitor/Crestor in the past , Disp: 30 tablet, Rfl: 0    Allergies   Allergen Reactions    Lipitor [Atorvastatin]      myalgia     Codeine GI Intolerance    Crestor [Rosuvastatin]      myalgia     Penicillins Rash    Sulfa Antibiotics Rash     Tolerates Lasix       Objective: There were no vitals filed for this visit  Right Shoulder Exam     Tenderness   The patient is experiencing tenderness in the acromioclavicular joint      Range of Motion   Active abduction: 130   External rotation: 50   Internal rotation 0 degrees: Sacrum     Muscle Strength   Abduction: 4/5 Internal rotation: 5/5   External rotation: 5/5     Tests   Anand test: positive  Impingement: positive  Drop arm: negative    Other   Erythema: absent  Scars: absent  Sensation: normal  Pulse: present      Left Shoulder Exam     Tenderness   The patient is experiencing tenderness in the acromioclavicular joint  Range of Motion   Active abduction: 130   External rotation: 50   Internal rotation 0 degrees: Sacrum     Muscle Strength   Abduction: 4/5   Internal rotation: 5/5   External rotation: 5/5     Tests   Anand test: positive  Impingement: positive  Drop arm: negative    Other   Erythema: absent  Scars: absent  Sensation: normal  Pulse: present             Physical Exam   Constitutional: He is oriented to person, place, and time  He appears well-developed and well-nourished  HENT:   Head: Normocephalic and atraumatic  Eyes: Conjunctivae are normal  Right eye exhibits no discharge  Left eye exhibits no discharge  Neck: Normal range of motion  Neck supple  Cardiovascular: Normal rate and intact distal pulses  Pulmonary/Chest: Effort normal  No respiratory distress  Musculoskeletal:   As noted in HPI   Neurological: He is alert and oriented to person, place, and time  Skin: Skin is warm and dry  Psychiatric: He has a normal mood and affect  His behavior is normal  Judgment and thought content normal    Vitals reviewed  I have personally reviewed pertinent films in PACS and my interpretation is as follows:    X-rays of the right shoulder demonstrate mild to moderate glenohumeral joint osteoarthritis with moderate osteoarthritis at the acromioclavicular joint  Calcific density demonstrated at the proximal humerus  X-rays of the left shoulder demonstrate mild-to-moderate glenohumeral joint osteoarthritis with moderate osteoarthritis at the acromioclavicular joint  Similar calcific density demonstrated the proximal humerus as the right side

## 2019-12-12 ENCOUNTER — EVALUATION (OUTPATIENT)
Dept: PHYSICAL THERAPY | Facility: CLINIC | Age: 64
End: 2019-12-12
Payer: MEDICARE

## 2019-12-12 DIAGNOSIS — M76.31 IT BAND SYNDROME, RIGHT: ICD-10-CM

## 2019-12-12 DIAGNOSIS — M25.512 LEFT SHOULDER PAIN, UNSPECIFIED CHRONICITY: Primary | ICD-10-CM

## 2019-12-12 DIAGNOSIS — M25.511 RIGHT SHOULDER PAIN, UNSPECIFIED CHRONICITY: ICD-10-CM

## 2019-12-12 DIAGNOSIS — M19.012 LOCALIZED OSTEOARTHRITIS OF SHOULDER REGIONS, BILATERAL: ICD-10-CM

## 2019-12-12 DIAGNOSIS — M70.61 GREATER TROCHANTERIC BURSITIS OF RIGHT HIP: ICD-10-CM

## 2019-12-12 DIAGNOSIS — M19.011 LOCALIZED OSTEOARTHRITIS OF SHOULDER REGIONS, BILATERAL: ICD-10-CM

## 2019-12-12 DIAGNOSIS — S76.301D RIGHT HAMSTRING INJURY, SUBSEQUENT ENCOUNTER: ICD-10-CM

## 2019-12-12 PROCEDURE — 97110 THERAPEUTIC EXERCISES: CPT | Performed by: PHYSICAL THERAPIST

## 2019-12-12 NOTE — PROGRESS NOTES
PT Evaluation     Today's date: 2019  Patient name: Humberto Obregon  : 1955  MRN: [id number]  Referring provider: Ashwin Ribeiro MD  Dx:   Encounter Diagnosis     ICD-10-CM    1  Left shoulder pain, unspecified chronicity M25 512    2  Right shoulder pain, unspecified chronicity M25 511    3  Right hamstring injury, subsequent encounter S76 301D    4  Greater trochanteric bursitis of right hip M70 61    5  It band syndrome, right M76 31    6  Localized osteoarthritis of shoulder regions, bilateral M19 011     M19 012                   Assessment  Assessment details: Humberto Obregon  is a 66 y o  male who presents to physical therapy with pain, decreased LE range of motion, decreased LE strength, fair balance, impaired function and decreased activity tolerance  Patient's clinical presentation is consistent with their referring diagnosis of Greater trochanteric bursitis of right hip, Right hamstring injury, subsequent encounter ,It band syndrome, right and bilateral shoulder pain  The pt presents with functional limitations of ADLs, recreational activities, ambulation, performing household chores and stair negotiation  Pt would benefit fromcontinuing physical therapy services to address these limitations and maximize function  Impairments: abnormal gait, abnormal muscle firing, abnormal muscle tone, abnormal or restricted ROM, abnormal movement, activity intolerance, impaired balance, impaired physical strength, lacks appropriate home exercise program, pain with function, weight-bearing intolerance, poor posture  and poor body mechanics  Understanding of Dx/Px/POC: good   Prognosis: good    Goals  Short term goals  (2 weeks)  1  Patient will have no pain right knee and hip -  Partially Met  2   Patient will have full range of motion right knee and hip -  Partially Met  3  Patient will report a 50% improvement with activities of daily living  -   Met  4    Patient will decrease girth of right knee by 1 to 2 cm   -   Met    Long term goals - (4 weeks)  1  Patient will be independent with home exercise program -  Partially Met  2  Patient will have no gait deviations ambulating on level surfaces   -  Partially Met  3  Patient will report 80 % improvement with activity of daily living function   -  Partially Met  4  Patient will negotiate stairs up and down pain free with use of one railing   -  Partially Met    Shoulder goals  Short term goals:  (2 weeks)  1  Patient will have pain level 2/10 bilateral  shoulder at rest  2  Patient will report a 50% improvement in symptoms with ADL's  3  Patient will demonstrate appropriate scapulohumeral rhythm with reaching shoulder height and below  4  Patient will have improved bilateral shoulder ROM by 20 degrees    Long term goals: (4 weeks)  1  Patient will report 80% improvement improvement in symptoms with ADL's  2  Patient will have pain level 2/10 bilateral shoulder with ADL's   3  Patient will demonstrate appropriate scapulohumeral rhythm with reaching overhead  4  Patient will be independent in a comprehensive home exercise program      Plan  Patient would benefit from: skilled physical therapy  Planned modality interventions: cryotherapy  Planned therapy interventions: ADL training, balance/weight bearing training, joint mobilization, manual therapy, massage, neuromuscular re-education, patient education, postural training, strengthening, stretching, functional ROM exercises, therapeutic activities, therapeutic exercise, gait training and home exercise program  Frequency: 2x week  Duration in visits: 12  Duration in weeks: 6  Treatment plan discussed with: patient        Subjective Evaluation    History of Present Illness  Mechanism of injury: He reports Right lateral thigh pain is overall better but still has pain with prolonged activity    He has had 2/10 pain in his right lateral thigh unless he has prolonged activity then pain will prohibit further activity that day  He had an appointment with Dr Rebekah Trujillo for shoulder pain  He had an x-ray and a cortisone injection in each shoulder  He was then referred to PT  Pain  Current pain ratin  At best pain ratin  At worst pain ratin  Location: Bilateral scapula  Quality: dull ache and sharp  Relieving factors: medications  Aggravating factors: lifting and overhead activity    Social Support    Employment status: not working  Exercise history: Fishing    Patient Goals  Patient goals for therapy: decreased pain          Objective     Postural Observations  Seated posture: poor        Palpation   Left   Hypertonic in the levator scapulae, pectoralis minor and rhomboids  Tenderness of the levator scapulae, pectoralis minor and rhomboids  Right   Hypertonic in the distal semimembranosus, distal semitendinosus, levator scapulae, pectoralis minor, rectus femoris, rhomboids and vastus lateralis  Tenderness of the levator scapulae, pectoralis minor and rhomboids  Tenderness     Right Knee   Tenderness in the ITB       Active Range of Motion   Left Knee   Flexion: 117 degrees   Extension: 0 degrees     Right Knee   Flexion: 96 degrees   Extension: -2 degrees     Passive Range of Motion   Left Shoulder   Flexion: 133 degrees   Abduction: 121 degrees   External rotation 0°: 55 degrees   Internal rotation 0°: WFL    Right Shoulder   Flexion: 144 degrees   Abduction: 131 degrees   External rotation 0°: 44 degrees   Internal rotation 0°: WFL    Mobility   Patellar Mobility:     Right Knee   Hypomobile: medial, lateral, superior and inferior     Strength/Myotome Testing     Left Shoulder     Planes of Motion   Flexion: 4   Abduction: 4   External rotation at 0°: 4   Internal rotation at 0°: 4+     Right Shoulder     Planes of Motion   Flexion: 4   Abduction: 4   External rotation at 0°: 4   Internal rotation at 0°: 4+     Left Knee   Flexion: 5  Extension: 5    Right Knee   Flexion: 4+  Extension: 4    Swelling     Left Knee Girth Measurement (cm)   Joint line: 43 5 cm    Right Knee Girth Measurement (cm)   Joint line: 44 4 cm    Ambulation     Observational Gait   Gait: antalgic     Additional Observational Gait Details  His Right ankle has a more neutral position               Precautions: Multiple surgeries bilateral leg , AAA ,  CHF , Hypertension ,  MT x 1, TE x 1      Specialty Daily Treatment Diary     Manual  11/12/19       PF mobs        Knee PROM        STM HS and quad        Stretch HS and quad                    Exercise Diary  11/12       Bike        TG squat        Side step        Knee flex        Knee ext        SLR        [place]        SLR                                                                                                    Modalities 11/12       CP                Visit # 1

## 2019-12-17 ENCOUNTER — OFFICE VISIT (OUTPATIENT)
Dept: PHYSICAL THERAPY | Facility: CLINIC | Age: 64
End: 2019-12-17
Payer: MEDICARE

## 2019-12-17 DIAGNOSIS — M25.511 RIGHT SHOULDER PAIN, UNSPECIFIED CHRONICITY: ICD-10-CM

## 2019-12-17 DIAGNOSIS — M25.512 LEFT SHOULDER PAIN, UNSPECIFIED CHRONICITY: Primary | ICD-10-CM

## 2019-12-17 DIAGNOSIS — S76.301D RIGHT HAMSTRING INJURY, SUBSEQUENT ENCOUNTER: ICD-10-CM

## 2019-12-17 PROCEDURE — 97140 MANUAL THERAPY 1/> REGIONS: CPT

## 2019-12-17 PROCEDURE — 97110 THERAPEUTIC EXERCISES: CPT

## 2019-12-17 NOTE — PROGRESS NOTES
Daily Note     Today's date: 2019  Patient name: Uche Gregg   : 1955  MRN: [id number]  Referring provider: Betty Ya DO  Dx:   Encounter Diagnosis     ICD-10-CM    1  Left shoulder pain, unspecified chronicity M25 512    2  Right shoulder pain, unspecified chronicity M25 511    3  Right hamstring injury, subsequent encounter S76 345D                   Subjective:  Pain is  4-5/10 indicating area of R scap  States that R ITB is stiff and tight      Objective: See treatment diary below      Assessment:   AROM flexion is improving but still very limited  Much Patella tightness showing difficulty with mobs today  Could not perform hip ABD today secondary to pain TTP medial R knee and along R scap border      Plan: Continue per plan of care  Attempt mini squat next session now that ROM has improved       Precautions: Multiple surgeries bilateral leg , AAA ,  CHF , Hypertension ,  MT x 1, TE x 1      Specialty Daily Treatment Diary     Manual  12/3/19 12/6/19 12/17/19     PF mobs        Knee PROM 2 min 2 min 2 min     STM HS and quad 5 min 5 min 8 min /quad , R shld levator     Stretch HS and quad 3 min 3 min 5 min HS, quad ROM R shld                 Exercise Diary  12/3 12/6 12/17     NuStep 10 min 10 min 10 min     TG squat        Side step 8 x 10 ft 12 ft x 8 12 ft x 6     Knee flex 40 40 40     Knee ext 40 40 40     SLR 20 20 4 x 5     Heelslides 40 40 40     Clamshells 30   black 30 x evangelina 30 x black     Standing hip Abdband   20 4     T Ext   20 x celso     T band Row   20 x celso     Active ER        Cane ext / IR   20     pulleys   20                         Modalities 12/3 12/6 12/17     CP 10 min 10 mi              Visit # 6 7 8

## 2019-12-20 ENCOUNTER — OFFICE VISIT (OUTPATIENT)
Dept: PHYSICAL THERAPY | Facility: CLINIC | Age: 64
End: 2019-12-20
Payer: MEDICARE

## 2019-12-20 DIAGNOSIS — M19.012 LOCALIZED OSTEOARTHRITIS OF SHOULDER REGIONS, BILATERAL: Primary | ICD-10-CM

## 2019-12-20 DIAGNOSIS — M19.011 LOCALIZED OSTEOARTHRITIS OF SHOULDER REGIONS, BILATERAL: Primary | ICD-10-CM

## 2019-12-20 PROCEDURE — 97110 THERAPEUTIC EXERCISES: CPT

## 2019-12-20 PROCEDURE — 97140 MANUAL THERAPY 1/> REGIONS: CPT

## 2019-12-20 NOTE — PROGRESS NOTES
Daily Note     Today's date: 2019  Patient name: Humberto Obregon  : 1955  MRN: [id number]  Referring provider: Sanjana Anand DO  Dx:   Encounter Diagnosis     ICD-10-CM    1  Localized osteoarthritis of shoulder regions, bilateral M19 011     M19 012                   Subjective:  Pt reports having to make a quick turn which caused intense pain in R LE rated at 8/10      Objective: See treatment diary below      Assessment:   AROM flexion is improving but still very limited  Much Patella tightness showing difficulty with mobs today  States that he can now put his belt on by himself  TTP at proximal ITB      Plan: Continue per plan of care  Attempt mini squat next session now that ROM has improved       Precautions: Multiple surgeries bilateral leg , AAA ,  CHF , Hypertension ,  MT x 1, TE x 1      Specialty Daily Treatment Diary     Manual  12/3/19 12/6/19 12/17/19 12/12/19  IE 19   PF mobs        Knee PROM 2 min 2 min 2 min  2 min   STM HS and quad 5 min 5 min 8 min /quad , R shld levator  8 min   Stretch HS and quad 3 min 3 min 5 min HS, quad ROM R shld  5 min               Exercise Diary  12/3 12/6 12/17 12/12 IE    NuStep 10 min 10 min 10 min  10 min   TG squat        Side step 8 x 10 ft 12 ft x 8 12 ft x 6  12 ft x 6   Knee flex 40 40 40  30 x 2#   Knee ext 40 40 40  30 x 2#   SLR 20 20 4 x 5  4 x 5   Heelslides 40 40 40  30   Clamshells 30   black 30 x evangelina 30 x black  30 x carlos   Standing hip Abdband   20 4  20   T Ext   20 x celso  20 x blue   T band Row   20 x celso  20 x blue   Active ER     20   Cane ext / IR   20  20   pulleys   20  30   Minisquat     10               Modalities 12/3 12/6 12/17 12/12 12/20   CP 10 min 10 mi              Visit # 6 7 8 IE / 9 10

## 2019-12-24 ENCOUNTER — APPOINTMENT (OUTPATIENT)
Dept: PHYSICAL THERAPY | Facility: CLINIC | Age: 64
End: 2019-12-24
Payer: MEDICARE

## 2019-12-27 ENCOUNTER — OFFICE VISIT (OUTPATIENT)
Dept: PHYSICAL THERAPY | Facility: CLINIC | Age: 64
End: 2019-12-27
Payer: MEDICARE

## 2019-12-27 DIAGNOSIS — M19.012 LOCALIZED OSTEOARTHRITIS OF SHOULDER REGIONS, BILATERAL: Primary | ICD-10-CM

## 2019-12-27 DIAGNOSIS — M25.511 RIGHT SHOULDER PAIN, UNSPECIFIED CHRONICITY: ICD-10-CM

## 2019-12-27 DIAGNOSIS — M25.512 LEFT SHOULDER PAIN, UNSPECIFIED CHRONICITY: ICD-10-CM

## 2019-12-27 DIAGNOSIS — M19.011 LOCALIZED OSTEOARTHRITIS OF SHOULDER REGIONS, BILATERAL: Primary | ICD-10-CM

## 2019-12-27 PROCEDURE — 97140 MANUAL THERAPY 1/> REGIONS: CPT

## 2019-12-27 PROCEDURE — 97110 THERAPEUTIC EXERCISES: CPT

## 2019-12-27 NOTE — PROGRESS NOTES
Daily Note     Today's date: 2019  Patient name: Pedro Robles  : 1955  MRN: [id number]  Referring provider: Leelee Fong DO  Dx:   Encounter Diagnosis     ICD-10-CM    1  Localized osteoarthritis of shoulder regions, bilateral M19 011     M19 012                   Subjective:  Pt reports having 4-5/10 pain indicating R distal HS and gastroc      Objective: See treatment diary below      Assessment:   AROM flexion is improving but still very limited  Much Patella tightness showing difficulty with mobs today  States that he can now put his belt on by himself  TTP at proximal ITB  Requires momentum to perform sit to stand tending to attempt movement with extened  R LE      Plan: Continue per plan of care  Attempt mini squat next session now that ROM has improved       Precautions: Multiple surgeries bilateral leg , AAA ,  CHF , Hypertension ,  MT x 1, TE x 1      Specialty Daily Treatment Diary     Manual  19       PF mobs        Knee PROM 2 min       STM HS and quad 5 min       Stretch HS and quad 5 min                   Exercise Diary         NuStep 10       TG squat        Side step 12 ft X 6       Knee flex 30 x 2 5#       Knee ext 30 X 2 5#       SLR 4 x 5       Heelslides 30       Clamshells 30 x evangelina    30 x carlos   Standing hip Abdband  20    20   T Ext 20 x blue       T band Row 20 x blue       Active ER 20    20   Cane ext / IR 20       pulleys 30    30   Minisquat 20    10   Prostretch  10 x 10 sec       Sit to stand  10 x 24 inch       Step ups  20 X 1 insert 10 L/ 10 R            Modalities    CP 10 mi               Visit # 11   IE / 9 10

## 2019-12-31 ENCOUNTER — APPOINTMENT (OUTPATIENT)
Dept: PHYSICAL THERAPY | Facility: CLINIC | Age: 64
End: 2019-12-31
Payer: MEDICARE

## 2020-01-02 ENCOUNTER — OFFICE VISIT (OUTPATIENT)
Dept: FAMILY MEDICINE CLINIC | Facility: CLINIC | Age: 65
End: 2020-01-02
Payer: COMMERCIAL

## 2020-01-02 VITALS
WEIGHT: 315 LBS | HEART RATE: 80 BPM | BODY MASS INDEX: 38.36 KG/M2 | DIASTOLIC BLOOD PRESSURE: 80 MMHG | HEIGHT: 76 IN | TEMPERATURE: 98 F | RESPIRATION RATE: 16 BRPM | SYSTOLIC BLOOD PRESSURE: 130 MMHG

## 2020-01-02 DIAGNOSIS — Z00.00 MEDICARE ANNUAL WELLNESS VISIT, SUBSEQUENT: Primary | ICD-10-CM

## 2020-01-02 PROCEDURE — G0439 PPPS, SUBSEQ VISIT: HCPCS | Performed by: NURSE PRACTITIONER

## 2020-01-02 NOTE — PATIENT INSTRUCTIONS
Medicare Preventive Visit Patient Instructions  Thank you for completing your Welcome to Medicare Visit or Medicare Annual Wellness Visit today  Your next wellness visit will be due in one year (1/2/2021)  The screening/preventive services that you may require over the next 5-10 years are detailed below  Some tests may not apply to you based off risk factors and/or age  Screening tests ordered at today's visit but not completed yet may show as past due  Also, please note that scanned in results may not display below  Preventive Screenings:  Service Recommendations Previous Testing/Comments   Colorectal Cancer Screening  · Colonoscopy    · Fecal Occult Blood Test (FOBT)/Fecal Immunochemical Test (FIT)  · Fecal DNA/Cologuard Test  · Flexible Sigmoidoscopy Age: 54-65 years old   Colonoscopy: every 10 years (May be performed more frequently if at higher risk)  OR  FOBT/FIT: every 1 year  OR  Cologuard: every 3 years  OR  Sigmoidoscopy: every 5 years  Screening may be recommended earlier than age 48 if at higher risk for colorectal cancer  Also, an individualized decision between you and your healthcare provider will decide whether screening between the ages of 74-80 would be appropriate  Colonoscopy: 12/29/2016  FOBT/FIT: Not on file  Cologuard: 10/16/2019  Sigmoidoscopy: Not on file         Prostate Cancer Screening Individualized decision between patient and health care provider in men between ages of 53-78   Medicare will cover every 12 months beginning on the day after your 50th birthday PSA: No results in last 5 years          Hepatitis C Screening Once for adults born between 80 and 1965  More frequently in patients at high risk for Hepatitis C Hep C Antibody: Not on file       Diabetes Screening 1-2 times per year if you're at risk for diabetes or have pre-diabetes Fasting glucose: 86 mg/dL   A1C: 6 0 %       Cholesterol Screening Once every 5 years if you don't have a lipid disorder   May order more often based on risk factors  Lipid panel: 06/04/2018          Other Preventive Screenings Covered by Medicare:  1  Abdominal Aortic Aneurysm (AAA) Screening: covered once if your at risk  You're considered to be at risk if you have a family history of AAA or a male between the age of 73-68 who smoking at least 100 cigarettes in your lifetime  2  Lung Cancer Screening: covers low dose CT scan once per year if you meet all of the following conditions: (1) Age 50-69; (2) No signs or symptoms of lung cancer; (3) Current smoker or have quit smoking within the last 15 years; (4) You have a tobacco smoking history of at least 30 pack years (packs per day x number of years you smoked); (5) You get a written order from a healthcare provider  3  Glaucoma Screening: covered annually if you're considered high risk: (1) You have diabetes OR (2) Family history of glaucoma OR (3)  aged 48 and older OR (3)  American aged 72 and older  3  Osteoporosis Screening: covered every 2 years if you meet one of the following conditions: (1) Have a vertebral abnormality; (2) On glucocorticoid therapy for more than 3 months; (3) Have primary hyperparathyroidism; (4) On osteoporosis medications and need to assess response to drug therapy  5  HIV Screening: covered annually if you're between the age of 12-76  Also covered annually if you are younger than 13 and older than 72 with risk factors for HIV infection  For pregnant patients, it is covered up to 3 times per pregnancy      Immunizations:  Immunization Recommendations   Influenza Vaccine Annual influenza vaccination during flu season is recommended for all persons aged >= 6 months who do not have contraindications   Pneumococcal Vaccine (Prevnar and Pneumovax)  * Prevnar = PCV13  * Pneumovax = PPSV23 Adults 25-60 years old: 1-3 doses may be recommended based on certain risk factors  Adults 72 years old: Prevnar (PCV13) vaccine recommended followed by Pneumovax (PPSV23) vaccine  If already received PPSV23 since turning 65, then PCV13 recommended at least one year after PPSV23 dose  Hepatitis B Vaccine 3 dose series if at intermediate or high risk (ex: diabetes, end stage renal disease, liver disease)   Tetanus (Td) Vaccine - COST NOT COVERED BY MEDICARE PART B Following completion of primary series, a booster dose should be given every 10 years to maintain immunity against tetanus  Td may also be given as tetanus wound prophylaxis  Tdap Vaccine - COST NOT COVERED BY MEDICARE PART B Recommended at least once for all adults  For pregnant patients, recommended with each pregnancy  Shingles Vaccine (Shingrix) - COST NOT COVERED BY MEDICARE PART B  2 shot series recommended in those aged 48 and above     Health Maintenance Due:      Topic Date Due    Hepatitis C Screening  05/15/2020 (Originally 1955)    CRC Screening: Cologuard  10/16/2022    CRC Screening: Colonoscopy  12/29/2026     Immunizations Due:  There are no preventive care reminders to display for this patient  Advance Directives   What are advance directives? Advance directives are legal documents that state your wishes and plans for medical care  These plans are made ahead of time in case you lose your ability to make decisions for yourself  Advance directives can apply to any medical decision, such as the treatments you want, and if you want to donate organs  What are the types of advance directives? There are many types of advance directives, and each state has rules about how to use them  You may choose a combination of any of the following:  · Living will: This is a written record of the treatment you want  You can also choose which treatments you do not want, which to limit, and which to stop at a certain time  This includes surgery, medicine, IV fluid, and tube feedings  · Durable power of  for healthcare [facility]):   This is a written record that states who you want to make healthcare choices for you when you are unable to make them for yourself  This person, called a proxy, is usually a family member or a friend  You may choose more than 1 proxy  · Do not resuscitate (DNR) order:  A DNR order is used in case your heart stops beating or you stop breathing  It is a request not to have certain forms of treatment, such as CPR  A DNR order may be included in other types of advance directives  · Medical directive: This covers the care that you want if you are in a coma, near death, or unable to make decisions for yourself  You can list the treatments you want for each condition  Treatment may include pain medicine, surgery, blood transfusions, dialysis, IV or tube feedings, and a ventilator (breathing machine)  · Values history: This document has questions about your views, beliefs, and how you feel and think about life  This information can help others choose the care that you would choose  Why are advance directives important? An advance directive helps you control your care  Although spoken wishes may be used, it is better to have your wishes written down  Spoken wishes can be misunderstood, or not followed  Treatments may be given even if you do not want them  An advance directive may make it easier for your family to make difficult choices about your care  Weight Management   Why it is important to manage your weight:  Being overweight increases your risk of health conditions such as heart disease, high blood pressure, type 2 diabetes, and certain types of cancer  It can also increase your risk for osteoarthritis, sleep apnea, and other respiratory problems  Aim for a slow, steady weight loss  Even a small amount of weight loss can lower your risk of health problems  How to lose weight safely:  A safe and healthy way to lose weight is to eat fewer calories and get regular exercise   You can lose up about 1 pound a week by decreasing the number of calories you eat by 500 calories each day    Healthy meal plan for weight management:  A healthy meal plan includes a variety of foods, contains fewer calories, and helps you stay healthy  A healthy meal plan includes the following:  · Eat whole-grain foods more often  A healthy meal plan should contain fiber  Fiber is the part of grains, fruits, and vegetables that is not broken down by your body  Whole-grain foods are healthy and provide extra fiber in your diet  Some examples of whole-grain foods are whole-wheat breads and pastas, oatmeal, brown rice, and bulgur  · Eat a variety of vegetables every day  Include dark, leafy greens such as spinach, kale, brown greens, and mustard greens  Eat yellow and orange vegetables such as carrots, sweet potatoes, and winter squash  · Eat a variety of fruits every day  Choose fresh or canned fruit (canned in its own juice or light syrup) instead of juice  Fruit juice has very little or no fiber  · Eat low-fat dairy foods  Drink fat-free (skim) milk or 1% milk  Eat fat-free yogurt and low-fat cottage cheese  Try low-fat cheeses such as mozzarella and other reduced-fat cheeses  · Choose meat and other protein foods that are low in fat  Choose beans or other legumes such as split peas or lentils  Choose fish, skinless poultry (chicken or turkey), or lean cuts of red meat (beef or pork)  Before you cook meat or poultry, cut off any visible fat  · Use less fat and oil  Try baking foods instead of frying them  Add less fat, such as margarine, sour cream, regular salad dressing and mayonnaise to foods  Eat fewer high-fat foods  Some examples of high-fat foods include french fries, doughnuts, ice cream, and cakes  · Eat fewer sweets  Limit foods and drinks that are high in sugar  This includes candy, cookies, regular soda, and sweetened drinks  Exercise:  Exercise at least 30 minutes per day on most days of the week  Some examples of exercise include walking, biking, dancing, and swimming   You can also fit in more physical activity by taking the stairs instead of the elevator or parking farther away from stores  Ask your healthcare provider about the best exercise plan for you  © Copyright [employer] 2018 Information is for End User's use only and may not be sold, redistributed or otherwise used for commercial purposes  All illustrations and images included in CareNotes® are the copyrighted property of [employer]  or [facility] Preventive Visit Patient Instructions  Thank you for completing your Welcome to Medicare Visit or Medicare Annual Wellness Visit today  Your next wellness visit will be due in one year (1/2/2021)  The screening/preventive services that you may require over the next 5-10 years are detailed below  Some tests may not apply to you based off risk factors and/or age  Screening tests ordered at today's visit but not completed yet may show as past due  Also, please note that scanned in results may not display below  Preventive Screenings:  Service Recommendations Previous Testing/Comments   Colorectal Cancer Screening  · Colonoscopy    · Fecal Occult Blood Test (FOBT)/Fecal Immunochemical Test (FIT)  · Fecal DNA/Cologuard Test  · Flexible Sigmoidoscopy Age: 54-65 years old   Colonoscopy: every 10 years (May be performed more frequently if at higher risk)  OR  FOBT/FIT: every 1 year  OR  Cologuard: every 3 years  OR  Sigmoidoscopy: every 5 years  Screening may be recommended earlier than age 48 if at higher risk for colorectal cancer  Also, an individualized decision between you and your healthcare provider will decide whether screening between the ages of 74-80 would be appropriate   Colonoscopy: 12/29/2016  FOBT/FIT: Not on file  Cologuard: 10/16/2019  Sigmoidoscopy: Not on file    Screening Current     Prostate Cancer Screening Individualized decision between patient and health care provider in men between ages of 53-78   Medicare will cover every 12 months beginning on the day after your 50th birthday PSA: No results in last 5 years     Risks and Benefits Discussed     Hepatitis C Screening Once for adults born between 1945 and 1965  More frequently in patients at high risk for Hepatitis C Hep C Antibody: Not on file    Screening Current   Diabetes Screening 1-2 times per year if you're at risk for diabetes or have pre-diabetes Fasting glucose: 86 mg/dL   A1C: 6 0 %    Risks and Benefits Discussed   Cholesterol Screening Once every 5 years if you don't have a lipid disorder  May order more often based on risk factors  Lipid panel: 06/04/2018    History Lipid Disorder  Risks and Benefits Discussed      Other Preventive Screenings Covered by Medicare:  6  Abdominal Aortic Aneurysm (AAA) Screening: covered once if your at risk  You're considered to be at risk if you have a family history of AAA or a male between the age of 73-68 who smoking at least 100 cigarettes in your lifetime  7  Lung Cancer Screening: covers low dose CT scan once per year if you meet all of the following conditions: (1) Age 50-69; (2) No signs or symptoms of lung cancer; (3) Current smoker or have quit smoking within the last 15 years; (4) You have a tobacco smoking history of at least 30 pack years (packs per day x number of years you smoked); (5) You get a written order from a healthcare provider  8  Glaucoma Screening: covered annually if you're considered high risk: (1) You have diabetes OR (2) Family history of glaucoma OR (3)  aged 48 and older OR (3)  American aged 72 and older  5  Osteoporosis Screening: covered every 2 years if you meet one of the following conditions: (1) Have a vertebral abnormality; (2) On glucocorticoid therapy for more than 3 months; (3) Have primary hyperparathyroidism; (4) On osteoporosis medications and need to assess response to drug therapy  10  HIV Screening: covered annually if you're between the age of 12-76   Also covered annually if you are younger than 13 and older than 72 with risk factors for HIV infection  For pregnant patients, it is covered up to 3 times per pregnancy  Immunizations:  Immunization Recommendations   Influenza Vaccine Annual influenza vaccination during flu season is recommended for all persons aged >= 6 months who do not have contraindications   Pneumococcal Vaccine (Prevnar and Pneumovax)  * Prevnar = PCV13  * Pneumovax = PPSV23 Adults 25-60 years old: 1-3 doses may be recommended based on certain risk factors  Adults 72 years old: Prevnar (PCV13) vaccine recommended followed by Pneumovax (PPSV23) vaccine  If already received PPSV23 since turning 65, then PCV13 recommended at least one year after PPSV23 dose  Hepatitis B Vaccine 3 dose series if at intermediate or high risk (ex: diabetes, end stage renal disease, liver disease)   Tetanus (Td) Vaccine - COST NOT COVERED BY MEDICARE PART B Following completion of primary series, a booster dose should be given every 10 years to maintain immunity against tetanus  Td may also be given as tetanus wound prophylaxis  Tdap Vaccine - COST NOT COVERED BY MEDICARE PART B Recommended at least once for all adults  For pregnant patients, recommended with each pregnancy  Shingles Vaccine (Shingrix) - COST NOT COVERED BY MEDICARE PART B  2 shot series recommended in those aged 48 and above     Health Maintenance Due:      Topic Date Due    Hepatitis C Screening  05/15/2020 (Originally 1955)    CRC Screening: Cologuard  10/16/2022    CRC Screening: Colonoscopy  12/29/2026     Immunizations Due:  There are no preventive care reminders to display for this patient  Advance Directives   What are advance directives? Advance directives are legal documents that state your wishes and plans for medical care  These plans are made ahead of time in case you lose your ability to make decisions for yourself   Advance directives can apply to any medical decision, such as the treatments you want, and if you want to donate organs  What are the types of advance directives? There are many types of advance directives, and each state has rules about how to use them  You may choose a combination of any of the following:  · Living will: This is a written record of the treatment you want  You can also choose which treatments you do not want, which to limit, and which to stop at a certain time  This includes surgery, medicine, IV fluid, and tube feedings  · Durable power of  for healthcare [facility]): This is a written record that states who you want to make healthcare choices for you when you are unable to make them for yourself  This person, called a proxy, is usually a family member or a friend  You may choose more than 1 proxy  · Do not resuscitate (DNR) order:  A DNR order is used in case your heart stops beating or you stop breathing  It is a request not to have certain forms of treatment, such as CPR  A DNR order may be included in other types of advance directives  · Medical directive: This covers the care that you want if you are in a coma, near death, or unable to make decisions for yourself  You can list the treatments you want for each condition  Treatment may include pain medicine, surgery, blood transfusions, dialysis, IV or tube feedings, and a ventilator (breathing machine)  · Values history: This document has questions about your views, beliefs, and how you feel and think about life  This information can help others choose the care that you would choose  Why are advance directives important? An advance directive helps you control your care  Although spoken wishes may be used, it is better to have your wishes written down  Spoken wishes can be misunderstood, or not followed  Treatments may be given even if you do not want them  An advance directive may make it easier for your family to make difficult choices about your care     Weight Management   Why it is important to manage your weight:  Being overweight increases your risk of health conditions such as heart disease, high blood pressure, type 2 diabetes, and certain types of cancer  It can also increase your risk for osteoarthritis, sleep apnea, and other respiratory problems  Aim for a slow, steady weight loss  Even a small amount of weight loss can lower your risk of health problems  How to lose weight safely:  A safe and healthy way to lose weight is to eat fewer calories and get regular exercise  You can lose up about 1 pound a week by decreasing the number of calories you eat by 500 calories each day  Healthy meal plan for weight management:  A healthy meal plan includes a variety of foods, contains fewer calories, and helps you stay healthy  A healthy meal plan includes the following:  · Eat whole-grain foods more often  A healthy meal plan should contain fiber  Fiber is the part of grains, fruits, and vegetables that is not broken down by your body  Whole-grain foods are healthy and provide extra fiber in your diet  Some examples of whole-grain foods are whole-wheat breads and pastas, oatmeal, brown rice, and bulgur  · Eat a variety of vegetables every day  Include dark, leafy greens such as spinach, kale, brown greens, and mustard greens  Eat yellow and orange vegetables such as carrots, sweet potatoes, and winter squash  · Eat a variety of fruits every day  Choose fresh or canned fruit (canned in its own juice or light syrup) instead of juice  Fruit juice has very little or no fiber  · Eat low-fat dairy foods  Drink fat-free (skim) milk or 1% milk  Eat fat-free yogurt and low-fat cottage cheese  Try low-fat cheeses such as mozzarella and other reduced-fat cheeses  · Choose meat and other protein foods that are low in fat  Choose beans or other legumes such as split peas or lentils  Choose fish, skinless poultry (chicken or turkey), or lean cuts of red meat (beef or pork)   Before you cook meat or poultry, cut off any visible fat  · Use less fat and oil  Try baking foods instead of frying them  Add less fat, such as margarine, sour cream, regular salad dressing and mayonnaise to foods  Eat fewer high-fat foods  Some examples of high-fat foods include french fries, doughnuts, ice cream, and cakes  · Eat fewer sweets  Limit foods and drinks that are high in sugar  This includes candy, cookies, regular soda, and sweetened drinks  Exercise:  Exercise at least 30 minutes per day on most days of the week  Some examples of exercise include walking, biking, dancing, and swimming  You can also fit in more physical activity by taking the stairs instead of the elevator or parking farther away from stores  Ask your healthcare provider about the best exercise plan for you  © Copyright [employer] 2018 Information is for End User's use only and may not be sold, redistributed or otherwise used for commercial purposes   All illustrations and images included in CareNotes® are the copyrighted property of [employer]  or [address] [place] [address]

## 2020-01-02 NOTE — PROGRESS NOTES
Assessment and Plan:     Problem List Items Addressed This Visit     None      Visit Diagnoses     Medicare annual wellness visit, subsequent    -  Primary           Preventive health issues were discussed with patient, and age appropriate screening tests were ordered as noted in patient's After Visit Summary  Personalized health advice and appropriate referrals for health education or preventive services given if needed, as noted in patient's After Visit Summary       History of Present Illness:     Patient presents for Medicare Annual Wellness visit    Patient Care Team:  Sheri Bland as PCP - General (Family Medicine)     Problem List:     Patient Active Problem List   Diagnosis    Hyperglycemia    Leg edema    Bilateral edema of lower extremity    Hyperlipidemia    Aortic regurgitation    [facility]    Bipolar 1 disorder ([facility] )    Morbid obesity with BMI of 40 0-44 9, adult ([facility] )    Aortic aneurysm (HCC)    Arthritis    Chronic diastolic heart failure (HCC)    Simple chronic bronchitis (HCC)    Depression    Erectile dysfunction of organic origin    Exertional dyspnea    Hypertension    Snoring    Witnessed episode of apnea    Obstructive sleep apnea    Mass of right side of neck    DNS (deviated nasal septum)    Pancreatic cyst      Past Medical and Surgical History:     Past Medical History:   Diagnosis Date    Aortic aneurysm, abdominal ([facility] )     Arthritis of right knee     Asthma     Bipolar disorder ([facility] )     Depression     Heart abnormality     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   [facility]    HLD (hyperlipidemia)     Hypertension      Past Surgical History:   Procedure Laterality Date    ABDOMINAL AORTIC ANEURYSM REPAIR      Last assessed: 4/14/16    AORTIC VALVE REPLACEMENT      Last assessed: 4/14/16    FOOT SURGERY      ROTATOR CUFF REPAIR      Last assessed: 4/14/16    SHOULDER SURGERY      TOE SURGERY        Family History: Family History   Problem Relation Age of Onset    Heart disease Mother     Alcohol abuse Mother     Hypertension Mother     Cancer Mother     Arthritis Mother     Cancer Father     Hypertension Father     Diabetes Father         Mellitus    Alcohol abuse Father     Arthritis Father     Mental illness Brother         Diseases of the nervous system complicating pregnancy, childbirth and the peurperium    Cancer Brother     No Known Problems Sister     No Known Problems Maternal Aunt     No Known Problems Maternal Uncle     No Known Problems Paternal Aunt     No Known Problems Paternal Uncle     No Known Problems Maternal Grandmother     No Known Problems Maternal Grandfather     No Known Problems Paternal Grandmother     No Known Problems Paternal Grandfather     ADD / ADHD Neg Hx     Anesthesia problems Neg Hx     Clotting disorder Neg Hx     Collagen disease Neg Hx     Dislocations Neg Hx     Learning disabilities Neg Hx     Neurological problems Neg Hx     Osteoporosis Neg Hx     Rheumatologic disease Neg Hx     Scoliosis Neg Hx     Vascular Disease Neg Hx       Social History:     Social History     Socioeconomic History    Marital status: /Civil Union     Spouse name: None    Number of children: None    Years of education: None    Highest education level: None   Occupational History    None   Social Needs    Financial resource strain: None    Food insecurity:     Worry: None     Inability: None    Transportation needs:     Medical: None     Non-medical: None   Tobacco Use    Smoking status: Former Smoker     Packs/day: 2 00     Years: 6 00     Pack years: 12 00     Last attempt to quit: 1981     Years since quittin 4    Smokeless tobacco: Never Used    Tobacco comment: quit in    Substance and Sexual Activity    Alcohol use: No     Comment: Per [employer]: Former consumption of alcohol    Drug use: No     Comment: Per [employer]: History of crack cocaine and marijuana use    Sexual activity: Not Currently   Lifestyle    Physical activity:     Days per week: None     Minutes per session: None    Stress: None   Relationships    Social connections:     Talks on phone: None     Gets together: None     Attends Zoroastrian service: None     Active member of club or organization: None     Attends meetings of clubs or organizations: None     Relationship status: None    Intimate partner violence:     Fear of current or ex partner: None     Emotionally abused: None     Physically abused: None     Forced sexual activity: None   Other Topics Concern    None   Social History Narrative    Always uses seatbelt       Medications and Allergies:     Current Outpatient Medications   Medication Sig Dispense Refill    albuterol (PROVENTIL HFA,VENTOLIN HFA) 90 mcg/act inhaler Inhale 2 puffs every 6 (six) hours as needed for wheezing or shortness of breath 1 Inhaler 0    aspirin (ECOTRIN LOW STRENGTH) 81 mg EC tablet Take 81 mg by mouth daily      atenolol (TENORMIN) 50 mg tablet TAKE ONE TABLET BY MOUTH EVERY DAY 90 tablet 1    atenolol (TENORMIN) 50 mg tablet TAKE ONE TABLET BY MOUTH EVERY DAY 90 tablet 1    cyclobenzaprine (FLEXERIL) 10 mg tablet Take 1 tablet (10 mg total) by mouth 3 (three) times a day as needed for muscle spasms 60 tablet 1    enalapril (VASOTEC) 5 mg tablet TAKE ONE TABLET BY MOUTH EVERY DAY 90 tablet 1    fluticasone-salmeterol (ADVAIR) 250-50 mcg/dose inhaler Inhale 1 puff every 12 (twelve) hours 1 Inhaler 2    furosemide (LASIX) 40 mg tablet TAKE ONE TABLET BY MOUTH EVERY DAY 90 tablet 1    ibuprofen (MOTRIN) 600 mg tablet Take 1 tablet (600 mg total) by mouth every 6 (six) hours as needed for mild pain 30 tablet 0    levocetirizine (XYZAL ALLERGY 24HR) 5 MG tablet Take 5 mg by mouth every evening      loratadine (CLARITIN) 10 mg tablet Take 10 mg by mouth daily      omeprazole (PriLOSEC) 40 MG capsule TAKE 1 CAPSULE TWICE A DAY FOR 7 DAYS, THEN 1 CAPSULE DAILY 37 capsule 5    OXcarbazepine (TRILEPTAL) 300 mg tablet TAKE ONE AND ONE-HALF TABLETS BY MOUTH TWICE A DAY 90 tablet 1    potassium chloride (KLOR-CON M20) 20 mEq tablet Take 1 tablet (20 mEq total) by mouth daily 30 tablet 6    predniSONE 20 mg tablet Take 1 tablet (20 mg total) by mouth daily 5 tablet 0    sertraline (ZOLOFT) 100 mg tablet Take 1 tablet (100 mg total) by mouth 2 (two) times a day 60 tablet 1    tiotropium (SPIRIVA) 18 mcg inhalation capsule Place 1 capsule (18 mcg total) into inhaler and inhale daily 30 capsule 0    umeclidinium-vilanterol (ANORO ELLIPTA) 62 5-25 MCG/INH inhaler Inhale 1 puff daily 1 Inhaler 5    pravastatin (PRAVACHOL) 20 mg tablet Take 1 tablet by mouth daily with dinner for 30 days Patient would like to try pravastatin 20mg, he does have myalgia from Lipitor/Crestor in the past  30 tablet 0     No current facility-administered medications for this visit  Allergies   Allergen Reactions    Lipitor [Atorvastatin]      myalgia     Codeine GI Intolerance    Crestor [Rosuvastatin]      myalgia     Penicillins Rash    Sulfa Antibiotics Rash     Tolerates Lasix      Immunizations:     Immunization History   Administered Date(s) Administered    Hep B, adult 12/01/2017, 01/02/2018    Influenza Quadrivalent Preservative Free 3 years and older IM 10/20/2017    Influenza TIV (IM) 09/20/2016    Influenza, recombinant, quadrivalent,injectable, preservative free 09/24/2018, 10/22/2019    Pneumococcal Polysaccharide PPV23 07/12/2017    Tdap 11/29/2017    Tuberculin Skin Test-PPD Intradermal 11/29/2017, 12/13/2017, 02/19/2019, 03/11/2019      Health Maintenance:         Topic Date Due    Hepatitis C Screening  05/15/2020 (Originally 1955)    CRC Screening: Cologuard  10/16/2022    CRC Screening: Colonoscopy  12/29/2026     There are no preventive care reminders to display for this patient     Medicare Health Risk Assessment:     BP 130/80 (BP Location: Left arm, Patient Position: Sitting, Cuff Size: Large)   Pulse 80   Temp 98 °F (36 7 °C)   Resp 16   Ht 6' 3 5" (1 918 m)   Wt (!) 160 kg (352 lb 6 4 oz)   BMI 43 47 kg/m²          Health Risk Assessment:   Patient rates overall health as good  Patient feels that their physical health rating is same  Eyesight was rated as same  Hearing was rated as same  Patient feels that their emotional and mental health rating is same  Pain experienced in the last 7 days has been none  Patient states that he has experienced no weight loss or gain in last 6 months  Fall Risk Screening: In the past year, patient has experienced: no history of falling in past year      Home Safety:  Patient does not have trouble with stairs inside or outside of their home  Patient has working smoke alarms and has working carbon monoxide detector  Home safety hazards include: none  Nutrition:   Current diet is Regular and Limited junk food  Medications:   Patient is currently taking over-the-counter supplements  OTC medications include: see medication list  Patient is able to manage medications  Activities of Daily Living (ADLs)/Instrumental Activities of Daily Living (IADLs):   Walk and transfer into and out of bed and chair?: Yes  Dress and groom yourself?: Yes    Bathe or shower yourself?: Yes    Feed yourself?  Yes  Do your laundry/housekeeping?: Yes  Manage your money, pay your bills and track your expenses?: Yes  Make your own meals?: Yes    Do your own shopping?: Yes    Previous Hospitalizations:   Any hospitalizations or ED visits within the last 12 months?: No      Advance Care Planning:   Living will: No    Durable POA for healthcare: No    Advanced directive: No    Advanced directive counseling given: Yes    Five wishes given: Yes      Cognitive Screening:   Provider or family/friend/caregiver concerned regarding cognition?: No    PREVENTIVE SCREENINGS      Cardiovascular Screening:    General: History Lipid Disorder and Risks and Benefits Discussed    Due for: Lipid Panel      Diabetes Screening:     General: Risks and Benefits Discussed    Due for: Blood Glucose      Colorectal Cancer Screening:     General: Screening Current      Prostate Cancer Screening:    General: Risks and Benefits Discussed    Due for: PSA      Abdominal Aortic Aneurysm (AAA) Screening:    Risk factors include: tobacco use        General: Screening Not Indicated and History AAA      Lung Cancer Screening:     General: Risks and Benefits Discussed and Patient Declines      Hepatitis C Screening:    General: Screening Current    Other Counseling Topics:   Car/seat belt/driving safety, sunscreen and regular weightbearing exercise         Zeinab Garcia, [facility]

## 2020-01-06 ENCOUNTER — CLINICAL SUPPORT (OUTPATIENT)
Dept: FAMILY MEDICINE CLINIC | Facility: CLINIC | Age: 65
End: 2020-01-06
Payer: COMMERCIAL

## 2020-01-06 DIAGNOSIS — Z11.1 PPD SCREENING TEST: Primary | ICD-10-CM

## 2020-01-06 PROCEDURE — 86580 TB INTRADERMAL TEST: CPT

## 2020-01-07 ENCOUNTER — APPOINTMENT (OUTPATIENT)
Dept: PHYSICAL THERAPY | Facility: CLINIC | Age: 65
End: 2020-01-07
Payer: COMMERCIAL

## 2020-01-08 ENCOUNTER — CLINICAL SUPPORT (OUTPATIENT)
Dept: FAMILY MEDICINE CLINIC | Facility: CLINIC | Age: 65
End: 2020-01-08

## 2020-01-08 DIAGNOSIS — Z11.1 ENCOUNTER FOR PPD SKIN TEST READING: Primary | ICD-10-CM

## 2020-01-08 PROCEDURE — RECHECK

## 2020-01-09 ENCOUNTER — OFFICE VISIT (OUTPATIENT)
Dept: PHYSICAL THERAPY | Facility: CLINIC | Age: 65
End: 2020-01-09
Payer: COMMERCIAL

## 2020-01-09 DIAGNOSIS — M76.31 IT BAND SYNDROME, RIGHT: Primary | ICD-10-CM

## 2020-01-09 PROCEDURE — 97110 THERAPEUTIC EXERCISES: CPT

## 2020-01-09 PROCEDURE — 97140 MANUAL THERAPY 1/> REGIONS: CPT

## 2020-01-09 NOTE — PROGRESS NOTES
Daily Note     Today's date: 2020  Patient name: Dominga Cotton Sr   : 1955  MRN: [id number]  Referring provider: Corey Perla DO  Dx:   Encounter Diagnosis     ICD-10-CM    1  It band syndrome, right M76 31                   Subjective:  Pt reports having 6-7/10 pain indicating R distal HS and gastroc      Objective: See treatment diary below      Assessment:   AROM flexion is improving but still very limited  Much Patella tightness showing difficulty with mobs today  States that he can now put his belt on by himself  TTP at proximal ITB  Requires momentum to perform sit to stand tending to attempt movement with extened  R LE  Improved SLR  States that he can now get his arms over his head without difficulty     Plan: Continue per plan of care         Precautions: Multiple surgeries bilateral leg , AAA ,  CHF , Hypertension ,  MT x 1, TE x 1      Specialty Daily Treatment Diary     Manual  19      PF mobs        Knee PROM 2 min 2 min      STM HS and quad 5 min 5 min      Stretch HS and quad 5 min 5 min                  Exercise Diary        NuStep 10 10      TG squat        Side step 12 ft X 6 12ft x 6      Knee flex 30 x 2 5# 30      Knee ext 30 X 2 5# 30      SLR 4 x 5 4 x 5      Heelslides 30 30      Clamshells 30 x evangelina 30 x black   30 x carlos   Standing hip Abdband  20 20   20   T Ext 20 x blue 30 x blue      T band Row 20 x blue 30 x blue      Active ER 20 20   20   Cane ext / IR 20 30      pulleys 30 30   30   Minisquat 20 20   10   Prostretch  10 x 10 sec 10 x 10 sec      Sit to stand  10 x 24 inch 20 x 24inch      Step ups  20 X 1 insert 10 L/ 10 R  20          Modalities  12/20   CP 10 mi deferred              Visit # 11 12   10

## 2020-01-10 ENCOUNTER — TRANSCRIBE ORDERS (OUTPATIENT)
Dept: ADMINISTRATIVE | Facility: HOSPITAL | Age: 65
End: 2020-01-10

## 2020-01-10 ENCOUNTER — APPOINTMENT (OUTPATIENT)
Dept: LAB | Facility: HOSPITAL | Age: 65
End: 2020-01-10
Attending: SPECIALIST
Payer: COMMERCIAL

## 2020-01-10 ENCOUNTER — APPOINTMENT (OUTPATIENT)
Dept: PREADMISSION TESTING | Facility: HOSPITAL | Age: 65
End: 2020-01-10
Payer: COMMERCIAL

## 2020-01-10 DIAGNOSIS — Z01.818 OTHER SPECIFIED PRE-OPERATIVE EXAMINATION: Primary | ICD-10-CM

## 2020-01-10 DIAGNOSIS — Z01.818 OTHER SPECIFIED PRE-OPERATIVE EXAMINATION: ICD-10-CM

## 2020-01-10 LAB
ALBUMIN SERPL BCP-MCNC: 3.8 G/DL (ref 3.5–5)
ALP SERPL-CCNC: 152 U/L (ref 46–116)
ALT SERPL W P-5'-P-CCNC: 49 U/L (ref 12–78)
ANION GAP SERPL CALCULATED.3IONS-SCNC: 8 MMOL/L (ref 4–13)
AST SERPL W P-5'-P-CCNC: 22 U/L (ref 5–45)
BASOPHILS # BLD AUTO: 0.03 THOUSANDS/ΜL (ref 0–0.1)
BASOPHILS NFR BLD AUTO: 0 % (ref 0–1)
BILIRUB SERPL-MCNC: 0.5 MG/DL (ref 0.2–1)
BUN SERPL-MCNC: 10 MG/DL (ref 5–25)
CALCIUM SERPL-MCNC: 8.8 MG/DL (ref 8.3–10.1)
CHLORIDE SERPL-SCNC: 106 MMOL/L (ref 100–108)
CO2 SERPL-SCNC: 26 MMOL/L (ref 21–32)
CREAT SERPL-MCNC: 0.94 MG/DL (ref 0.6–1.3)
EOSINOPHIL # BLD AUTO: 0.09 THOUSAND/ΜL (ref 0–0.61)
EOSINOPHIL NFR BLD AUTO: 1 % (ref 0–6)
ERYTHROCYTE [DISTWIDTH] IN BLOOD BY AUTOMATED COUNT: 13.6 % (ref 11.6–15.1)
GFR SERPL CREATININE-BSD FRML MDRD: 87 ML/MIN/1.73SQ M
GLUCOSE SERPL-MCNC: 87 MG/DL (ref 65–140)
HCT VFR BLD AUTO: 48.1 % (ref 36.5–49.3)
HGB BLD-MCNC: 16.1 G/DL (ref 12–17)
IMM GRANULOCYTES # BLD AUTO: 0.02 THOUSAND/UL (ref 0–0.2)
IMM GRANULOCYTES NFR BLD AUTO: 0 % (ref 0–2)
LYMPHOCYTES # BLD AUTO: 2.27 THOUSANDS/ΜL (ref 0.6–4.47)
LYMPHOCYTES NFR BLD AUTO: 28 % (ref 14–44)
MCH RBC QN AUTO: 32.5 PG (ref 26.8–34.3)
MCHC RBC AUTO-ENTMCNC: 33.5 G/DL (ref 31.4–37.4)
MCV RBC AUTO: 97 FL (ref 82–98)
MONOCYTES # BLD AUTO: 0.6 THOUSAND/ΜL (ref 0.17–1.22)
MONOCYTES NFR BLD AUTO: 7 % (ref 4–12)
NEUTROPHILS # BLD AUTO: 5.25 THOUSANDS/ΜL (ref 1.85–7.62)
NEUTS SEG NFR BLD AUTO: 64 % (ref 43–75)
NRBC BLD AUTO-RTO: 0 /100 WBCS
PLATELET # BLD AUTO: 270 THOUSANDS/UL (ref 149–390)
PMV BLD AUTO: 9.8 FL (ref 8.9–12.7)
POTASSIUM SERPL-SCNC: 3.8 MMOL/L (ref 3.5–5.3)
PROT SERPL-MCNC: 7.2 G/DL (ref 6.4–8.2)
RBC # BLD AUTO: 4.96 MILLION/UL (ref 3.88–5.62)
SODIUM SERPL-SCNC: 140 MMOL/L (ref 136–145)
WBC # BLD AUTO: 8.26 THOUSAND/UL (ref 4.31–10.16)

## 2020-01-10 PROCEDURE — 80053 COMPREHEN METABOLIC PANEL: CPT

## 2020-01-10 PROCEDURE — 85025 COMPLETE CBC W/AUTO DIFF WBC: CPT | Performed by: SPECIALIST

## 2020-01-10 PROCEDURE — 36415 COLL VENOUS BLD VENIPUNCTURE: CPT | Performed by: SPECIALIST

## 2020-01-10 RX ORDER — LEVOCETIRIZINE DIHYDROCHLORIDE 5 MG/1
5 TABLET, FILM COATED ORAL EVERY EVENING
COMMUNITY

## 2020-01-10 NOTE — PRE-PROCEDURE INSTRUCTIONS
Pre-Surgery Instructions:   Medication Instructions    albuterol (PROVENTIL HFA,VENTOLIN HFA) 90 mcg/act inhaler Instructed patient per Anesthesia Guidelines   aspirin (ECOTRIN LOW STRENGTH) 81 mg EC tablet Patient was instructed to contact Physician for medication instruction   atenolol (TENORMIN) 50 mg tablet Instructed patient per Anesthesia Guidelines   enalapril (VASOTEC) 5 mg tablet Instructed patient per Anesthesia Guidelines   furosemide (LASIX) 40 mg tablet Instructed patient per Anesthesia Guidelines   ibuprofen (MOTRIN) 600 mg tablet Instructed patient per Anesthesia Guidelines   levocetirizine (XYZAL) 5 MG tablet Instructed patient per Anesthesia Guidelines   OXcarbazepine (TRILEPTAL) 300 mg tablet Instructed patient per Anesthesia Guidelines   potassium chloride (KLOR-CON M20) 20 mEq tablet Instructed patient per Anesthesia Guidelines   sertraline (ZOLOFT) 100 mg tablet Instructed patient per Anesthesia Guidelines   umeclidinium-vilanterol (ANORO ELLIPTA) 62 5-25 MCG/INH inhaler Instructed patient per Anesthesia Guidelines   [DISCONTINUED] atenolol (TENORMIN) 50 mg tablet Instructed patient per Anesthesia Guidelines   [DISCONTINUED] loratadine (CLARITIN) 10 mg tablet Instructed patient per Anesthesia Guidelines  Pre op instructions given  Pt to take sertraline, oxcarbazepine, may use albuterol inhaler, anoro ellipta the am of the surgery   wife Rudy Sunshine [phone] Surgical Experience    The following information was developed to assist you to prepare for your operation  What do I need to do before coming to the hospital?   Arrange for a responsible person to drive you to and from the hospital    Arrange care for your children at home  Children are not allowed in the recovery areas of the hospital   Plan to wear clothing that is easy to put on and take off   If you are having shoulder surgery, wear a shirt that buttons or zippers in the front  Bathing  o Shower the evening before and the morning of your surgery with an antibacterial soap  Please refer to the Pre Op Showering Instructions for Surgery Patients Sheet   o Remove nail polish and all body piercing jewelry  o Do not shave any body part for at least 24 hours before surgery-this includes face, arms, legs and upper body  Food  o Nothing to eat or drink after midnight the night before your surgery  This includes candy and chewing gum  o Exception: If your surgery is after 12:00pm (noon), you may have clear liquids such as 7-Up®, ginger ale, apple or cranberry juice, Jell-O®, water, or clear broth until 8:00 am  o Do not drink milk or juice with pulp on the morning before surgery  o Do not drink alcohol 24 hours before surgery  Medicine  o Follow instructions you received from your surgeon about which medicines you may take on the day of surgery  o If instructed to take medicine on the morning of surgery, take pills with just a small sip of water  Call your prescribing doctor for specific infroamtion on what to do if you take insulin    What should I bring to the hospital?    Bring:  Malik Del Rio or a walker, if you have them, for foot or knee surgery   A list of the daily medicines, vitamins, minerals, herbals and nutritional supplements you take  Include the dosages of medicines and the time you take them each day   Glasses, dentures or hearing aids   Minimal clothing; you will be wearing hospital sleepwear   Photo ID; required to verify your identity   If you have a Living Will or Power of , bring a copy of the documents   If you have an ostomy, bring an extra pouch and any supplies you use    Do not bring   Medicines or inhalers   Money, valuables or jewelry    What other information should I know about the day of surgery?  Notify your surgeons if you develop a cold, sore throat, cough, fever, rash or any other illness     Report to the Ambulatory Surgical/Same Day Surgery Unit   You will be instructed to stop at Registration only if you have not been pre-registered   Inform your  fi they do not stay that they will be asked by the staff to leave a phone number where they can be reached   Be available to be reached before surgery  In the event the operating room schedule changes, you may be asked to come in earlier or later than expected    *It is important to tell your doctor and others involved in your health care if you are taking or have been taking any non-prescription drugs, vitamins, minerals, herbals or other nutritional supplements   Any of these may interact with some food or medicines and cause a reaction

## 2020-01-13 ENCOUNTER — TELEPHONE (OUTPATIENT)
Dept: OTOLARYNGOLOGY | Facility: CLINIC | Age: 65
End: 2020-01-13

## 2020-01-13 NOTE — TELEPHONE ENCOUNTER
Analisa Zoila was at [facility] for pre-operative testing and asked the nurse, Meliza, if 81 mg of aspirin should be stopped prior to surgery, with Dr Víctor Bocanegra, on 1/21/20  Please advise patient at [phone]  Thank you!

## 2020-01-14 ENCOUNTER — APPOINTMENT (OUTPATIENT)
Dept: PHYSICAL THERAPY | Facility: CLINIC | Age: 65
End: 2020-01-14
Payer: COMMERCIAL

## 2020-01-17 ENCOUNTER — OFFICE VISIT (OUTPATIENT)
Dept: PHYSICAL THERAPY | Facility: CLINIC | Age: 65
End: 2020-01-17
Payer: COMMERCIAL

## 2020-01-17 DIAGNOSIS — M19.012 LOCALIZED OSTEOARTHRITIS OF SHOULDER REGIONS, BILATERAL: ICD-10-CM

## 2020-01-17 DIAGNOSIS — M76.31 IT BAND SYNDROME, RIGHT: Primary | ICD-10-CM

## 2020-01-17 DIAGNOSIS — M19.011 LOCALIZED OSTEOARTHRITIS OF SHOULDER REGIONS, BILATERAL: ICD-10-CM

## 2020-01-17 DIAGNOSIS — M25.512 LEFT SHOULDER PAIN, UNSPECIFIED CHRONICITY: ICD-10-CM

## 2020-01-17 PROCEDURE — 97110 THERAPEUTIC EXERCISES: CPT

## 2020-01-17 PROCEDURE — 97140 MANUAL THERAPY 1/> REGIONS: CPT

## 2020-01-17 NOTE — PROGRESS NOTES
Daily Note     Today's date: 2020  Patient name: Abena Boateng   : 1955  MRN: [id number]  Referring provider: Jimmy Connolly DO  Dx:   Encounter Diagnosis     ICD-10-CM    1  It band syndrome, right M76 31    2  Localized osteoarthritis of shoulder regions, bilateral M19 011     M19 012    3  Left shoulder pain, unspecified chronicity M25 512                   Subjective:  Pt reports having 6/10 pain indicating R distal HS and gastroc      Objective: See treatment diary below      Assessment:   AROM flexion is improving but still very limited  Much Patella tightness showing difficulty with mobs today  States that he can now put his belt on by himself  TTP at proximal ITB  Requires momentum to perform sit to stand tending to attempt movement with extened  R LE  Improved SLR  States that he can now get his arms over his head without difficulty  Palpable tightness throughout R quad    Plan: Continue per plan of care         Precautions: Multiple surgeries bilateral leg , AAA ,  CHF , Hypertension ,  MT x 1, TE x 1      Specialty Daily Treatment Diary     Manual  19     PF mobs        Knee PROM 2 min 2 min 2 min     STM HS and quad 5 min 5 min 5 min     Stretch HS and quad 5 min 5 min 5 min                 Exercise Diary       NuStep 10 10 10 min     TG squat        Side step 12 ft X 6 12ft x 6 12 ft x 6     Knee flex 30 x 2 5# 30 30     Knee ext 30 X 2 5# 30 30     SLR 4 x 5 4 x 5 4 X 5     Heelslides 30 30 30     Clamshells 30 x evangelina 30 x black 30 x carlos     Standing hip Abd / fle 20 20 20     T Ext 20 x blue 30 x blue 30 x celso     T band Row 20 x blue 30 x blue 30 x celso     Active ER 20 20 20     Cane ext / IR 20 30 30     pulleys 30 30 30     Minisquat 20 20      Prostretch  10 x 10 sec 10 x 10 sec 10 x 10 sec     Sit to stand  10 x 24 inch 20 x 24inch 20     Step ups  20 X 1 insert 10 L/ 10 R  20 20 x 1 insert         Modalities  CP 10 mi deferred              Visit # 11 12 13 IE / 9 10

## 2020-01-20 ENCOUNTER — ANESTHESIA EVENT (OUTPATIENT)
Dept: PERIOP | Facility: HOSPITAL | Age: 65
End: 2020-01-20
Payer: COMMERCIAL

## 2020-01-20 NOTE — ANESTHESIA PREPROCEDURE EVALUATION
Review of Systems/Medical History  Patient summary reviewed  Chart reviewed  No history of anesthetic complications     Cardiovascular  EKG reviewed, Exercise tolerance (METS): >4,  Hyperlipidemia, Hypertension , Valvular heart disease , CHF , Aortic disease (AAA (abdominal aortic aneurysm) (4 9 cm ascending thoracic aortic aneurysm ),   Comment: LEFT VENTRICLE:  Systolic function was normal by visual assessment  Ejection fraction was estimated to be 55 %  Although no diagnostic regional wall motion abnormality was identified, this possibility cannot be completely excluded on the basis of this study  There was mild concentric hypertrophy  Features were consistent with a pseudonormal left ventricular filling pattern, with concomitant abnormal relaxation and increased filling pressure (grade 2 diastolic dysfunction)      MITRAL VALVE:  There was trace regurgitation      AORTIC VALVE:  The valve was not visualized well enough to rule out a bicuspid morphology  Leaflets exhibited calcification and normal cuspal separation  There was no evidence for stenosis  There was mild to moderate regurgitation  The valve was not well visualized      TRICUSPID VALVE:  There was mild regurgitation  Pulmonary artery systolic pressure was within the normal range  Estimated peak PA pressure was 33 mmHg      AORTA:  The root exhibited dilatation  Not well visualized  ,  Pulmonary  Asthma , Sleep apnea CPAP,        GI/Hepatic       Kidney stones,        Endo/Other    Obesity  morbid obesity   GYN       Hematology   Musculoskeletal    Arthritis     Neurology   Psychology   Anxiety, Depression , bipolar disorder,              Physical Exam    Airway    Mallampati score: II  TM Distance: >3 FB  Neck ROM: full     Dental       Cardiovascular  Cardiovascular exam normal    Pulmonary  Pulmonary exam normal     Other Findings        Anesthesia Plan  ASA Score- 3     Anesthesia Type- general with ASA Monitors     Additional Monitors: Airway Plan: ETT  Plan Factors-    Induction- intravenous  Postoperative Plan- Plan for postoperative opioid use  Informed Consent- Anesthetic plan and risks discussed with patient  I personally reviewed this patient with the CRNA  Discussed and agreed on the Anesthesia Plan with the CRNA  Jason Hill

## 2020-01-21 ENCOUNTER — ANESTHESIA (OUTPATIENT)
Dept: PERIOP | Facility: HOSPITAL | Age: 65
End: 2020-01-21
Payer: COMMERCIAL

## 2020-01-21 ENCOUNTER — HOSPITAL ENCOUNTER (OUTPATIENT)
Facility: HOSPITAL | Age: 65
Setting detail: OUTPATIENT SURGERY
Discharge: HOME | End: 2020-01-21
Attending: SPECIALIST | Admitting: SPECIALIST
Payer: COMMERCIAL

## 2020-01-21 VITALS
SYSTOLIC BLOOD PRESSURE: 120 MMHG | OXYGEN SATURATION: 96 % | WEIGHT: 315 LBS | DIASTOLIC BLOOD PRESSURE: 74 MMHG | RESPIRATION RATE: 20 BRPM | TEMPERATURE: 97.6 F | HEIGHT: 76 IN | HEART RATE: 66 BPM | BODY MASS INDEX: 38.36 KG/M2

## 2020-01-21 DIAGNOSIS — R22.1 MASS OF RIGHT SIDE OF NECK: Primary | ICD-10-CM

## 2020-01-21 PROCEDURE — 88304 TISSUE EXAM BY PATHOLOGIST: CPT | Performed by: PATHOLOGY

## 2020-01-21 PROCEDURE — 38510 BIOPSY/REMOVAL LYMPH NODES: CPT | Performed by: SPECIALIST

## 2020-01-21 RX ORDER — HYDROMORPHONE HCL/PF 1 MG/ML
0.5 SYRINGE (ML) INJECTION
Status: DISCONTINUED | OUTPATIENT
Start: 2020-01-21 | End: 2020-01-21 | Stop reason: HOSPADM

## 2020-01-21 RX ORDER — IBUPROFEN 400 MG/1
400 TABLET ORAL EVERY 6 HOURS PRN
Status: DISCONTINUED | OUTPATIENT
Start: 2020-01-21 | End: 2020-01-21 | Stop reason: HOSPADM

## 2020-01-21 RX ORDER — ACETAMINOPHEN 325 MG/1
650 TABLET ORAL EVERY 6 HOURS PRN
Status: DISCONTINUED | OUTPATIENT
Start: 2020-01-21 | End: 2020-01-21 | Stop reason: HOSPADM

## 2020-01-21 RX ORDER — ACETAMINOPHEN 325 MG/1
TABLET ORAL
Qty: 30 TABLET | Refills: 0
Start: 2020-01-21

## 2020-01-21 RX ORDER — LIDOCAINE HYDROCHLORIDE 10 MG/ML
INJECTION, SOLUTION EPIDURAL; INFILTRATION; INTRACAUDAL; PERINEURAL AS NEEDED
Status: DISCONTINUED | OUTPATIENT
Start: 2020-01-21 | End: 2020-01-21 | Stop reason: SURG

## 2020-01-21 RX ORDER — ONDANSETRON 2 MG/ML
4 INJECTION INTRAMUSCULAR; INTRAVENOUS ONCE AS NEEDED
Status: DISCONTINUED | OUTPATIENT
Start: 2020-01-21 | End: 2020-01-21 | Stop reason: HOSPADM

## 2020-01-21 RX ORDER — SODIUM CHLORIDE, SODIUM LACTATE, POTASSIUM CHLORIDE, CALCIUM CHLORIDE 600; 310; 30; 20 MG/100ML; MG/100ML; MG/100ML; MG/100ML
125 INJECTION, SOLUTION INTRAVENOUS CONTINUOUS
Status: DISCONTINUED | OUTPATIENT
Start: 2020-01-21 | End: 2020-01-21 | Stop reason: HOSPADM

## 2020-01-21 RX ORDER — ONDANSETRON 2 MG/ML
INJECTION INTRAMUSCULAR; INTRAVENOUS AS NEEDED
Status: DISCONTINUED | OUTPATIENT
Start: 2020-01-21 | End: 2020-01-21 | Stop reason: SURG

## 2020-01-21 RX ORDER — PROPOFOL 10 MG/ML
INJECTION, EMULSION INTRAVENOUS AS NEEDED
Status: DISCONTINUED | OUTPATIENT
Start: 2020-01-21 | End: 2020-01-21 | Stop reason: SURG

## 2020-01-21 RX ORDER — MEPERIDINE HYDROCHLORIDE 25 MG/ML
12.5 INJECTION INTRAMUSCULAR; INTRAVENOUS; SUBCUTANEOUS
Status: DISCONTINUED | OUTPATIENT
Start: 2020-01-21 | End: 2020-01-21 | Stop reason: HOSPADM

## 2020-01-21 RX ORDER — FENTANYL CITRATE 50 UG/ML
INJECTION, SOLUTION INTRAMUSCULAR; INTRAVENOUS AS NEEDED
Status: DISCONTINUED | OUTPATIENT
Start: 2020-01-21 | End: 2020-01-21 | Stop reason: SURG

## 2020-01-21 RX ORDER — LIDOCAINE HYDROCHLORIDE AND EPINEPHRINE 10; 10 MG/ML; UG/ML
INJECTION, SOLUTION INFILTRATION; PERINEURAL AS NEEDED
Status: DISCONTINUED | OUTPATIENT
Start: 2020-01-21 | End: 2020-01-21 | Stop reason: HOSPADM

## 2020-01-21 RX ORDER — EPHEDRINE SULFATE 50 MG/ML
INJECTION INTRAVENOUS AS NEEDED
Status: DISCONTINUED | OUTPATIENT
Start: 2020-01-21 | End: 2020-01-21 | Stop reason: SURG

## 2020-01-21 RX ORDER — FENTANYL CITRATE/PF 50 MCG/ML
25 SYRINGE (ML) INJECTION
Status: DISCONTINUED | OUTPATIENT
Start: 2020-01-21 | End: 2020-01-21 | Stop reason: HOSPADM

## 2020-01-21 RX ORDER — MAGNESIUM HYDROXIDE 1200 MG/15ML
LIQUID ORAL AS NEEDED
Status: DISCONTINUED | OUTPATIENT
Start: 2020-01-21 | End: 2020-01-21 | Stop reason: HOSPADM

## 2020-01-21 RX ORDER — DEXAMETHASONE SODIUM PHOSPHATE 10 MG/ML
INJECTION, SOLUTION INTRAMUSCULAR; INTRAVENOUS AS NEEDED
Status: DISCONTINUED | OUTPATIENT
Start: 2020-01-21 | End: 2020-01-21 | Stop reason: SURG

## 2020-01-21 RX ORDER — SUCCINYLCHOLINE/SOD CL,ISO/PF 100 MG/5ML
SYRINGE (ML) INTRAVENOUS AS NEEDED
Status: DISCONTINUED | OUTPATIENT
Start: 2020-01-21 | End: 2020-01-21 | Stop reason: SURG

## 2020-01-21 RX ORDER — MIDAZOLAM HYDROCHLORIDE 2 MG/2ML
INJECTION, SOLUTION INTRAMUSCULAR; INTRAVENOUS AS NEEDED
Status: DISCONTINUED | OUTPATIENT
Start: 2020-01-21 | End: 2020-01-21 | Stop reason: SURG

## 2020-01-21 RX ADMIN — Medication 100 MG: at 09:21

## 2020-01-21 RX ADMIN — REMIFENTANIL HYDROCHLORIDE 0.08 MCG/KG/MIN: 1 INJECTION, POWDER, LYOPHILIZED, FOR SOLUTION INTRAVENOUS at 09:27

## 2020-01-21 RX ADMIN — PROPOFOL 200 MG: 10 INJECTION, EMULSION INTRAVENOUS at 09:18

## 2020-01-21 RX ADMIN — FENTANYL CITRATE 25 MCG: 50 INJECTION, SOLUTION INTRAMUSCULAR; INTRAVENOUS at 09:53

## 2020-01-21 RX ADMIN — PROPOFOL 50 MG: 10 INJECTION, EMULSION INTRAVENOUS at 09:22

## 2020-01-21 RX ADMIN — EPHEDRINE SULFATE 15 MG: 50 INJECTION, SOLUTION INTRAVENOUS at 09:34

## 2020-01-21 RX ADMIN — DEXAMETHASONE SODIUM PHOSPHATE 10 MG: 10 INJECTION, SOLUTION INTRAMUSCULAR; INTRAVENOUS at 09:21

## 2020-01-21 RX ADMIN — PROPOFOL 100 MG: 10 INJECTION, EMULSION INTRAVENOUS at 09:20

## 2020-01-21 RX ADMIN — ONDANSETRON 4 MG: 2 INJECTION INTRAMUSCULAR; INTRAVENOUS at 09:21

## 2020-01-21 RX ADMIN — FENTANYL CITRATE 50 MCG: 50 INJECTION, SOLUTION INTRAMUSCULAR; INTRAVENOUS at 09:18

## 2020-01-21 RX ADMIN — EPHEDRINE SULFATE 10 MG: 50 INJECTION, SOLUTION INTRAVENOUS at 09:37

## 2020-01-21 RX ADMIN — SODIUM CHLORIDE, SODIUM LACTATE, POTASSIUM CHLORIDE, AND CALCIUM CHLORIDE 125 ML/HR: .6; .31; .03; .02 INJECTION, SOLUTION INTRAVENOUS at 08:12

## 2020-01-21 RX ADMIN — LIDOCAINE HYDROCHLORIDE 50 MG: 10 INJECTION, SOLUTION EPIDURAL; INFILTRATION; INTRACAUDAL; PERINEURAL at 09:18

## 2020-01-21 RX ADMIN — FENTANYL CITRATE 25 MCG: 50 INJECTION, SOLUTION INTRAMUSCULAR; INTRAVENOUS at 09:56

## 2020-01-21 RX ADMIN — MIDAZOLAM HYDROCHLORIDE 2 MG: 1 INJECTION, SOLUTION INTRAMUSCULAR; INTRAVENOUS at 09:11

## 2020-01-21 NOTE — PERIOPERATIVE NURSING NOTE
Complete discharge instructions reviewed with patient and wife  Denies any pain at this time, steri strip remains dry, intact

## 2020-01-21 NOTE — DISCHARGE INSTRUCTIONS
Excisional Biopsy   WHAT YOU SHOULD KNOW:    An excisional biopsy is a surgical procedure to remove a lump, tumor or mass  The sample will be sent to a lab for testing  AFTER YOU LEAVE:    Medicines:   · Pain medicine: You may be given a prescription medicine to decrease pain  Do not wait until the pain is severe before you take this medicine  · Take your medicine as directed  Call your healthcare provider if you think your medicine is not helping or if you have side effects  Tell him if you are allergic to any medicine  Keep a list of the medicines, vitamins, and herbs you take  Include the amounts, and when and why you take them  Bring the list or the pill bottles to follow-up visits  Carry your medicine list with you in case of an emergency  Follow up with Dr Abbi Wolf as directed: You will need to return to have your stitches removed  Write down your questions so you remember to ask them during your visits  Wound care:  Leave the stitches or surgical adhesive tape in place  You may shower or bathe as you wish  Ice:  Ice helps decrease swelling and pain  Use an ice pack, or put crushed ice in a plastic bag  Cover it with a towel and place it over your wound for 15 to 20 minutes every hour if needed  Self-care:    Return to daily activities: You may be able to return to most of your normal activities the day after your procedure  Ask for more information about the activities you should avoid      · Do not smoke: If you smoke, it is never too late to quit  Smoking can affect how well your wound will heal  Ask for information if you need help quitting  Contact your primary healthcare provider if:   · You have a fever  · You have increased pain or swelling at the surgical site  · Your wound is red, swollen, tender, or has pus coming from it  · You have questions or concerns about your condition or care  Seek care immediately or call 911 if:   Your stitches or staples become loose or fall out     © 8033 [address] [employer] is for End User's use only and may not be sold, redistributed or otherwise used for commercial purposes  All illustrations and images included in CareNotes® are the copyrighted property of [employer] , [employer]  or Alvin Durham  The above information is an  only  It is not intended as medical advice for individual conditions or treatments  Talk to your doctor, nurse or pharmacist before following any medical regimen to see if it is safe and effective for you  Call Dr Raman Vitale with any questions or concerns: office ; mobile  (urgent issues)
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
## 2020-01-21 NOTE — PERIOPERATIVE NURSING NOTE
Steri strip dressing on lateral right neck dry, intact  No drainage seen on dressing, Patient states he has soreness on upper back , in between shoulders  Taking po fluids, Tolerated well

## 2020-01-21 NOTE — OP NOTE
OPERATIVE REPORT  PATIENT NAME: Dominga Cotton Sr     :  1955  MRN: [id number]  Pt Location: [facility] OR ROOM 04    SURGERY DATE: 2020    Surgeon(s) and Role:     * Misti Collins MD - Primary    Preop Diagnosis:  Mass of right side of neck [R22 1]    Post-Op Diagnosis Codes:     * Mass of right side of neck [R22 1]    Procedure(s) (LRB):  EXCISION BIOPSY RIGHT NECK (Right)    Specimen(s):  ID Type Source Tests Collected by Time Destination   1 : Right Neck Mass suspect Lipoma Tissue Mass TISSUE EXAM Misti Collins MD 2020 [id number]        Estimated Blood Loss:   Minimal    Drains:  None    Anesthesia Type:   General    Operative Indications:  60year old with right neck mass, deep to platysma on CT evaluation, tender to touch  Appearance and consistency suggestive of lipoma  Due to tenderness and progression in size excision was indicated for definitive diagnosis and likely treatment  Operative Findings:  Fatty tumor with capsule, consistent with lipoma  4 5 cm x 4 cm  Complications:   None    Procedure and Technique:  The patient was positively identified and transferred onto the operating table in the supine position  Appropriate monitoring devices were put in place, anesthesia was induced and the patient was intubated without difficulty  The patients neck was examined  An appropriate site for skin incision was demarcated in a transverse fashion overlying the right neck mass inferior to the tail of the parotid gland  Before proceeding further, the time-out procedure was completed  Local anesthesia in the form of 1% lidocaine with 1/100,000 epinephrine was then injected to the marked site  The patients neck was then prepped and draped in the usual sterile fashion  Skin incision was then made at the marked site in a transverse fashion using a 15 blade  Dissection continued through subcutaneous tissues and platysma using the 15 blade   The capsule of the neck mass was immediately encountered, and the mass was noted to have an appearance and consistency consistent with lipoma  The platysma muscle was elevated off Dissection was carefully carried out around the mass, dissecting it free off the overlying platysma muscle, and carrying dissection out around the mass in its entirety using Tenotomy scissors and bipolar cautery  Remaining fascial attachments were divided, and the mass was removed, and sent to pathology for permanent section evaluation  The surgical site was irrigated with saline, and hemostasis was ensured using bipolar cautery  The surgical site was then closed in multiple layers  Platysma was reapproximated using 3-0 Vicryl stitches in an interrupted fashion  Skin itself was closed using a 4-0 moncryl stitch in a running sub-cuticular fashion, followed by a steri-strip  Anesthesia was then reversed, and drapes were removed  The patient was awakened, extubated and taken to the recovery room in stable condition  All counts were correct at the end of the case, and no complications were encountered       I was present for the entire procedure    Patient Disposition:  PACU  and extubated and stable    SIGNATURE: Carolin Self MD  DATE: January 21, 2020  TIME: 10:03 AM

## 2020-01-21 NOTE — ANESTHESIA POSTPROCEDURE EVALUATION
Post-Op Assessment Note    CV Status:  Stable  Pain Score: 0    Pain management: adequate     Mental Status:  Arousable and awake   Hydration Status:  Stable   PONV Controlled:  None   Airway Patency:  Patent   Post Op Vitals Reviewed: Yes      Staff: CRNA, Anesthesiologist   Comments: Spontaneously breathing, HOB @ 30 degrees, vss, fully endorsed to recovery w/o AC   Pt comfortable and awake          BP   129/76   Temp     Pulse  68   Resp   14   SpO2   96

## 2020-01-22 ENCOUNTER — OFFICE VISIT (OUTPATIENT)
Dept: OTOLARYNGOLOGY | Facility: CLINIC | Age: 65
End: 2020-01-22

## 2020-01-22 VITALS — HEIGHT: 76 IN | BODY MASS INDEX: 38.36 KG/M2 | WEIGHT: 315 LBS

## 2020-01-22 DIAGNOSIS — R22.1 MASS OF RIGHT SIDE OF NECK: Primary | ICD-10-CM

## 2020-01-22 PROCEDURE — 99024 POSTOP FOLLOW-UP VISIT: CPT | Performed by: NURSE PRACTITIONER

## 2020-01-23 NOTE — PROGRESS NOTES
Assessment/Plan:    Mass of right side of neck  Status post excision neck mass right neck post op day one  Ecchymosis below incision, steri strip in place  Tenderness on palpation  Seroma vs hematoma surrounding incision  Dr Devi Schroeder notified  Attempted to aspirate seroma fluid  Able to remove 2 cc clear pink serous fluid  Pt tolerated well  Unable to aspirate further due to discomfort  Recommend follow up with Dr Jeri Jones tomorrow to monitor, pt agreed           Diagnoses and all orders for this visit:    Mass of right side of neck          Subjective:      Patient ID: Madyson Tipton  is a 60 y o  male  Presents today for POV status post excision neck mass on right 01/21/2020  Noticed this morning began with swelling along neck  This is worsening as day goes on  Pain is also increasing  Also has bruising along neck  The following portions of the patient's history were reviewed and updated as appropriate: allergies, current medications, past family history, past medical history, past social history, past surgical history and problem list     Review of Systems   Constitutional: Negative  HENT: Positive for facial swelling  Negative for congestion, ear discharge, ear pain, hearing loss, nosebleeds, postnasal drip, rhinorrhea, sinus pressure, sinus pain, sore throat, tinnitus and voice change  Eyes: Negative  Respiratory: Negative for chest tightness and shortness of breath  Cardiovascular: Negative  Gastrointestinal: Negative  Endocrine: Negative  Musculoskeletal: Positive for neck pain and neck stiffness  Skin: Positive for wound  Negative for color change  Neurological: Negative for dizziness, numbness and headaches  Psychiatric/Behavioral: Negative  Objective:      Ht 6' 3 5" (1 918 m)   Wt (!) 158 kg (348 lb)   BMI 42 92 kg/m²          Physical Exam   Constitutional: He is oriented to person, place, and time  He appears well-developed and well-nourished   He is cooperative  HENT:   Head: Normocephalic  Right Ear: Hearing, tympanic membrane, external ear and ear canal normal  No drainage or tenderness  Tympanic membrane is not perforated and not erythematous  No decreased hearing is noted  Left Ear: Hearing, tympanic membrane, external ear and ear canal normal  No drainage or tenderness  Tympanic membrane is not perforated and not erythematous  No decreased hearing is noted  Nose: Nose normal  No sinus tenderness, nasal deformity or septal deviation  Mouth/Throat: Uvula is midline, oropharynx is clear and moist and mucous membranes are normal  Mucous membranes are not pale and not dry  No oral lesions  Normal dentition  No oropharyngeal exudate  Neck: Normal range of motion and full passive range of motion without pain  Neck supple  No tracheal deviation present  Right neck incision with steri strip intact  Ecchymosis below incision  Tenderness on palpation  Fluid filled surrounding incision     Cardiovascular: Normal rate  Pulmonary/Chest: Effort normal  No accessory muscle usage  No respiratory distress  Musculoskeletal:        Right shoulder: He exhibits normal range of motion  Lymphadenopathy:     He has no cervical adenopathy  Neurological: He is alert and oriented to person, place, and time  No cranial nerve deficit or sensory deficit  Skin: Skin is warm, dry and intact  Psychiatric: He has a normal mood and affect

## 2020-01-23 NOTE — ASSESSMENT & PLAN NOTE
Status post excision neck mass right neck post op day one  Ecchymosis below incision, steri strip in place  Tenderness on palpation  Seroma vs hematoma surrounding incision  Dr Praveena Mccauley notified  Attempted to aspirate seroma fluid  Able to remove 2 cc clear pink serous fluid  Pt tolerated well  Unable to aspirate further due to discomfort    Recommend follow up with Dr Rusty Montano tomorrow to monitor, pt agreed

## 2020-01-24 ENCOUNTER — APPOINTMENT (OUTPATIENT)
Dept: PHYSICAL THERAPY | Facility: CLINIC | Age: 65
End: 2020-01-24
Payer: COMMERCIAL

## 2020-01-27 ENCOUNTER — OFFICE VISIT (OUTPATIENT)
Dept: FAMILY MEDICINE CLINIC | Facility: CLINIC | Age: 65
End: 2020-01-27
Payer: COMMERCIAL

## 2020-01-27 ENCOUNTER — HOSPITAL ENCOUNTER (EMERGENCY)
Facility: HOSPITAL | Age: 65
Discharge: HOME | End: 2020-01-27
Attending: EMERGENCY MEDICINE | Admitting: EMERGENCY MEDICINE
Payer: COMMERCIAL

## 2020-01-27 ENCOUNTER — APPOINTMENT (EMERGENCY)
Dept: RADIOLOGY | Facility: HOSPITAL | Age: 65
End: 2020-01-27
Payer: COMMERCIAL

## 2020-01-27 VITALS
HEIGHT: 76 IN | HEART RATE: 76 BPM | WEIGHT: 315 LBS | DIASTOLIC BLOOD PRESSURE: 90 MMHG | TEMPERATURE: 101.5 F | BODY MASS INDEX: 38.36 KG/M2 | OXYGEN SATURATION: 98 % | SYSTOLIC BLOOD PRESSURE: 150 MMHG

## 2020-01-27 VITALS
SYSTOLIC BLOOD PRESSURE: 120 MMHG | RESPIRATION RATE: 16 BRPM | DIASTOLIC BLOOD PRESSURE: 73 MMHG | TEMPERATURE: 99.4 F | OXYGEN SATURATION: 97 % | HEART RATE: 68 BPM

## 2020-01-27 DIAGNOSIS — R50.9 FEVER, UNSPECIFIED FEVER CAUSE: Primary | ICD-10-CM

## 2020-01-27 DIAGNOSIS — R22.1 NECK MASS: ICD-10-CM

## 2020-01-27 DIAGNOSIS — R05.9 COUGH: ICD-10-CM

## 2020-01-27 DIAGNOSIS — T81.40XA POSTOPERATIVE INFECTION: Primary | ICD-10-CM

## 2020-01-27 DIAGNOSIS — L03.90 CELLULITIS: ICD-10-CM

## 2020-01-27 LAB
ALBUMIN SERPL BCP-MCNC: 3.5 G/DL (ref 3.5–5)
ALP SERPL-CCNC: 126 U/L (ref 46–116)
ALT SERPL W P-5'-P-CCNC: 28 U/L (ref 12–78)
ANION GAP SERPL CALCULATED.3IONS-SCNC: 8 MMOL/L (ref 4–13)
APTT PPP: 32 SECONDS (ref 25–32)
AST SERPL W P-5'-P-CCNC: 19 U/L (ref 5–45)
BASOPHILS # BLD AUTO: 0.01 THOUSANDS/ΜL (ref 0–0.1)
BASOPHILS NFR BLD AUTO: 0 % (ref 0–1)
BILIRUB SERPL-MCNC: 0.7 MG/DL (ref 0.2–1)
BUN SERPL-MCNC: 14 MG/DL (ref 5–25)
CALCIUM SERPL-MCNC: 8.6 MG/DL (ref 8.3–10.1)
CHLORIDE SERPL-SCNC: 104 MMOL/L (ref 100–108)
CO2 SERPL-SCNC: 28 MMOL/L (ref 21–32)
CREAT SERPL-MCNC: 1.12 MG/DL (ref 0.6–1.3)
EOSINOPHIL # BLD AUTO: 0.04 THOUSAND/ΜL (ref 0–0.61)
EOSINOPHIL NFR BLD AUTO: 1 % (ref 0–6)
ERYTHROCYTE [DISTWIDTH] IN BLOOD BY AUTOMATED COUNT: 13.3 % (ref 11.6–15.1)
GFR SERPL CREATININE-BSD FRML MDRD: 70 ML/MIN/1.73SQ M
GLUCOSE SERPL-MCNC: 102 MG/DL (ref 65–140)
HCT VFR BLD AUTO: 45 % (ref 36.5–49.3)
HGB BLD-MCNC: 14.7 G/DL (ref 12–17)
HOLD SPECIMEN: NORMAL
IMM GRANULOCYTES # BLD AUTO: 0.01 THOUSAND/UL (ref 0–0.2)
IMM GRANULOCYTES NFR BLD AUTO: 0 % (ref 0–2)
INR PPP: 0.93 (ref 0.91–1.09)
LACTATE SERPL-SCNC: 1.8 MMOL/L (ref 0.5–2)
LYMPHOCYTES # BLD AUTO: 0.93 THOUSANDS/ΜL (ref 0.6–4.47)
LYMPHOCYTES NFR BLD AUTO: 20 % (ref 14–44)
MCH RBC QN AUTO: 32 PG (ref 26.8–34.3)
MCHC RBC AUTO-ENTMCNC: 32.7 G/DL (ref 31.4–37.4)
MCV RBC AUTO: 98 FL (ref 82–98)
MONOCYTES # BLD AUTO: 0.6 THOUSAND/ΜL (ref 0.17–1.22)
MONOCYTES NFR BLD AUTO: 13 % (ref 4–12)
NEUTROPHILS # BLD AUTO: 3.07 THOUSANDS/ΜL (ref 1.85–7.62)
NEUTS SEG NFR BLD AUTO: 66 % (ref 43–75)
NRBC BLD AUTO-RTO: 0 /100 WBCS
PLATELET # BLD AUTO: 186 THOUSANDS/UL (ref 149–390)
PMV BLD AUTO: 9.4 FL (ref 8.9–12.7)
POTASSIUM SERPL-SCNC: 3.9 MMOL/L (ref 3.5–5.3)
PROT SERPL-MCNC: 7 G/DL (ref 6.4–8.2)
PROTHROMBIN TIME: 10 SECONDS (ref 9.8–12)
RBC # BLD AUTO: 4.59 MILLION/UL (ref 3.88–5.62)
SODIUM SERPL-SCNC: 140 MMOL/L (ref 136–145)
WBC # BLD AUTO: 4.66 THOUSAND/UL (ref 4.31–10.16)

## 2020-01-27 PROCEDURE — 83605 ASSAY OF LACTIC ACID: CPT | Performed by: EMERGENCY MEDICINE

## 2020-01-27 PROCEDURE — 3008F BODY MASS INDEX DOCD: CPT | Performed by: FAMILY MEDICINE

## 2020-01-27 PROCEDURE — 96360 HYDRATION IV INFUSION INIT: CPT

## 2020-01-27 PROCEDURE — 96361 HYDRATE IV INFUSION ADD-ON: CPT

## 2020-01-27 PROCEDURE — 85610 PROTHROMBIN TIME: CPT | Performed by: EMERGENCY MEDICINE

## 2020-01-27 PROCEDURE — 99284 EMERGENCY DEPT VISIT MOD MDM: CPT | Performed by: EMERGENCY MEDICINE

## 2020-01-27 PROCEDURE — 80053 COMPREHEN METABOLIC PANEL: CPT | Performed by: EMERGENCY MEDICINE

## 2020-01-27 PROCEDURE — 85730 THROMBOPLASTIN TIME PARTIAL: CPT | Performed by: EMERGENCY MEDICINE

## 2020-01-27 PROCEDURE — 36415 COLL VENOUS BLD VENIPUNCTURE: CPT | Performed by: EMERGENCY MEDICINE

## 2020-01-27 PROCEDURE — 99284 EMERGENCY DEPT VISIT MOD MDM: CPT

## 2020-01-27 PROCEDURE — 99213 OFFICE O/P EST LOW 20 MIN: CPT | Performed by: FAMILY MEDICINE

## 2020-01-27 PROCEDURE — 87040 BLOOD CULTURE FOR BACTERIA: CPT | Performed by: EMERGENCY MEDICINE

## 2020-01-27 PROCEDURE — 85025 COMPLETE CBC W/AUTO DIFF WBC: CPT | Performed by: EMERGENCY MEDICINE

## 2020-01-27 PROCEDURE — 70491 CT SOFT TISSUE NECK W/DYE: CPT

## 2020-01-27 RX ADMIN — IOHEXOL 85 ML: 350 INJECTION, SOLUTION INTRAVENOUS at 17:58

## 2020-01-27 RX ADMIN — SODIUM CHLORIDE 1000 ML: 0.9 INJECTION, SOLUTION INTRAVENOUS at 17:11
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
## 2020-01-28 ENCOUNTER — APPOINTMENT (OUTPATIENT)
Dept: PHYSICAL THERAPY | Facility: CLINIC | Age: 65
End: 2020-01-28
Payer: COMMERCIAL

## 2020-01-28 ENCOUNTER — TELEPHONE (OUTPATIENT)
Dept: FAMILY MEDICINE CLINIC | Facility: CLINIC | Age: 65
End: 2020-01-28

## 2020-01-28 ENCOUNTER — OFFICE VISIT (OUTPATIENT)
Dept: OTOLARYNGOLOGY | Facility: CLINIC | Age: 65
End: 2020-01-28

## 2020-01-28 VITALS
BODY MASS INDEX: 38.36 KG/M2 | TEMPERATURE: 97.6 F | WEIGHT: 315 LBS | HEIGHT: 76 IN | SYSTOLIC BLOOD PRESSURE: 128 MMHG | DIASTOLIC BLOOD PRESSURE: 76 MMHG | HEART RATE: 68 BPM

## 2020-01-28 DIAGNOSIS — R22.1 MASS OF RIGHT SIDE OF NECK: Primary | ICD-10-CM

## 2020-01-28 DIAGNOSIS — R22.1 MASS OF RIGHT SIDE OF NECK: ICD-10-CM

## 2020-01-28 PROCEDURE — 99024 POSTOP FOLLOW-UP VISIT: CPT | Performed by: SPECIALIST

## 2020-01-28 NOTE — ASSESSMENT & PLAN NOTE
Status post excision neck mass right neck post op one week     Pathology indicating Partially fat-replaced benign lymph node    Reviewed actual procedure and post operative expectations  Ecchymosis below incision, steri strip in place   Tenderness on palpation  Removed steri strips without difficulty  Reviewed CT scan neck from last evening indicating small amount fluid collection  Attempted removal of fluid with needle aspiration and unable remove any fluid  Discussed Seroma, inflammation, vs hematoma surrounding incision  Discussed fever in response to bacterial vs viral response  Reviewed options including oral antibiotics, pain management, warm compresses, aspiration  Pt agreed to options, continue antibiotics      Recommend follow up one week

## 2020-01-28 NOTE — PROGRESS NOTES
Assessment/Plan:    Mass of right side of neck  Status post excision neck mass right neck post op one week     Pathology indicating Partially fat-replaced benign lymph node    Reviewed actual procedure and post operative expectations  Ecchymosis below incision, steri strip in place   Tenderness on palpation  Removed steri strips without difficulty  Reviewed CT scan neck from last evening indicating small amount fluid collection  Attempted removal of fluid with needle aspiration and unable remove any fluid  Discussed Seroma, inflammation, vs hematoma surrounding incision  Discussed fever in response to bacterial vs viral response  Reviewed options including oral antibiotics, pain management, warm compresses, aspiration  Pt agreed to options, continue antibiotics  Recommend follow up one week       Diagnoses and all orders for this visit:    Mass of right side of neck          Subjective:      Patient ID: Vriy Willis  is a 60 y o  male  Presents today for POV due to neck mass excision 2 days ago  Noticed yesterday morning began with swelling along neck worsened since last week  This is worsening as day went on  Pain is also increasing slightly  Also had fever of 101  Also has bruising along neck  Flu swab completed in PCP office yesterday  Care in ER yesterday due to concerns of right neck incision causing temperature elevation  The following portions of the patient's history were reviewed and updated as appropriate: allergies, current medications, past family history, past medical history, past social history, past surgical history and problem list     Review of Systems   Constitutional: Negative  HENT: Positive for facial swelling  Negative for congestion, ear discharge, ear pain, hearing loss, nosebleeds, postnasal drip, rhinorrhea, sinus pressure, sinus pain, sore throat, tinnitus and voice change  Eyes: Negative      Respiratory: Negative for chest tightness and shortness of breath  Cardiovascular: Negative  Gastrointestinal: Negative  Endocrine: Negative  Musculoskeletal: Positive for neck pain and neck stiffness  Skin: Negative for color change  Neurological: Negative for dizziness, numbness and headaches  Psychiatric/Behavioral: Negative  Objective:      /76 (BP Location: Left arm, Patient Position: Sitting, Cuff Size: Large)   Pulse 68   Temp 97 6 °F (36 4 °C) (Oral)   Ht 6' 3 6" (1 92 m)   Wt (!) 158 kg (348 lb)   BMI 42 81 kg/m²          Physical Exam   Constitutional: He is oriented to person, place, and time  He appears well-developed and well-nourished  He is cooperative  HENT:   Head: Normocephalic  Right Ear: Hearing, tympanic membrane, external ear and ear canal normal  No drainage or tenderness  Tympanic membrane is not perforated and not erythematous  No decreased hearing is noted  Left Ear: Hearing, tympanic membrane, external ear and ear canal normal  No drainage or tenderness  Tympanic membrane is not perforated and not erythematous  No decreased hearing is noted  Nose: Nose normal  No sinus tenderness, nasal deformity or septal deviation  Mouth/Throat: Uvula is midline, oropharynx is clear and moist and mucous membranes are normal  Mucous membranes are not pale and not dry  No oral lesions  Normal dentition  No oropharyngeal exudate  Neck: Normal range of motion and full passive range of motion without pain  Neck supple  No tracheal deviation present  Right neck swelling and ecchymosis below incision     Cardiovascular: Normal rate  Pulmonary/Chest: Effort normal  No accessory muscle usage  No respiratory distress  Musculoskeletal:        Right shoulder: He exhibits normal range of motion  Lymphadenopathy:     He has no cervical adenopathy  Neurological: He is alert and oriented to person, place, and time  No cranial nerve deficit or sensory deficit  Skin: Skin is warm, dry and intact     Psychiatric: He has a normal mood and affect         Scribe Attestation    I,:   AYLA Hoyos am acting as a scribe while in the presence of the attending physician :        I,:    personally performed the services described in this documentation    as scribed in my presence :

## 2020-01-28 NOTE — TELEPHONE ENCOUNTER
Spoke with patient at this time he does not need a chest x-ray given that there is a clinical indication and CT confirming that there is an infection of the right neck    Patient seeing surgeon today

## 2020-01-28 NOTE — TELEPHONE ENCOUNTER
Dr Bill Lopez:    Patient went to ER yesterday and diagnosed with cellulitis  He wanted to let you know that the ER did not do chest xray  Do you still want him to have xray done? Please contact patient to discuss further

## 2020-02-01 LAB
BACTERIA BLD CULT: NORMAL
BACTERIA BLD CULT: NORMAL

## 2020-02-03 ENCOUNTER — OFFICE VISIT (OUTPATIENT)
Dept: FAMILY MEDICINE CLINIC | Facility: CLINIC | Age: 65
End: 2020-02-03
Payer: COMMERCIAL

## 2020-02-03 VITALS
HEART RATE: 66 BPM | SYSTOLIC BLOOD PRESSURE: 122 MMHG | HEIGHT: 76 IN | RESPIRATION RATE: 16 BRPM | TEMPERATURE: 98.7 F | WEIGHT: 315 LBS | BODY MASS INDEX: 38.36 KG/M2 | DIASTOLIC BLOOD PRESSURE: 78 MMHG

## 2020-02-03 DIAGNOSIS — L03.221 CELLULITIS OF NECK: ICD-10-CM

## 2020-02-03 DIAGNOSIS — J40 BRONCHITIS: Primary | ICD-10-CM

## 2020-02-03 PROCEDURE — 3008F BODY MASS INDEX DOCD: CPT | Performed by: FAMILY MEDICINE

## 2020-02-03 PROCEDURE — 1036F TOBACCO NON-USER: CPT | Performed by: FAMILY MEDICINE

## 2020-02-03 PROCEDURE — 3078F DIAST BP <80 MM HG: CPT | Performed by: FAMILY MEDICINE

## 2020-02-03 PROCEDURE — 3074F SYST BP LT 130 MM HG: CPT | Performed by: FAMILY MEDICINE

## 2020-02-03 PROCEDURE — 99213 OFFICE O/P EST LOW 20 MIN: CPT | Performed by: FAMILY MEDICINE

## 2020-02-03 RX ORDER — LEVALBUTEROL TARTRATE 45 UG/1
1-2 AEROSOL, METERED ORAL EVERY 6 HOURS PRN
Qty: 1 INHALER | Refills: 0 | Status: SHIPPED | OUTPATIENT
Start: 2020-02-03

## 2020-02-03 RX ORDER — METHYLPREDNISOLONE 4 MG/1
TABLET ORAL
Qty: 21 EACH | Refills: 0 | Status: SHIPPED | OUTPATIENT
Start: 2020-02-03 | End: 2020-09-30 | Stop reason: ALTCHOICE
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
## 2020-02-07 ENCOUNTER — TELEPHONE (OUTPATIENT)
Dept: FAMILY MEDICINE CLINIC | Facility: CLINIC | Age: 65
End: 2020-02-07

## 2020-02-07 NOTE — TELEPHONE ENCOUNTER
Dr Edmundo Thibodeaux    Patient went to work past couple days and with rainy weather, he is still congested and coughing  He has 3 more days left of taking steroids  He is requesting note for out of work today, returning to work on Monday 2/10

## 2020-02-19 NOTE — PROGRESS NOTES
PT Discharge    Patient has not attended scheduled PT sessions  He stated he has other health issues to deal with and has not reurned to scheduled PT sessions  D/C at this time

## 2020-02-28 DIAGNOSIS — I50.32 CHRONIC DIASTOLIC CONGESTIVE HEART FAILURE (HCC): ICD-10-CM

## 2020-02-28 RX ORDER — ENALAPRIL MALEATE 5 MG/1
TABLET ORAL
Qty: 90 TABLET | Refills: 1 | Status: SHIPPED | OUTPATIENT
Start: 2020-02-28 | End: 2020-09-17

## 2020-03-05 ENCOUNTER — OFFICE VISIT (OUTPATIENT)
Dept: FAMILY MEDICINE CLINIC | Facility: CLINIC | Age: 65
End: 2020-03-05
Payer: COMMERCIAL

## 2020-03-05 VITALS
WEIGHT: 315 LBS | BODY MASS INDEX: 38.36 KG/M2 | DIASTOLIC BLOOD PRESSURE: 82 MMHG | SYSTOLIC BLOOD PRESSURE: 130 MMHG | HEIGHT: 76 IN | TEMPERATURE: 98.2 F | OXYGEN SATURATION: 92 % | HEART RATE: 74 BPM | RESPIRATION RATE: 16 BRPM

## 2020-03-05 DIAGNOSIS — M79.89 PAIN AND SWELLING OF LEFT LOWER LEG: ICD-10-CM

## 2020-03-05 DIAGNOSIS — M79.662 PAIN AND SWELLING OF LEFT LOWER LEG: ICD-10-CM

## 2020-03-05 DIAGNOSIS — L03.031 PARONYCHIA OF GREAT TOE, RIGHT: Primary | ICD-10-CM

## 2020-03-05 PROCEDURE — 3079F DIAST BP 80-89 MM HG: CPT | Performed by: NURSE PRACTITIONER

## 2020-03-05 PROCEDURE — 3075F SYST BP GE 130 - 139MM HG: CPT | Performed by: NURSE PRACTITIONER

## 2020-03-05 PROCEDURE — 99213 OFFICE O/P EST LOW 20 MIN: CPT | Performed by: NURSE PRACTITIONER

## 2020-03-05 PROCEDURE — 3008F BODY MASS INDEX DOCD: CPT | Performed by: NURSE PRACTITIONER

## 2020-03-05 PROCEDURE — 1036F TOBACCO NON-USER: CPT | Performed by: NURSE PRACTITIONER

## 2020-03-06 ENCOUNTER — HOSPITAL ENCOUNTER (OUTPATIENT)
Dept: RADIOLOGY | Facility: HOSPITAL | Age: 65
Discharge: HOME | End: 2020-03-06
Payer: COMMERCIAL

## 2020-03-06 DIAGNOSIS — M79.662 PAIN AND SWELLING OF LEFT LOWER LEG: ICD-10-CM

## 2020-03-06 DIAGNOSIS — M79.89 PAIN AND SWELLING OF LEFT LOWER LEG: ICD-10-CM

## 2020-03-06 PROCEDURE — 93971 EXTREMITY STUDY: CPT

## 2020-03-06 RX ORDER — CEPHALEXIN 500 MG/1
500 CAPSULE ORAL EVERY 12 HOURS SCHEDULED
Qty: 14 CAPSULE | Refills: 0
Start: 2020-03-06 | End: 2020-03-13

## 2020-03-06 NOTE — PROGRESS NOTES
Assessment/Plan:    1  Paronychia of great toe, right  -     cephalexin (KEFLEX) 500 mg capsule; Take 1 capsule (500 mg total) by mouth every 12 (twelve) hours for 7 days    2  Pain and swelling of left lower leg  -     VAS lower limb venous duplex study, unilateral/limited; Future; Expected date: 03/06/2020      BMI Counseling: Body mass index is 42 77 kg/m²  The BMI is above normal  Nutrition recommendations include decreasing portion sizes, decreasing fast food intake, consuming healthier snacks, moderation in carbohydrate intake and increasing intake of lean protein  Exercise recommendations include exercising 3-5 times per week  The case discussed with patient using patient centered shared decision making  The patient was counseled regarding instructions for management,-- risk factor reductions,-- prognosis,-- impressions,-- risks and benefits of treatment options,-- importance of compliance with treatment  I have reviewed the instructions with the patient, answering all questions to his satisfaction  Call with update on Monday  Return in about 1 week (around 3/12/2020)  Subjective:      Patient ID: Navjot Faria  is a 60 y o  male  Chief Complaint   Patient presents with    Sore Throat     pain on right side of neck, pain in calf and no engery       Here for infected right great toe   Onset >1 week  Sees podiatrist next week    Also  C/o hard red painful left calf x 3 days  Denies injury to the area  deneis recent travel or long car ride  Denies dvt history      The following portions of the patient's history were reviewed and updated as appropriate: allergies, current medications, past family history, past medical history, past social history, past surgical history and problem list     Review of Systems   Constitutional: Positive for fatigue  Negative for fever  Respiratory: Negative  Cardiovascular: Negative  Skin:        Per hpi   Neurological: Negative  Current Outpatient Medications   Medication Sig Dispense Refill    acetaminophen (TYLENOL) 325 mg tablet 2, by mouth, every 6 hours as needed for mild to moderate pain  30 tablet 0    albuterol (PROVENTIL HFA,VENTOLIN HFA) 90 mcg/act inhaler Inhale 2 puffs every 6 (six) hours as needed for wheezing or shortness of breath 1 Inhaler 0    aspirin (ECOTRIN LOW STRENGTH) 81 mg EC tablet Take 81 mg by mouth every evening       enalapril (VASOTEC) 5 mg tablet TAKE ONE TABLET BY MOUTH EVERY DAY 90 tablet 1    furosemide (LASIX) 40 mg tablet TAKE ONE TABLET BY MOUTH EVERY DAY (Patient taking differently: 40 mg every morning ) 90 tablet 1    ibuprofen (MOTRIN) 600 mg tablet Take 1 tablet (600 mg total) by mouth every 6 (six) hours as needed for mild pain 30 tablet 0    levalbuterol (XOPENEX HFA) 45 mcg/act inhaler Inhale 1-2 puffs every 6 (six) hours as needed for wheezing 1 Inhaler 0    levocetirizine (XYZAL) 5 MG tablet Take 5 mg by mouth every evening      methylPREDNISolone 4 MG tablet therapy pack Use as directed on package 21 each 0    OXcarbazepine (TRILEPTAL) 300 mg tablet TAKE ONE AND ONE-HALF TABLETS BY MOUTH TWICE A DAY (Patient taking differently: 300 mg every 12 (twelve) hours ) 90 tablet 1    potassium chloride (KLOR-CON M20) 20 mEq tablet Take 1 tablet (20 mEq total) by mouth daily 30 tablet 6    sertraline (ZOLOFT) 100 mg tablet Take 1 tablet (100 mg total) by mouth 2 (two) times a day 60 tablet 1    umeclidinium-vilanterol (ANORO ELLIPTA) 62 5-25 MCG/INH inhaler Inhale 1 puff daily 1 Inhaler 5    cephalexin (KEFLEX) 500 mg capsule Take 1 capsule (500 mg total) by mouth every 12 (twelve) hours for 7 days 14 capsule 0     No current facility-administered medications for this visit          Objective:    /82 (BP Location: Left arm, Patient Position: Sitting, Cuff Size: Large)   Pulse 74   Temp 98 2 °F (36 8 °C)   Resp 16   Ht 6' 4" (1 93 m)   Wt (!) 159 kg (351 lb 6 4 oz)   SpO2 92% BMI 42 77 kg/m²        Physical Exam   Constitutional: He is oriented to person, place, and time  He appears well-developed and well-nourished  No distress  HENT:   Head: Normocephalic and atraumatic  Mouth/Throat: Oropharynx is clear and moist    Cardiovascular: Normal rate, regular rhythm and intact distal pulses  Murmur heard  Pulses:       Popliteal pulses are 2+ on the left side  Dorsalis pedis pulses are 2+ on the left side  Posterior tibial pulses are 2+ on the left side  Pulmonary/Chest: Effort normal and breath sounds normal  No respiratory distress  Musculoskeletal:        Legs:       Feet:    Lymphadenopathy:     He has no cervical adenopathy  Neurological: He is alert and oriented to person, place, and time  Coordination normal    Psychiatric: He has a normal mood and affect  Nursing note and vitals reviewed               Annmarie Mendez, [address]

## 2020-03-07 ENCOUNTER — TELEPHONE (OUTPATIENT)
Dept: FAMILY MEDICINE CLINIC | Facility: CLINIC | Age: 65
End: 2020-03-07

## 2020-03-07 PROCEDURE — 93971 EXTREMITY STUDY: CPT | Performed by: SURGERY

## 2020-04-09 ENCOUNTER — TELEPHONE (OUTPATIENT)
Dept: FAMILY MEDICINE CLINIC | Facility: CLINIC | Age: 65
End: 2020-04-09

## 2020-04-09 DIAGNOSIS — M54.50 CHRONIC LOW BACK PAIN WITHOUT SCIATICA, UNSPECIFIED BACK PAIN LATERALITY: Primary | ICD-10-CM

## 2020-04-09 DIAGNOSIS — G89.29 CHRONIC LOW BACK PAIN WITHOUT SCIATICA, UNSPECIFIED BACK PAIN LATERALITY: Primary | ICD-10-CM

## 2020-04-09 RX ORDER — CYCLOBENZAPRINE HCL 10 MG
10 TABLET ORAL 2 TIMES DAILY PRN
Qty: 30 TABLET | Refills: 1 | Status: SHIPPED | OUTPATIENT
Start: 2020-04-09 | End: 2020-06-11 | Stop reason: SDUPTHER

## 2020-05-26 ENCOUNTER — TELEPHONE (OUTPATIENT)
Dept: OBGYN CLINIC | Facility: CLINIC | Age: 65
End: 2020-05-26

## 2020-05-27 DIAGNOSIS — M17.0 BILATERAL PRIMARY OSTEOARTHRITIS OF KNEE: Primary | ICD-10-CM

## 2020-05-29 DIAGNOSIS — I10 ESSENTIAL HYPERTENSION: ICD-10-CM

## 2020-05-29 DIAGNOSIS — I50.32 CHRONIC DIASTOLIC CONGESTIVE HEART FAILURE (HCC): ICD-10-CM

## 2020-05-29 RX ORDER — FUROSEMIDE 40 MG/1
40 TABLET ORAL EVERY MORNING
Qty: 90 TABLET | Refills: 1 | Status: SHIPPED | OUTPATIENT
Start: 2020-05-29 | End: 2021-08-11

## 2020-05-29 RX ORDER — ATENOLOL 50 MG/1
50 TABLET ORAL EVERY EVENING
Qty: 90 TABLET | Refills: 1 | Status: SHIPPED | OUTPATIENT
Start: 2020-05-29 | End: 2021-01-26

## 2020-06-10 ENCOUNTER — TELEPHONE (OUTPATIENT)
Dept: OBGYN CLINIC | Facility: CLINIC | Age: 65
End: 2020-06-10

## 2020-06-10 DIAGNOSIS — M17.0 BILATERAL PRIMARY OSTEOARTHRITIS OF KNEE: Primary | ICD-10-CM

## 2020-06-11 ENCOUNTER — OFFICE VISIT (OUTPATIENT)
Dept: FAMILY MEDICINE CLINIC | Facility: CLINIC | Age: 65
End: 2020-06-11
Payer: COMMERCIAL

## 2020-06-11 VITALS
BODY MASS INDEX: 38.36 KG/M2 | WEIGHT: 315 LBS | HEART RATE: 77 BPM | HEIGHT: 76 IN | SYSTOLIC BLOOD PRESSURE: 130 MMHG | OXYGEN SATURATION: 97 % | DIASTOLIC BLOOD PRESSURE: 88 MMHG | RESPIRATION RATE: 18 BRPM | TEMPERATURE: 98.8 F

## 2020-06-11 DIAGNOSIS — M54.50 CHRONIC LOW BACK PAIN WITHOUT SCIATICA, UNSPECIFIED BACK PAIN LATERALITY: ICD-10-CM

## 2020-06-11 DIAGNOSIS — I50.32 CHRONIC DIASTOLIC HEART FAILURE (HCC): Primary | ICD-10-CM

## 2020-06-11 DIAGNOSIS — G89.29 CHRONIC LOW BACK PAIN WITHOUT SCIATICA, UNSPECIFIED BACK PAIN LATERALITY: ICD-10-CM

## 2020-06-11 DIAGNOSIS — R60.0 BILATERAL EDEMA OF LOWER EXTREMITY: ICD-10-CM

## 2020-06-11 DIAGNOSIS — M19.049 HAND ARTHROPATHY: ICD-10-CM

## 2020-06-11 DIAGNOSIS — Z91.81 AT STANDARD RISK FOR FALL: ICD-10-CM

## 2020-06-11 DIAGNOSIS — R26.81 DIFFICULTY STANDING: ICD-10-CM

## 2020-06-11 DIAGNOSIS — M17.0 PRIMARY OSTEOARTHRITIS OF BOTH KNEES: ICD-10-CM

## 2020-06-11 PROCEDURE — 3079F DIAST BP 80-89 MM HG: CPT | Performed by: NURSE PRACTITIONER

## 2020-06-11 PROCEDURE — 1036F TOBACCO NON-USER: CPT | Performed by: NURSE PRACTITIONER

## 2020-06-11 PROCEDURE — 99213 OFFICE O/P EST LOW 20 MIN: CPT | Performed by: NURSE PRACTITIONER

## 2020-06-11 PROCEDURE — 3008F BODY MASS INDEX DOCD: CPT | Performed by: NURSE PRACTITIONER

## 2020-06-11 PROCEDURE — 3075F SYST BP GE 130 - 139MM HG: CPT | Performed by: NURSE PRACTITIONER

## 2020-06-11 RX ORDER — CYCLOBENZAPRINE HCL 10 MG
10 TABLET ORAL 2 TIMES DAILY PRN
Qty: 30 TABLET | Refills: 1 | Status: SHIPPED | OUTPATIENT
Start: 2020-06-11 | End: 2020-10-08 | Stop reason: SDUPTHER

## 2020-06-11 RX ORDER — FUROSEMIDE 20 MG/1
20 TABLET ORAL DAILY PRN
Qty: 90 TABLET | Refills: 1 | Status: SHIPPED | OUTPATIENT
Start: 2020-06-11 | End: 2021-08-11

## 2020-06-11 RX ORDER — MELOXICAM 15 MG/1
15 TABLET ORAL DAILY
Qty: 30 TABLET | Refills: 5 | Status: SHIPPED | OUTPATIENT
Start: 2020-06-11 | End: 2021-03-11 | Stop reason: SDUPTHER

## 2020-06-12 ENCOUNTER — TELEPHONE (OUTPATIENT)
Dept: FAMILY MEDICINE CLINIC | Facility: CLINIC | Age: 65
End: 2020-06-12

## 2020-06-23 ENCOUNTER — TELEPHONE (OUTPATIENT)
Dept: OBGYN CLINIC | Facility: HOSPITAL | Age: 65
End: 2020-06-23

## 2020-06-24 ENCOUNTER — OFFICE VISIT (OUTPATIENT)
Dept: OBGYN CLINIC | Facility: CLINIC | Age: 65
End: 2020-06-24
Payer: COMMERCIAL

## 2020-06-24 VITALS — WEIGHT: 315 LBS | BODY MASS INDEX: 38.36 KG/M2 | HEIGHT: 76 IN | TEMPERATURE: 97.7 F

## 2020-06-24 DIAGNOSIS — M17.0 BILATERAL PRIMARY OSTEOARTHRITIS OF KNEE: Primary | ICD-10-CM

## 2020-06-24 PROCEDURE — 20610 DRAIN/INJ JOINT/BURSA W/O US: CPT | Performed by: ORTHOPAEDIC SURGERY

## 2020-07-01 ENCOUNTER — OFFICE VISIT (OUTPATIENT)
Dept: OBGYN CLINIC | Facility: CLINIC | Age: 65
End: 2020-07-01
Payer: COMMERCIAL

## 2020-07-01 VITALS — HEIGHT: 76 IN | TEMPERATURE: 96.5 F | WEIGHT: 315 LBS | BODY MASS INDEX: 38.36 KG/M2

## 2020-07-01 DIAGNOSIS — M17.0 BILATERAL PRIMARY OSTEOARTHRITIS OF KNEE: Primary | ICD-10-CM

## 2020-07-01 PROCEDURE — 20610 DRAIN/INJ JOINT/BURSA W/O US: CPT | Performed by: ORTHOPAEDIC SURGERY

## 2020-07-01 RX ORDER — HYALURONATE SODIUM 10 MG/ML
20 SYRINGE (ML) INTRAARTICULAR
Status: COMPLETED | OUTPATIENT
Start: 2020-07-01 | End: 2020-07-01

## 2020-07-01 RX ADMIN — Medication 20 MG: at 07:48
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
## 2020-07-13 ENCOUNTER — OFFICE VISIT (OUTPATIENT)
Dept: OBGYN CLINIC | Facility: CLINIC | Age: 65
End: 2020-07-13
Payer: COMMERCIAL

## 2020-07-13 VITALS — TEMPERATURE: 96.6 F

## 2020-07-13 DIAGNOSIS — M17.0 BILATERAL PRIMARY OSTEOARTHRITIS OF KNEE: Primary | ICD-10-CM

## 2020-07-13 PROCEDURE — 20610 DRAIN/INJ JOINT/BURSA W/O US: CPT | Performed by: ORTHOPAEDIC SURGERY
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
## 2020-07-20 ENCOUNTER — APPOINTMENT (OUTPATIENT)
Dept: RADIOLOGY | Facility: CLINIC | Age: 65
End: 2020-07-20
Payer: COMMERCIAL

## 2020-07-20 ENCOUNTER — OFFICE VISIT (OUTPATIENT)
Dept: OBGYN CLINIC | Facility: CLINIC | Age: 65
End: 2020-07-20
Payer: COMMERCIAL

## 2020-07-20 VITALS
BODY MASS INDEX: 38.36 KG/M2 | WEIGHT: 315 LBS | TEMPERATURE: 97.8 F | SYSTOLIC BLOOD PRESSURE: 135 MMHG | DIASTOLIC BLOOD PRESSURE: 90 MMHG | HEART RATE: 75 BPM | HEIGHT: 76 IN

## 2020-07-20 DIAGNOSIS — M25.552 PAIN IN LEFT HIP: ICD-10-CM

## 2020-07-20 DIAGNOSIS — M16.12 PRIMARY OSTEOARTHRITIS OF ONE HIP, LEFT: ICD-10-CM

## 2020-07-20 DIAGNOSIS — M70.62 TROCHANTERIC BURSITIS OF LEFT HIP: Primary | ICD-10-CM

## 2020-07-20 PROCEDURE — 20610 DRAIN/INJ JOINT/BURSA W/O US: CPT | Performed by: ORTHOPAEDIC SURGERY

## 2020-07-20 PROCEDURE — 3075F SYST BP GE 130 - 139MM HG: CPT | Performed by: ORTHOPAEDIC SURGERY

## 2020-07-20 PROCEDURE — 1036F TOBACCO NON-USER: CPT | Performed by: ORTHOPAEDIC SURGERY

## 2020-07-20 PROCEDURE — 3080F DIAST BP >= 90 MM HG: CPT | Performed by: ORTHOPAEDIC SURGERY

## 2020-07-20 PROCEDURE — 99214 OFFICE O/P EST MOD 30 MIN: CPT | Performed by: ORTHOPAEDIC SURGERY

## 2020-07-20 PROCEDURE — 3008F BODY MASS INDEX DOCD: CPT | Performed by: ORTHOPAEDIC SURGERY

## 2020-07-20 PROCEDURE — 73502 X-RAY EXAM HIP UNI 2-3 VIEWS: CPT

## 2020-07-20 RX ORDER — LIDOCAINE HYDROCHLORIDE 10 MG/ML
2 INJECTION, SOLUTION INFILTRATION; PERINEURAL
Status: COMPLETED | OUTPATIENT
Start: 2020-07-20 | End: 2020-07-20

## 2020-07-20 RX ORDER — TRIAMCINOLONE ACETONIDE 40 MG/ML
40 INJECTION, SUSPENSION INTRA-ARTICULAR; INTRAMUSCULAR
Status: COMPLETED | OUTPATIENT
Start: 2020-07-20 | End: 2020-07-20

## 2020-07-20 RX ORDER — BUPIVACAINE HYDROCHLORIDE 5 MG/ML
2 INJECTION, SOLUTION EPIDURAL; INTRACAUDAL
Status: COMPLETED | OUTPATIENT
Start: 2020-07-20 | End: 2020-07-20

## 2020-07-20 RX ADMIN — LIDOCAINE HYDROCHLORIDE 2 ML: 10 INJECTION, SOLUTION INFILTRATION; PERINEURAL at 10:16

## 2020-07-20 RX ADMIN — BUPIVACAINE HYDROCHLORIDE 2 ML: 5 INJECTION, SOLUTION EPIDURAL; INTRACAUDAL at 10:16

## 2020-07-20 RX ADMIN — TRIAMCINOLONE ACETONIDE 40 MG: 40 INJECTION, SUSPENSION INTRA-ARTICULAR; INTRAMUSCULAR at 10:16
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
## 2020-07-23 ENCOUNTER — OFFICE VISIT (OUTPATIENT)
Dept: FAMILY MEDICINE CLINIC | Facility: CLINIC | Age: 65
End: 2020-07-23
Payer: COMMERCIAL

## 2020-07-23 VITALS
DIASTOLIC BLOOD PRESSURE: 88 MMHG | TEMPERATURE: 98.9 F | RESPIRATION RATE: 18 BRPM | BODY MASS INDEX: 38.36 KG/M2 | SYSTOLIC BLOOD PRESSURE: 132 MMHG | OXYGEN SATURATION: 96 % | HEIGHT: 76 IN | WEIGHT: 315 LBS | HEART RATE: 68 BPM

## 2020-07-23 DIAGNOSIS — Z79.899 HIGH RISK MEDICATION USE: ICD-10-CM

## 2020-07-23 DIAGNOSIS — K86.2 PANCREAS CYST: Primary | ICD-10-CM

## 2020-07-23 DIAGNOSIS — R73.01 ELEVATED FASTING BLOOD SUGAR: ICD-10-CM

## 2020-07-23 DIAGNOSIS — Z11.59 ENCOUNTER FOR HEPATITIS C SCREENING TEST FOR LOW RISK PATIENT: ICD-10-CM

## 2020-07-23 DIAGNOSIS — R93.89 ABNORMAL CT SCAN: ICD-10-CM

## 2020-07-23 DIAGNOSIS — R73.9 HYPERGLYCEMIA: ICD-10-CM

## 2020-07-23 DIAGNOSIS — E78.2 MIXED HYPERLIPIDEMIA: ICD-10-CM

## 2020-07-23 DIAGNOSIS — I10 ESSENTIAL HYPERTENSION: ICD-10-CM

## 2020-07-23 LAB
HBA1C MFR BLD HPLC: 5.6 %
HCV AB SER-ACNC: NOT DETECTED

## 2020-07-23 PROCEDURE — 3008F BODY MASS INDEX DOCD: CPT | Performed by: NURSE PRACTITIONER

## 2020-07-23 PROCEDURE — 36415 COLL VENOUS BLD VENIPUNCTURE: CPT | Performed by: NURSE PRACTITIONER

## 2020-07-23 PROCEDURE — 3079F DIAST BP 80-89 MM HG: CPT | Performed by: NURSE PRACTITIONER

## 2020-07-23 PROCEDURE — 99213 OFFICE O/P EST LOW 20 MIN: CPT | Performed by: NURSE PRACTITIONER

## 2020-07-23 PROCEDURE — 1036F TOBACCO NON-USER: CPT | Performed by: NURSE PRACTITIONER

## 2020-07-23 PROCEDURE — 3075F SYST BP GE 130 - 139MM HG: CPT | Performed by: NURSE PRACTITIONER

## 2020-07-23 NOTE — PROGRESS NOTES
Assessment/Plan:    1  Pancreas cyst  -     US abdomen limited; Future; Expected date: 07/23/2020  -     Comprehensive metabolic panel; Future    2  Abnormal CT scan  -     US abdomen limited; Future; Expected date: 07/23/2020    3  Essential hypertension  -     TSH, 3rd generation with Free T4 reflex; Future  -     CBC and Platelet; Future  -     Comprehensive metabolic panel; Future    4  Hyperglycemia    5  Mixed hyperlipidemia  -     TSH, 3rd generation with Free T4 reflex; Future  -     Comprehensive metabolic panel; Future  -     Lipid panel; Future    6  Elevated fasting blood sugar  -     Hemoglobin A1C; Future    7  High risk medication use  -     TSH, 3rd generation with Free T4 reflex; Future  -     CBC and Platelet; Future  -     Comprehensive metabolic panel; Future  -     Hemoglobin A1C; Future    8  Encounter for hepatitis C screening test for low risk patient  -     Hepatitis C RNA, quantitative, PCR; Future        The case discussed with patient using patient centered shared decision making  The patient was counseled regarding instructions for management,-- risk factor reductions,-- prognosis,-- impressions,-- risks and benefits of treatment options,-- importance of compliance with treatment  I have reviewed the instructions with the patient, answering all questions to his satisfaction  Treatment plan r/t pancreatic cyst dependant on further imaging  U/S ordered  Will likely need MRI  There are no Patient Instructions on file for this visit  Return in about 3 months (around 10/23/2020)  Subjective:      Patient ID: Jensen Cooper  is a 60 y o  male  Chief Complaint   Patient presents with    Follow-up     6 week f/u       Here for lab draw r/t chronic conditions and discussion of recent Ct scan (follow up [facility]) ordered by Dr Kristine Esparza and Lung   HE has been monitoring pancreatic cyst  Now feels time for further eval  Per pt, it was first discovered 4 years ago  It has grown ever so slowly  The following portions of the patient's history were reviewed and updated as appropriate: allergies, current medications, past family history, past medical history, past social history, past surgical history and problem list     Review of Systems   Constitutional: Positive for fatigue  Negative for fever and unexpected weight change  Respiratory:        Copd sxs at baseline   Cardiovascular: Negative  Gastrointestinal: Negative  Endocrine: Negative  Neurological: Negative  Current Outpatient Medications   Medication Sig Dispense Refill    acetaminophen (TYLENOL) 325 mg tablet 2, by mouth, every 6 hours as needed for mild to moderate pain   30 tablet 0    albuterol (PROVENTIL HFA,VENTOLIN HFA) 90 mcg/act inhaler Inhale 2 puffs every 6 (six) hours as needed for wheezing or shortness of breath 1 Inhaler 0    aspirin (ECOTRIN LOW STRENGTH) 81 mg EC tablet Take 81 mg by mouth every evening       atenolol (TENORMIN) 50 mg tablet Take 1 tablet (50 mg total) by mouth every evening 90 tablet 1    cyclobenzaprine (FLEXERIL) 10 mg tablet Take 1 tablet (10 mg total) by mouth 2 (two) times a day as needed for muscle spasms 30 tablet 1    enalapril (VASOTEC) 5 mg tablet TAKE ONE TABLET BY MOUTH EVERY DAY 90 tablet 1    furosemide (LASIX) 20 mg tablet Take 1 tablet (20 mg total) by mouth daily as needed (severe swelling in legs) 90 tablet 1    furosemide (LASIX) 40 mg tablet Take 1 tablet (40 mg total) by mouth every morning 90 tablet 1    ibuprofen (MOTRIN) 600 mg tablet Take 1 tablet (600 mg total) by mouth every 6 (six) hours as needed for mild pain 30 tablet 0    levalbuterol (XOPENEX HFA) 45 mcg/act inhaler Inhale 1-2 puffs every 6 (six) hours as needed for wheezing 1 Inhaler 0    levocetirizine (XYZAL) 5 MG tablet Take 5 mg by mouth every evening      meloxicam (MOBIC) 15 mg tablet Take 1 tablet (15 mg total) by mouth daily 30 tablet 5    OXcarbazepine (TRILEPTAL) 300 mg tablet TAKE ONE AND ONE-HALF TABLETS BY MOUTH TWICE A DAY (Patient taking differently: 300 mg every 12 (twelve) hours ) 90 tablet 1    potassium chloride (KLOR-CON M20) 20 mEq tablet Take 1 tablet (20 mEq total) by mouth daily 30 tablet 6    sertraline (ZOLOFT) 100 mg tablet Take 1 tablet (100 mg total) by mouth 2 (two) times a day 60 tablet 1    umeclidinium-vilanterol (ANORO ELLIPTA) 62 5-25 MCG/INH inhaler Inhale 1 puff daily 1 Inhaler 5    methylPREDNISolone 4 MG tablet therapy pack Use as directed on package (Patient not taking: Reported on 7/23/2020) 21 each 0     No current facility-administered medications for this visit  Objective:    /88   Pulse 68   Temp 98 9 °F (37 2 °C)   Resp 18   Ht 6' 4" (1 93 m)   Wt (!) 161 kg (355 lb)   SpO2 96%   BMI 43 21 kg/m²        Physical Exam   Constitutional: He is oriented to person, place, and time  Vital signs are normal  He appears well-developed and well-nourished  No distress  HENT:   Head: Normocephalic and atraumatic  Cardiovascular: Normal rate, regular rhythm and intact distal pulses  Murmur heard  Pulmonary/Chest: Effort normal and breath sounds normal    Abdominal: Soft  Bowel sounds are normal  He exhibits no distension and no mass  There is no tenderness  There is no guarding  Musculoskeletal: He exhibits no edema  Neurological: He is alert and oriented to person, place, and time  Nursing note and vitals reviewed               Lucy Mayorga, [address] [place] [address]

## 2020-07-27 LAB
ALBUMIN SERPL-MCNC: 4 G/DL (ref 3.8–4.8)
ALBUMIN/GLOB SERPL: 1.7 {RATIO} (ref 1.2–2.2)
ALP SERPL-CCNC: 158 IU/L (ref 39–117)
ALT SERPL-CCNC: 22 IU/L (ref 0–44)
AST SERPL-CCNC: 19 IU/L (ref 0–40)
BILIRUB SERPL-MCNC: 0.3 MG/DL (ref 0–1.2)
BUN SERPL-MCNC: 16 MG/DL (ref 8–27)
BUN/CREAT SERPL: 19 (ref 10–24)
CALCIUM SERPL-MCNC: 9.4 MG/DL (ref 8.6–10.2)
CHLORIDE SERPL-SCNC: 105 MMOL/L (ref 96–106)
CHOLEST SERPL-MCNC: 156 MG/DL (ref 100–199)
CO2 SERPL-SCNC: 19 MMOL/L (ref 20–29)
CREAT SERPL-MCNC: 0.86 MG/DL (ref 0.76–1.27)
ERYTHROCYTE [DISTWIDTH] IN BLOOD BY AUTOMATED COUNT: 13.6 % (ref 11.6–15.4)
GLOBULIN SER-MCNC: 2.3 G/DL (ref 1.5–4.5)
GLUCOSE SERPL-MCNC: 101 MG/DL (ref 65–99)
HBA1C MFR BLD: 5.6 % (ref 4.8–5.6)
HCT VFR BLD AUTO: 43.9 % (ref 37.5–51)
HCV RNA SERPL NAA+PROBE-ACNC: NORMAL IU/ML
HDLC SERPL-MCNC: 41 MG/DL
HGB BLD-MCNC: 14.6 G/DL (ref 13–17.7)
LDLC SERPL CALC-MCNC: 90 MG/DL (ref 0–99)
MCH RBC QN AUTO: 32.2 PG (ref 26.6–33)
MCHC RBC AUTO-ENTMCNC: 33.3 G/DL (ref 31.5–35.7)
MCV RBC AUTO: 97 FL (ref 79–97)
MICRODELETION SYND BLD/T FISH: NORMAL
PLATELET # BLD AUTO: 229 X10E3/UL (ref 150–450)
POTASSIUM SERPL-SCNC: 4.8 MMOL/L (ref 3.5–5.2)
PROT SERPL-MCNC: 6.3 G/DL (ref 6–8.5)
RBC # BLD AUTO: 4.54 X10E6/UL (ref 4.14–5.8)
SL AMB EGFR AFRICAN AMERICAN: 107 ML/MIN/1.73
SL AMB EGFR NON AFRICAN AMERICAN: 93 ML/MIN/1.73
SL AMB VLDL CHOLESTEROL CALC: 25 MG/DL (ref 5–40)
SODIUM SERPL-SCNC: 143 MMOL/L (ref 134–144)
TEST INFORMATION: NORMAL
TRIGL SERPL-MCNC: 126 MG/DL (ref 0–149)
TSH SERPL DL<=0.005 MIU/L-ACNC: 2.52 UIU/ML (ref 0.45–4.5)
WBC # BLD AUTO: 7.4 X10E3/UL (ref 3.4–10.8)

## 2020-07-28 ENCOUNTER — EVALUATION (OUTPATIENT)
Dept: PHYSICAL THERAPY | Facility: CLINIC | Age: 65
End: 2020-07-28
Payer: COMMERCIAL

## 2020-07-28 DIAGNOSIS — M70.62 TROCHANTERIC BURSITIS OF LEFT HIP: Primary | ICD-10-CM

## 2020-07-28 DIAGNOSIS — M16.12 PRIMARY OSTEOARTHRITIS OF ONE HIP, LEFT: ICD-10-CM

## 2020-07-28 PROCEDURE — 97161 PT EVAL LOW COMPLEX 20 MIN: CPT

## 2020-08-03 ENCOUNTER — OFFICE VISIT (OUTPATIENT)
Dept: PHYSICAL THERAPY | Facility: CLINIC | Age: 65
End: 2020-08-03
Payer: COMMERCIAL

## 2020-08-03 DIAGNOSIS — M16.12 PRIMARY OSTEOARTHRITIS OF ONE HIP, LEFT: ICD-10-CM

## 2020-08-03 DIAGNOSIS — M70.62 TROCHANTERIC BURSITIS OF LEFT HIP: Primary | ICD-10-CM

## 2020-08-03 PROCEDURE — 97140 MANUAL THERAPY 1/> REGIONS: CPT

## 2020-08-03 PROCEDURE — 97110 THERAPEUTIC EXERCISES: CPT

## 2020-08-03 NOTE — PROGRESS NOTES
Daily Note     Today's date: 8/3/2020  Patient name: Mindy Johnson  : 1955  MRN: [id number]  Referring provider: Sallie Ornelas DO  Dx:   Encounter Diagnosis     ICD-10-CM    1  Trochanteric bursitis of left hip  M70 62    2  Primary osteoarthritis of one hip, left  M16 12                   Subjective:  Reports 6/10 R hip and L groin pain      Objective: See treatment diary below      Assessment: Tolerated treatment well  Patient would benefit from continued PT  Reports "cramps" indicating area of L calf displayed pain with DF but was not warm or swollen explained signs and symptoms of DVT and recommended that he see his doctor if he experiences any increase in swelling or warmth      Plan: Continue per plan of care        Precautions: Standard, L trochanteric bursitis, B knee OA, HTN, aortic aneurysm, FALL RISK      Manuals 7/28 8/3/2020           L hip PROM  5 min           STM L HS, ITB  10 min                                     Neuro Re-Ed             Quad set              SL balance  10 x 5 sed           Tandem stance             Romberg                                                     Ther Ex             NuStep Warmup  10           Mini squats   10           Standing hip flex, abd, ext   30           Calf raises   30           Side stepping in // bars   5 X 10ft           Step ups 4"   20           Marching in // bars   30 count                        Ther Activity                                       Gait Training             Stair training Rev                          Modalities             Ice post   N/A

## 2020-08-05 ENCOUNTER — TELEPHONE (OUTPATIENT)
Dept: OBGYN CLINIC | Facility: CLINIC | Age: 65
End: 2020-08-05

## 2020-08-05 DIAGNOSIS — M16.11 PRIMARY OSTEOARTHRITIS OF ONE HIP, RIGHT: Primary | ICD-10-CM

## 2020-08-05 NOTE — TELEPHONE ENCOUNTER
Patient called stating he is now having the same issue with his RIGHT leg and would like to know if Dr Jhonny Parsons could write a script for PT for the right leg as well? Please advise if this is okay  Call pt once script is in

## 2020-08-07 ENCOUNTER — OFFICE VISIT (OUTPATIENT)
Dept: PHYSICAL THERAPY | Facility: CLINIC | Age: 65
End: 2020-08-07
Payer: COMMERCIAL

## 2020-08-07 DIAGNOSIS — M16.12 PRIMARY OSTEOARTHRITIS OF ONE HIP, LEFT: ICD-10-CM

## 2020-08-07 DIAGNOSIS — M70.62 TROCHANTERIC BURSITIS OF LEFT HIP: Primary | ICD-10-CM

## 2020-08-07 PROCEDURE — 97110 THERAPEUTIC EXERCISES: CPT

## 2020-08-07 PROCEDURE — 97140 MANUAL THERAPY 1/> REGIONS: CPT

## 2020-08-07 NOTE — PROGRESS NOTES
Daily Note     Today's date: 2020  Patient name: Marce Frye  : 1955  MRN: [id number]  Referring provider: Jerry Kuhn DO  Dx:   Encounter Diagnosis     ICD-10-CM    1  Trochanteric bursitis of left hip  M70 62    2  Primary osteoarthritis of one hip, left  M16 12        Start Time: 0800  Stop Time: 0850  Total time in clinic (min): 50 minutes    Subjective: Patient reports increased stiffness and pain in B hips today  He reports 9/10 pain bilaterally today  Functional difficulties include ambulation greater than 5 minutes, ascending/descending stairs, and household chores  Objective: See treatment diary below  No signs/symptoms of L DVT  Negative Shanda's  No redness, swelling, intense pain  Assessment: Tolerated treatment well  Patient would benefit from continued PT to improve B hip ROM and strength, and improve overall functional mobility  Patient fatigues quickly throughout session  Stiffness dissipates with step ups and side stepping exercises  Allow patient frequent breaks throughout session  Patient was educated on DVT signs/symptoms  Continue to progress as able  Plan: Continue per plan of care        Precautions: Standard, L trochanteric bursitis, B knee OA, HTN, aortic aneurysm, FALL RISK      Manuals 7/28 8/3/2020 8/7          L hip PROM  5 min 5'          STM L HS, ITB  10 min 5'                                    Neuro Re-Ed             [employer] balance  10 x 5 sed 10 x 5 sec          Tandem stance             Romberg                                                     Ther Ex             NuStep Warmup  10 10' L3          Mini squats   10 x10          Standing hip flex, abd, ext   30 x15          Calf raises, toe raises  30 x30          LAQ, SAQ   x10 B          Standing knee flex   x10 B          Side stepping in // bars   5 X 10ft 5 x 10ft          Step ups 4"   20 6" x20          Marching in // bars   30 count 30 count Ther Activity                                       Gait Training             Stair training Rev                          Modalities             Ice post   N/A           Heat post    5'

## 2020-08-10 ENCOUNTER — HOSPITAL ENCOUNTER (OUTPATIENT)
Dept: RADIOLOGY | Facility: HOSPITAL | Age: 65
Discharge: HOME | End: 2020-08-10
Payer: COMMERCIAL

## 2020-08-10 DIAGNOSIS — K86.2 PANCREAS CYST: ICD-10-CM

## 2020-08-10 DIAGNOSIS — R93.89 ABNORMAL CT SCAN: ICD-10-CM

## 2020-08-10 PROCEDURE — 76705 ECHO EXAM OF ABDOMEN: CPT

## 2020-08-11 ENCOUNTER — TELEPHONE (OUTPATIENT)
Dept: FAMILY MEDICINE CLINIC | Facility: CLINIC | Age: 65
End: 2020-08-11

## 2020-08-11 ENCOUNTER — OFFICE VISIT (OUTPATIENT)
Dept: PHYSICAL THERAPY | Facility: CLINIC | Age: 65
End: 2020-08-11
Payer: COMMERCIAL

## 2020-08-11 DIAGNOSIS — M16.12 PRIMARY OSTEOARTHRITIS OF ONE HIP, LEFT: ICD-10-CM

## 2020-08-11 DIAGNOSIS — M70.62 TROCHANTERIC BURSITIS OF LEFT HIP: Primary | ICD-10-CM

## 2020-08-11 PROCEDURE — 97140 MANUAL THERAPY 1/> REGIONS: CPT

## 2020-08-11 PROCEDURE — 97110 THERAPEUTIC EXERCISES: CPT

## 2020-08-11 NOTE — PROGRESS NOTES
Daily Note     Today's date: 2020  Patient name: Johnathan Arroyo   : 1955  MRN: [id number]  Referring provider: Demario Barriga DO  Dx:   Encounter Diagnosis     ICD-10-CM    1  Trochanteric bursitis of left hip  M70 62    2  Primary osteoarthritis of one hip, left  M16 12                   Subjective: Pt report felling better following last session however, following work he note increase levels of pain  Objective: See treatment diary below      Assessment: Tolerated treatment well  Patient demonstrated fatigue post treatment, exhibited good technique with therapeutic exercises and would benefit from continued PT      Plan: Continue per plan of care        Precautions: Standard, L trochanteric bursitis, B knee OA, HTN, aortic aneurysm, FALL RISK      Manuals 7/28 8/3/2020 8/7 8/10/20          L hip PROM  5 min 5' 5         STM L HS, ITB  10 min 5' 5                                   Neuro Re-Ed             RELEASEIF balance  10 x 5 sed 10 x 5 sec 10 x 5          Tandem stance             Romberg                                                     Ther Ex             NuStep Warmup  10 10' L3 10' L 3          Mini squats   10 x10 X 10          Standing hip flex, abd, ext   30 x15 X 15          Calf raises, toe raises  30 x30 X 30          LAQ, SAQ   x10 B X 10 B          Standing knee flex   x10 B X 10 B          Side stepping in // bars   5 X 10ft 5 x 10ft 5 x 10 ft          Step ups 4"   20 6" x20 6' x 20          Marching in // bars   30 count 30 count 30 count                       Ther Activity                                       Gait Training             Stair training Rev                          Modalities             Ice post   N/A           Heat post    5' 5

## 2020-08-13 ENCOUNTER — TELEPHONE (OUTPATIENT)
Dept: FAMILY MEDICINE CLINIC | Facility: CLINIC | Age: 65
End: 2020-08-13

## 2020-08-14 ENCOUNTER — APPOINTMENT (OUTPATIENT)
Dept: PHYSICAL THERAPY | Facility: CLINIC | Age: 65
End: 2020-08-14
Payer: COMMERCIAL

## 2020-08-18 ENCOUNTER — OFFICE VISIT (OUTPATIENT)
Dept: PHYSICAL THERAPY | Facility: CLINIC | Age: 65
End: 2020-08-18
Payer: COMMERCIAL

## 2020-08-18 DIAGNOSIS — M16.12 PRIMARY OSTEOARTHRITIS OF ONE HIP, LEFT: ICD-10-CM

## 2020-08-18 DIAGNOSIS — M70.62 TROCHANTERIC BURSITIS OF LEFT HIP: Primary | ICD-10-CM

## 2020-08-18 PROCEDURE — 97110 THERAPEUTIC EXERCISES: CPT

## 2020-08-18 PROCEDURE — 97140 MANUAL THERAPY 1/> REGIONS: CPT

## 2020-08-18 NOTE — PROGRESS NOTES
Daily Note     Today's date: 2020  Patient name: Noel Bond   : 1955  MRN: [id number]  Referring provider: Kyrie Berkowitz DO  Dx:   Encounter Diagnosis     ICD-10-CM    1  Trochanteric bursitis of left hip  M70 62    2  Primary osteoarthritis of one hip, left  M16 12        Start Time: 09  Stop Time: 1020  Total time in clinic (min): 45 minutes    Subjective: Patient denies L hip pain today  He feels increased tightness in B ITB today  Sleeping increases tightness  Objective: See treatment diary below      Assessment: Patient demonstrates fatigue throughout session  Stiffness dissipates after exercises performed  Heat and STM implemented to B ITB to decrease stiffness and decrease pain  Tolerated treatment well  Patient would benefit from continued PT to improve B LE strength, improve B LE ROM, and improve overall mobility  Plan: Continue per plan of care        Precautions: Standard, L trochanteric bursitis, B knee OA, HTN, aortic aneurysm, FALL RISK      Manuals 7/28 8/3/2020 8/7 8/10/20  8/18        L hip PROM  5 min 5' 5 5'        STM L HS, ITB  10 min 5' 5 5'        Manual stretch B ITB                          Neuro Re-Ed             Quad set              SL balance  10 x 5 sed 10 x 5 sec 10 x 5  10s x 5        Tandem stance             Romberg              Hip add squeeze     3s x 10        Fallouts      x10 YTB                     Ther Ex             NuStep Warmup  10 10' L3 10' L 3  5' L3        Mini squats   10 x10 X 10  x10        Standing hip flex, abd, ext, circles  30 x15 X 15  x15        Calf raises, toe raises  30 x30 X 30  x30        LAQ, SAQ   x10 B X 10 B  x20 B        Standing knee flex   x10 B X 10 B  x10 B        Side stepping in // bars   5 X 10ft 5 x 10ft 5 x 10 ft  5 x 10 ft         Step taps     x20 6"        Step ups 4"   20 6" x20 6' x 20  6" x 10 B        Marching in // bars   30 count 30 count 30 count  X 30                     Ther Activity Gait Training             Stair training Rev                          Modalities             Ice post   N/A           Heat post    5' 5 5'

## 2020-08-19 ENCOUNTER — OFFICE VISIT (OUTPATIENT)
Dept: FAMILY MEDICINE CLINIC | Facility: CLINIC | Age: 65
End: 2020-08-19
Payer: COMMERCIAL

## 2020-08-19 VITALS — TEMPERATURE: 97.1 F | SYSTOLIC BLOOD PRESSURE: 132 MMHG | HEART RATE: 76 BPM | DIASTOLIC BLOOD PRESSURE: 82 MMHG

## 2020-08-19 DIAGNOSIS — K76.9 LIVER LESION: ICD-10-CM

## 2020-08-19 DIAGNOSIS — K86.2 PANCREAS CYST: ICD-10-CM

## 2020-08-19 DIAGNOSIS — Z71.2 ENCOUNTER TO DISCUSS TEST RESULTS: Primary | ICD-10-CM

## 2020-08-19 PROCEDURE — 1036F TOBACCO NON-USER: CPT | Performed by: NURSE PRACTITIONER

## 2020-08-19 PROCEDURE — 3079F DIAST BP 80-89 MM HG: CPT | Performed by: NURSE PRACTITIONER

## 2020-08-19 PROCEDURE — 99213 OFFICE O/P EST LOW 20 MIN: CPT | Performed by: NURSE PRACTITIONER

## 2020-08-19 PROCEDURE — 3075F SYST BP GE 130 - 139MM HG: CPT | Performed by: NURSE PRACTITIONER

## 2020-08-19 NOTE — PROGRESS NOTES
Assessment/Plan:    1  Encounter to discuss test results    2  Pancreas cyst  -     MRI abdomen w wo contrast; Future; Expected date: 08/19/2020    3  Liver lesion  -     MRI abdomen w wo contrast; Future; Expected date: 08/19/2020        The case discussed with patient using patient centered shared decision making  The patient was counseled regarding instructions for management,-- risk factor reductions,-- prognosis,-- impressions,-- risks and benefits of treatment options,-- importance of compliance with treatment  I have reviewed the instructions with the patient, answering all questions to his satisfaction  Domenica Ding opts to have MRI of cyst to characterize and and formally diagnose type  MRI ordered  Will follow up with Domenica Ding to discuss results  Subjective:      Patient ID: Zuleyka Teran  is a 60 y o  male  No chief complaint on file  Here for discussion of recent us of abd r/t pancreatic cyst  He has h/o known pancreatic cyst  Per pt, it was first discovered 4 years ago by his CT surgeon at [facility]  It has grown ever so slowly  He is asymptomatic    Us of abd revealed Hypoechoic 4 9 cm lesion associated with the pancreatic tail is most suspicious for pseudocyst as the lesion is similar in size when compared as far back as CT of the chest performed July 12, 2017  More definitive characterization with noncontrast and   contrast-enhanced pancreatic protocol MRI is nonetheless recommended      Hepatomegaly and hepatic steatosis  8mm left hepatic lobe cyst       The following portions of the patient's history were reviewed and updated as appropriate: allergies, current medications, past family history, past medical history, past social history, past surgical history and problem list     Review of Systems   Constitutional: Negative  Gastrointestinal: Negative  Endocrine: Negative            Current Outpatient Medications   Medication Sig Dispense Refill    acetaminophen (TYLENOL) 325 mg tablet 2, by mouth, every 6 hours as needed for mild to moderate pain   30 tablet 0    albuterol (PROVENTIL HFA,VENTOLIN HFA) 90 mcg/act inhaler Inhale 2 puffs every 6 (six) hours as needed for wheezing or shortness of breath 1 Inhaler 0    aspirin (ECOTRIN LOW STRENGTH) 81 mg EC tablet Take 81 mg by mouth every evening       atenolol (TENORMIN) 50 mg tablet Take 1 tablet (50 mg total) by mouth every evening 90 tablet 1    cyclobenzaprine (FLEXERIL) 10 mg tablet Take 1 tablet (10 mg total) by mouth 2 (two) times a day as needed for muscle spasms 30 tablet 1    enalapril (VASOTEC) 5 mg tablet TAKE ONE TABLET BY MOUTH EVERY DAY 90 tablet 1    furosemide (LASIX) 20 mg tablet Take 1 tablet (20 mg total) by mouth daily as needed (severe swelling in legs) 90 tablet 1    furosemide (LASIX) 40 mg tablet Take 1 tablet (40 mg total) by mouth every morning 90 tablet 1    ibuprofen (MOTRIN) 600 mg tablet Take 1 tablet (600 mg total) by mouth every 6 (six) hours as needed for mild pain 30 tablet 0    levalbuterol (XOPENEX HFA) 45 mcg/act inhaler Inhale 1-2 puffs every 6 (six) hours as needed for wheezing 1 Inhaler 0    levocetirizine (XYZAL) 5 MG tablet Take 5 mg by mouth every evening      meloxicam (MOBIC) 15 mg tablet Take 1 tablet (15 mg total) by mouth daily 30 tablet 5    methylPREDNISolone 4 MG tablet therapy pack Use as directed on package (Patient not taking: Reported on 7/23/2020) 21 each 0    OXcarbazepine (TRILEPTAL) 300 mg tablet TAKE ONE AND ONE-HALF TABLETS BY MOUTH TWICE A DAY (Patient taking differently: 300 mg every 12 (twelve) hours ) 90 tablet 1    potassium chloride (KLOR-CON M20) 20 mEq tablet Take 1 tablet (20 mEq total) by mouth daily 30 tablet 6    sertraline (ZOLOFT) 100 mg tablet Take 1 tablet (100 mg total) by mouth 2 (two) times a day 60 tablet 1    umeclidinium-vilanterol (ANORO ELLIPTA) 62 5-25 MCG/INH inhaler Inhale 1 puff daily 1 Inhaler 5     No current facility-administered medications for this visit  Objective: There were no vitals taken for this visit  Physical Exam  Vitals signs and nursing note reviewed  Constitutional:       General: He is not in acute distress  Appearance: Normal appearance  He is obese  Abdominal:      General: Bowel sounds are normal  There is no distension  Palpations: Abdomen is soft  There is no mass  Tenderness: There is no abdominal tenderness  Neurological:      General: No focal deficit present  Mental Status: He is alert  Mental status is at baseline     Psychiatric:         Mood and Affect: Mood normal                 Kaveh Jurado, [address]

## 2020-08-21 ENCOUNTER — OFFICE VISIT (OUTPATIENT)
Dept: PHYSICAL THERAPY | Facility: CLINIC | Age: 65
End: 2020-08-21
Payer: COMMERCIAL

## 2020-08-21 DIAGNOSIS — M16.12 PRIMARY OSTEOARTHRITIS OF ONE HIP, LEFT: ICD-10-CM

## 2020-08-21 DIAGNOSIS — M70.62 TROCHANTERIC BURSITIS OF LEFT HIP: Primary | ICD-10-CM

## 2020-08-21 PROCEDURE — 97110 THERAPEUTIC EXERCISES: CPT

## 2020-08-21 PROCEDURE — 97140 MANUAL THERAPY 1/> REGIONS: CPT

## 2020-08-21 NOTE — PROGRESS NOTES
Daily Note     Today's date: 2020  Patient name: Valerie Oneill   : 1955  MRN: [id number]  Referring provider: Dagoberto Leigh DO  Dx:   Encounter Diagnosis     ICD-10-CM    1  Trochanteric bursitis of left hip  M70 62    2  Primary osteoarthritis of one hip, left  M16 12        Start Time: 0800  Stop Time: 0850  Total time in clinic (min): 50 minutes    Subjective: Patient reports muscle spasms in B gastroc and B hip adductors last night  He reports increased stiffness in B hips today  Objective: See treatment diary below      Assessment: Tolerated treatment well  Patient would benefit from continued PT to improve overall function mobilty, decrease stiffness, and improve B LE strength  Patient fatigues quickly throughout session  Stretching of B hips increases pain in lateral hips, and increases adductor spasms bilaterally  Continue to improve BLE strength to improve independence in home and community  Plan: Continue per plan of care        Precautions: Standard, L trochanteric bursitis, B knee OA, HTN, aortic aneurysm, FALL RISK      Manuals 7/28 8/3/2020 8/7 8/10/20  8/18 8/21       L hip PROM  5 min 5' 5 5' 5'       STM L HS, ITB  10 min 5' 5 5' 5'       Manual stretch B ITB      5'                    Neuro Re-Ed             Quad set              SL balance  10 x 5 sed 10 x 5 sec 10 x 5  10s x 5 3 x 10s       Tandem stance             Romberg              Hip add squeeze     3s x 10 3s x 15       Fallouts      x10 YTB x10 GTB                    Ther Ex             NuStep Warmup  10 10' L3 10' L 3  5' L3 10' L3       Mini squats   10 x10 X 10  x10 x10       LAQ      x20       Standing hip flex, abd, ext, circles  30 x15 X 15  x15 x15       Calf raises, toe raises  30 x30 X 30  x30 x30       LAQ, SAQ   x10 B X 10 B  x20 B        Standing knee flex   x10 B X 10 B  x10 B x10 B       Side stepping in // bars   5 X 10ft 5 x 10ft 5 x 10 ft  5 x 10 ft  4 x 10'       Step taps     x20 6" x20 6" Step ups 4"   20 6" x20 6' x 20  6" x 10 B 6" x 10 B       Marching in // bars   30 count 30 count 30 count  X 30 x30                    Ther Activity                                       Gait Training             Stair training Rev                          Modalities             Ice post   N/A           Heat post    5' 5 5' 10'

## 2020-08-25 ENCOUNTER — OFFICE VISIT (OUTPATIENT)
Dept: PHYSICAL THERAPY | Facility: CLINIC | Age: 65
End: 2020-08-25
Payer: COMMERCIAL

## 2020-08-25 DIAGNOSIS — M70.62 TROCHANTERIC BURSITIS OF LEFT HIP: Primary | ICD-10-CM

## 2020-08-25 PROCEDURE — 97110 THERAPEUTIC EXERCISES: CPT

## 2020-08-25 PROCEDURE — 97140 MANUAL THERAPY 1/> REGIONS: CPT

## 2020-08-25 NOTE — PROGRESS NOTES
Daily Note     Today's date: 2020  Patient name: Marce Frye  : 1955  MRN: [id number]  Referring provider: Jerry Kuhn DO  Dx:   Encounter Diagnosis     ICD-10-CM    1  Trochanteric bursitis of left hip  M70 62        Start Time: 935  Stop Time: 1025  Total time in clinic (min): 50 minutes    Subjective: Patient states he worked all night; was using a  for about 4 hours - coming in with pain right LB       Objective: See treatment diary below      Assessment: Tolerated treatment well  Patient reported increased relief w ER stretch left hip &  along the left ITB;  Patient would benefit from continued PT      Plan: Continue per plan of care        Precautions: Standard, L trochanteric bursitis, B knee OA, HTN, aortic aneurysm, FALL RISK      Manuals 7/28 8/3/2020 8/7 8/10/20  8/18 8/21 8/25      L hip PROM  5 min 5' 5 5' 5' 5'      STM L HS, ITB  10 min 5' 5 5' 5' 5'       Manual stretch B ITB      5' 5'                   Neuro Re-Ed             Quad set              SL balance  10 x 5 sed 10 x 5 sec 10 x 5  10s x 5 3 x 10s 3x 10s      Tandem stance             Romberg              Hip add squeeze     3s x 10 3s x 15 3s x   20      Fallouts      x10 YTB x10 GTB X 20 GTB                   Ther Ex             NuStep Warmup  10 10' L3 10' L 3  5' L3 10' L3 10' L3      Mini squats   10 x10 X 10  x10 x10 X 10      LAQ      x20 X 20      Standing hip flex, abd, ext, circles  30 x15 X 15  x15 x15 X 20      Calf raises, toe raises  30 x30 X 30  x30 x30 X 30      LAQ, SAQ   x10 B X 10 B  x20 B  x20B      Standing knee flex   x10 B X 10 B  x10 B x10 B x10 B      Side stepping in // bars   5 X 10ft 5 x 10ft 5 x 10 ft  5 x 10 ft  4 x 10' 4 x 10      Step taps     x20 6" x20 6" X 20 6" NP      Step ups 4"   20 6" x20 6' x 20  6" x 10 B 6" x 10 B 6" x 10B NP       Marching in // bars   30 count 30 count 30 count  X 30 x30 X 30                   Ther Activity Gait Training             Stair training Rev                          Modalities             Ice post   N/A           Heat post    5' 5 5' 10' 10'

## 2020-08-28 ENCOUNTER — OFFICE VISIT (OUTPATIENT)
Dept: PHYSICAL THERAPY | Facility: CLINIC | Age: 65
End: 2020-08-28
Payer: COMMERCIAL

## 2020-08-28 DIAGNOSIS — M25.551 RIGHT HIP PAIN: ICD-10-CM

## 2020-08-28 DIAGNOSIS — M70.62 TROCHANTERIC BURSITIS OF LEFT HIP: Primary | ICD-10-CM

## 2020-08-28 DIAGNOSIS — M16.12 PRIMARY OSTEOARTHRITIS OF ONE HIP, LEFT: ICD-10-CM

## 2020-08-28 PROCEDURE — 97112 NEUROMUSCULAR REEDUCATION: CPT

## 2020-08-28 PROCEDURE — 97140 MANUAL THERAPY 1/> REGIONS: CPT

## 2020-08-28 PROCEDURE — 97110 THERAPEUTIC EXERCISES: CPT

## 2020-08-28 NOTE — PROGRESS NOTES
PT Re-evaluation/Daily Note     Today's date: 2020  Patient name: Cassia Dial  : 1955  MRN: [id number]  Referring provider: Cristy Todd DO  Dx:   Encounter Diagnosis     ICD-10-CM    1  Trochanteric bursitis of left hip  M70 62    2  Primary osteoarthritis of one hip, left  M16 12    3  Right hip pain  M25 551        Start Time: 0800  Stop Time: 0850  Total time in clinic (min): 50 minutes    Subjective: Patient reports increased pain and stiffness in his low back and L LE today  He slipped and fell yesterday and had to crawl on grass to his truck in order to stand up  No one was able to help him up from a sitting position  Objective: See treatment diary below  Goals  STGs to be achieved in 4 weeks  -STG 1: Patient will be independent with HEP -MET  -STG 2: Patient will have 0/10 L hip pain at rest - NOT MET  -STG 3: Patient will increase L hip ROM to within normal limits - PARTIALLY MET  -STG 4: Patient will report 40% functional improvement of symptoms - PARTIALLY MET  -STG 5: Patient will ascend/descend stairs with step over step pattern with less than 2/10 L hip pain - PARTIALLY MET    LTGs to be achieved in 8 weeks  -LTG 1: Patient will demonstrate independence with maintenance program - NOT MET  -LTG 2: Patient will perform all functional activities with less than 2/10 L hip pain - NOT MET  -LTG 3: Patient will improve FOTO score by 10 points     -LTG 4: Patient will report 70% functional improvement - NOT MET  -LTG 5: Patient will perform SLS test greater than 10 seconds in B LE - NOT MET      Active Range of Motion   Left Hip   Flexion: 90 degrees with pain  Abduction: 40 degrees with pain  External rotation (90/90): WFL and with pain  Internal rotation (90/90): WFL and with pain    Right Hip   Flexion: 90 degrees with pain  Abduction: 40 degrees with pain  External rotation (90/90): WFL and with pain  Internal rotation (90/90): WFL and with pain    Passive Range of Motion   Left Hip   Flexion: 100 degrees with pain  Abduction: 50 degrees with pain  External rotation (90/90): 45 degrees with pain  Internal rotation (90/90): 45 degrees with pain    Right Hip   Flexion: 100 degrees with pain  Abduction: with pain  External rotation (90/90): 45 degrees with pain  Internal rotation (90/90): 45 degrees with pain    Strength/Myotome Testing     Left Hip   Planes of Motion   Flexion: 3+  Abduction: 3+  Adduction: 3+  External rotation: 3+  Internal rotation: 3+    Isolated Muscles   Gluteus quirino: 3+  Gluteus medius: 3+  Iliopsoas: 3+    Right Hip   Planes of Motion   Flexion: 3+  Abduction: 3+  Adduction: 3+  External rotation: 3+  Internal rotation: 3+    Isolated Muscles   Gluteus maximums: 3  Gluteus medius: 3  Iliopsoas: 3    FOTO goal: 51%  FOTO IE: 29%  FOTO 8/28: 33%    Assessment: Patient demonstrates fatigue throughout session  Improvements in strength, ROM, and mobility noted during today's session  See above for details  Patient cued to perform exercises slow and controlled  Patient would benefit from continued PT to improve B LE strength, improve BLE ROM, and decrease pain to maintain independence at home and in community  Patient educated on importance of performing exercises at home, performing heat throughout the day on B hips  Patient verbalized and demonstrated understanding of HEP and PLOC as noted  Plan: Continue per plan of care        Precautions: Standard, L trochanteric bursitis, B knee OA, HTN, aortic aneurysm, FALL RISK      Manuals 7/28 8/3/2020 8/7 8/10/20  8/18 8/21 8/25 8/28     B hip PROM  5 min 5' 5 5' 5' 5' 5'     STM B HS, ITB  10 min 5' 5 5' 5' 5'  5'      Manual stretch B ITB      5' 5' 5'                  Neuro Re-Ed             SL balance  10 x 5 sed 10 x 5 sec 10 x 5  10s x 5 3 x 10s 3x 10s 5x10s     Tandem stance             Romberg              Hip add squeeze     3s x 10 3s x 15 3s x   20 3s x 20     Fallouts x10 YTB x10 GTB X 20 GTB x20 GTB                  Ther Ex             NuStep Warmup  10 10' L3 10' L 3  5' L3 10' L3 10' L3 10' L3     Mini squats   10 x10 X 10  x10 x10 X 10 x10     LAQ      x20 X 20 x10B     Standing hip flex, abd, ext, circles  30 x15 X 15  x15 x15 X 20 x10B     Calf raises, toe raises  30 x30 X 30  x30 x30 X 30 x30     LAQ, SAQ   x10 B X 10 B  x20 B  x20B      Standing knee flex   x10 B X 10 B  x10 B x10 B x10 B x10 B     Side stepping in // bars   5 X 10ft 5 x 10ft 5 x 10 ft  5 x 10 ft  4 x 10' 4 x 10 6 x 10'     Step taps     x20 6" x20 6" X 20 6" NP      Step ups 4"   20 6" x20 6' x 20  6" x 10 B 6" x 10 B 6" x 10B NP  6" x 10B     Marching in // bars   30 count 30 count 30 count  X 30 x30 X 30 X 30                  Ther Activity                                       Gait Training             Stair training Rev                          Modalities             Ice post   N/A           Heat post    5' 5 5' 10' 10' 10'

## 2020-09-01 ENCOUNTER — OFFICE VISIT (OUTPATIENT)
Dept: PHYSICAL THERAPY | Facility: CLINIC | Age: 65
End: 2020-09-01
Payer: COMMERCIAL

## 2020-09-01 DIAGNOSIS — M25.551 RIGHT HIP PAIN: ICD-10-CM

## 2020-09-01 DIAGNOSIS — M16.12 PRIMARY OSTEOARTHRITIS OF ONE HIP, LEFT: ICD-10-CM

## 2020-09-01 DIAGNOSIS — M70.62 TROCHANTERIC BURSITIS OF LEFT HIP: Primary | ICD-10-CM

## 2020-09-01 PROCEDURE — 97140 MANUAL THERAPY 1/> REGIONS: CPT

## 2020-09-01 PROCEDURE — 97110 THERAPEUTIC EXERCISES: CPT

## 2020-09-01 NOTE — PROGRESS NOTES
Daily Note     Today's date: 2020  Patient name: Flex De La Rosa   : 1955  MRN: [id number]  Referring provider: Sherry Owens DO  Dx:   Encounter Diagnosis     ICD-10-CM    1  Trochanteric bursitis of left hip  M70 62    2  Primary osteoarthritis of one hip, left  M16 12    3  Right hip pain  M25 551        Start Time: 0810  Stop Time: 6364  Total time in clinic (min): 45 minutes    Subjective: Patient reports 7/10 B hip and groin pain today  He feels as though he overdid it this weekend with the garage sale he had, and going to the [employer] yard  Objective: See treatment diary below      Assessment: Increased soreness and pain noted throughout treatment session  Due to increased pain, most of treatment session was performed in sitting and supine  STM to B gastroc and ITB/TFL implemented to decrease pain  Patient reports 4/10 B hip pain at end of session  Tolerated treatment fair  Patient demonstrated fatigue post treatment  Plan: Continue per plan of care        Precautions: Standard, L trochanteric bursitis, B knee OA, HTN, aortic aneurysm, FALL RISK      Manuals 7/28 8/3/2020 8/7 8/10/20  8/18 8/21 8/25 8/28 9/1    B hip PROM  5 min 5' 5 5' 5' 5' 5' 5'    STM B HS, ITB  10 min 5' 5 5' 5' 5'  5'  5'    Manual stretch B ITB      5' 5' 5'                  Neuro Re-Ed             SL balance  10 x 5 sed 10 x 5 sec 10 x 5  10s x 5 3 x 10s 3x 10s 5x10s     Tandem stance             Romberg              Hip add squeeze     3s x 10 3s x 15 3s x   20 3s x 20     Fallouts      x10 YTB x10 GTB X 20 GTB x20 GTB                  Ther Ex             NuStep Warmup  10 10' L3 10' L 3  5' L3 10' L3 10' L3 10' L3 10' L3    Mini squats   10 x10 X 10  x10 x10 X 10 x10     LAQ      x20 X 20 x10B x10B    Standing hip flex, abd, ext, circles  30 x15 X 15  x15 x15 X 20 x10B x10 B    Calf raises, toe raises  30 x30 X 30  x30 x30 X 30 x30 Seated heel toe raises 2 x 10 B    LAQ, SAQ   x10 B X 10 B  x20 B  x20B      Standing knee flex   x10 B X 10 B  x10 B x10 B x10 B x10 B     Side stepping in // bars   5 X 10ft 5 x 10ft 5 x 10 ft  5 x 10 ft  4 x 10' 4 x 10 6 x 10'     Step taps     x20 6" x20 6" X 20 6" NP      Step ups 4"   20 6" x20 6' x 20  6" x 10 B 6" x 10 B 6" x 10B NP  6" x 10B     Marching in // bars   30 count 30 count 30 count  X 30 x30 X 30 X 30                  Ther Activity                                       Gait Training             Stair training Rev                          Modalities             Ice post   N/A           Heat post    5' 5 5' 10' 10' 10' 5'

## 2020-09-04 ENCOUNTER — OFFICE VISIT (OUTPATIENT)
Dept: PHYSICAL THERAPY | Facility: CLINIC | Age: 65
End: 2020-09-04
Payer: COMMERCIAL

## 2020-09-04 DIAGNOSIS — M16.12 PRIMARY OSTEOARTHRITIS OF ONE HIP, LEFT: ICD-10-CM

## 2020-09-04 DIAGNOSIS — M25.551 RIGHT HIP PAIN: ICD-10-CM

## 2020-09-04 DIAGNOSIS — M70.62 TROCHANTERIC BURSITIS OF LEFT HIP: Primary | ICD-10-CM

## 2020-09-04 PROCEDURE — 97140 MANUAL THERAPY 1/> REGIONS: CPT

## 2020-09-04 PROCEDURE — 97110 THERAPEUTIC EXERCISES: CPT

## 2020-09-04 PROCEDURE — 97112 NEUROMUSCULAR REEDUCATION: CPT

## 2020-09-04 NOTE — PROGRESS NOTES
Daily Note     Today's date: 2020  Patient name: Denise Santizo  : 1955  MRN: [id number]  Referring provider: Tenzin Mcduffie DO  Dx:   Encounter Diagnosis     ICD-10-CM    1  Trochanteric bursitis of left hip  M70 62    2  Primary osteoarthritis of one hip, left  M16 12    3  Right hip pain  M25 551        Start Time: 0800  Stop Time: 0850  Total time in clinic (min): 50 minutes    Subjective: Patient reports 7/10 B hip pain today  He cleaned garage yesterday, and reports that he "overdid it " He is sore today      Objective: See treatment diary below      Assessment: Stiffness and pain relieved with standing hip circles  Pain increased with R hip flexion stretch  Hold on manual stretching due to increased R groin pain  Tolerated treatment well  Patient would benefit from continued PT to normalize gait and improve overall functional mobility  Plan: Continue per plan of care        Precautions: Standard, L trochanteric bursitis, B knee OA, HTN, aortic aneurysm, FALL RISK      Manuals 7/28 8/3/2020 8/7 8/10/20  8/18 8/21 8/25 8/28 9/1 9/4   B hip PROM  5 min 5' 5 5' 5' 5' 5' 5' 5' L ONLY   STM B HS, ITB  10 min 5' 5 5' 5' 5'  5'  5' 5'   Manual stretch B ITB      5' 5' 5'                  Neuro Re-Ed             SL balance  10 x 5 sed 10 x 5 sec 10 x 5  10s x 5 3 x 10s 3x 10s 5x10s  10s x 3B   Tandem stance             Romberg              Hip add squeeze     3s x 10 3s x 15 3s x   20 3s x 20  3s x 20   Fallouts      x10 YTB x10 GTB X 20 GTB x20 GTB  x20 GTB                Ther Ex             NuStep Warmup  10 10' L3 10' L 3  5' L3 10' L3 10' L3 10' L3 10' L3 10' L3   Mini squats   10 x10 X 10  x10 x10 X 10 x10  x10   LAQ      x20 X 20 x10B x10B x10 B   Standing hip flex, abd, ext, circles  30 x15 X 15  x15 x15 X 20 x10B x10 B x10 B   Calf raises, toe raises  30 x30 X 30  x30 x30 X 30 x30 Seated heel toe raises 2 x 10 B x20 standing   SAQ   x10 B X 10 B  x20 B  x20B   x20 B   Standing knee flex   x10 B X 10 B  x10 B x10 B x10 B x10 B  x10 B   Side stepping in // bars   5 X 10ft 5 x 10ft 5 x 10 ft  5 x 10 ft  4 x 10' 4 x 10 6 x 10'  6 x 10'   Step taps     x20 6" x20 6" X 20 6" NP   x20B   Step ups 4"   20 6" x20 6' x 20  6" x 10 B 6" x 10 B 6" x 10B NP  6" x 10B     Marching in // bars   30 count 30 count 30 count  X 30 x30 X 30 X 30  x30   Hip flexion with peanut          x10   Ther Activity                                       Gait Training             Stair training Rev                          Modalities             Ice post   N/A           Heat post    5' 5 5' 10' 10' 10' 5' 5'

## 2020-09-08 ENCOUNTER — HOSPITAL ENCOUNTER (OUTPATIENT)
Dept: RADIOLOGY | Facility: HOSPITAL | Age: 65
Discharge: HOME | End: 2020-09-08
Payer: COMMERCIAL

## 2020-09-08 DIAGNOSIS — K76.9 LIVER LESION: ICD-10-CM

## 2020-09-08 DIAGNOSIS — K86.2 PANCREAS CYST: ICD-10-CM

## 2020-09-08 PROCEDURE — 74183 MRI ABD W/O CNTR FLWD CNTR: CPT

## 2020-09-08 PROCEDURE — G1004 CDSM NDSC: HCPCS

## 2020-09-08 PROCEDURE — 76376 3D RENDER W/INTRP POSTPROCES: CPT

## 2020-09-08 PROCEDURE — A9585 GADOBUTROL INJECTION: HCPCS | Performed by: NURSE PRACTITIONER

## 2020-09-08 RX ADMIN — GADOBUTROL 16 ML: 604.72 INJECTION INTRAVENOUS at 08:55

## 2020-09-09 ENCOUNTER — APPOINTMENT (OUTPATIENT)
Dept: PHYSICAL THERAPY | Facility: CLINIC | Age: 65
End: 2020-09-09
Payer: COMMERCIAL

## 2020-09-11 ENCOUNTER — APPOINTMENT (OUTPATIENT)
Dept: PHYSICAL THERAPY | Facility: CLINIC | Age: 65
End: 2020-09-11
Payer: COMMERCIAL

## 2020-09-14 ENCOUNTER — TELEPHONE (OUTPATIENT)
Dept: FAMILY MEDICINE CLINIC | Facility: CLINIC | Age: 65
End: 2020-09-14

## 2020-09-14 NOTE — TELEPHONE ENCOUNTER
Pt returned your call   He will be going to work at [phone] today and will be hard to get a hold of after that, tomorrow he will be available

## 2020-09-15 ENCOUNTER — OFFICE VISIT (OUTPATIENT)
Dept: PHYSICAL THERAPY | Facility: CLINIC | Age: 65
End: 2020-09-15
Payer: COMMERCIAL

## 2020-09-15 DIAGNOSIS — M25.551 RIGHT HIP PAIN: Primary | ICD-10-CM

## 2020-09-15 DIAGNOSIS — M16.12 PRIMARY OSTEOARTHRITIS OF ONE HIP, LEFT: ICD-10-CM

## 2020-09-15 DIAGNOSIS — M70.62 TROCHANTERIC BURSITIS OF LEFT HIP: ICD-10-CM

## 2020-09-15 PROCEDURE — 97140 MANUAL THERAPY 1/> REGIONS: CPT

## 2020-09-15 PROCEDURE — 97110 THERAPEUTIC EXERCISES: CPT

## 2020-09-15 NOTE — PROGRESS NOTES
Daily Note     Today's date: 9/15/2020  Patient name: Marcela Davis  : 1955  MRN: [id number]  Referring provider: Kim Mcfadden DO  Dx:   Encounter Diagnosis     ICD-10-CM    1  Right hip pain  M25 551    2  Primary osteoarthritis of one hip, left  M16 12    3  Trochanteric bursitis of left hip  M70 62        Start Time: 0800  Stop Time: 0850  Total time in clinic (min): 50 minutes    Subjective: Patient was trying to get out of his truck this weekend and his R hip flexor cramped up  He loss his balance and fell  He was unable to get into the house due to increased pain  His wife called EMS to help him get in the house  Once in the house, his muscle cramp persisted for the next hour  Today, he complains of increased stiffness and soreness due to events over the weekend  Objective: See treatment diary below      Assessment: Patient's stiffness is relieved with NuStep warmup  He is able to complete all exercises provided after active warmup  He was educated on importance of performing exercises at home to maintain carryover from session to session  He was provided with B hip flexor stretch off edge of bed to improve flexibility and decrease hypertonicity of psoas group  Tolerated treatment well  Patient would benefit from continued PT to improve BLE strength, BLE ROM, and improve overall functional mobility  Plan: Continue per plan of care        Precautions: Standard, L trochanteric bursitis, B knee OA, HTN, aortic aneurysm, FALL RISK      Manuals 8/21 8/25 8/28 9/1 9/4 9/15     B hip PROM 5' 5' 5' 5' 5' L ONLY 5' R only     STM B HS, ITB 5' 5'  5'  5' 5' 5'     Manual stretch B ITB 5' 5' 5'                   Neuro Re-Ed           SL balance 3 x 10s 3x 10s 5x10s  10s x 3B 10s x 3B     Tandem stance           Romberg            Hip add squeeze 3s x 15 3s x   20 3s x 20  3s x 20 3s x 10     Fallouts  x10 GTB X 20 GTB x20 GTB  x20 GTB x20 GTB                Ther Ex NuStep Warmup 10' L3 10' L3 10' L3 10' L3 10' L3 10' L3     Mini squats  x10 X 10 x10  x10 X2, def due to pain     LAQ x20 X 20 x10B x10B x10 B      Standing hip flex, abd, ext, circles x15 X 20 x10B x10 B x10 B x10 B     Calf raises, toe raises x30 X 30 x30 Seated heel toe raises 2 x 10 B x20 standing x20 standing     SAQ  x20B   x20 B      Standing knee flex x10 B x10 B x10 B  x10 B x10B     Side stepping in // bars  4 x 10' 4 x 10 6 x 10'  6 x 10' 6 x 10'     Step taps x20 6" X 20 6" NP   x20B x20B     Step ups 4"  6" x 10 B 6" x 10B NP  6" x 10B        Marching in // bars  x30 X 30 X 30  x30 x30     Hip flexion with peanut     x10 x20     Ther Activity                                 Gait Training           Stair training                      Modalities           Ice post            Heat post  10' 10' 10' 5' 5' 5'

## 2020-09-16 DIAGNOSIS — I50.32 CHRONIC DIASTOLIC CONGESTIVE HEART FAILURE (HCC): ICD-10-CM

## 2020-09-17 RX ORDER — ENALAPRIL MALEATE 5 MG/1
TABLET ORAL
Qty: 90 TABLET | Refills: 1 | Status: SHIPPED | OUTPATIENT
Start: 2020-09-17 | End: 2022-08-08

## 2020-09-18 ENCOUNTER — OFFICE VISIT (OUTPATIENT)
Dept: PHYSICAL THERAPY | Facility: CLINIC | Age: 65
End: 2020-09-18
Payer: COMMERCIAL

## 2020-09-18 DIAGNOSIS — M70.62 TROCHANTERIC BURSITIS OF LEFT HIP: ICD-10-CM

## 2020-09-18 DIAGNOSIS — M16.12 PRIMARY OSTEOARTHRITIS OF ONE HIP, LEFT: ICD-10-CM

## 2020-09-18 DIAGNOSIS — M25.551 RIGHT HIP PAIN: Primary | ICD-10-CM

## 2020-09-18 PROCEDURE — 97140 MANUAL THERAPY 1/> REGIONS: CPT

## 2020-09-18 PROCEDURE — 97112 NEUROMUSCULAR REEDUCATION: CPT

## 2020-09-18 PROCEDURE — 97110 THERAPEUTIC EXERCISES: CPT

## 2020-09-18 NOTE — PROGRESS NOTES
Daily Note     Today's date: 2020  Patient name: Tiana De La Cruz  : 1955  MRN: [id number]  Referring provider: Pennie Wiggins DO  Dx:   Encounter Diagnosis     ICD-10-CM    1  Right hip pain  M25 551    2  Primary osteoarthritis of one hip, left  M16 12    3  Trochanteric bursitis of left hip  M70 62        Start Time: 1627  Stop Time: 0845  Total time in clinic (min): 50 minutes    Subjective: Patient reports R sided low back pain today  He went on day trips to the [facility] and walked 1 5 miles each day  He reports minimal hip pain today  Patient feels as though his back pain is from lifting a bath tub this morning  Objective: See treatment diary below      Assessment: Patient's low back pain dissipated with repeated flexion in seated  He was educated on performing RFIS when pain arises, multiple times a day, to decrease pain  B hip flexion PROM increased low back pain today  Patient unable to perform squats below 45 degrees due to increased low back and B hip pain  Tolerated treatment well  Patient would benefit from continued PT to improve B hip strength, decrease low back pain, and improve overall functional mobility  Plan: Continue per plan of care        Precautions: Standard, L trochanteric bursitis, B knee OA, HTN, aortic aneurysm, FALL RISK      Manuals 8/21 8/25 8/28 9/1 9/4 9/15 9/18    B hip PROM 5' 5' 5' 5' 5' L ONLY 5' R only 5'    STM B HS, ITB 5' 5'  5'  5' 5' 5' 3'    Manual stretch B ITB 5' 5' 5'    2'               Neuro Re-Ed           SL balance 3 x 10s 3x 10s 5x10s  10s x 3B 10s x 3B     Tandem stance           Romberg            Hip add squeeze 3s x 15 3s x   20 3s x 20  3s x 20 3s x 10 3s x 10    Fallouts  x10 GTB X 20 GTB x20 GTB  x20 GTB x20 GTB x20 GTB               Ther Ex           NuStep Warmup 10' L3 10' L3 10' L3 10' L3 10' L3 10' L3 10' L3    Mini squats  x10 X 10 x10  x10 X2, def due to pain x10    LAQ x20 X 20 x10B x10B x10 B  x10 B Standing hip flex, abd, ext, circles x15 X 20 x10B x10 B x10 B x10 B x10 B    Calf raises, toe raises x30 X 30 x30 Seated heel toe raises 2 x 10 B x20 standing x20 standing x20    SAQ  x20B   x20 B  x20 B    Standing knee flex x10 B x10 B x10 B  x10 B x10B x10 B    Side stepping in // bars  4 x 10' 4 x 10 6 x 10'  6 x 10' 6 x 10' 6 x 10'    Step taps x20 6" X 20 6" NP   x20B x20B x20B    Step ups 4"  6" x 10 B 6" x 10B NP  6" x 10B    x10 6"    Marching in // bars  x30 X 30 X 30  x30 x30 x30    RFIS       x10    Hip flexion with peanut     x10 x20     Ther Activity                                 Gait Training           Stair training                      Modalities           Ice post            Heat post  10' 10' 10' 5' 5' 5' 5'

## 2020-09-22 ENCOUNTER — OFFICE VISIT (OUTPATIENT)
Dept: PHYSICAL THERAPY | Facility: CLINIC | Age: 65
End: 2020-09-22
Payer: COMMERCIAL

## 2020-09-22 DIAGNOSIS — M25.551 RIGHT HIP PAIN: Primary | ICD-10-CM

## 2020-09-22 PROCEDURE — 97140 MANUAL THERAPY 1/> REGIONS: CPT

## 2020-09-22 PROCEDURE — 97110 THERAPEUTIC EXERCISES: CPT

## 2020-09-22 PROCEDURE — 97112 NEUROMUSCULAR REEDUCATION: CPT

## 2020-09-22 NOTE — PROGRESS NOTES
Daily Note     Today's date: 2020  Patient name: Jeanine Gardner  : 1955  MRN: [id number]  Referring provider: Landon Will DO  Dx:   Encounter Diagnosis     ICD-10-CM    1  Right hip pain  M25 551        Start Time: 805  Stop Time: 900  Total time in clinic (min): 55 minutes    Subjective: Doing better today; states the bending forward  back exercise from last session  helped his back & right hip a lot; 3/10 pain today     Objective: See treatment diary below      Assessment: Tolerated treatment well  + right distal  ITB tightness & point specific tenderness -  in addition to  Entire right  ITB tightness; tolerated weight bearing on the RLE with all stance therex; patient reporting compliance with home exercises Patient would benefit from continued PT      Plan: Continue per plan of care        Precautions: Standard, L trochanteric bursitis, B knee OA, HTN, aortic aneurysm, FALL RISK      Manuals 8/21 8/25 8/28 9/1 9/4 9/15 9/18 9/22   B hip PROM 5' 5' 5' 5' 5' L ONLY 5' R only 5' 5'   STM B HS, ITB 5' 5'  5'  5' 5' 5' 3' 3'   Manual stretch B ITB 5' 5' 5'    2' 2'              Neuro Re-Ed           SL balance 3 x 10s 3x 10s 5x10s  10s x 3B 10s x 3B  10s x 3B   Tandem stance           Romberg            Hip add squeeze 3s x 15 3s x   20 3s x 20  3s x 20 3s x 10 3s x 10 3s x10   Fallouts  x10 GTB X 20 GTB x20 GTB  x20 GTB x20 GTB x20 GTB x20 blue tband               Ther Ex           NuStep Warmup 10' L3 10' L3 10' L3 10' L3 10' L3 10' L3 10' L3 10'L3  A & L    Mini squats  x10 X 10 x10  x10 X2, def due to pain x10 x15   LAQ x20 X 20 x10B x10B x10 B  x10 B x20 B    Standing hip flex, abd, ext, circles x15 X 20 x10B x10 B x10 B x10 B x10 B x20 B    Calf raises, toe raises x30 X 30 x30 Seated heel toe raises 2 x 10 B x20 standing x20 standing x20 x20   SAQ  x20B   x20 B  x20 B x20B   Standing knee flex x10 B x10 B x10 B  x10 B x10B x10 B x10B   Side stepping in // bars  4 x 10' 4 x 10 6 x 10'  6 x 10' 6 x 10' 6 x 10' 6 x10'   Step taps x20 6" X 20 6" NP   x20B x20B x20B x20B   Step ups 4"  6" x 10 B 6" x 10B NP  6" x 10B    x10 6" x10  6"   Marching in // bars  x30 X 30 X 30  x30 x30 x30 x30   RFIS       x10 x10   Hip flexion with peanut     x10 x20     Ther Activity                                 Gait Training           Stair training                      Modalities           Ice post            Heat post  10' 10' 10' 5' 5' 5' 5' 10' right ITB in L S/L

## 2020-09-24 ENCOUNTER — CLINICAL SUPPORT (OUTPATIENT)
Dept: FAMILY MEDICINE CLINIC | Facility: CLINIC | Age: 65
End: 2020-09-24
Payer: COMMERCIAL

## 2020-09-24 DIAGNOSIS — Z23 NEED FOR VACCINATION: Primary | ICD-10-CM

## 2020-09-24 PROCEDURE — 90682 RIV4 VACC RECOMBINANT DNA IM: CPT

## 2020-09-24 PROCEDURE — G0008 ADMIN INFLUENZA VIRUS VAC: HCPCS

## 2020-09-25 ENCOUNTER — OFFICE VISIT (OUTPATIENT)
Dept: PHYSICAL THERAPY | Facility: CLINIC | Age: 65
End: 2020-09-25
Payer: COMMERCIAL

## 2020-09-25 DIAGNOSIS — M25.551 RIGHT HIP PAIN: Primary | ICD-10-CM

## 2020-09-25 DIAGNOSIS — M70.62 TROCHANTERIC BURSITIS OF LEFT HIP: ICD-10-CM

## 2020-09-25 DIAGNOSIS — M16.12 PRIMARY OSTEOARTHRITIS OF ONE HIP, LEFT: ICD-10-CM

## 2020-09-25 PROCEDURE — 97110 THERAPEUTIC EXERCISES: CPT

## 2020-09-25 PROCEDURE — 97112 NEUROMUSCULAR REEDUCATION: CPT

## 2020-09-25 PROCEDURE — 97140 MANUAL THERAPY 1/> REGIONS: CPT

## 2020-09-25 NOTE — PROGRESS NOTES
Daily Note     Today's date: 2020  Patient name: Kamryn Tam  : 1955  MRN: [id number]  Referring provider: Kenzie Chino DO  Dx:   Encounter Diagnosis     ICD-10-CM    1  Right hip pain  M25 551    2  Primary osteoarthritis of one hip, left  M16 12    3  Trochanteric bursitis of left hip  M70 62        Start Time: 09  Stop Time: 1230  Total time in clinic (min): 45 minutes    Subjective: Patient reports 3/10 LBP and R hip pain today  He is able to move around the community with ease at this time  He is happy with his progress  Objective: See treatment diary below      Assessment: Patient sought relief in R sided LBP and R hip pain with long axis distraction  Patient able to progress through treatment with minimal breaks and no signs of fatigue  Tolerated treatment well  Patient would benefit from continued PT      Plan: Continue per plan of care        Precautions: Standard, L trochanteric bursitis, B knee OA, HTN, aortic aneurysm, FALL RISK      Manuals 8/21 8/25 8/28 9/1 9/4 9/15 9/18 9/22 9/25   B hip PROM 5' 5' 5' 5' 5' L ONLY 5' R only 5' 5' 5'   STM B HS, ITB 5' 5'  5'  5' 5' 5' 3' 3' 2'   Manual stretch B ITB 5' 5' 5'    2' 2'    LAD         3'   Neuro Re-Ed            SL balance 3 x 10s 3x 10s 5x10s  10s x 3B 10s x 3B  10s x 3B 10s x 5 B   Tandem stance            Romberg             Hip add squeeze 3s x 15 3s x   20 3s x 20  3s x 20 3s x 10 3s x 10 3s x10    Fallouts  x10 GTB X 20 GTB x20 GTB  x20 GTB x20 GTB x20 GTB x20 blue tband                 Ther Ex            NuStep Warmup 10' L3 10' L3 10' L3 10' L3 10' L3 10' L3 10' L3 10'L3  A & L  10' L3   Mini squats  x10 X 10 x10  x10 X2, def due to pain x10 x15 x15    LAQ x20 X 20 x10B x10B x10 B  x10 B x20 B  x20 B   Standing hip flex, abd, ext, circles x15 X 20 x10B x10 B x10 B x10 B x10 B x20 B  x20 B   Calf raises, toe raises x30 X 30 x30 Seated heel toe raises 2 x 10 B x20 standing x20 standing x20 x20 x20   SAQ  x20B   x20 B  x20 B x20B    Standing knee flex x10 B x10 B x10 B  x10 B x10B x10 B x10B X 10B   Side stepping in // bars  4 x 10' 4 x 10 6 x 10'  6 x 10' 6 x 10' 6 x 10' 6 x10' 6 x 10'   Step taps x20 6" X 20 6" NP   x20B x20B x20B x20B x20 B   Step ups 4"  6" x 10 B 6" x 10B NP  6" x 10B    x10 6" x10  6" x10 6"   Marching in // bars  x30 X 30 X 30  x30 x30 x30 x30 x30   RFIS       x10 x10 x10   Hip flexion with peanut     x10 x20      Ther Activity                                    Gait Training            Stair training                        Modalities            Ice post             Heat post  10' 10' 10' 5' 5' 5' 5' 10' right ITB in L S/L  Def

## 2020-09-29 ENCOUNTER — EVALUATION (OUTPATIENT)
Dept: PHYSICAL THERAPY | Facility: CLINIC | Age: 65
End: 2020-09-29
Payer: COMMERCIAL

## 2020-09-29 DIAGNOSIS — M16.12 PRIMARY OSTEOARTHRITIS OF ONE HIP, LEFT: ICD-10-CM

## 2020-09-29 DIAGNOSIS — M70.62 TROCHANTERIC BURSITIS OF LEFT HIP: ICD-10-CM

## 2020-09-29 DIAGNOSIS — M25.551 RIGHT HIP PAIN: Primary | ICD-10-CM

## 2020-09-29 PROCEDURE — 97110 THERAPEUTIC EXERCISES: CPT

## 2020-09-29 PROCEDURE — 97164 PT RE-EVAL EST PLAN CARE: CPT

## 2020-09-29 PROCEDURE — 97140 MANUAL THERAPY 1/> REGIONS: CPT

## 2020-09-29 NOTE — PROGRESS NOTES
PT Re-evaluation/Daily Note     Today's date: 2020  Patient name: Gabby Dotson  : 1955  MRN: [id number]  Referring provider: Skylar James DO  Dx:   Encounter Diagnosis     ICD-10-CM    1  Right hip pain  M25 551    2  Primary osteoarthritis of one hip, left  M16 12    3  Trochanteric bursitis of left hip  M70 62        Start Time: 845  Stop Time: 935  Total time in clinic (min): 50 minutes    Subjective: Patient reports increased ITB tightness today  He complains of increased low back pain after moving this weekend  Objective: See treatment diary below  Goals  STGs to be achieved in 4 weeks  -STG 1: Patient will be independent with HEP - MET   -STG 2: Patient will have 0/10 L hip pain at rest - partially met  -STG 3: Patient will increase L hip ROM to within normal limits - partially met  -STG 4: Patient will report 40% functional improvement of symptoms - MET  -STG 5: Patient will ascend/descend stairs with step over step pattern with less than 2/10 L hip pain  LTGs to be achieved in 8 weeks     -LTG 1: Patient will demonstrate independence with maintenance program - MET  -LTG 2: Patient will perform all functional activities with less than 2/10 L hip pain - MET  -LTG 3: Patient will improve FOTO score by 10 points - MET  -LTG 4: Patient will report 70% functional improvement in symptoms - NOT MET  -LTG 5: Patient will perform SLS test greater than 10 seconds in B LE - not met    Active Range of Motion   Left Hip   Flexion: 90 degrees with pain  Abduction: 40 degrees with pain  External rotation (90/90): WFL and with pain  Internal rotation (90/90): WFL and with pain    Right Hip   Flexion: 90 degrees with pain  Abduction: 40 degrees with pain  External rotation (90/90): WFL and with pain  Internal rotation (90/90): WFL and with pain    Passive Range of Motion   Left Hip   Flexion: 100 degrees with pain  Abduction: 50 degrees with pain  External rotation (90/90): 45 degrees with pain  Internal rotation (90/90): 45 degrees with pain    Passive Range of Motion   Left Hip   Flexion: 100 degrees with pain  Abduction: 50 degrees with pain  External rotation (90/90): 45 degrees with pain  Internal rotation (90/90): 45 degrees with pain    Right Hip   Flexion: 100 degrees with pain  Abduction: with pain  External rotation (90/90): 45 degrees with pain  Internal rotation (90/90): 45 degrees with pain    Strength/Myotome Testing     Left Hip   Planes of Motion   Flexion: 3+  Abduction: 3+  Adduction: 3+  External rotation: 3+  Internal rotation: 3+    Isolated Muscles   Gluteus quirino: 3+  Gluteus medius: 3+  Iliopsoas: 3+    Right Hip   Planes of Motion   Flexion: 3+  Abduction: 3+  Adduction: 3+  External rotation: 3+  Internal rotation: 3+    Isolated Muscles   Gluteus maximums: 3+  Gluteus medius: 3+  Iliopsoas: 3+    FOTO goal: 51%   IE: 29%    8/28: 33%    9/29: 40%    Assessment: Patient demonstrates improvements in B hip strength, ROM, and overall function  Patient lacks demonstrates fatigue post tx  Gait deviations noted with increased pain  B ITB tightness and low back pain persist at this time, with relief from stretching and STM  Patient is able to perform all community related tasks with ease at times, but is fatigued at end of day  Tolerated treatment well  Patient would benefit from continued PT to decrease pain, improve B LE strength, and maximize function to maintain independence in community  Plan: Continue per plan of care        Precautions: Standard, L trochanteric bursitis, B knee OA, HTN, aortic aneurysm, FALL RISK      Manuals 8/21 8/25 8/28 9/1 9/4 9/15 9/18 9/22 9/25 9/28   B hip PROM 5' 5' 5' 5' 5' L ONLY 5' R only 5' 5' 5' 5'   STM B HS, ITB 5' 5'  5'  5' 5' 5' 3' 3' 2' 2'   Manual stretch B ITB 5' 5' 5'    2' 2'     LAD         3' 3'   Neuro Re-Ed             SL balance 3 x 10s 3x 10s 5x10s  10s x 3B 10s x 3B  10s x 3B 10s x 5 B    Tandem stance             Romberg              Hip add squeeze 3s x 15 3s x   20 3s x 20  3s x 20 3s x 10 3s x 10 3s x10     Fallouts  x10 GTB X 20 GTB x20 GTB  x20 GTB x20 GTB x20 GTB x20 blue tband                   Ther Ex             NuStep Warmup 10' L3 10' L3 10' L3 10' L3 10' L3 10' L3 10' L3 10'L3  A & L  10' L3 10' L3   Mini squats  x10 X 10 x10  x10 X2, def due to pain x10 x15 x15  x15   LAQ x20 X 20 x10B x10B x10 B  x10 B x20 B  x20 B    Standing hip flex, abd, ext, circles x15 X 20 x10B x10 B x10 B x10 B x10 B x20 B  x20 B x20 B   Calf raises, toe raises x30 X 30 x30 Seated heel toe raises 2 x 10 B x20 standing x20 standing x20 x20 x20 x20   SAQ  x20B   x20 B  x20 B x20B     Standing knee flex x10 B x10 B x10 B  x10 B x10B x10 B x10B X 10B X 10B   Side stepping in // bars  4 x 10' 4 x 10 6 x 10'  6 x 10' 6 x 10' 6 x 10' 6 x10' 6 x 10' 6 x 10'   Step taps x20 6" X 20 6" NP   x20B x20B x20B x20B x20 B x20 B   Step ups 4"  6" x 10 B 6" x 10B NP  6" x 10B    x10 6" x10  6" x10 6" x10 6"   Marching in // bars  x30 X 30 X 30  x30 x30 x30 x30 x30 x30   RFIS       x10 x10 x10    Hip flexion with peanut     x10 x20       Ther Activity                                       Gait Training             Stair training                          Modalities             Ice post              Heat post  10' 10' 10' 5' 5' 5' 5' 10' right ITB in L S/L  Def Def

## 2020-09-30 ENCOUNTER — APPOINTMENT (OUTPATIENT)
Dept: RADIOLOGY | Facility: CLINIC | Age: 65
End: 2020-09-30
Payer: COMMERCIAL

## 2020-09-30 ENCOUNTER — OFFICE VISIT (OUTPATIENT)
Dept: OBGYN CLINIC | Facility: CLINIC | Age: 65
End: 2020-09-30
Payer: COMMERCIAL

## 2020-09-30 VITALS
HEIGHT: 76 IN | BODY MASS INDEX: 38.36 KG/M2 | HEART RATE: 70 BPM | DIASTOLIC BLOOD PRESSURE: 80 MMHG | WEIGHT: 315 LBS | SYSTOLIC BLOOD PRESSURE: 133 MMHG

## 2020-09-30 DIAGNOSIS — M75.42 SUBACROMIAL IMPINGEMENT OF LEFT SHOULDER: Primary | ICD-10-CM

## 2020-09-30 DIAGNOSIS — M25.512 LEFT SHOULDER PAIN, UNSPECIFIED CHRONICITY: ICD-10-CM

## 2020-09-30 DIAGNOSIS — M19.012 OSTEOARTHRITIS OF LEFT AC (ACROMIOCLAVICULAR) JOINT: ICD-10-CM

## 2020-09-30 PROCEDURE — 73030 X-RAY EXAM OF SHOULDER: CPT

## 2020-09-30 PROCEDURE — 99214 OFFICE O/P EST MOD 30 MIN: CPT | Performed by: ORTHOPAEDIC SURGERY

## 2020-09-30 PROCEDURE — 20610 DRAIN/INJ JOINT/BURSA W/O US: CPT | Performed by: ORTHOPAEDIC SURGERY

## 2020-09-30 RX ORDER — LIDOCAINE HYDROCHLORIDE 10 MG/ML
4 INJECTION, SOLUTION INFILTRATION; PERINEURAL
Status: COMPLETED | OUTPATIENT
Start: 2020-09-30 | End: 2020-09-30

## 2020-09-30 RX ORDER — DEXAMETHASONE SODIUM PHOSPHATE 100 MG/10ML
40 INJECTION INTRAMUSCULAR; INTRAVENOUS
Status: COMPLETED | OUTPATIENT
Start: 2020-09-30 | End: 2020-09-30

## 2020-09-30 RX ADMIN — LIDOCAINE HYDROCHLORIDE 4 ML: 10 INJECTION, SOLUTION INFILTRATION; PERINEURAL at 09:02

## 2020-09-30 RX ADMIN — DEXAMETHASONE SODIUM PHOSPHATE 40 MG: 100 INJECTION INTRAMUSCULAR; INTRAVENOUS at 09:02
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
## 2020-10-02 ENCOUNTER — OFFICE VISIT (OUTPATIENT)
Dept: PHYSICAL THERAPY | Facility: CLINIC | Age: 65
End: 2020-10-02
Payer: COMMERCIAL

## 2020-10-02 DIAGNOSIS — M70.62 TROCHANTERIC BURSITIS OF LEFT HIP: ICD-10-CM

## 2020-10-02 DIAGNOSIS — M25.551 RIGHT HIP PAIN: Primary | ICD-10-CM

## 2020-10-02 DIAGNOSIS — M16.12 PRIMARY OSTEOARTHRITIS OF ONE HIP, LEFT: ICD-10-CM

## 2020-10-02 PROCEDURE — 97110 THERAPEUTIC EXERCISES: CPT

## 2020-10-02 PROCEDURE — 97140 MANUAL THERAPY 1/> REGIONS: CPT

## 2020-10-02 PROCEDURE — 97112 NEUROMUSCULAR REEDUCATION: CPT

## 2020-10-06 ENCOUNTER — APPOINTMENT (OUTPATIENT)
Dept: PHYSICAL THERAPY | Facility: CLINIC | Age: 65
End: 2020-10-06
Payer: COMMERCIAL

## 2020-10-08 ENCOUNTER — OFFICE VISIT (OUTPATIENT)
Dept: FAMILY MEDICINE CLINIC | Facility: CLINIC | Age: 65
End: 2020-10-08
Payer: COMMERCIAL

## 2020-10-08 VITALS
WEIGHT: 315 LBS | RESPIRATION RATE: 18 BRPM | TEMPERATURE: 98.8 F | OXYGEN SATURATION: 96 % | HEIGHT: 76 IN | SYSTOLIC BLOOD PRESSURE: 120 MMHG | HEART RATE: 72 BPM | DIASTOLIC BLOOD PRESSURE: 80 MMHG | BODY MASS INDEX: 38.36 KG/M2

## 2020-10-08 DIAGNOSIS — R07.89 OTHER CHEST PAIN: Primary | ICD-10-CM

## 2020-10-08 DIAGNOSIS — Z56.6 STRESS AT WORK: ICD-10-CM

## 2020-10-08 DIAGNOSIS — F43.0 STRESS REACTION: ICD-10-CM

## 2020-10-08 DIAGNOSIS — G44.209 TENSION HEADACHE: ICD-10-CM

## 2020-10-08 PROCEDURE — 93000 ELECTROCARDIOGRAM COMPLETE: CPT | Performed by: NURSE PRACTITIONER

## 2020-10-08 PROCEDURE — 99214 OFFICE O/P EST MOD 30 MIN: CPT | Performed by: NURSE PRACTITIONER

## 2020-10-08 RX ORDER — CYCLOBENZAPRINE HCL 10 MG
10 TABLET ORAL 2 TIMES DAILY PRN
Qty: 30 TABLET | Refills: 1 | Status: SHIPPED | OUTPATIENT
Start: 2020-10-08 | End: 2020-12-18 | Stop reason: SDUPTHER

## 2020-10-08 SDOH — HEALTH STABILITY - MENTAL HEALTH: OTHER PHYSICAL AND MENTAL STRAIN RELATED TO WORK: Z56.6

## 2020-10-09 ENCOUNTER — TELEPHONE (OUTPATIENT)
Dept: PHYSICAL THERAPY | Facility: CLINIC | Age: 65
End: 2020-10-09

## 2020-10-09 ENCOUNTER — APPOINTMENT (OUTPATIENT)
Dept: PHYSICAL THERAPY | Facility: CLINIC | Age: 65
End: 2020-10-09
Payer: COMMERCIAL

## 2020-10-12 ENCOUNTER — TELEPHONE (OUTPATIENT)
Dept: FAMILY MEDICINE CLINIC | Facility: CLINIC | Age: 65
End: 2020-10-12

## 2020-10-13 ENCOUNTER — OFFICE VISIT (OUTPATIENT)
Dept: PHYSICAL THERAPY | Facility: CLINIC | Age: 65
End: 2020-10-13
Payer: COMMERCIAL

## 2020-10-13 DIAGNOSIS — M70.62 TROCHANTERIC BURSITIS OF LEFT HIP: ICD-10-CM

## 2020-10-13 DIAGNOSIS — M25.551 RIGHT HIP PAIN: Primary | ICD-10-CM

## 2020-10-13 DIAGNOSIS — M16.12 PRIMARY OSTEOARTHRITIS OF ONE HIP, LEFT: ICD-10-CM

## 2020-10-13 DIAGNOSIS — M25.512 ACUTE PAIN OF LEFT SHOULDER: ICD-10-CM

## 2020-10-13 PROCEDURE — 97140 MANUAL THERAPY 1/> REGIONS: CPT

## 2020-10-13 PROCEDURE — 97110 THERAPEUTIC EXERCISES: CPT

## 2020-10-13 PROCEDURE — 97112 NEUROMUSCULAR REEDUCATION: CPT

## 2020-10-15 ENCOUNTER — OFFICE VISIT (OUTPATIENT)
Dept: FAMILY MEDICINE CLINIC | Facility: CLINIC | Age: 65
End: 2020-10-15
Payer: COMMERCIAL

## 2020-10-15 VITALS
RESPIRATION RATE: 14 BRPM | HEART RATE: 84 BPM | SYSTOLIC BLOOD PRESSURE: 140 MMHG | BODY MASS INDEX: 38.36 KG/M2 | DIASTOLIC BLOOD PRESSURE: 84 MMHG | TEMPERATURE: 97.6 F | WEIGHT: 315 LBS | HEIGHT: 76 IN

## 2020-10-15 DIAGNOSIS — I71.9 AORTIC ANEURYSM WITHOUT RUPTURE, UNSPECIFIED PORTION OF AORTA (HCC): ICD-10-CM

## 2020-10-15 DIAGNOSIS — I50.32 CHRONIC DIASTOLIC HEART FAILURE (HCC): ICD-10-CM

## 2020-10-15 DIAGNOSIS — E78.2 MIXED HYPERLIPIDEMIA: ICD-10-CM

## 2020-10-15 DIAGNOSIS — I10 ESSENTIAL HYPERTENSION: Primary | ICD-10-CM

## 2020-10-15 PROCEDURE — 3079F DIAST BP 80-89 MM HG: CPT | Performed by: FAMILY MEDICINE

## 2020-10-15 PROCEDURE — 3077F SYST BP >= 140 MM HG: CPT | Performed by: FAMILY MEDICINE

## 2020-10-15 PROCEDURE — 99214 OFFICE O/P EST MOD 30 MIN: CPT | Performed by: FAMILY MEDICINE

## 2020-10-15 PROCEDURE — 1036F TOBACCO NON-USER: CPT | Performed by: FAMILY MEDICINE

## 2020-10-16 ENCOUNTER — OFFICE VISIT (OUTPATIENT)
Dept: PHYSICAL THERAPY | Facility: CLINIC | Age: 65
End: 2020-10-16
Payer: COMMERCIAL

## 2020-10-16 DIAGNOSIS — M25.512 ACUTE PAIN OF LEFT SHOULDER: ICD-10-CM

## 2020-10-16 DIAGNOSIS — M25.551 RIGHT HIP PAIN: Primary | ICD-10-CM

## 2020-10-16 DIAGNOSIS — M16.12 PRIMARY OSTEOARTHRITIS OF ONE HIP, LEFT: ICD-10-CM

## 2020-10-16 DIAGNOSIS — M70.62 TROCHANTERIC BURSITIS OF LEFT HIP: ICD-10-CM

## 2020-10-16 PROCEDURE — 97140 MANUAL THERAPY 1/> REGIONS: CPT

## 2020-10-16 PROCEDURE — 97110 THERAPEUTIC EXERCISES: CPT

## 2020-10-16 PROCEDURE — 97112 NEUROMUSCULAR REEDUCATION: CPT

## 2020-10-21 ENCOUNTER — OFFICE VISIT (OUTPATIENT)
Dept: PHYSICAL THERAPY | Facility: CLINIC | Age: 65
End: 2020-10-21
Payer: COMMERCIAL

## 2020-10-21 DIAGNOSIS — M25.551 RIGHT HIP PAIN: Primary | ICD-10-CM

## 2020-10-21 DIAGNOSIS — M70.62 TROCHANTERIC BURSITIS OF LEFT HIP: ICD-10-CM

## 2020-10-21 DIAGNOSIS — M16.12 PRIMARY OSTEOARTHRITIS OF ONE HIP, LEFT: ICD-10-CM

## 2020-10-21 DIAGNOSIS — M25.512 ACUTE PAIN OF LEFT SHOULDER: ICD-10-CM

## 2020-10-21 PROCEDURE — 97140 MANUAL THERAPY 1/> REGIONS: CPT

## 2020-10-21 PROCEDURE — 97112 NEUROMUSCULAR REEDUCATION: CPT

## 2020-10-21 PROCEDURE — 97110 THERAPEUTIC EXERCISES: CPT

## 2020-10-27 ENCOUNTER — OFFICE VISIT (OUTPATIENT)
Dept: PHYSICAL THERAPY | Facility: CLINIC | Age: 65
End: 2020-10-27
Payer: COMMERCIAL

## 2020-10-27 DIAGNOSIS — M25.551 RIGHT HIP PAIN: Primary | ICD-10-CM

## 2020-10-27 PROCEDURE — 97112 NEUROMUSCULAR REEDUCATION: CPT

## 2020-10-27 PROCEDURE — 97140 MANUAL THERAPY 1/> REGIONS: CPT

## 2020-10-27 PROCEDURE — 97110 THERAPEUTIC EXERCISES: CPT

## 2020-10-29 ENCOUNTER — TELEMEDICINE (OUTPATIENT)
Dept: FAMILY MEDICINE CLINIC | Facility: CLINIC | Age: 65
End: 2020-10-29
Payer: COMMERCIAL

## 2020-10-29 DIAGNOSIS — Z20.828 EXPOSURE TO SARS-ASSOCIATED CORONAVIRUS: Primary | ICD-10-CM

## 2020-10-29 PROCEDURE — 99212 OFFICE O/P EST SF 10 MIN: CPT | Performed by: NURSE PRACTITIONER

## 2020-10-30 ENCOUNTER — OFFICE VISIT (OUTPATIENT)
Dept: PHYSICAL THERAPY | Facility: CLINIC | Age: 65
End: 2020-10-30
Payer: COMMERCIAL

## 2020-10-30 DIAGNOSIS — M25.551 RIGHT HIP PAIN: Primary | ICD-10-CM

## 2020-10-30 DIAGNOSIS — M70.62 TROCHANTERIC BURSITIS OF LEFT HIP: ICD-10-CM

## 2020-10-30 DIAGNOSIS — M16.12 PRIMARY OSTEOARTHRITIS OF ONE HIP, LEFT: ICD-10-CM

## 2020-10-30 PROCEDURE — 97110 THERAPEUTIC EXERCISES: CPT

## 2020-10-30 PROCEDURE — 97140 MANUAL THERAPY 1/> REGIONS: CPT

## 2020-11-03 ENCOUNTER — OFFICE VISIT (OUTPATIENT)
Dept: PHYSICAL THERAPY | Facility: CLINIC | Age: 65
End: 2020-11-03
Payer: COMMERCIAL

## 2020-11-03 DIAGNOSIS — M25.512 ACUTE PAIN OF LEFT SHOULDER: ICD-10-CM

## 2020-11-03 DIAGNOSIS — M25.551 RIGHT HIP PAIN: Primary | ICD-10-CM

## 2020-11-03 DIAGNOSIS — M70.62 TROCHANTERIC BURSITIS OF LEFT HIP: ICD-10-CM

## 2020-11-03 DIAGNOSIS — M16.12 PRIMARY OSTEOARTHRITIS OF ONE HIP, LEFT: ICD-10-CM

## 2020-11-03 PROCEDURE — 97140 MANUAL THERAPY 1/> REGIONS: CPT

## 2020-11-03 PROCEDURE — 97112 NEUROMUSCULAR REEDUCATION: CPT

## 2020-11-03 PROCEDURE — 97110 THERAPEUTIC EXERCISES: CPT

## 2020-11-06 ENCOUNTER — OFFICE VISIT (OUTPATIENT)
Dept: PHYSICAL THERAPY | Facility: CLINIC | Age: 65
End: 2020-11-06
Payer: COMMERCIAL

## 2020-11-06 DIAGNOSIS — M16.12 PRIMARY OSTEOARTHRITIS OF ONE HIP, LEFT: ICD-10-CM

## 2020-11-06 DIAGNOSIS — M25.512 ACUTE PAIN OF LEFT SHOULDER: ICD-10-CM

## 2020-11-06 DIAGNOSIS — M25.551 RIGHT HIP PAIN: Primary | ICD-10-CM

## 2020-11-06 DIAGNOSIS — M70.62 TROCHANTERIC BURSITIS OF LEFT HIP: ICD-10-CM

## 2020-11-06 PROCEDURE — 97140 MANUAL THERAPY 1/> REGIONS: CPT

## 2020-11-06 PROCEDURE — 97110 THERAPEUTIC EXERCISES: CPT

## 2020-11-10 ENCOUNTER — OFFICE VISIT (OUTPATIENT)
Dept: PHYSICAL THERAPY | Facility: CLINIC | Age: 65
End: 2020-11-10
Payer: COMMERCIAL

## 2020-11-10 DIAGNOSIS — M25.551 RIGHT HIP PAIN: Primary | ICD-10-CM

## 2020-11-10 DIAGNOSIS — M70.62 TROCHANTERIC BURSITIS OF LEFT HIP: ICD-10-CM

## 2020-11-10 DIAGNOSIS — M25.512 ACUTE PAIN OF LEFT SHOULDER: ICD-10-CM

## 2020-11-10 DIAGNOSIS — M16.12 PRIMARY OSTEOARTHRITIS OF ONE HIP, LEFT: ICD-10-CM

## 2020-11-10 PROCEDURE — 97140 MANUAL THERAPY 1/> REGIONS: CPT

## 2020-11-10 PROCEDURE — 97110 THERAPEUTIC EXERCISES: CPT

## 2020-11-11 ENCOUNTER — APPOINTMENT (OUTPATIENT)
Dept: RADIOLOGY | Facility: CLINIC | Age: 65
End: 2020-11-11
Payer: COMMERCIAL

## 2020-11-11 ENCOUNTER — TELEPHONE (OUTPATIENT)
Dept: OBGYN CLINIC | Facility: HOSPITAL | Age: 65
End: 2020-11-11

## 2020-11-11 ENCOUNTER — OFFICE VISIT (OUTPATIENT)
Dept: OBGYN CLINIC | Facility: CLINIC | Age: 65
End: 2020-11-11
Payer: COMMERCIAL

## 2020-11-11 VITALS
WEIGHT: 315 LBS | BODY MASS INDEX: 38.36 KG/M2 | SYSTOLIC BLOOD PRESSURE: 119 MMHG | DIASTOLIC BLOOD PRESSURE: 78 MMHG | HEIGHT: 76 IN | HEART RATE: 67 BPM

## 2020-11-11 DIAGNOSIS — E66.01 CLASS 3 SEVERE OBESITY WITH BODY MASS INDEX (BMI) OF 40.0 TO 44.9 IN ADULT, UNSPECIFIED OBESITY TYPE, UNSPECIFIED WHETHER SERIOUS COMORBIDITY PRESENT (HCC): ICD-10-CM

## 2020-11-11 DIAGNOSIS — M54.50 LOW BACK PAIN, UNSPECIFIED BACK PAIN LATERALITY, UNSPECIFIED CHRONICITY, UNSPECIFIED WHETHER SCIATICA PRESENT: ICD-10-CM

## 2020-11-11 DIAGNOSIS — M47.26 OSTEOARTHRITIS OF SPINE WITH RADICULOPATHY, LUMBAR REGION: Primary | ICD-10-CM

## 2020-11-11 PROCEDURE — 72100 X-RAY EXAM L-S SPINE 2/3 VWS: CPT

## 2020-11-11 PROCEDURE — 99214 OFFICE O/P EST MOD 30 MIN: CPT | Performed by: ORTHOPAEDIC SURGERY

## 2020-11-11 PROCEDURE — 3074F SYST BP LT 130 MM HG: CPT | Performed by: ORTHOPAEDIC SURGERY

## 2020-11-11 PROCEDURE — 3078F DIAST BP <80 MM HG: CPT | Performed by: ORTHOPAEDIC SURGERY

## 2020-11-11 PROCEDURE — 3008F BODY MASS INDEX DOCD: CPT | Performed by: ORTHOPAEDIC SURGERY

## 2020-11-11 PROCEDURE — 1036F TOBACCO NON-USER: CPT | Performed by: ORTHOPAEDIC SURGERY

## 2020-11-13 ENCOUNTER — OFFICE VISIT (OUTPATIENT)
Dept: PHYSICAL THERAPY | Facility: CLINIC | Age: 65
End: 2020-11-13
Payer: COMMERCIAL

## 2020-11-13 DIAGNOSIS — M25.512 ACUTE PAIN OF LEFT SHOULDER: Primary | ICD-10-CM

## 2020-11-13 PROCEDURE — 97110 THERAPEUTIC EXERCISES: CPT

## 2020-11-20 ENCOUNTER — APPOINTMENT (OUTPATIENT)
Dept: PHYSICAL THERAPY | Facility: CLINIC | Age: 65
End: 2020-11-20
Payer: COMMERCIAL

## 2020-11-24 ENCOUNTER — OFFICE VISIT (OUTPATIENT)
Dept: PHYSICAL THERAPY | Facility: CLINIC | Age: 65
End: 2020-11-24
Payer: COMMERCIAL

## 2020-11-24 DIAGNOSIS — M16.12 PRIMARY OSTEOARTHRITIS OF ONE HIP, LEFT: ICD-10-CM

## 2020-11-24 DIAGNOSIS — M25.512 ACUTE PAIN OF LEFT SHOULDER: Primary | ICD-10-CM

## 2020-11-24 DIAGNOSIS — M25.551 RIGHT HIP PAIN: ICD-10-CM

## 2020-11-24 DIAGNOSIS — M70.62 TROCHANTERIC BURSITIS OF LEFT HIP: ICD-10-CM

## 2020-11-24 PROCEDURE — 97140 MANUAL THERAPY 1/> REGIONS: CPT

## 2020-11-24 PROCEDURE — 97110 THERAPEUTIC EXERCISES: CPT

## 2020-11-27 ENCOUNTER — OFFICE VISIT (OUTPATIENT)
Dept: PHYSICAL THERAPY | Facility: CLINIC | Age: 65
End: 2020-11-27
Payer: COMMERCIAL

## 2020-11-27 DIAGNOSIS — M25.551 RIGHT HIP PAIN: ICD-10-CM

## 2020-11-27 DIAGNOSIS — M25.512 ACUTE PAIN OF LEFT SHOULDER: Primary | ICD-10-CM

## 2020-11-27 DIAGNOSIS — M16.12 PRIMARY OSTEOARTHRITIS OF ONE HIP, LEFT: ICD-10-CM

## 2020-11-27 PROCEDURE — 97140 MANUAL THERAPY 1/> REGIONS: CPT

## 2020-11-27 PROCEDURE — 97110 THERAPEUTIC EXERCISES: CPT

## 2020-11-30 ENCOUNTER — HOSPITAL ENCOUNTER (OUTPATIENT)
Dept: RADIOLOGY | Facility: HOSPITAL | Age: 65
Discharge: HOME | End: 2020-11-30
Attending: ORTHOPAEDIC SURGERY
Payer: COMMERCIAL

## 2020-11-30 DIAGNOSIS — M47.26 OSTEOARTHRITIS OF SPINE WITH RADICULOPATHY, LUMBAR REGION: ICD-10-CM

## 2020-11-30 PROCEDURE — 72148 MRI LUMBAR SPINE W/O DYE: CPT

## 2020-11-30 PROCEDURE — G1004 CDSM NDSC: HCPCS

## 2020-12-01 ENCOUNTER — OFFICE VISIT (OUTPATIENT)
Dept: PHYSICAL THERAPY | Facility: CLINIC | Age: 65
End: 2020-12-01
Payer: COMMERCIAL

## 2020-12-01 DIAGNOSIS — M25.551 RIGHT HIP PAIN: ICD-10-CM

## 2020-12-01 DIAGNOSIS — M70.62 TROCHANTERIC BURSITIS OF LEFT HIP: ICD-10-CM

## 2020-12-01 DIAGNOSIS — M16.12 PRIMARY OSTEOARTHRITIS OF ONE HIP, LEFT: ICD-10-CM

## 2020-12-01 DIAGNOSIS — M25.512 ACUTE PAIN OF LEFT SHOULDER: Primary | ICD-10-CM

## 2020-12-01 PROCEDURE — 97140 MANUAL THERAPY 1/> REGIONS: CPT

## 2020-12-01 PROCEDURE — 97110 THERAPEUTIC EXERCISES: CPT

## 2020-12-02 ENCOUNTER — OFFICE VISIT (OUTPATIENT)
Dept: OBGYN CLINIC | Facility: CLINIC | Age: 65
End: 2020-12-02
Payer: COMMERCIAL

## 2020-12-02 VITALS
HEIGHT: 76 IN | DIASTOLIC BLOOD PRESSURE: 75 MMHG | SYSTOLIC BLOOD PRESSURE: 134 MMHG | BODY MASS INDEX: 42.36 KG/M2 | HEART RATE: 70 BPM

## 2020-12-02 DIAGNOSIS — M47.26 OSTEOARTHRITIS OF SPINE WITH RADICULOPATHY, LUMBAR REGION: Primary | ICD-10-CM

## 2020-12-02 DIAGNOSIS — M16.0 PRIMARY OSTEOARTHRITIS OF BOTH HIPS: ICD-10-CM

## 2020-12-02 PROCEDURE — 99213 OFFICE O/P EST LOW 20 MIN: CPT | Performed by: ORTHOPAEDIC SURGERY

## 2020-12-04 ENCOUNTER — OFFICE VISIT (OUTPATIENT)
Dept: PHYSICAL THERAPY | Facility: CLINIC | Age: 65
End: 2020-12-04
Payer: COMMERCIAL

## 2020-12-04 DIAGNOSIS — M25.512 ACUTE PAIN OF LEFT SHOULDER: Primary | ICD-10-CM

## 2020-12-04 DIAGNOSIS — M16.12 PRIMARY OSTEOARTHRITIS OF ONE HIP, LEFT: ICD-10-CM

## 2020-12-04 PROCEDURE — 97110 THERAPEUTIC EXERCISES: CPT

## 2020-12-04 PROCEDURE — 97140 MANUAL THERAPY 1/> REGIONS: CPT

## 2020-12-08 ENCOUNTER — OFFICE VISIT (OUTPATIENT)
Dept: PHYSICAL THERAPY | Facility: CLINIC | Age: 65
End: 2020-12-08
Payer: COMMERCIAL

## 2020-12-08 DIAGNOSIS — M16.12 PRIMARY OSTEOARTHRITIS OF ONE HIP, LEFT: ICD-10-CM

## 2020-12-08 DIAGNOSIS — M25.512 ACUTE PAIN OF LEFT SHOULDER: Primary | ICD-10-CM

## 2020-12-08 PROCEDURE — 97110 THERAPEUTIC EXERCISES: CPT

## 2020-12-08 PROCEDURE — 97140 MANUAL THERAPY 1/> REGIONS: CPT

## 2020-12-11 ENCOUNTER — OFFICE VISIT (OUTPATIENT)
Dept: PHYSICAL THERAPY | Facility: CLINIC | Age: 65
End: 2020-12-11
Payer: COMMERCIAL

## 2020-12-11 DIAGNOSIS — M16.12 PRIMARY OSTEOARTHRITIS OF ONE HIP, LEFT: ICD-10-CM

## 2020-12-11 DIAGNOSIS — M25.512 ACUTE PAIN OF LEFT SHOULDER: Primary | ICD-10-CM

## 2020-12-11 PROCEDURE — 97110 THERAPEUTIC EXERCISES: CPT

## 2020-12-11 PROCEDURE — 97140 MANUAL THERAPY 1/> REGIONS: CPT

## 2020-12-14 ENCOUNTER — OFFICE VISIT (OUTPATIENT)
Dept: PHYSICAL THERAPY | Facility: CLINIC | Age: 65
End: 2020-12-14
Payer: COMMERCIAL

## 2020-12-14 DIAGNOSIS — M25.512 ACUTE PAIN OF LEFT SHOULDER: Primary | ICD-10-CM

## 2020-12-14 DIAGNOSIS — M16.12 PRIMARY OSTEOARTHRITIS OF ONE HIP, LEFT: ICD-10-CM

## 2020-12-14 PROCEDURE — 97110 THERAPEUTIC EXERCISES: CPT

## 2020-12-14 PROCEDURE — 97140 MANUAL THERAPY 1/> REGIONS: CPT

## 2020-12-17 ENCOUNTER — APPOINTMENT (OUTPATIENT)
Dept: PHYSICAL THERAPY | Facility: CLINIC | Age: 65
End: 2020-12-17
Payer: COMMERCIAL

## 2020-12-18 ENCOUNTER — OFFICE VISIT (OUTPATIENT)
Dept: FAMILY MEDICINE CLINIC | Facility: CLINIC | Age: 65
End: 2020-12-18
Payer: COMMERCIAL

## 2020-12-18 VITALS
DIASTOLIC BLOOD PRESSURE: 82 MMHG | TEMPERATURE: 97.6 F | OXYGEN SATURATION: 99 % | SYSTOLIC BLOOD PRESSURE: 128 MMHG | RESPIRATION RATE: 17 BRPM | BODY MASS INDEX: 38.36 KG/M2 | HEIGHT: 76 IN | HEART RATE: 68 BPM | WEIGHT: 315 LBS

## 2020-12-18 DIAGNOSIS — M62.838 MUSCLE SPASM: ICD-10-CM

## 2020-12-18 DIAGNOSIS — M79.89 RIGHT LEG SWELLING: Primary | ICD-10-CM

## 2020-12-18 PROCEDURE — 3079F DIAST BP 80-89 MM HG: CPT | Performed by: NURSE PRACTITIONER

## 2020-12-18 PROCEDURE — 3008F BODY MASS INDEX DOCD: CPT | Performed by: NURSE PRACTITIONER

## 2020-12-18 PROCEDURE — 99213 OFFICE O/P EST LOW 20 MIN: CPT | Performed by: NURSE PRACTITIONER

## 2020-12-18 PROCEDURE — 1036F TOBACCO NON-USER: CPT | Performed by: NURSE PRACTITIONER

## 2020-12-18 PROCEDURE — 3074F SYST BP LT 130 MM HG: CPT | Performed by: NURSE PRACTITIONER

## 2020-12-18 RX ORDER — CYCLOBENZAPRINE HCL 10 MG
10 TABLET ORAL 2 TIMES DAILY PRN
Qty: 30 TABLET | Refills: 1 | Status: SHIPPED | OUTPATIENT
Start: 2020-12-18 | End: 2021-06-29 | Stop reason: SDUPTHER

## 2020-12-21 ENCOUNTER — OFFICE VISIT (OUTPATIENT)
Dept: PHYSICAL THERAPY | Facility: CLINIC | Age: 65
End: 2020-12-21
Payer: COMMERCIAL

## 2020-12-21 DIAGNOSIS — M25.512 ACUTE PAIN OF LEFT SHOULDER: Primary | ICD-10-CM

## 2020-12-21 DIAGNOSIS — M16.12 PRIMARY OSTEOARTHRITIS OF ONE HIP, LEFT: ICD-10-CM

## 2020-12-21 PROCEDURE — 97140 MANUAL THERAPY 1/> REGIONS: CPT

## 2020-12-21 PROCEDURE — 97110 THERAPEUTIC EXERCISES: CPT

## 2020-12-23 ENCOUNTER — APPOINTMENT (OUTPATIENT)
Dept: PHYSICAL THERAPY | Facility: CLINIC | Age: 65
End: 2020-12-23
Payer: COMMERCIAL

## 2020-12-29 ENCOUNTER — OFFICE VISIT (OUTPATIENT)
Dept: PHYSICAL THERAPY | Facility: CLINIC | Age: 65
End: 2020-12-29
Payer: COMMERCIAL

## 2020-12-29 DIAGNOSIS — M25.512 ACUTE PAIN OF LEFT SHOULDER: Primary | ICD-10-CM

## 2020-12-29 DIAGNOSIS — M25.551 RIGHT HIP PAIN: ICD-10-CM

## 2020-12-29 DIAGNOSIS — M16.12 PRIMARY OSTEOARTHRITIS OF ONE HIP, LEFT: ICD-10-CM

## 2020-12-29 PROCEDURE — 97140 MANUAL THERAPY 1/> REGIONS: CPT

## 2020-12-29 PROCEDURE — 97110 THERAPEUTIC EXERCISES: CPT

## 2021-01-05 ENCOUNTER — TELEPHONE (OUTPATIENT)
Dept: OBGYN CLINIC | Facility: HOSPITAL | Age: 66
End: 2021-01-05

## 2021-01-05 ENCOUNTER — EVALUATION (OUTPATIENT)
Dept: PHYSICAL THERAPY | Facility: CLINIC | Age: 66
End: 2021-01-05
Payer: COMMERCIAL

## 2021-01-05 DIAGNOSIS — M17.0 BILATERAL PRIMARY OSTEOARTHRITIS OF KNEE: Primary | ICD-10-CM

## 2021-01-05 DIAGNOSIS — M25.512 ACUTE PAIN OF LEFT SHOULDER: Primary | ICD-10-CM

## 2021-01-05 DIAGNOSIS — M25.551 RIGHT HIP PAIN: ICD-10-CM

## 2021-01-05 DIAGNOSIS — M16.12 PRIMARY OSTEOARTHRITIS OF ONE HIP, LEFT: ICD-10-CM

## 2021-01-05 PROCEDURE — 97140 MANUAL THERAPY 1/> REGIONS: CPT

## 2021-01-05 PROCEDURE — 97110 THERAPEUTIC EXERCISES: CPT

## 2021-01-05 NOTE — PROGRESS NOTES
PT Re-evaluation/Daily Note     Today's date: 2021  Patient name: Humberto Obregon  : 1955  MRN: [id number]  Referring provider: Sanjana Anand DO  Dx:   Encounter Diagnosis     ICD-10-CM    1  Acute pain of left shoulder  M25 512    2  Primary osteoarthritis of one hip, left  M16 12    3  Right hip pain  M25 551        Start Time: 1020  Stop Time: 1105  Total time in clinic (min): 45 minutes    Subjective: Patient reports 8/10 R LE pain today  He was driving in his car this weekend, and notes difficulties with ambulation after sitting for greater than 2 hours at a time  He is not seeing results in BLE function or pain levels since several months ago  He received doppler over the weekend, as his MD thought he may have a blood clot  Patient is negative for blood clot, but notes [place] Cysts in R knee  He notes 95% improvements in his shoulder function  He is able to perform self care independently  Objective: See treatment diary below  Goals for bilateral lower extremities  STGs to be achieved in 4 weeks  -STG 1: Patient will be independent with HEP - MET   -STG 2: Patient will have 0/10 L hip pain at rest - partially met  -STG 3: Patient will increase L hip ROM to within normal limits - partially met  -STG 4: Patient will report 40% functional improvement of symptoms - MET  -STG 5: Patient will ascend/descend stairs with step over step pattern with less than 2/10 L hip pain  LTGs to be achieved in 8 weeks     -LTG 1: Patient will demonstrate independence with maintenance program - MET  -LTG 2: Patient will perform all functional activities with less than 2/10 L hip pain - MET  -LTG 3: Patient will improve FOTO score by 10 points - MET  -LTG 4: Patient will report 70% functional improvement in symptoms - NOT MET  -LTG 5: Patient will perform SLS test greater than 10 seconds in B LE - not met    Active Range of Motion   Left Hip   Flexion: 90 degrees with pain  Abduction: 40 degrees with pain  External rotation (90/90): WFL and with pain  Internal rotation (90/90): WFL and with pain    Right Hip   Flexion: 90 degrees with pain  Abduction: 40 degrees with pain  External rotation (90/90): WFL and with pain  Internal rotation (90/90): WFL and with pain    Passive Range of Motion   Left Hip   Flexion: 100 degrees with pain  Abduction: 50 degrees with pain  External rotation (90/90): 45 degrees with pain  Internal rotation (90/90): 45 degrees with pain    Passive Range of Motion   Left Hip   Flexion: 100 degrees with pain  Abduction: 50 degrees with pain  External rotation (90/90): 45 degrees with pain  Internal rotation (90/90): 45 degrees with pain    Right Hip   Flexion: 100 degrees with pain  Abduction: with pain  External rotation (90/90): 45 degrees with pain  Internal rotation (90/90): 45 degrees with pain    Strength/Myotome Testing     Left Hip   Planes of Motion   Flexion: 3+  Abduction: 3+  Adduction: 3+  External rotation: 3+  Internal rotation: 3+    Isolated Muscles   Gluteus quirino: 3+  Gluteus medius: 3+  Iliopsoas: 3+    Right Hip   Planes of Motion   Flexion: 3+  Abduction: 3+  Adduction: 3+  External rotation: 3+  Internal rotation: 3+    Isolated Muscles   Gluteus maximums: 3+  Gluteus medius: 3+  Iliopsoas: 3+    BLE FOTO goal: 51%   IE: 29%    8/28: 33%    9/29: 40%   11/10: 43%    Shoulder A/PROM    L shoulder flexion: 145 deg   L shoulder abduction:  120 deg   L shoulder IR: 90 deg   L shoulder ER: 50 deg    Shoulder strength    L shoulder flexion: 4+/5   L shoulder abduction: 4+/5    L shoulder IR: 4+/5   L shoulder ER: 4+/5   L elbow flexion: 5/5   L elbow extension: 5/5    Goals for L shoulder   STGs to be achieved in 4 weeks     -STG 1: Patient will be independent with HEP - MET  -STG 2: Patient will have 0/10 L shoulder pain at rest  - MET  -STG 3: Patient will increase L shoulder ROM to within normal limits - MET  -STG 4: Patient will report 40% functional improvement - MET  -STG 5: Patient will demonstrate 1/2 grade MMT improvement in L shoulder - MET  -STG 6: Patient will be able to reach overhead with L UE to perform self care - MET    LTGs to be achieved in 8 weeks  -LTG 1: Patient will demonstrate independence with maintenance program - MET  -LTG 2: Patient will perform all functional activities with less than 2/10 L shoulder pain - MET  -LTG 3: Patient will improve FOTO score by 10 points - MET  -LTG 4: Patient will report 80% functional improvement - MET  -LTG 5: Patient will be able to lift greater than 10 pounds with L UE and no pain to move into his new house independently - MET    Assessment: Patient demonstrates limited progress with lumbar spine and BLE pain  He does not demonstrate improvements in BLE strength, ROM or function since the start of PT  His pain increases with modalities, P/AROM, and strengthening exercises of lumbar spine and BLEs  LAD of R LE abolishes pain post tx, but his pain persists after several hours  Patient and therapist discussed seeking pain management for lumbar spine pain  Patient agreed to be discharged from formal PT intervention next visit, as he lacks improvement in lumbar spine and BLE function  Patient demonstrates significant improvements in L shoulder ROM, strength, and function  He is able to perform self care independently, with minimal pain during internal rotation ROM  Patient is compliant with HEP  He is to be discharged from formal PT intervention for L shoulder pain next visit, as he has met all goals and demonstrates 95% improvement in function subjectively  Tolerated treatment fair  Patient would benefit from continued PT to provide home exercise program for independence in community  Plan: Continue per plan of care    Discharge next visit with comprehensive home exercise program       Precautions: Standard, L trochanteric bursitis, B knee OA, HTN, aortic aneurysm, FALL RISK      Manuals 12/4/2020 12/8/20 12/11/2020 12/14/2020 12/21/2020 12/29/2020 1/5/2021   B hip PROM  performed all motions inés 5 min Stretch Bilat LE 5 min 5 min 5 min    STM B HS, ITB 5 min --- 5 min 8 min L shd / TS 8 min 8 min    LAD       5'   L shoulder PROM 5 min performed  3 min 2 miin   5'   Piriformis stretch   inés 10 sec x 5   5 x 10 se X 2 min       tspine mobs rotation inés  Grade II/III                  Ther Ex          NuStep Warmup 10 min lv 3 5 min   28 miles  Lev 3 X 10 min Lev 3 x 10 min   Lev 3 X 10 min 10 min 10'   Standing knee flex  x10 B 10 bilat 20 20 20    Step HS stretch  2 x 20s B 5 X 10 se bilat 5 X 10 sec  5 x 10 sec              Marching in // bars   x30 30 30 30 30    RFIS 10         Scap iso          Low, mid rows, IR, ER TB 10 ea X celso 2 x10 BTB 2 0 X celso 20 X celso 20 X evangelina 20 X gr tube 20 BTB   Wall slides 10         Cane ext 10 x10 10 10 20 Ext/ 12 IR 20 ext/IR 20 ext/IR   Supine hands clasped flexion 10         IR SOS stretch 20 2s x 10 20 20 20 20 20   Pulleys  30 x flex, abd 30 flex/abd 30 fle/Abd 30 Fle /abd 30 fle/abd    Standing shoulder flex, scaption 10 X 1#           Cane ABD    20  20  20 BB    sidelying tspine rotation   5 sec x 7 inés  10 x 5 sec    10 x 5 sec   Piriformis stretch   Next visit  Unable secondary to pain       Modalities          Ice post     10 min post 10 min R hip 10 min R hip Def   Heat post  Def  10 min R hip   10 min LB Def

## 2021-01-05 NOTE — TELEPHONE ENCOUNTER
I called patient to advised we placed order this can take up to a month with all said and done  He verbalized understanding

## 2021-01-08 ENCOUNTER — APPOINTMENT (OUTPATIENT)
Dept: PHYSICAL THERAPY | Facility: CLINIC | Age: 66
End: 2021-01-08
Payer: COMMERCIAL

## 2021-01-09 ENCOUNTER — TELEMEDICINE (OUTPATIENT)
Dept: FAMILY MEDICINE CLINIC | Facility: CLINIC | Age: 66
End: 2021-01-09
Payer: COMMERCIAL

## 2021-01-09 DIAGNOSIS — Z20.822 EXPOSURE TO COVID-19 VIRUS: Primary | ICD-10-CM

## 2021-01-09 DIAGNOSIS — Z20.822 EXPOSURE TO COVID-19 VIRUS: ICD-10-CM

## 2021-01-09 PROCEDURE — U0005 INFEC AGEN DETEC AMPLI PROBE: HCPCS | Performed by: FAMILY MEDICINE

## 2021-01-09 PROCEDURE — 99442 PR PHYS/QHP TELEPHONE EVALUATION 11-20 MIN: CPT | Performed by: FAMILY MEDICINE

## 2021-01-09 PROCEDURE — U0003 INFECTIOUS AGENT DETECTION BY NUCLEIC ACID (DNA OR RNA); SEVERE ACUTE RESPIRATORY SYNDROME CORONAVIRUS 2 (SARS-COV-2) (CORONAVIRUS DISEASE [COVID-19]), AMPLIFIED PROBE TECHNIQUE, MAKING USE OF HIGH THROUGHPUT TECHNOLOGIES AS DESCRIBED BY CMS-2020-01-R: HCPCS | Performed by: FAMILY MEDICINE
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
## 2021-01-11 ENCOUNTER — TELEPHONE (OUTPATIENT)
Dept: FAMILY MEDICINE CLINIC | Facility: CLINIC | Age: 66
End: 2021-01-11

## 2021-01-11 LAB — SARS-COV-2 RNA SPEC QL NAA+PROBE: NOT DETECTED

## 2021-01-11 NOTE — TELEPHONE ENCOUNTER
----- Message from Senia Briggs MD sent at 1/11/2021  8:18 AM EST -----  Please advise the patient that him and his wife are negative

## 2021-01-12 ENCOUNTER — OFFICE VISIT (OUTPATIENT)
Dept: PHYSICAL THERAPY | Facility: CLINIC | Age: 66
End: 2021-01-12
Payer: COMMERCIAL

## 2021-01-12 ENCOUNTER — TELEPHONE (OUTPATIENT)
Dept: FAMILY MEDICINE CLINIC | Facility: CLINIC | Age: 66
End: 2021-01-12

## 2021-01-12 DIAGNOSIS — M25.551 RIGHT HIP PAIN: ICD-10-CM

## 2021-01-12 DIAGNOSIS — M25.512 ACUTE PAIN OF LEFT SHOULDER: Primary | ICD-10-CM

## 2021-01-12 DIAGNOSIS — M16.12 PRIMARY OSTEOARTHRITIS OF ONE HIP, LEFT: ICD-10-CM

## 2021-01-12 PROCEDURE — 97110 THERAPEUTIC EXERCISES: CPT

## 2021-01-12 PROCEDURE — 97140 MANUAL THERAPY 1/> REGIONS: CPT

## 2021-01-12 PROCEDURE — 97112 NEUROMUSCULAR REEDUCATION: CPT

## 2021-01-12 NOTE — PROGRESS NOTES
Discharge/Daily Note     Today's date: 2021  Patient name: Frankie Dawn   : 1955  MRN: [id number]  Referring provider: Abhi Young DO  Dx:   Encounter Diagnosis     ICD-10-CM    1  Acute pain of left shoulder  M25 512    2  Primary osteoarthritis of one hip, left  M16 12    3  Right hip pain  M25 551        Start Time: 0845  Stop Time: 09  Total time in clinic (min): 45 minutes    Subjective: Patient denies pain in L shoulder today  He is able to perform all toileting activities independently  He reports difficulties performing stairs, ambulating and standing greater than an hour, walking mile, and lifting heavy objects  Functionally, he reports 95% improvement in symptoms of his L shoulder since the start of PT  Objective: See treatment diary below  Goals for bilateral lower extremities  STGs to be achieved in 4 weeks  -STG 1: Patient will be independent with HEP - MET   -STG 2: Patient will have 0/10 L hip pain at rest - partially met  -STG 3: Patient will increase L hip ROM to within normal limits - partially met  -STG 4: Patient will report 40% functional improvement of symptoms - MET  -STG 5: Patient will ascend/descend stairs with step over step pattern with less than 2/10 L hip pain  LTGs to be achieved in 8 weeks     -LTG 1: Patient will demonstrate independence with maintenance program - MET  -LTG 2: Patient will perform all functional activities with less than 2/10 L hip pain - MET  -LTG 3: Patient will improve FOTO score by 10 points - MET  -LTG 4: Patient will report 70% functional improvement in symptoms - NOT MET  -LTG 5: Patient will perform SLS test greater than 10 seconds in B LE - not met    Active Range of Motion   Left Hip   Flexion: 90 degrees with pain  Abduction: 40 degrees with pain  External rotation (90/90): WFL and with pain  Internal rotation (90/90): WFL and with pain    Right Hip   Flexion: 90 degrees with pain  Abduction: 40 degrees with pain  External rotation (90/90): WFL and with pain  Internal rotation (90/90): WFL and with pain    Passive Range of Motion   Left Hip   Flexion: 100 degrees with pain  Abduction: 50 degrees with pain  External rotation (90/90): 45 degrees with pain  Internal rotation (90/90): 45 degrees with pain    Passive Range of Motion   Left Hip   Flexion: 100 degrees with pain  Abduction: 50 degrees with pain  External rotation (90/90): 45 degrees with pain  Internal rotation (90/90): 45 degrees with pain    Right Hip   Flexion: 100 degrees with pain  Abduction: with pain  External rotation (90/90): 45 degrees with pain  Internal rotation (90/90): 45 degrees with pain    Strength/Myotome Testing     Left Hip   Planes of Motion   Flexion: 3+  Abduction: 3+  Adduction: 3+  External rotation: 3+  Internal rotation: 3+    Isolated Muscles   Gluteus quirino: 3+  Gluteus medius: 3+  Iliopsoas: 3+    Right Hip   Planes of Motion   Flexion: 3+  Abduction: 3+  Adduction: 3+  External rotation: 3+  Internal rotation: 3+    Isolated Muscles   Gluteus maximums: 3+  Gluteus medius: 3+  Iliopsoas: 3+    BLE FOTO goal: 51%   IE: 29%    8/28: 33%    9/29: 40%   11/10: 43%   1/12/2021: 54%    Shoulder A/PROM    L shoulder flexion: 145 deg   L shoulder abduction:  120 deg   L shoulder IR: 90 deg   L shoulder ER: 50 deg    Shoulder strength    L shoulder flexion: 4+/5   L shoulder abduction: 4+/5    L shoulder IR: 4+/5   L shoulder ER: 4+/5   L elbow flexion: 5/5   L elbow extension: 5/5    Goals for L shoulder   STGs to be achieved in 4 weeks     -STG 1: Patient will be independent with HEP - MET  -STG 2: Patient will have 0/10 L shoulder pain at rest  - MET  -STG 3: Patient will increase L shoulder ROM to within normal limits - MET  -STG 4: Patient will report 40% functional improvement - MET  -STG 5: Patient will demonstrate 1/2 grade MMT improvement in L shoulder - MET  -STG 6: Patient will be able to reach overhead with L UE to perform self care - MET    LTGs to be achieved in 8 weeks  -LTG 1: Patient will demonstrate independence with maintenance program - MET  -LTG 2: Patient will perform all functional activities with less than 2/10 L shoulder pain - MET  -LTG 3: Patient will improve FOTO score by 10 points - MET  -LTG 4: Patient will report 80% functional improvement - MET  -LTG 5: Patient will be able to lift greater than 10 pounds with L UE and no pain to move into his new house independently - MET          Assessment: Patient demonstrates improvements in function as measured by FOTO, BLE strength and ROM, lumbar spine, and L shoulder ROM and strength  Patient is still limited in functional mobility of BLE due to weakness and pain  Patient advised to seek second opinion regarding continued lumbar spine and radicular symptoms  Comprehensive home exercise program provided to patient regarding lower extremities and L shoulder  Demonstrated and verbalized understanding of exercises provided  Tolerated treatment well  Patient exhibited good technique with therapeutic exercises  Plan: Patient to be discharged with comprehensive home exercise program for L shoulder pain  Follow up with orthopedics and pain management for lumbar spine  Precautions: Standard, L trochanteric bursitis, B knee OA, HTN, aortic aneurysm, FALL RISK      Manuals 12/4/2020 12/8/20 12/11/2020 12/14/2020 12/21/2020 12/29/2020 1/5/2021 1/12/21   B hip PROM  performed all motions inés 5 min Stretch Bilat LE 5 min 5 min 5 min  5'   STM B HS, ITB 5 min --- 5 min 8 min L shd / TS 8 min 8 min     LAD       5' 5'   L shoulder PROM 5 min performed  3 min 2 miin   5' 5'   Piriformis stretch   inés 10 sec x 5   5 x 10 se X 2 min        tspine mobs rotation inés  Grade II/III                    Ther Ex           NuStep Warmup 10 min lv 3 5 min   28 miles  Lev 3 X 10 min Lev 3 x 10 min   Lev 3 X 10 min 10 min 10' 10'   Standing knee flex  x10 B 10 bilat 20 20 20     Step HS stretch  2 x 20s B 5 X 10 se bilat 5 X 10 sec  5 x 10 sec                Marching in // bars   x30 30 30 30 30     RFIS 10          Scap iso           Low, mid rows, IR, ER TB 10 ea X celso 2 x10 BTB 2 0 X celso 20 X celso 20 X evangelina 20 X gr tube 20 BTB 20 BTB   Wall slides 10       20   Cane ext 10 x10 10 10 20 Ext/ 12 IR 20 ext/IR 20 ext/IR 20 ext/IR   Supine hands clasped flexion 10          IR SOS stretch 20 2s x 10 20 20 20 20 20 20   Pulleys  30 x flex, abd 30 flex/abd 30 fle/Abd 30 Fle /abd 30 fle/abd     Standing shoulder flex, scaption 10 X 1#            Cane ABD    20  20  20 BB  20 BB   sidelying tspine rotation   5 sec x 7 inés  10 x 5 sec    10 x 5 sec    Piriformis stretch   Next visit  Unable secondary to pain        Modalities           Ice post     10 min post 10 min R hip 10 min R hip Def Def   Heat post  Def  10 min R hip   10 min LB Def Def

## 2021-01-12 NOTE — TELEPHONE ENCOUNTER
Sera Dorseyjaye is not having any SX I did let Sera Jose Manuel know that because they were exposed in close contact he still has to quarantine 14 days from his last contact with the covid positive person

## 2021-01-12 NOTE — TELEPHONE ENCOUNTER
Please check in on César  Is he feeling better?  Even though test negative, he should quarantine for 14 days since last contact with step daughter and the boys

## 2021-01-13 ENCOUNTER — OFFICE VISIT (OUTPATIENT)
Dept: OBGYN CLINIC | Facility: CLINIC | Age: 66
End: 2021-01-13
Payer: COMMERCIAL

## 2021-01-13 VITALS
BODY MASS INDEX: 38.36 KG/M2 | DIASTOLIC BLOOD PRESSURE: 75 MMHG | HEART RATE: 73 BPM | HEIGHT: 76 IN | SYSTOLIC BLOOD PRESSURE: 117 MMHG | WEIGHT: 315 LBS

## 2021-01-13 DIAGNOSIS — R25.2 MUSCLE CRAMPS: Primary | ICD-10-CM

## 2021-01-13 DIAGNOSIS — M79.661 RIGHT CALF PAIN: ICD-10-CM

## 2021-01-13 PROCEDURE — 1036F TOBACCO NON-USER: CPT | Performed by: ORTHOPAEDIC SURGERY

## 2021-01-13 PROCEDURE — 3008F BODY MASS INDEX DOCD: CPT | Performed by: ORTHOPAEDIC SURGERY

## 2021-01-13 PROCEDURE — 3074F SYST BP LT 130 MM HG: CPT | Performed by: ORTHOPAEDIC SURGERY

## 2021-01-13 PROCEDURE — 3078F DIAST BP <80 MM HG: CPT | Performed by: ORTHOPAEDIC SURGERY

## 2021-01-13 PROCEDURE — 99214 OFFICE O/P EST MOD 30 MIN: CPT | Performed by: ORTHOPAEDIC SURGERY
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
## 2021-01-26 DIAGNOSIS — I10 ESSENTIAL HYPERTENSION: ICD-10-CM

## 2021-01-26 RX ORDER — ATENOLOL 50 MG/1
TABLET ORAL
Qty: 90 TABLET | Refills: 1 | Status: SHIPPED | OUTPATIENT
Start: 2021-01-26

## 2021-02-15 ENCOUNTER — OFFICE VISIT (OUTPATIENT)
Dept: FAMILY MEDICINE CLINIC | Facility: CLINIC | Age: 66
End: 2021-02-15
Payer: COMMERCIAL

## 2021-02-15 VITALS
RESPIRATION RATE: 18 BRPM | HEART RATE: 71 BPM | WEIGHT: 315 LBS | OXYGEN SATURATION: 97 % | SYSTOLIC BLOOD PRESSURE: 142 MMHG | DIASTOLIC BLOOD PRESSURE: 80 MMHG | BODY MASS INDEX: 38.36 KG/M2 | TEMPERATURE: 96.2 F | HEIGHT: 76 IN

## 2021-02-15 DIAGNOSIS — Z13.29 SCREENING FOR THYROID DISORDER: ICD-10-CM

## 2021-02-15 DIAGNOSIS — E78.2 MIXED HYPERLIPIDEMIA: ICD-10-CM

## 2021-02-15 DIAGNOSIS — R60.0 BILATERAL EDEMA OF LOWER EXTREMITY: ICD-10-CM

## 2021-02-15 DIAGNOSIS — I10 ESSENTIAL HYPERTENSION: ICD-10-CM

## 2021-02-15 DIAGNOSIS — Z13.0 SCREENING, DEFICIENCY ANEMIA, IRON: ICD-10-CM

## 2021-02-15 DIAGNOSIS — Z12.5 PROSTATE CANCER SCREENING: ICD-10-CM

## 2021-02-15 DIAGNOSIS — Z00.00 MEDICARE ANNUAL WELLNESS VISIT, SUBSEQUENT: Primary | ICD-10-CM

## 2021-02-15 DIAGNOSIS — R73.9 HYPERGLYCEMIA: ICD-10-CM

## 2021-02-15 DIAGNOSIS — Z79.899 HIGH RISK MEDICATION USE: ICD-10-CM

## 2021-02-15 LAB — HBA1C MFR BLD HPLC: 5.6 %

## 2021-02-15 PROCEDURE — 3079F DIAST BP 80-89 MM HG: CPT | Performed by: NURSE PRACTITIONER

## 2021-02-15 PROCEDURE — G0439 PPPS, SUBSEQ VISIT: HCPCS | Performed by: NURSE PRACTITIONER

## 2021-02-15 PROCEDURE — 3008F BODY MASS INDEX DOCD: CPT | Performed by: NURSE PRACTITIONER

## 2021-02-15 PROCEDURE — 3725F SCREEN DEPRESSION PERFORMED: CPT | Performed by: NURSE PRACTITIONER

## 2021-02-15 PROCEDURE — 3077F SYST BP >= 140 MM HG: CPT | Performed by: NURSE PRACTITIONER

## 2021-02-15 PROCEDURE — 1036F TOBACCO NON-USER: CPT | Performed by: NURSE PRACTITIONER

## 2021-02-15 PROCEDURE — 36415 COLL VENOUS BLD VENIPUNCTURE: CPT | Performed by: NURSE PRACTITIONER

## 2021-02-15 RX ORDER — HYALURONATE SODIUM 30 MG/2 ML
SYRINGE (ML) INTRAARTICULAR
COMMUNITY
Start: 2021-01-05
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
## 2021-02-15 NOTE — PATIENT INSTRUCTIONS

## 2021-02-16 LAB
ALBUMIN SERPL-MCNC: 4.3 G/DL (ref 3.8–4.8)
ALBUMIN/GLOB SERPL: 1.8 {RATIO} (ref 1.2–2.2)
ALP SERPL-CCNC: 150 IU/L (ref 39–117)
ALT SERPL-CCNC: 27 IU/L (ref 0–44)
AST SERPL-CCNC: 21 IU/L (ref 0–40)
BILIRUB SERPL-MCNC: 0.6 MG/DL (ref 0–1.2)
BUN SERPL-MCNC: 14 MG/DL (ref 8–27)
BUN/CREAT SERPL: 17 (ref 10–24)
CALCIUM SERPL-MCNC: 9.2 MG/DL (ref 8.6–10.2)
CHLORIDE SERPL-SCNC: 107 MMOL/L (ref 96–106)
CHOLEST SERPL-MCNC: 157 MG/DL (ref 100–199)
CO2 SERPL-SCNC: 22 MMOL/L (ref 20–29)
CREAT SERPL-MCNC: 0.84 MG/DL (ref 0.76–1.27)
ERYTHROCYTE [DISTWIDTH] IN BLOOD BY AUTOMATED COUNT: 13.3 % (ref 11.6–15.4)
GLOBULIN SER-MCNC: 2.4 G/DL (ref 1.5–4.5)
GLUCOSE SERPL-MCNC: ABNORMAL MG/DL
HBA1C MFR BLD: 5.6 % (ref 4.8–5.6)
HCT VFR BLD AUTO: 46.3 % (ref 37.5–51)
HDLC SERPL-MCNC: 36 MG/DL
HGB BLD-MCNC: 15.8 G/DL (ref 13–17.7)
LDLC SERPL CALC-MCNC: 89 MG/DL (ref 0–99)
MAGNESIUM SERPL-MCNC: 2.4 MG/DL (ref 1.6–2.3)
MCH RBC QN AUTO: 32.2 PG (ref 26.6–33)
MCHC RBC AUTO-ENTMCNC: 34.1 G/DL (ref 31.5–35.7)
MCV RBC AUTO: 94 FL (ref 79–97)
MICRODELETION SYND BLD/T FISH: NORMAL
PLATELET # BLD AUTO: 247 X10E3/UL (ref 150–450)
POTASSIUM SERPL-SCNC: ABNORMAL MMOL/L
PROT SERPL-MCNC: 6.7 G/DL (ref 6–8.5)
PSA SERPL-MCNC: 0.5 NG/ML (ref 0–4)
RBC # BLD AUTO: 4.91 X10E6/UL (ref 4.14–5.8)
SL AMB EGFR AFRICAN AMERICAN: 108 ML/MIN/1.73
SL AMB EGFR NON AFRICAN AMERICAN: 93 ML/MIN/1.73
SL AMB VLDL CHOLESTEROL CALC: 32 MG/DL (ref 5–40)
SODIUM SERPL-SCNC: 144 MMOL/L (ref 134–144)
TRIGL SERPL-MCNC: 185 MG/DL (ref 0–149)
TSH SERPL DL<=0.005 MIU/L-ACNC: 0.99 UIU/ML (ref 0.45–4.5)
WBC # BLD AUTO: 6.4 X10E3/UL (ref 3.4–10.8)

## 2021-02-17 ENCOUNTER — TELEPHONE (OUTPATIENT)
Dept: OBGYN CLINIC | Facility: CLINIC | Age: 66
End: 2021-02-17

## 2021-02-17 ENCOUNTER — TELEPHONE (OUTPATIENT)
Dept: FAMILY MEDICINE CLINIC | Facility: CLINIC | Age: 66
End: 2021-02-17

## 2021-02-17 NOTE — TELEPHONE ENCOUNTER
According to the referral notes I spoke to the pharmacy and he canceled the order with the pharmacy on 01/08/2021

## 2021-02-17 NOTE — TELEPHONE ENCOUNTER
Patient is looking to get injections again  He is stating Buddy last year has no record of his injections and he is wondering why, he said he remembers getting them  Can you look into this for him? Looks like order was already placed on 1/5/21

## 2021-02-18 DIAGNOSIS — M17.0 BILATERAL PRIMARY OSTEOARTHRITIS OF KNEE: Primary | ICD-10-CM

## 2021-02-23 ENCOUNTER — TELEPHONE (OUTPATIENT)
Dept: FAMILY MEDICINE CLINIC | Facility: CLINIC | Age: 66
End: 2021-02-23

## 2021-02-23 DIAGNOSIS — J41.0 SIMPLE CHRONIC BRONCHITIS (HCC): Primary | ICD-10-CM

## 2021-02-23 RX ORDER — ALBUTEROL SULFATE 2.5 MG/3ML
2.5 SOLUTION RESPIRATORY (INHALATION) EVERY 6 HOURS PRN
Qty: 30 VIAL | Refills: 3 | Status: SHIPPED | OUTPATIENT
Start: 2021-02-23

## 2021-03-11 DIAGNOSIS — M19.049 HAND ARTHROPATHY: ICD-10-CM

## 2021-03-11 DIAGNOSIS — G89.29 CHRONIC LOW BACK PAIN WITHOUT SCIATICA, UNSPECIFIED BACK PAIN LATERALITY: ICD-10-CM

## 2021-03-11 DIAGNOSIS — M54.50 CHRONIC LOW BACK PAIN WITHOUT SCIATICA, UNSPECIFIED BACK PAIN LATERALITY: ICD-10-CM

## 2021-03-11 DIAGNOSIS — M17.0 PRIMARY OSTEOARTHRITIS OF BOTH KNEES: ICD-10-CM

## 2021-03-11 RX ORDER — MELOXICAM 15 MG/1
15 TABLET ORAL DAILY
Qty: 30 TABLET | Refills: 5 | Status: SHIPPED | OUTPATIENT
Start: 2021-03-11 | End: 2022-02-07 | Stop reason: ALTCHOICE

## 2021-03-11 NOTE — TELEPHONE ENCOUNTER
Jaja Woods    Patient is requesting refill meloxicam 15 mg sent to [facility]  He only has 3 pills left  I advised that López Melchor is out of the office at this time

## 2021-03-15 ENCOUNTER — OFFICE VISIT (OUTPATIENT)
Dept: OBGYN CLINIC | Facility: CLINIC | Age: 66
End: 2021-03-15
Payer: COMMERCIAL

## 2021-03-15 ENCOUNTER — HOSPITAL ENCOUNTER (OUTPATIENT)
Dept: RADIOLOGY | Facility: HOSPITAL | Age: 66
Discharge: HOME | End: 2021-03-15
Payer: COMMERCIAL

## 2021-03-15 DIAGNOSIS — M79.661 PAIN OF RIGHT CALF: Primary | ICD-10-CM

## 2021-03-15 DIAGNOSIS — M79.661 PAIN OF RIGHT CALF: ICD-10-CM

## 2021-03-15 DIAGNOSIS — M17.0 BILATERAL PRIMARY OSTEOARTHRITIS OF KNEE: ICD-10-CM

## 2021-03-15 PROCEDURE — 93971 EXTREMITY STUDY: CPT

## 2021-03-15 PROCEDURE — 93971 EXTREMITY STUDY: CPT | Performed by: SURGERY

## 2021-03-15 PROCEDURE — 20610 DRAIN/INJ JOINT/BURSA W/O US: CPT | Performed by: ORTHOPAEDIC SURGERY

## 2021-03-15 NOTE — LETTER
March 15, 2021     Patient: Trang Pérez  YOB: 1955   Date of Visit: 3/15/2021       To Whom it May Concern:    Vira Sutton is under my professional care  He was seen in my office on 3/15/2021  Please excuse him from work today for STAT test ordered  If you have any questions or concerns, please don't hesitate to call           Sincerely,          Danish aClixto MD        CC: No Recipients
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
## 2021-03-24 ENCOUNTER — IMMUNIZATIONS (OUTPATIENT)
Dept: FAMILY MEDICINE CLINIC | Facility: HOSPITAL | Age: 66
End: 2021-03-24

## 2021-03-24 DIAGNOSIS — Z23 ENCOUNTER FOR IMMUNIZATION: Primary | ICD-10-CM

## 2021-03-24 PROCEDURE — 91301 SARS-COV-2 / COVID-19 MRNA VACCINE (MODERNA) 100 MCG: CPT

## 2021-03-24 PROCEDURE — 0011A SARS-COV-2 / COVID-19 MRNA VACCINE (MODERNA) 100 MCG: CPT

## 2021-03-26 ENCOUNTER — OFFICE VISIT (OUTPATIENT)
Dept: OBGYN CLINIC | Facility: CLINIC | Age: 66
End: 2021-03-26
Payer: COMMERCIAL

## 2021-03-26 DIAGNOSIS — M17.0 BILATERAL PRIMARY OSTEOARTHRITIS OF KNEE: Primary | ICD-10-CM

## 2021-03-26 PROCEDURE — 20610 DRAIN/INJ JOINT/BURSA W/O US: CPT | Performed by: ORTHOPAEDIC SURGERY
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
## 2021-04-01 ENCOUNTER — TELEMEDICINE (OUTPATIENT)
Dept: FAMILY MEDICINE CLINIC | Facility: CLINIC | Age: 66
End: 2021-04-01
Payer: COMMERCIAL

## 2021-04-01 DIAGNOSIS — J01.00 ACUTE NON-RECURRENT MAXILLARY SINUSITIS: Primary | ICD-10-CM

## 2021-04-01 PROCEDURE — 99213 OFFICE O/P EST LOW 20 MIN: CPT | Performed by: NURSE PRACTITIONER

## 2021-04-01 RX ORDER — BENZONATATE 200 MG/1
200 CAPSULE ORAL 3 TIMES DAILY PRN
Qty: 20 CAPSULE | Refills: 0 | Status: SHIPPED | OUTPATIENT
Start: 2021-04-01 | End: 2021-04-20 | Stop reason: SDUPTHER

## 2021-04-01 RX ORDER — AZITHROMYCIN 250 MG/1
TABLET, FILM COATED ORAL
Qty: 6 TABLET | Refills: 0 | Status: SHIPPED | OUTPATIENT
Start: 2021-04-01 | End: 2021-04-05
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
## 2021-04-06 ENCOUNTER — OFFICE VISIT (OUTPATIENT)
Dept: OBGYN CLINIC | Facility: CLINIC | Age: 66
End: 2021-04-06
Payer: COMMERCIAL

## 2021-04-06 VITALS — BODY MASS INDEX: 38.36 KG/M2 | WEIGHT: 315 LBS | HEIGHT: 76 IN

## 2021-04-06 DIAGNOSIS — M17.0 BILATERAL PRIMARY OSTEOARTHRITIS OF KNEE: Primary | ICD-10-CM

## 2021-04-06 PROCEDURE — 20610 DRAIN/INJ JOINT/BURSA W/O US: CPT | Performed by: ORTHOPAEDIC SURGERY
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
## 2021-04-19 ENCOUNTER — TELEPHONE (OUTPATIENT)
Dept: FAMILY MEDICINE CLINIC | Facility: CLINIC | Age: 66
End: 2021-04-19

## 2021-04-19 DIAGNOSIS — B96.89 ACUTE BRONCHITIS, BACTERIAL: Primary | ICD-10-CM

## 2021-04-19 DIAGNOSIS — J20.8 ACUTE BRONCHITIS, BACTERIAL: Primary | ICD-10-CM

## 2021-04-19 RX ORDER — DOXYCYCLINE HYCLATE 100 MG
100 TABLET ORAL 2 TIMES DAILY
Qty: 14 TABLET | Refills: 0 | Status: SHIPPED | OUTPATIENT
Start: 2021-04-19 | End: 2021-04-26

## 2021-04-19 NOTE — TELEPHONE ENCOUNTER
Anaya Lincoln pt said octavio did not work cold has settle in his chest  Pt would like something else

## 2021-04-19 NOTE — TELEPHONE ENCOUNTER
Patient advised  SICU Transfer Note    PRIYANK WESLEY  69y (1950)  [id number]      Transfer from: SICU  Transfer to: Surgery-      SICU COURSE:  69y Male w/ PMHx of Afib on Eliquis, HTN, HLD, DM, CABG x3 vessel, Bladder Ca recent TURBT by Dr Jo. Patient was recently discharged from hospital on 9/21/2020 after C diff colitis, now admitted for SBO     Patient upgraded to ICU for q1 cleviprex gtt. patient started on PO rx and weaned off cleviprex gtt.      PAST MEDICAL & SURGICAL HISTORY:  Afib  rate controlled currently    Hematuria    Bladder tumor    History of diabetic neuropathy    CAD (coronary artery disease) of bypass graft  cabg 1995 3v    Glaucoma    Arthritis    GERD (gastroesophageal reflux disease)    HTN (hypertension)    Depression    DM (diabetes mellitus)    S/P lumbar laminectomy  with repair of CSF leak using dural graft 7/8/2018 - dr Crenshaw    Malfunction of intrathecal infusion pump  implanted initially in 1996  reimplanted 2006  removed 6/27/2018    Foot amputation status, right  partial foot amputation  tarsals and metatarsals of right foot    S/P laparoscopic cholecystectomy    CAD (coronary artery disease) of bypass graft      Allergies    No Known Allergies    Intolerances      MEDICATIONS  (STANDING):  acetaminophen   Tablet .. 650 milliGRAM(s) Oral every 6 hours  brimonidine 0.2% Ophthalmic Solution 1 Drop(s) Both EYES every 12 hours  cefTRIAXone   IVPB      cefTRIAXone   IVPB 1000 milliGRAM(s) IV Intermittent every 24 hours  chlorhexidine 4% Liquid 1 Application(s) Topical once  dextrose 50% Injectable 25 Gram(s) IV Push once  enalapril 20 milliGRAM(s) Oral daily  enoxaparin Injectable 81 milliGRAM(s) SubCutaneous every 12 hours  insulin regular  human corrective regimen sliding scale   IV Push every 4 hours  labetalol 250 milliGRAM(s) Oral every 12 hours  lactated ringers. 1000 milliLiter(s) (50 mL/Hr) IV Continuous <Continuous>  latanoprost 0.005% Ophthalmic Solution 1 Drop(s) Both EYES at bedtime  NIFEdipine XL 60 milliGRAM(s) Oral daily  pantoprazole    Tablet 40 milliGRAM(s) Oral before breakfast  timolol 0.5% Solution 1 Drop(s) Both EYES every 12 hours  vancomycin    Solution 125 milliGRAM(s) Oral every 6 hours    MEDICATIONS  (PRN):  benzocaine 20% Spray 1 Spray(s) Topical every 4 hours PRN NGT discomfort  glucagon  Injectable 1 milliGRAM(s) IntraMuscular once PRN Glucose LESS THAN 70 milligrams/deciliter  HYDROmorphone  Injectable 0.25 milliGRAM(s) IV Push every 4 hours PRN Moderate Pain (4 - 6)  HYDROmorphone  Injectable 0.5 milliGRAM(s) IV Push every 4 hours PRN Severe Pain (7 - 10)  ondansetron Injectable 4 milliGRAM(s) IV Push every 6 hours PRN Nausea and/or Vomiting      Vital Signs Last 24 Hrs  T(C): 36.4 (30 Sep 2020 09:11), Max: 36.9 (30 Sep 2020 00:00)  T(F): 97.5 (30 Sep 2020 09:11), Max: 98.4 (30 Sep 2020 00:00)  HR: 70 (30 Sep 2020 10:00) (62 - 97)  BP: 151/66 (30 Sep 2020 10:00) (118/58 - 213/93)  BP(mean): 95 (30 Sep 2020 10:00) (84 - 133)  RR: 19 (30 Sep 2020 10:00) (18 - 20)  SpO2: 96% (30 Sep 2020 10:00) (89% - 100%)  I&O's Summary    29 Sep 2020 07:01  -  30 Sep 2020 07:00  --------------------------------------------------------  IN: 1258 mL / OUT: 1050 mL / NET: 208 mL    30 Sep 2020 07:01  -  30 Sep 2020 11:42  --------------------------------------------------------  IN: 150 mL / OUT: 0 mL / NET: 150 mL        LABS  LABS:  CAPILLARY BLOOD GLUCOSE      POCT Blood Glucose.: 187 mg/dL (30 Sep 2020 10:08)  POCT Blood Glucose.: 142 mg/dL (30 Sep 2020 06:06)  POCT Blood Glucose.: 127 mg/dL (30 Sep 2020 01:45)  POCT Blood Glucose.: 134 mg/dL (29 Sep 2020 22:04)  POCT Blood Glucose.: 108 mg/dL (29 Sep 2020 17:51)  POCT Blood Glucose.: 112 mg/dL (29 Sep 2020 13:07)                          10.5   15.31 )-----------( 325      ( 30 Sep 2020 00:14 )             34.2       09-30    139  |  99  |  20  ----------------------------<  124<H>  3.7   |  27  |  0.8    Ca    9.2      30 Sep 2020 00:14  Phos  3.0     09-30  Mg     2.0     09-30        PT/INR - ( 30 Sep 2020 00:14 )   PT: 20.00 sec;   INR: 1.74 ratio         PTT - ( 30 Sep 2020 00:14 )  PTT:33.9 sec  CARDIAC MARKERS ( 28 Sep 2020 17:39 )  x     / <0.01 ng/mL / 276 U/L / x     / 2.0 ng/mL          Culture - Urine (collected 28 Sep 2020 00:45)  Source: .Urine Clean Catch (Midstream)  Preliminary Report (29 Sep 2020 15:37):    10,000 - 49,000 CFU/mL Klebsiella pneumoniae    Culture - Blood (collected 27 Sep 2020 20:42)  Source: .Blood Blood  Preliminary Report (29 Sep 2020 01:02):    No growth to date.    Culture - Blood (collected 27 Sep 2020 20:42)  Source: .Blood Blood  Preliminary Report (29 Sep 2020 01:02):    No growth to date.            Assessment & Plan:  Assessment and Plan:   · Assessment    69y Male w/ PMHx of Afib on Eliquis, HTN, HLD, DM, CABG x3 vessel, Bladder Ca recent TURBT by Dr Jo. Patient was recently discharged from hospital on 9/21/2020 after C diff colitis, now admitted for SBO     NEURO:    Pain control with tylenol, Dilaudid PRN     Dx of Depression. restart PO buproprion, Sertraline, and Tramadol.     Monitor for changes in mental status.     RESP:     Sating well on RA    Maintain O2 saturation >95%     Xray Chest 1 View- PORTABLE-Routine (09.30.20 @ 06:21) >    Impression:     Low lung volume. Right basilar opacity without significant change.      CARDS:    Hx of Afib, rate controlled (On Eliquis at home). Hx of HTN.    hx of HTN-labetalol 250mg q12, enalapril 20mg qd, Procardia xl 60 qd   Maintain normotension. MAP >65    EKG Afib   CE neg x3    GI/NUTR:     Diet, NPO, NGT d/c'd, having BMs    GI Prophylaxis-PPI     Recently treated for C. diff, repeat CDIFF 9/29- neg     /RENAL:     LR @ 50     Lactate 6--->1.4    No boone, voiding      Electrolytes-Na 139 // K 3.7 // Mg 2.0 //  Phos 3.0 09-30 @ 00:14     Na 137 // K 4.2 // Mg 2.2 //  Phos 2.6 09-29 @ 01:26        HEME/ONC:     DVT prophylaxis- Lovenox therapeutic at 81 q 12    Hb/Hct:  10.5/34.2   <--,  10.6/32.9   <--,  11.6/35.9   <--    Plts:  325  <--,  333  <--,  392  <--,  421  <--     holding eliquis, asa    ID:   Afebrile    WBC downtrending    UA + Leukocytes. Started on Rocephin x3 days    Ucx- klebsiella, f/u sens   Blood culture 9/27, NGTD    9/27 Cdiff neg. ID following, started Vanc po      ENDO:    Hx of DM. Holding KwikPen, FS Q 4 w/ ISS     Last POCT 101    Lines: PIV          Follow Up:  -2330 labs  -IVL once patient able to take PO diet        Signed out to:  Date:  Time: SICU Transfer Note    PRIYANK WESLEY  69y (1950)  [id number]      Transfer from: SICU  Transfer to: Surgery-      SICU COURSE:  69y Male w/ PMHx of Afib on Eliquis, HTN, HLD, DM, CABG x3 vessel, Bladder Ca recent TURBT by Dr Jo. Patient was recently discharged from hospital on 9/21/2020 after C diff colitis, now admitted for SBO. Patient treated non-operatively and medically managed. Patient had NG tube placed, subsequently removed once having bowel movement and low NGT output.    Patient upgraded to ICU for q1 cleviprex gtt. patient started on PO rx and weaned off cleviprex gtt. Spoke with primary team, okay to start clear liquid diet 9/30 AM      PAST MEDICAL & SURGICAL HISTORY:  Afib  rate controlled currently    Hematuria    Bladder tumor    History of diabetic neuropathy    CAD (coronary artery disease) of bypass graft  cabg 1995 3v    Glaucoma    Arthritis    GERD (gastroesophageal reflux disease)    HTN (hypertension)    Depression    DM (diabetes mellitus)    S/P lumbar laminectomy  with repair of CSF leak using dural graft 7/8/2018 - dr Crenshaw    Malfunction of intrathecal infusion pump  implanted initially in 1996  reimplanted 2006  removed 6/27/2018    Foot amputation status, right  partial foot amputation  tarsals and metatarsals of right foot    S/P laparoscopic cholecystectomy    CAD (coronary artery disease) of bypass graft      Allergies    No Known Allergies    Intolerances      MEDICATIONS  (STANDING):  acetaminophen   Tablet .. 650 milliGRAM(s) Oral every 6 hours  brimonidine 0.2% Ophthalmic Solution 1 Drop(s) Both EYES every 12 hours  cefTRIAXone   IVPB      cefTRIAXone   IVPB 1000 milliGRAM(s) IV Intermittent every 24 hours  chlorhexidine 4% Liquid 1 Application(s) Topical once  dextrose 50% Injectable 25 Gram(s) IV Push once  enalapril 20 milliGRAM(s) Oral daily  enoxaparin Injectable 81 milliGRAM(s) SubCutaneous every 12 hours  insulin regular  human corrective regimen sliding scale   IV Push every 4 hours  labetalol 250 milliGRAM(s) Oral every 12 hours  lactated ringers. 1000 milliLiter(s) (50 mL/Hr) IV Continuous <Continuous>  latanoprost 0.005% Ophthalmic Solution 1 Drop(s) Both EYES at bedtime  NIFEdipine XL 60 milliGRAM(s) Oral daily  pantoprazole    Tablet 40 milliGRAM(s) Oral before breakfast  timolol 0.5% Solution 1 Drop(s) Both EYES every 12 hours  vancomycin    Solution 125 milliGRAM(s) Oral every 6 hours    MEDICATIONS  (PRN):  benzocaine 20% Spray 1 Spray(s) Topical every 4 hours PRN NGT discomfort  glucagon  Injectable 1 milliGRAM(s) IntraMuscular once PRN Glucose LESS THAN 70 milligrams/deciliter  HYDROmorphone  Injectable 0.25 milliGRAM(s) IV Push every 4 hours PRN Moderate Pain (4 - 6)  HYDROmorphone  Injectable 0.5 milliGRAM(s) IV Push every 4 hours PRN Severe Pain (7 - 10)  ondansetron Injectable 4 milliGRAM(s) IV Push every 6 hours PRN Nausea and/or Vomiting      Vital Signs Last 24 Hrs  T(C): 36.4 (30 Sep 2020 09:11), Max: 36.9 (30 Sep 2020 00:00)  T(F): 97.5 (30 Sep 2020 09:11), Max: 98.4 (30 Sep 2020 00:00)  HR: 70 (30 Sep 2020 10:00) (62 - 97)  BP: 151/66 (30 Sep 2020 10:00) (118/58 - 213/93)  BP(mean): 95 (30 Sep 2020 10:00) (84 - 133)  RR: 19 (30 Sep 2020 10:00) (18 - 20)  SpO2: 96% (30 Sep 2020 10:00) (89% - 100%)  I&O's Summary    29 Sep 2020 07:01  -  30 Sep 2020 07:00  --------------------------------------------------------  IN: 1258 mL / OUT: 1050 mL / NET: 208 mL    30 Sep 2020 07:01  -  30 Sep 2020 11:42  --------------------------------------------------------  IN: 150 mL / OUT: 0 mL / NET: 150 mL        LABS  LABS:  CAPILLARY BLOOD GLUCOSE      POCT Blood Glucose.: 187 mg/dL (30 Sep 2020 10:08)  POCT Blood Glucose.: 142 mg/dL (30 Sep 2020 06:06)  POCT Blood Glucose.: 127 mg/dL (30 Sep 2020 01:45)  POCT Blood Glucose.: 134 mg/dL (29 Sep 2020 22:04)  POCT Blood Glucose.: 108 mg/dL (29 Sep 2020 17:51)  POCT Blood Glucose.: 112 mg/dL (29 Sep 2020 13:07)                          10.5   15.31 )-----------( 325      ( 30 Sep 2020 00:14 )             34.2       09-30    139  |  99  |  20  ----------------------------<  124<H>  3.7   |  27  |  0.8    Ca    9.2      30 Sep 2020 00:14  Phos  3.0     09-30  Mg     2.0     09-30        PT/INR - ( 30 Sep 2020 00:14 )   PT: 20.00 sec;   INR: 1.74 ratio         PTT - ( 30 Sep 2020 00:14 )  PTT:33.9 sec  CARDIAC MARKERS ( 28 Sep 2020 17:39 )  x     / <0.01 ng/mL / 276 U/L / x     / 2.0 ng/mL          Culture - Urine (collected 28 Sep 2020 00:45)  Source: .Urine Clean Catch (Midstream)  Preliminary Report (29 Sep 2020 15:37):    10,000 - 49,000 CFU/mL Klebsiella pneumoniae    Culture - Blood (collected 27 Sep 2020 20:42)  Source: .Blood Blood  Preliminary Report (29 Sep 2020 01:02):    No growth to date.    Culture - Blood (collected 27 Sep 2020 20:42)  Source: .Blood Blood  Preliminary Report (29 Sep 2020 01:02):    No growth to date.            Assessment & Plan:  Assessment and Plan:   · Assessment    69y Male w/ PMHx of Afib on Eliquis, HTN, HLD, DM, CABG x3 vessel, Bladder Ca recent TURBT by Dr Jo. Patient was recently discharged from hospital on 9/21/2020 after C diff colitis, now admitted for SBO     NEURO:    Pain control with tylenol, Dilaudid PRN     Dx of Depression. restart PO buproprion, Sertraline, and Tramadol.     Monitor for changes in mental status.     RESP:     Sating well on RA    Maintain O2 saturation >95%     Xray Chest 1 View- PORTABLE-Routine (09.30.20 @ 06:21) >    Impression:     Low lung volume. Right basilar opacity without significant change.      CARDS:    Hx of Afib, rate controlled (On Eliquis at home). Hx of HTN.    hx of HTN-labetalol 250mg q12, enalapril 20mg qd, Procardia xl 60 qd   Maintain normotension. MAP >65    EKG Afib   CE neg x3    GI/NUTR:     Diet, NPO, NGT d/c'd, having BMs    GI Prophylaxis-PPI     Recently treated for C. diff, repeat CDIFF 9/29- neg     /RENAL:     LR @ 50     Lactate 6--->1.4    No boone, voiding      Electrolytes-Na 139 // K 3.7 // Mg 2.0 //  Phos 3.0 09-30 @ 00:14     Na 137 // K 4.2 // Mg 2.2 //  Phos 2.6 09-29 @ 01:26        HEME/ONC:     DVT prophylaxis- Lovenox therapeutic at 81 q 12    Hb/Hct:  10.5/34.2   <--,  10.6/32.9   <--,  11.6/35.9   <--    Plts:  325  <--,  333  <--,  392  <--,  421  <--     holding eliquis, asa    ID:   Afebrile    WBC downtrending    UA + Leukocytes. Started on Rocephin x3 days    Ucx- klebsiella, started on levaquin (sensitive)   Blood culture 9/27, NGTD    9/27 Cdiff neg. ID following, started Vanc po      ENDO:    Hx of DM. Holding KwikPen, FS Q 4 w/ ISS     Last POCT 101    Lines: PIV          Follow Up:  -2330 labs  -IVL once patient able to take PO diet      Signed out to: Dr. Chavez Ames  Date: 9/30/2020  Time: 1320 SICU Transfer Note    PRIYANK WESLEY  69y (1950)  [id number]      Transfer from: SICU  Transfer to: Surgery-      SICU COURSE:  69y Male w/ PMHx of Afib on Eliquis, HTN, HLD, DM, CABG x3 vessel, Bladder Ca recent TURBT by Dr Jo. Patient was recently discharged from hospital on 9/21/2020 after C diff colitis, now admitted for SBO. Patient treated non-operatively and medically managed. Patient had NG tube placed, subsequently removed once having bowel movement and low NGT output.    Patient upgraded to ICU for q1 cleviprex gtt. patient started on PO rx and weaned off cleviprex gtt. Spoke with primary team, okay to start clear liquid diet 9/30 AM      PAST MEDICAL & SURGICAL HISTORY:  Afib  rate controlled currently    Hematuria    Bladder tumor    History of diabetic neuropathy    CAD (coronary artery disease) of bypass graft  cabg 1995 3v    Glaucoma    Arthritis    GERD (gastroesophageal reflux disease)    HTN (hypertension)    Depression    DM (diabetes mellitus)    S/P lumbar laminectomy  with repair of CSF leak using dural graft 7/8/2018 - dr Crenshaw    Malfunction of intrathecal infusion pump  implanted initially in 1996  reimplanted 2006  removed 6/27/2018    Foot amputation status, right  partial foot amputation  tarsals and metatarsals of right foot    S/P laparoscopic cholecystectomy    CAD (coronary artery disease) of bypass graft      Allergies    No Known Allergies    Intolerances      MEDICATIONS  (STANDING):  acetaminophen   Tablet .. 650 milliGRAM(s) Oral every 6 hours  brimonidine 0.2% Ophthalmic Solution 1 Drop(s) Both EYES every 12 hours  cefTRIAXone   IVPB      cefTRIAXone   IVPB 1000 milliGRAM(s) IV Intermittent every 24 hours  chlorhexidine 4% Liquid 1 Application(s) Topical once  dextrose 50% Injectable 25 Gram(s) IV Push once  enalapril 20 milliGRAM(s) Oral daily  enoxaparin Injectable 81 milliGRAM(s) SubCutaneous every 12 hours  insulin regular  human corrective regimen sliding scale   IV Push every 4 hours  labetalol 250 milliGRAM(s) Oral every 12 hours  lactated ringers. 1000 milliLiter(s) (50 mL/Hr) IV Continuous <Continuous>  latanoprost 0.005% Ophthalmic Solution 1 Drop(s) Both EYES at bedtime  NIFEdipine XL 60 milliGRAM(s) Oral daily  pantoprazole    Tablet 40 milliGRAM(s) Oral before breakfast  timolol 0.5% Solution 1 Drop(s) Both EYES every 12 hours  vancomycin    Solution 125 milliGRAM(s) Oral every 6 hours    MEDICATIONS  (PRN):  benzocaine 20% Spray 1 Spray(s) Topical every 4 hours PRN NGT discomfort  glucagon  Injectable 1 milliGRAM(s) IntraMuscular once PRN Glucose LESS THAN 70 milligrams/deciliter  HYDROmorphone  Injectable 0.25 milliGRAM(s) IV Push every 4 hours PRN Moderate Pain (4 - 6)  HYDROmorphone  Injectable 0.5 milliGRAM(s) IV Push every 4 hours PRN Severe Pain (7 - 10)  ondansetron Injectable 4 milliGRAM(s) IV Push every 6 hours PRN Nausea and/or Vomiting      Vital Signs Last 24 Hrs  T(C): 36.4 (30 Sep 2020 09:11), Max: 36.9 (30 Sep 2020 00:00)  T(F): 97.5 (30 Sep 2020 09:11), Max: 98.4 (30 Sep 2020 00:00)  HR: 70 (30 Sep 2020 10:00) (62 - 97)  BP: 151/66 (30 Sep 2020 10:00) (118/58 - 213/93)  BP(mean): 95 (30 Sep 2020 10:00) (84 - 133)  RR: 19 (30 Sep 2020 10:00) (18 - 20)  SpO2: 96% (30 Sep 2020 10:00) (89% - 100%)  I&O's Summary    29 Sep 2020 07:01  -  30 Sep 2020 07:00  --------------------------------------------------------  IN: 1258 mL / OUT: 1050 mL / NET: 208 mL    30 Sep 2020 07:01  -  30 Sep 2020 11:42  --------------------------------------------------------  IN: 150 mL / OUT: 0 mL / NET: 150 mL        LABS  LABS:  CAPILLARY BLOOD GLUCOSE      POCT Blood Glucose.: 187 mg/dL (30 Sep 2020 10:08)  POCT Blood Glucose.: 142 mg/dL (30 Sep 2020 06:06)  POCT Blood Glucose.: 127 mg/dL (30 Sep 2020 01:45)  POCT Blood Glucose.: 134 mg/dL (29 Sep 2020 22:04)  POCT Blood Glucose.: 108 mg/dL (29 Sep 2020 17:51)  POCT Blood Glucose.: 112 mg/dL (29 Sep 2020 13:07)                          10.5   15.31 )-----------( 325      ( 30 Sep 2020 00:14 )             34.2       09-30    139  |  99  |  20  ----------------------------<  124<H>  3.7   |  27  |  0.8    Ca    9.2      30 Sep 2020 00:14  Phos  3.0     09-30  Mg     2.0     09-30        PT/INR - ( 30 Sep 2020 00:14 )   PT: 20.00 sec;   INR: 1.74 ratio         PTT - ( 30 Sep 2020 00:14 )  PTT:33.9 sec  CARDIAC MARKERS ( 28 Sep 2020 17:39 )  x     / <0.01 ng/mL / 276 U/L / x     / 2.0 ng/mL          Culture - Urine (collected 28 Sep 2020 00:45)  Source: .Urine Clean Catch (Midstream)  Preliminary Report (29 Sep 2020 15:37):    10,000 - 49,000 CFU/mL Klebsiella pneumoniae    Culture - Blood (collected 27 Sep 2020 20:42)  Source: .Blood Blood  Preliminary Report (29 Sep 2020 01:02):    No growth to date.    Culture - Blood (collected 27 Sep 2020 20:42)  Source: .Blood Blood  Preliminary Report (29 Sep 2020 01:02):    No growth to date.            Assessment & Plan:  Assessment and Plan:   · Assessment    69y Male w/ PMHx of Afib on Eliquis, HTN, HLD, DM, CABG x3 vessel, Bladder Ca recent TURBT by Dr Jo. Patient was recently discharged from hospital on 9/21/2020 after C diff colitis, now admitted for SBO     NEURO:    Pain control with tylenol, Dilaudid PRN     Dx of Depression. restart PO buproprion, Sertraline, and Tramadol.     Monitor for changes in mental status.     RESP:     Sating well on RA    Maintain O2 saturation >95%     Xray Chest 1 View- PORTABLE-Routine (09.30.20 @ 06:21) >    Impression:     Low lung volume. Right basilar opacity without significant change.      CARDS:    Hx of Afib, rate controlled (On Eliquis at home). Hx of HTN.    hx of HTN-labetalol 250mg q12, enalapril 20mg qd, Procardia xl 60 qd   Maintain normotension. MAP >65    EKG Afib   CE neg x3    GI/NUTR:     Diet, NPO, NGT d/c'd, having BMs    GI Prophylaxis-PPI     Recently treated for C. diff, repeat CDIFF 9/29- neg   US Abdomen Limited (09.30.20 @ 10:23) >  IMPRESSION:  Post cholecystectomy.  3 cm right hepatic lobe hyperechoic lesion with central punctate calcifications, indeterminate, also seen on the prior CT abdomen pelvis September 11, 2020. Recommend MRI of the abdomen with intravenous contrast for further evaluation.    /RENAL:     LR @ 50     Lactate 6--->1.4    No boone, voiding      Electrolytes-Na 139 // K 3.7 // Mg 2.0 //  Phos 3.0 09-30 @ 00:14     Na 137 // K 4.2 // Mg 2.2 //  Phos 2.6 09-29 @ 01:26        HEME/ONC:     DVT prophylaxis- Lovenox therapeutic at 81 q 12    Hb/Hct:  10.5/34.2   <--,  10.6/32.9   <--,  11.6/35.9   <--    Plts:  325  <--,  333  <--,  392  <--,  421  <--     holding eliquis, asa    ID:   Afebrile    WBC downtrending    UA + Leukocytes. Started on Rocephin x3 days    Ucx- klebsiella, started on levaquin (sensitive)   Blood culture 9/27, NGTD    9/27 Cdiff neg. ID following, started Vanc po      ENDO:    Hx of DM. Holding KwikPen, FS Q 4 w/ ISS     Last POCT 101    Lines: PIV    Follow Up:  -2330 labs  -IVL once patient able to take PO diet  -MRI for hepatic findings on Ultrasound      Signed out to: Dr. Chavez Ames  Date: 9/30/2020  Time: 1320

## 2021-04-20 DIAGNOSIS — J01.00 ACUTE NON-RECURRENT MAXILLARY SINUSITIS: ICD-10-CM

## 2021-04-20 RX ORDER — BENZONATATE 200 MG/1
200 CAPSULE ORAL 3 TIMES DAILY PRN
Qty: 20 CAPSULE | Refills: 0 | Status: SHIPPED | OUTPATIENT
Start: 2021-04-20

## 2021-04-26 ENCOUNTER — IMMUNIZATIONS (OUTPATIENT)
Dept: FAMILY MEDICINE CLINIC | Facility: HOSPITAL | Age: 66
End: 2021-04-26

## 2021-04-26 DIAGNOSIS — Z23 ENCOUNTER FOR IMMUNIZATION: Primary | ICD-10-CM

## 2021-04-26 PROCEDURE — 0012A SARS-COV-2 / COVID-19 MRNA VACCINE (MODERNA) 100 MCG: CPT

## 2021-04-26 PROCEDURE — 91301 SARS-COV-2 / COVID-19 MRNA VACCINE (MODERNA) 100 MCG: CPT

## 2021-04-27 ENCOUNTER — TELEPHONE (OUTPATIENT)
Dept: FAMILY MEDICINE CLINIC | Facility: CLINIC | Age: 66
End: 2021-04-27

## 2021-04-27 DIAGNOSIS — Z91.81 AT RISK FOR FALLS: ICD-10-CM

## 2021-04-27 DIAGNOSIS — M19.049 HAND ARTHRITIS: Primary | ICD-10-CM

## 2021-04-27 DIAGNOSIS — M17.0 PRIMARY OSTEOARTHRITIS OF BOTH KNEES: ICD-10-CM

## 2021-04-27 DIAGNOSIS — R26.81 DIFFICULTY STANDING: ICD-10-CM

## 2021-04-27 NOTE — TELEPHONE ENCOUNTER
Pt is requesting an order for a lift chair and a letter of medical necessity with diagnosis code    Pt will

## 2021-05-11 ENCOUNTER — TRANSCRIBE ORDERS (OUTPATIENT)
Dept: ADMINISTRATIVE | Facility: HOSPITAL | Age: 66
End: 2021-05-11

## 2021-05-11 ENCOUNTER — TELEPHONE (OUTPATIENT)
Dept: FAMILY MEDICINE CLINIC | Facility: CLINIC | Age: 66
End: 2021-05-11

## 2021-05-11 ENCOUNTER — OFFICE VISIT (OUTPATIENT)
Dept: FAMILY MEDICINE CLINIC | Facility: CLINIC | Age: 66
End: 2021-05-11
Payer: COMMERCIAL

## 2021-05-11 ENCOUNTER — HOSPITAL ENCOUNTER (OUTPATIENT)
Dept: RADIOLOGY | Facility: HOSPITAL | Age: 66
Discharge: HOME | End: 2021-05-11
Payer: COMMERCIAL

## 2021-05-11 VITALS
RESPIRATION RATE: 18 BRPM | HEART RATE: 68 BPM | HEIGHT: 76 IN | WEIGHT: 315 LBS | SYSTOLIC BLOOD PRESSURE: 130 MMHG | OXYGEN SATURATION: 97 % | TEMPERATURE: 98.1 F | BODY MASS INDEX: 38.36 KG/M2 | DIASTOLIC BLOOD PRESSURE: 86 MMHG

## 2021-05-11 DIAGNOSIS — M25.512 ACUTE PAIN OF BOTH SHOULDERS: ICD-10-CM
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
## 2021-05-11 DIAGNOSIS — B96.89 ACUTE BRONCHITIS, BACTERIAL: ICD-10-CM

## 2021-05-11 DIAGNOSIS — J20.8 ACUTE BRONCHITIS, BACTERIAL: ICD-10-CM

## 2021-05-11 DIAGNOSIS — S76.301A RIGHT HAMSTRING INJURY, INITIAL ENCOUNTER: ICD-10-CM

## 2021-05-11 DIAGNOSIS — M79.661 PAIN OF RIGHT CALF: ICD-10-CM

## 2021-05-11 DIAGNOSIS — M25.511 ACUTE PAIN OF BOTH SHOULDERS: ICD-10-CM

## 2021-05-11 DIAGNOSIS — M54.2 NECK PAIN, ACUTE: ICD-10-CM

## 2021-05-11 DIAGNOSIS — M54.50 ACUTE BILATERAL LOW BACK PAIN WITHOUT SCIATICA: ICD-10-CM
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
## 2021-05-11 DIAGNOSIS — R10.2 PELVIC PAIN: ICD-10-CM

## 2021-05-11 PROCEDURE — 72100 X-RAY EXAM L-S SPINE 2/3 VWS: CPT

## 2021-05-11 PROCEDURE — 73521 X-RAY EXAM HIPS BI 2 VIEWS: CPT

## 2021-05-11 PROCEDURE — 72040 X-RAY EXAM NECK SPINE 2-3 VW: CPT

## 2021-05-11 PROCEDURE — 73030 X-RAY EXAM OF SHOULDER: CPT

## 2021-05-11 PROCEDURE — 99213 OFFICE O/P EST LOW 20 MIN: CPT | Performed by: NURSE PRACTITIONER

## 2021-05-11 PROCEDURE — 71046 X-RAY EXAM CHEST 2 VIEWS: CPT

## 2021-05-11 RX ORDER — IBUPROFEN 600 MG/1
600 TABLET ORAL EVERY 6 HOURS PRN
Qty: 30 TABLET | Refills: 0 | Status: SHIPPED | OUTPATIENT
Start: 2021-05-11

## 2021-05-11 NOTE — PROGRESS NOTES
Assessment/Plan:    1  Fall, initial encounter  Comments:  5/10/21   Orders:  -     XR shoulder 2+ vw left; Future; Expected date: 05/11/2021  -     XR shoulder 2+ vw right; Future; Expected date: 05/11/2021  -     XR hips bilateral 2 vw w pelvis if performed; Future; Expected date: 05/11/2021  -     XR spine cervical 2 or 3 vw injury; Future; Expected date: 05/11/2021  -     XR spine lumbar 2 or 3 views injury; Future; Expected date: 05/11/2021    2  Acute pain of both shoulders  -     XR shoulder 2+ vw left; Future; Expected date: 05/11/2021  -     XR shoulder 2+ vw right; Future; Expected date: 05/11/2021    3  Pelvic pain  -     XR hips bilateral 2 vw w pelvis if performed; Future; Expected date: 05/11/2021    4  Neck pain, acute  -     XR spine cervical 2 or 3 vw injury; Future; Expected date: 05/11/2021    5  Acute bilateral low back pain without sciatica  -     XR spine lumbar 2 or 3 views injury; Future; Expected date: 05/11/2021    The case discussed with patient using patient centered shared decision making  The patient was counseled regarding instructions for management,-- risk factor reductions,-- prognosis,-- impressions,-- risks and benefits of treatment options,-- importance of compliance with treatment  I have reviewed the instructions with the patient, answering all questions to his satisfaction  R/o fractures  Restorative care as discussed  Heat/ice, topical lidocaine, rest  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  Will call patient with xr results  Pt will call if his recovery stalls or if he has new sxs        There are no Patient Instructions on file for this visit  No follow-ups on file  Subjective:      Patient ID: Irma Ocampo  is a 63 y o  male  Chief Complaint   Patient presents with    Neck Pain     pt c/o neck, shoulds and pelvis pain due to fall        Fall  Incident onset: yesterday  Fall occurred: tripped over low fence  fell backwards onto back   He landed on concrete  There was no blood loss  The point of impact was the buttocks, right shoulder, left shoulder, neck and head  Pain location: pelvis, both shoulders, neck, head, low back  The pain is moderate  The symptoms are aggravated by ambulation, movement and pressure on injury  Associated symptoms include headaches  Pertinent negatives include no abdominal pain, bowel incontinence, fever, hearing loss, hematuria, loss of consciousness, nausea, numbness, tingling, visual change or vomiting  He has tried nothing for the symptoms  The following portions of the patient's history were reviewed and updated as appropriate: allergies, current medications, past family history, past medical history, past social history, past surgical history and problem list     Review of Systems   Constitutional: Negative for fever  Eyes: Negative for visual disturbance  Respiratory: Negative  Cardiovascular: Negative  Gastrointestinal: Negative for abdominal pain, bowel incontinence, nausea and vomiting  Genitourinary: Negative for hematuria  Musculoskeletal:        Per hpi   Neurological: Positive for headaches  Negative for dizziness, tingling, loss of consciousness, weakness and numbness  Current Outpatient Medications   Medication Sig Dispense Refill    acetaminophen (TYLENOL) 325 mg tablet 2, by mouth, every 6 hours as needed for mild to moderate pain   30 tablet 0    albuterol (PROVENTIL HFA,VENTOLIN HFA) 90 mcg/act inhaler Inhale 2 puffs every 6 (six) hours as needed for wheezing or shortness of breath 1 Inhaler 0    aspirin (ECOTRIN LOW STRENGTH) 81 mg EC tablet Take 81 mg by mouth every evening       atenolol (TENORMIN) 50 mg tablet TAKE ONE TABLET BY MOUTH EVERY EVENING 90 tablet 1    cyclobenzaprine (FLEXERIL) 10 mg tablet Take 1 tablet (10 mg total) by mouth 2 (two) times a day as needed for muscle spasms 30 tablet 1    enalapril (VASOTEC) 5 mg tablet TAKE ONE TABLET BY MOUTH EVERY DAY 90 tablet 1  furosemide (LASIX) 20 mg tablet Take 1 tablet (20 mg total) by mouth daily as needed (severe swelling in legs) 90 tablet 1    furosemide (LASIX) 40 mg tablet Take 1 tablet (40 mg total) by mouth every morning 90 tablet 1    levocetirizine (XYZAL) 5 MG tablet Take 5 mg by mouth every evening      meloxicam (MOBIC) 15 mg tablet Take 1 tablet (15 mg total) by mouth daily 30 tablet 5    OrthoVisc 30 MG/2ML SOSY injection       sertraline (ZOLOFT) 100 mg tablet Take 1 tablet (100 mg total) by mouth 2 (two) times a day 60 tablet 1    umeclidinium-vilanterol (ANORO ELLIPTA) 62 5-25 MCG/INH inhaler Inhale 1 puff daily 1 Inhaler 5    albuterol (2 5 mg/3 mL) 0 083 % nebulizer solution Take 1 vial (2 5 mg total) by nebulization every 6 (six) hours as needed for wheezing or shortness of breath (Patient not taking: Reported on 5/11/2021) 30 vial 3    benzonatate (TESSALON) 200 MG capsule Take 1 capsule (200 mg total) by mouth 3 (three) times a day as needed for cough (Patient not taking: Reported on 5/11/2021) 20 capsule 0    ibuprofen (MOTRIN) 600 mg tablet Take 1 tablet (600 mg total) by mouth every 6 (six) hours as needed for mild pain (Patient not taking: Reported on 5/11/2021) 30 tablet 0    levalbuterol (XOPENEX HFA) 45 mcg/act inhaler Inhale 1-2 puffs every 6 (six) hours as needed for wheezing 1 Inhaler 0    OXcarbazepine (TRILEPTAL) 300 mg tablet TAKE ONE AND ONE-HALF TABLETS BY MOUTH TWICE A DAY (Patient taking differently: 300 mg every 12 (twelve) hours ) 90 tablet 1    potassium chloride (KLOR-CON M20) 20 mEq tablet Take 1 tablet (20 mEq total) by mouth daily 30 tablet 6     No current facility-administered medications for this visit          Objective:    /86 (BP Location: Left arm, Patient Position: Sitting, Cuff Size: Large)   Pulse 68   Temp 98 1 °F (36 7 °C)   Resp 18   Ht 6' 4" (1 93 m)   Wt (!) 159 kg (350 lb)   SpO2 97%   BMI 42 60 kg/m²        Physical Exam  Vitals signs and nursing note reviewed  Constitutional:       General: He is not in acute distress  Appearance: Normal appearance  Eyes:      Extraocular Movements: Extraocular movements intact  Pupils: Pupils are equal, round, and reactive to light  Neck:      Musculoskeletal: Muscular tenderness present  No neck rigidity or spinous process tenderness  Comments: Full PROM with some pain    Cardiovascular:      Rate and Rhythm: Normal rate and regular rhythm  Pulses: Normal pulses  Heart sounds: Normal heart sounds  Pulmonary:      Effort: Pulmonary effort is normal       Breath sounds: Normal breath sounds  Abdominal:      Tenderness: There is no abdominal tenderness  Musculoskeletal:      Comments: Antalgic gait    B/l shoulder rom with pain    Back with diffuse muscle tenderness   Skin:     Findings: No bruising  Neurological:      General: No focal deficit present  Mental Status: He is alert  Mental status is at baseline     Psychiatric:         Mood and Affect: Mood normal          Behavior: Behavior normal                 Radha Mask, CARRIENP

## 2021-05-14 ENCOUNTER — TELEPHONE (OUTPATIENT)
Dept: FAMILY MEDICINE CLINIC | Facility: CLINIC | Age: 66
End: 2021-05-14

## 2021-05-14 DIAGNOSIS — M19.049 HAND ARTHRITIS: ICD-10-CM

## 2021-05-14 DIAGNOSIS — M48.10 DISH (DIFFUSE IDIOPATHIC SKELETAL HYPEROSTOSIS): ICD-10-CM

## 2021-05-14 DIAGNOSIS — M17.0 PRIMARY OSTEOARTHRITIS OF BOTH KNEES: Primary | ICD-10-CM

## 2021-05-14 DIAGNOSIS — R26.81 DIFFICULTY STANDING: ICD-10-CM

## 2021-05-14 NOTE — LETTER
May 18, 2021     Patient: Agus De León  YOB: 1955   Date of Visit: 5/14/2021       To Whom it May Concern:    Roseann Goodell is under my professional care  He has progressive arthritis of knees, hands, shoulders and  DISH (diffuse idiopathic skeletal hyperostosis) which often makes it very difficult to push self out of chair/sitting position  For this reason, he would greatly benefit from a chair lift  If you have any questions or concerns, please don't hesitate to call  Sincerely,          Sheree AGUILA    CC: [email or web address]  [email or web address]

## 2021-05-14 NOTE — TELEPHONE ENCOUNTER
Please advise Domenica Ding that Xrs showed some arthritis but no injury  Is his pain better?  Please schedule recheck and xr review next week  ty

## 2021-05-18 PROBLEM — M48.10 DISH (DIFFUSE IDIOPATHIC SKELETAL HYPEROSTOSIS): Status: ACTIVE | Noted: 2021-05-18

## 2021-05-18 NOTE — TELEPHONE ENCOUNTER
Checked in with [place]  Reviewed Xrs in detail  Answered questions   He will keep me informed of his progress

## 2021-06-29 ENCOUNTER — OFFICE VISIT (OUTPATIENT)
Dept: FAMILY MEDICINE CLINIC | Facility: CLINIC | Age: 66
End: 2021-06-29
Payer: COMMERCIAL

## 2021-06-29 VITALS
SYSTOLIC BLOOD PRESSURE: 120 MMHG | DIASTOLIC BLOOD PRESSURE: 72 MMHG | RESPIRATION RATE: 18 BRPM | WEIGHT: 315 LBS | TEMPERATURE: 98.4 F | HEART RATE: 71 BPM | HEIGHT: 76 IN | OXYGEN SATURATION: 96 % | BODY MASS INDEX: 38.36 KG/M2

## 2021-06-29 DIAGNOSIS — R82.998 DARK URINE: ICD-10-CM

## 2021-06-29 DIAGNOSIS — M62.838 MUSCLE SPASM: ICD-10-CM

## 2021-06-29 DIAGNOSIS — S39.012A BACK STRAIN, INITIAL ENCOUNTER: Primary | ICD-10-CM

## 2021-06-29 LAB
SL AMB  POCT GLUCOSE, UA: NORMAL
SL AMB LEUKOCYTE ESTERASE,UA: NORMAL
SL AMB POCT BILIRUBIN,UA: NORMAL
SL AMB POCT BLOOD,UA: NORMAL
SL AMB POCT CLARITY,UA: CLEAR
SL AMB POCT COLOR,UA: YELLOW
SL AMB POCT KETONES,UA: NORMAL
SL AMB POCT NITRITE,UA: NORMAL
SL AMB POCT PH,UA: 5
SL AMB POCT SPECIFIC GRAVITY,UA: 1.02
SL AMB POCT URINE PROTEIN: NORMAL
SL AMB POCT UROBILINOGEN: NORMAL

## 2021-06-29 PROCEDURE — 81003 URINALYSIS AUTO W/O SCOPE: CPT | Performed by: NURSE PRACTITIONER

## 2021-06-29 PROCEDURE — 99213 OFFICE O/P EST LOW 20 MIN: CPT | Performed by: NURSE PRACTITIONER

## 2021-06-29 RX ORDER — CYCLOBENZAPRINE HCL 10 MG
10 TABLET ORAL 2 TIMES DAILY PRN
Qty: 30 TABLET | Refills: 1 | Status: SHIPPED | OUTPATIENT
Start: 2021-06-29

## 2021-06-29 NOTE — LETTER
June 29, 2021     Patient: Isidra Gaston  YOB: 1955   Date of Visit: 6/29/2021       To Whom it May Concern:    Khai Larkin is under my professional care  He was seen in my office on 6/29/2021  He may return to work on 7/2/2021  If you have any questions or concerns, please don't hesitate to call           Sincerely,          AYLA Claros        CC: No Recipients

## 2021-06-29 NOTE — PROGRESS NOTES
Juanita Bowers Sr  is a 63 y o  male who presents for evaluation of low back pain  The patient has had recurrent self limited episodes of low back pain in the past  Symptoms have been present for 2 days and are unchanged  Onset was related to / precipitated by picking up his dog (weighs 90 #)  The pain is located in the across the lower back and does not radiate  The pain is described as aching, sharp and soreness and occurs intermittently  He rates his pain as moderate  Symptoms are exacerbated by lying down, sitting and twisting  Symptoms are improved by nothing  He has also tried NSAIDs and rest which provided no symptom relief  He has no other symptoms associated with the back pain  The patient has no "red flag" history indicative of complicated back pain  Eric Po Requesting UA for dark urine    The following portions of the patient's history were reviewed and updated as appropriate: allergies, current medications, past family history, past medical history, past social history, past surgical history and problem list     Review of Systems  Pertinent items are noted in HPI       Objective    Physical Exam  Vitals and nursing note reviewed  Constitutional:       General: He is not in acute distress  Appearance: Normal appearance  Comments: Appears uncomfortable     Cardiovascular:      Rate and Rhythm: Normal rate and regular rhythm  Pulses: Normal pulses  Heart sounds: Normal heart sounds  Pulmonary:      Effort: Pulmonary effort is normal       Breath sounds: Normal breath sounds  Abdominal:      General: Bowel sounds are normal       Palpations: Abdomen is soft  Tenderness: There is no abdominal tenderness  Musculoskeletal:      Lumbar back: Spasms and tenderness present  Decreased range of motion  Comments: Gait antalgic     Skin:     General: Skin is warm and dry  Coloration: Skin is not pale  Findings: No bruising     Neurological:      General: No focal deficit present  Mental Status: He is alert  Mental status is at baseline  Psychiatric:         Mood and Affect: Mood normal            Assessment/Plan   back strain  The case discussed with patient using patient centered shared decision making  The patient was counseled regarding instructions for management,-- risk factor reductions,-- prognosis,-- impressions,-- risks and benefits of treatment options,-- importance of compliance with treatment  I have reviewed the instructions with the patient, answering all questions to his satisfaction  UA normal  Natural history and expected course discussed  Questions answered  Proper lifting, bending technique discussed  Short (2-4 day) period of relative rest recommended until acute symptoms improve  Ice to affected area as needed for local pain relief  OTC analgesics as needed  Muscle relaxants per medication orders  Follow-up in 1 week  if not better

## 2021-08-10 DIAGNOSIS — I50.32 CHRONIC DIASTOLIC CONGESTIVE HEART FAILURE (HCC): ICD-10-CM

## 2021-08-10 DIAGNOSIS — I50.32 CHRONIC DIASTOLIC HEART FAILURE (HCC): ICD-10-CM

## 2021-08-10 DIAGNOSIS — R60.0 BILATERAL EDEMA OF LOWER EXTREMITY: ICD-10-CM

## 2021-08-11 RX ORDER — FUROSEMIDE 20 MG/1
TABLET ORAL
Qty: 90 TABLET | Refills: 1 | Status: SHIPPED | OUTPATIENT
Start: 2021-08-11

## 2021-08-11 RX ORDER — FUROSEMIDE 40 MG/1
TABLET ORAL
Qty: 90 TABLET | Refills: 1 | Status: SHIPPED | OUTPATIENT
Start: 2021-08-11

## 2021-08-14 ENCOUNTER — APPOINTMENT (EMERGENCY)
Dept: RADIOLOGY | Facility: HOSPITAL | Age: 66
End: 2021-08-14
Attending: EMERGENCY MEDICINE
Payer: COMMERCIAL

## 2021-08-14 ENCOUNTER — HOSPITAL ENCOUNTER (EMERGENCY)
Facility: HOSPITAL | Age: 66
Discharge: HOME | End: 2021-08-14
Attending: EMERGENCY MEDICINE | Admitting: EMERGENCY MEDICINE
Payer: COMMERCIAL

## 2021-08-14 VITALS
HEART RATE: 74 BPM | WEIGHT: 315 LBS | TEMPERATURE: 97.6 F | RESPIRATION RATE: 18 BRPM | SYSTOLIC BLOOD PRESSURE: 167 MMHG | DIASTOLIC BLOOD PRESSURE: 77 MMHG | BODY MASS INDEX: 41.26 KG/M2 | OXYGEN SATURATION: 96 %

## 2021-08-14 DIAGNOSIS — M25.562 ACUTE PAIN OF LEFT KNEE: Primary | ICD-10-CM

## 2021-08-14 DIAGNOSIS — M70.52 SUPRAPATELLAR BURSITIS OF LEFT KNEE: ICD-10-CM

## 2021-08-14 PROCEDURE — 99284 EMERGENCY DEPT VISIT MOD MDM: CPT | Performed by: EMERGENCY MEDICINE

## 2021-08-14 PROCEDURE — 99283 EMERGENCY DEPT VISIT LOW MDM: CPT

## 2021-08-14 PROCEDURE — 73562 X-RAY EXAM OF KNEE 3: CPT

## 2021-08-14 PROCEDURE — 96372 THER/PROPH/DIAG INJ SC/IM: CPT

## 2021-08-14 RX ORDER — KETOROLAC TROMETHAMINE 30 MG/ML
30 INJECTION, SOLUTION INTRAMUSCULAR; INTRAVENOUS ONCE
Status: COMPLETED | OUTPATIENT
Start: 2021-08-14 | End: 2021-08-14

## 2021-08-14 RX ORDER — KETOROLAC TROMETHAMINE 30 MG/ML
30 INJECTION, SOLUTION INTRAMUSCULAR; INTRAVENOUS ONCE
Status: DISCONTINUED | OUTPATIENT
Start: 2021-08-14 | End: 2021-08-14

## 2021-08-14 RX ADMIN — KETOROLAC TROMETHAMINE 30 MG: 30 INJECTION, SOLUTION INTRAMUSCULAR at 23:27

## 2021-08-14 NOTE — Clinical Note
Edvin Gongora was seen and treated in our emergency department on 8/14/2021  Diagnosis:     Mahin Savage  may return to work on return date  He may return on this date: 08/17/2021         If you have any questions or concerns, please don't hesitate to call        Rosmery Aguirre, DO    ______________________________           _______________          _______________  Hospital Representative                              Date                                Time
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
## 2021-08-16 ENCOUNTER — OFFICE VISIT (OUTPATIENT)
Dept: OBGYN CLINIC | Facility: CLINIC | Age: 66
End: 2021-08-16
Payer: COMMERCIAL

## 2021-08-16 VITALS — DIASTOLIC BLOOD PRESSURE: 78 MMHG | HEART RATE: 84 BPM | SYSTOLIC BLOOD PRESSURE: 133 MMHG

## 2021-08-16 DIAGNOSIS — M23.92 ACUTE INTERNAL DERANGEMENT OF LEFT KNEE: Primary | ICD-10-CM

## 2021-08-16 DIAGNOSIS — M25.462 EFFUSION OF LEFT KNEE: ICD-10-CM

## 2021-08-16 PROCEDURE — 99214 OFFICE O/P EST MOD 30 MIN: CPT | Performed by: ORTHOPAEDIC SURGERY

## 2021-08-16 NOTE — LETTER
August 16, 2021       No Recipients    Patient: Esther Bagi  YOB: 1955   Date of Visit: 8/16/2021     Dear Dr Kelly Ga Recipients      Thank you for referring Esperanzaji Oakley to me for evaluation  Below are the relevant portions of my assessment and plan of care  If you have questions, please do not hesitate to call me  I look forward to following Pete Haji along with you           Sincerely,        Diane Messer MD        CC:   No Recipients    Progress Notes:
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
## 2021-08-16 NOTE — LETTER
August 16, 2021     Patient: Gina Castle  YOB: 1955   Date of Visit: 8/16/2021       To Whom it May Concern:    Steff Richmond is under my professional care  He was seen in my office on 8/16/2021  He is out of work until further notice  If you have any questions or concerns, please don't hesitate to call           Sincerely,          Phoebe Matute MD        CC: No Recipients

## 2021-08-17 ENCOUNTER — TELEPHONE (OUTPATIENT)
Dept: OBGYN CLINIC | Facility: HOSPITAL | Age: 66
End: 2021-08-17

## 2021-08-17 NOTE — TELEPHONE ENCOUNTER
Updated the facility from [address] [place] [address] to [facility], per Aime's message

## 2021-08-17 NOTE — TELEPHONE ENCOUNTER
Patient is calling to let Dr Reynaldo Hdez know that when he arrived for his MRI at the outside facility they asked for a $150 copay and he was told he would not have any out of pocket expense  Patient did not get the MRI and will schedule something at [facility]  Patient was transferred to central scheduling

## 2021-08-18 ENCOUNTER — TELEPHONE (OUTPATIENT)
Dept: OBGYN CLINIC | Facility: CLINIC | Age: 66
End: 2021-08-18

## 2021-08-18 NOTE — TELEPHONE ENCOUNTER
Patient left message on machine yesterday here in [place]  [facility] was trying to charge him out of pocket expense even though he has insurance  I believe Petra tried to get him in with [facility] because Ariane Chavez could not get him in soon enough  Not sure what I should do, Should I send this to Mona Peng or Elijah Huang to see if they can help him get in somewhere?

## 2021-08-18 NOTE — TELEPHONE ENCOUNTER
Patient called would like you to look at his MRI it was a STAT  Report in chart and images are uploaded

## 2021-08-18 NOTE — TELEPHONE ENCOUNTER
I called patient to relay results but no answer  I did leave a voicemail to call back to discuss results

## 2021-08-18 NOTE — TELEPHONE ENCOUNTER
Spoke with Jhonathan Nunez this morning  He said he did get the MRI done at [facility] yesterday  He has dropped off the disk here in [place] for Dr  To review  He did not return call yesterday, as he was upset with the situation (at [facility]) He did apologize for not returning a call

## 2021-08-20 ENCOUNTER — OFFICE VISIT (OUTPATIENT)
Dept: OBGYN CLINIC | Facility: CLINIC | Age: 66
End: 2021-08-20
Payer: COMMERCIAL

## 2021-08-20 ENCOUNTER — TELEPHONE (OUTPATIENT)
Dept: OBGYN CLINIC | Facility: CLINIC | Age: 66
End: 2021-08-20

## 2021-08-20 VITALS — BODY MASS INDEX: 41.26 KG/M2 | HEIGHT: 76 IN

## 2021-08-20 DIAGNOSIS — M17.12 PRIMARY OSTEOARTHRITIS OF LEFT KNEE: Primary | ICD-10-CM

## 2021-08-20 DIAGNOSIS — S83.92XA SPRAIN OF LEFT KNEE, UNSPECIFIED LIGAMENT, INITIAL ENCOUNTER: ICD-10-CM

## 2021-08-20 PROCEDURE — 20610 DRAIN/INJ JOINT/BURSA W/O US: CPT | Performed by: ORTHOPAEDIC SURGERY

## 2021-08-20 PROCEDURE — 99214 OFFICE O/P EST MOD 30 MIN: CPT | Performed by: ORTHOPAEDIC SURGERY

## 2021-08-20 RX ORDER — DEXAMETHASONE SODIUM PHOSPHATE 100 MG/10ML
40 INJECTION INTRAMUSCULAR; INTRAVENOUS
Status: COMPLETED | OUTPATIENT
Start: 2021-08-20 | End: 2021-08-20

## 2021-08-20 RX ORDER — LIDOCAINE HYDROCHLORIDE 10 MG/ML
4 INJECTION, SOLUTION INFILTRATION; PERINEURAL
Status: COMPLETED | OUTPATIENT
Start: 2021-08-20 | End: 2021-08-20

## 2021-08-20 RX ADMIN — DEXAMETHASONE SODIUM PHOSPHATE 40 MG: 100 INJECTION INTRAMUSCULAR; INTRAVENOUS at 10:45

## 2021-08-20 RX ADMIN — LIDOCAINE HYDROCHLORIDE 4 ML: 10 INJECTION, SOLUTION INFILTRATION; PERINEURAL at 10:45

## 2021-08-20 NOTE — TELEPHONE ENCOUNTER
Patient calling asking for a note for work, wants to return Monday, but if Dr Keenan Merlos wants him to wait a little longer said it can't be longer than next Friday  Also he has been taking tylenol and ibuprofen and it has not helped  Asking for a non narcotic pain medicine
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
## 2021-08-30 ENCOUNTER — EVALUATION (OUTPATIENT)
Dept: PHYSICAL THERAPY | Facility: CLINIC | Age: 66
End: 2021-08-30
Payer: COMMERCIAL

## 2021-08-30 VITALS — HEART RATE: 71 BPM | SYSTOLIC BLOOD PRESSURE: 116 MMHG | DIASTOLIC BLOOD PRESSURE: 78 MMHG

## 2021-08-30 DIAGNOSIS — S83.92XA SPRAIN OF LEFT KNEE, UNSPECIFIED LIGAMENT, INITIAL ENCOUNTER: Primary | ICD-10-CM

## 2021-08-30 DIAGNOSIS — M17.12 PRIMARY OSTEOARTHRITIS OF LEFT KNEE: ICD-10-CM

## 2021-08-30 PROCEDURE — 97162 PT EVAL MOD COMPLEX 30 MIN: CPT

## 2021-08-30 NOTE — PROGRESS NOTES
PT Evaluation     Today's date: 2021  Patient name: Kamila Araujo  : 1955  MRN: [id number]  Referring provider: Marifer Potter*  Dx:   Encounter Diagnosis     ICD-10-CM    1  Sprain of left knee, unspecified ligament, initial encounter  S83  92XA Ambulatory referral to Physical Therapy   2  Primary osteoarthritis of left knee  M17 12 Ambulatory referral to Physical Therapy                  Assessment  Assessment details:   CASE SUMMARY:   Kamila Araujo  is a 63y o  year old male who presents to [facility] upon referral from ortho  secondary to c/o left knee pain  Mona Walker reports onset of symptoms ~  3 weeks as a result of he overextended left knee resulting in sharp pain in area of quad tendon  Increased swellling noted as well  Had a cortisone shot which brought 2 weeks of relief   patient describes  current symptoms as: sharp, pulling pain   Symptoms are : constant  Pain level:  Current: 6/10  At Best: 3-4/10   At worst: 7/10 and with ADL's 5-6/10  Symptoms improve with: resting on recliner, and worsen with working  Overall symptom progression at this time is improving  Previous injury to this area: knee surgery at age 14  Previous treatment includes: injection  Diagnostic testing includes: Mri: + tricompartment arthritis  PMHx includes: See chart for full details with medications, allergies, past family history, past medical history, social history, past surgical history and problem list  All topics were reviewed  Functionally, at baseline, Mona Walker is independent with all basic ADL's expect has difficulty with getting in /out of tub, also needs assistance with taking off shoes and socks  Currently, Mona Walker is limited in the following activities: uses increased UE assist with stair negotiation  Mona Walker is lives with wife  in a one story home with 0 stairs to enter and 0 stairs inside   Recreational activities include: melinda Araujo is employed as a independent   Patient goal(s) for physical therapy is: to get left knee to move with the least amount of pain as possible  The following is a summary of Vance Pelayo objective status:    Impairments:   Postural dysfunction  Reduced ROM  Reduced strength  Reduced flexibility  Gait/elevation dysfunction  Reduced stability  Transfers impairments  Reduced activity tolerance including above stated functional limitations    Patient's clinical presentation is consistent with their referring diagnosis of: left knee OA  Patient presents with reduced knee flexion on left, reduced ROM and reduced flexibility  signficant TTP in area of left quad tendon  Patient will benefit form skilled PT to reduce pain, improve mobilty and function  PLOC was discussed and agreed upon with patient  Patient was educated on: Hep and PLOC   Patient vocalized a good understanding of  PLOC and HEP issued  Vnace Pelayo would benefit from skilled physical therapy services to address their aforementioned functional limitations and progress towards prior level of function, to meet stated goals,  and independence with home exercise program   Prognosis for successful rehab outcome is good with consistent participation in therapy and carryover of HEP and PLOC     Functional limitations: per patient subjectiveUnderstanding of Dx/Px/POC: good   Prognosis: good    Goals  STG:  + Patient will have pain level 0/10 left knee  at rest (2-3 weeks)  + Patient will report a 40% improvement in symptoms (2-3 weeks)   + Patient will demonstrate a good quad set independently  (2-3 weeks)   + Patient will be independent in basic HEP (2-3 weeks)  + Patient will demonstrate an increase in range of motion left  knee flexion  to  Equal right (2-3 weeks)  + Patient will report increased ease walking due to a reduction in pain (2-3 weeks)      LTG:  + Patient will have pain level 2/10 left knee  with ADL's (4-6 weeks)  + Patient will report a 70% improvement in symptoms (4-6 weeks)   + Patient will increase FOTO score by 10 points  (10 sessions)   + Patient will be independent in comprehensive HEP (4-6 weeks)  + Patient will demonstrate reduced gait deviations ambulating on level surfaces  (4-6 week)  + Patient will negotiate stairs reciprically with use of one railing  (4-6 weeks)  + Patient will demonstrate an increase in range of motion left knee AROM in all planes  to  within normal limits and painfree (4-6 weeks)  + Patient will demonstrate increase  MMT of left knee/hip to  4/5 for maximum function  (4-6 weeks)      Plan  Plan details:  2-3 x week, 4-6 weeks: HEP development, stretching LE musculature per objective findings, strengthening LE musculature per objective findings, A/AA/PROM knee motions, joint mobilizations prn, posture education especially foot posture prn, STM/MI as needed to reduce muscle tension as per objective findings, muscle reeducation prn, gait/balance training, stability training, Jacques/Ktape prn PLOC discussed and agreed upon with patient      Patient would benefit from: skilled physical therapy  Frequency: 2x week  Plan of Care beginning date: 8/30/2021  Plan of Care expiration date: 10/11/2021  Treatment plan discussed with: patient        Subjective    Objective     Static Posture     Comments  Posture:forward flexed at hips mild, + neoprene sleeve on left knee     Gait:  No assistive device minimal to no knee flexion on left, reduced push off, reduced heel strike, reduced stance time on left,     Elevations: NT    Functional activities:  Squat: unable to perform to standard chair   Transfers: sit/stand: Independent uses UE to assist antalgic movement    Palpation:exquisitely tender in area of quad tendon left     ROM:  Knee flexion: Right:92 AROM   Left: 80 deg AROM,   Knee extension: Right:wnl      Left:wnl    Girth:  Tib Tub: Right:43 5 cm   Left:44 2 cm  Mid pat: Right:51 0  cm   Left:50 3  cm  Sup pat: Right:52 5 cm   Left:52 7  cm    [facility]  [facility] set: Right:fair Left:fair  Hip Flexion: Right: 4/5  left: 3-/5  Hip External Rotation (clamshells): Right: 4/5  left: 4/5 (sitting)  Knee flexion: Right:4/5 Left:-4/5 + pain   Extension: Right:4/5  Left:-4/5 + pain     Joint Mobility:  Patellar mobility: Right: major LOM , Left: minimally limited    Special tests: not performed due to reviewed orthos notes    Flexibility:  Hamstring: Right: fair  Left:fair  Quads:NT  Gastroc/soleus: Right:fair Left:fair    R/O lumbar:PRN              Precautions AAA, HTN, asthma, COPD, ortic regurgitation, CHF, bipolar, anxiety and depression    Specialty Daily Treatment Diary     Insurance:         Visits: 1       Manual: 8/30/21       PROM knee: flex/ext        IASTM/STM: distal quad        PFJ Mobs                Man Flexibility: hamstring/quad        Total time:                 Protocol: There ex:         Rec bike/ nustep        prostretch        Heel slides        Quad sets        SLR         Total gym: Squats        Bridges        Prone quad stretch        Seated hamstring stretch        Neuro García:                        Ther Activity:        reevaluation                Gait Training:                 HEP:                Modalities:        MH        Ice                    Access Code: [email or web address]  URL: [email or web address]  Date: 08/30/2021  Prepared by:  Patricia Stein    Exercises  Supine Heel Slide - 2 x daily - 7 x weekly - 2 sets - 10 reps  Supine Quadricep Sets - 2 x daily - 7 x weekly - 1 sets - 10 reps - 5 sec hold

## 2021-09-01 ENCOUNTER — OFFICE VISIT (OUTPATIENT)
Dept: PHYSICAL THERAPY | Facility: CLINIC | Age: 66
End: 2021-09-01
Payer: COMMERCIAL

## 2021-09-01 DIAGNOSIS — S83.92XA SPRAIN OF LEFT KNEE, UNSPECIFIED LIGAMENT, INITIAL ENCOUNTER: Primary | ICD-10-CM

## 2021-09-01 DIAGNOSIS — M17.12 PRIMARY OSTEOARTHRITIS OF LEFT KNEE: ICD-10-CM

## 2021-09-01 PROCEDURE — 97110 THERAPEUTIC EXERCISES: CPT

## 2021-09-01 PROCEDURE — 97140 MANUAL THERAPY 1/> REGIONS: CPT

## 2021-09-01 PROCEDURE — 97112 NEUROMUSCULAR REEDUCATION: CPT

## 2021-09-01 NOTE — PROGRESS NOTES
Daily Note     Today's date: 2021  Patient name: Maximiliano Harvey  : 1955  MRN: [id number]  Referring provider: Jasbir Crowe*  Dx:   Encounter Diagnosis     ICD-10-CM    1  Sprain of left knee, unspecified ligament, initial encounter  S83  92XA    2  Primary osteoarthritis of left knee  M17 12                   Subjective: Pt reports 5-6/10 pain in L knee  He reports he took tylenol prior to session  Objective: See treatment diary below      Assessment: Tolerated treatment well  Patient demonstrated fatigue post treatment and would benefit from continued PT  Pt demo good tolerance with exercises, inc difficulty with SLR flex  Fatigue noted post       Plan: Continue per plan of care  Precautions AAA, HTN, asthma, COPD, ortic regurgitation, CHF, bipolar, anxiety and depression    Specialty Daily Treatment Diary     Insurance:         Visits: 1 2      Manual: 21      PROM knee: flex/ext  5'      IASTM/STM: distal quad  Quad IASTM 5'      PFJ Mobs                Man Flexibility: hamstring/quad        Total time:                 Protocol: There ex:         Rec bike/ nustep  5 min Lvl1      prostretch  5x10s      Heel slides  Peanut 20x      Quad sets  5s10x      SLR   10x      Total gym: Squats        Bridges  10x      Prone quad stretch        Seated hamstring stretch  Supine 5x10s      Neuro García:                        Ther Activity:        reevaluation                Gait Training:                 HEP:                Modalities:        MH        Ice                    Access Code: [email or web address]  URL: [email or web address]  Date: 2021  Prepared by:  Parth Landin    Exercises  Supine Heel Slide - 2 x daily - 7 x weekly - 2 sets - 10 reps  Supine Quadricep Sets - 2 x daily - 7 x weekly - 1 sets - 10 reps - 5 sec hold

## 2021-09-08 ENCOUNTER — APPOINTMENT (OUTPATIENT)
Dept: PHYSICAL THERAPY | Facility: CLINIC | Age: 66
End: 2021-09-08
Payer: COMMERCIAL

## 2021-09-09 ENCOUNTER — OFFICE VISIT (OUTPATIENT)
Dept: PHYSICAL THERAPY | Facility: CLINIC | Age: 66
End: 2021-09-09
Payer: COMMERCIAL

## 2021-09-09 DIAGNOSIS — M17.12 PRIMARY OSTEOARTHRITIS OF LEFT KNEE: ICD-10-CM

## 2021-09-09 DIAGNOSIS — S83.92XA SPRAIN OF LEFT KNEE, UNSPECIFIED LIGAMENT, INITIAL ENCOUNTER: Primary | ICD-10-CM

## 2021-09-09 PROCEDURE — 97140 MANUAL THERAPY 1/> REGIONS: CPT

## 2021-09-09 PROCEDURE — 97110 THERAPEUTIC EXERCISES: CPT

## 2021-09-09 NOTE — PROGRESS NOTES
Daily Note     Today's date: 2021  Patient name: Bernabe Banks  : 1955  MRN: [id number]  Referring provider: Faye Felipe*  Dx:   Encounter Diagnosis     ICD-10-CM    1  Sprain of left knee, unspecified ligament, initial encounter  S83  92XA    2  Primary osteoarthritis of left knee  M17 12        Start Time: 0930  Stop Time: 1015  Total time in clinic (min): 45 minutes    Subjective: Pt reports 6/10 pain prior to session      Objective: See treatment diary below  Pt unable to lay prone for prone quad str      Assessment: Tolerated treatment fair  Patient demonstrated fatigue post treatment and would benefit from continued PT  Pt demo low activity tolerance due to deconditioning  Reported relief with IASTM to quad  Plan: Continue per plan of care  Precautions AAA, HTN, asthma, COPD, ortic regurgitation, CHF, bipolar, anxiety and depression    Specialty Daily Treatment Diary     Insurance:         Visits: 1 2 3     Manual: 21     PROM knee: flex/ext  5' 5'     IASTM/STM: distal quad  Quad IASTM 5' Quad IASTM 5'     PFJ Mobs                Man Flexibility: hamstring/quad        Total time:                 Protocol: There ex:         Rec bike/ nustep  5 min Lvl1 5 min lvl 1      prostretch  5x10s 10s10x     Heel slides  Peanut 20x Peanut 20x     Quad sets  5s10x 5sx15     LAQ   20x     SLR   10x 10x     Total gym: Squats        Bridges  10x 7x     Prone quad stretch        Seated hamstring stretch  Supine 5x10s Supine 5x10s     Neuro García:                        Ther Activity:        reevaluation                Gait Training:                 HEP:                Modalities:        MH        Ice                    Access Code: [email or web address]  URL: [email or web address]  Date: 2021  Prepared by:  Doreen Landa    Exercises  Supine Heel Slide - 2 x daily - 7 x weekly - 2 sets - 10 reps  Supine Quadricep Sets - 2 x daily - 7 x weekly - 1 sets - 10 reps - 5 sec hold

## 2021-09-14 ENCOUNTER — OFFICE VISIT (OUTPATIENT)
Dept: PHYSICAL THERAPY | Facility: CLINIC | Age: 66
End: 2021-09-14
Payer: COMMERCIAL

## 2021-09-14 DIAGNOSIS — M17.12 PRIMARY OSTEOARTHRITIS OF LEFT KNEE: ICD-10-CM

## 2021-09-14 DIAGNOSIS — S83.92XA SPRAIN OF LEFT KNEE, UNSPECIFIED LIGAMENT, INITIAL ENCOUNTER: Primary | ICD-10-CM

## 2021-09-14 PROCEDURE — 97110 THERAPEUTIC EXERCISES: CPT | Performed by: PHYSICAL THERAPIST

## 2021-09-14 NOTE — PROGRESS NOTES
Daily Note     Today's date: 2021  Patient name: Jv Steiner  : 1955  MRN: [id number]  Referring provider: Maryan Joy  Dx:   Encounter Diagnosis     ICD-10-CM    1  Sprain of left knee, unspecified ligament, initial encounter  S83  92XA    2  Primary osteoarthritis of left knee  M17 12                   Subjective: Pt reports left knee is doing a little better today but is using walking stick today  Reports that he felt a little wobbly today with standing  Objective: See treatment diary below  Pt unable to lay prone for prone quad str    Assessment: Tolerated treatment well  Exercises modified to lower repetitions with increased sets due to low level of conditioning  PT assist required with SLR flexion  Patient reported no pain following session today  Plan: Continue per plan of care  Precautions AAA, HTN, asthma, COPD, ortic regurgitation, CHF, bipolar, anxiety and depression    Specialty Daily Treatment Diary     Insurance:         Visits: 1 2 3 4    Manual: 21    PROM knee: flex/ext  5' 5'     IASTM/STM: distal quad  Quad IASTM 5' Quad IASTM 5' Quad IASTM 5' (handlebar)    PFJ Mobs                Man Flexibility: hamstring/quad        Total time:                 Protocol: There ex:         Rec bike/ nustep  5 min Lvl1 5 min lvl 1  8' lvl 1 nu step    prostretch  5x10s 10s10x 10s10x    Heel slides  Peanut 20x Peanut 20x Peanut 20x    Quad sets  5s10x 5sx15 2*10, 5"    LAQ   20x 3*10    SLR   10x 10x 2*10 PT assist    Total gym: Squats        Bridges  10x 7x 3*5    Seated hamstring stretch  Supine 5x10s Supine 5x10s Seated 5x10''    Neuro Re-ed: Ther Activity:        reevaluation                Gait Training:                 HEP:                Modalities:        MH        Ice                    Access Code: [email or web address]  URL: [email or web address]  Date: 2021  Prepared by:  Mckay Ch Alonzo    Exercises  Supine Heel Slide - 2 x daily - 7 x weekly - 2 sets - 10 reps  Supine Quadricep Sets - 2 x daily - 7 x weekly - 1 sets - 10 reps - 5 sec hold

## 2021-09-16 ENCOUNTER — APPOINTMENT (OUTPATIENT)
Dept: PHYSICAL THERAPY | Facility: CLINIC | Age: 66
End: 2021-09-16
Payer: COMMERCIAL

## 2021-09-21 ENCOUNTER — OFFICE VISIT (OUTPATIENT)
Dept: PHYSICAL THERAPY | Facility: CLINIC | Age: 66
End: 2021-09-21
Payer: COMMERCIAL

## 2021-09-21 DIAGNOSIS — M17.12 PRIMARY OSTEOARTHRITIS OF LEFT KNEE: ICD-10-CM

## 2021-09-21 DIAGNOSIS — S83.92XA SPRAIN OF LEFT KNEE, UNSPECIFIED LIGAMENT, INITIAL ENCOUNTER: Primary | ICD-10-CM

## 2021-09-21 PROCEDURE — 97140 MANUAL THERAPY 1/> REGIONS: CPT | Performed by: PHYSICAL THERAPIST

## 2021-09-21 PROCEDURE — 97110 THERAPEUTIC EXERCISES: CPT | Performed by: PHYSICAL THERAPIST

## 2021-09-21 NOTE — PROGRESS NOTES
Daily Note     Today's date: 2021  Patient name: Jacob Jacobson  : 1955  MRN: [id number]  Referring provider: Cris Garcia*  Dx:   Encounter Diagnosis     ICD-10-CM    1  Sprain of left knee, unspecified ligament, initial encounter  S83  92XA    2  Primary osteoarthritis of left knee  M17 12      Start Time: 0840  Stop Time: 0920  Total time in clinic (min): 40 minutes  Subjective: Pt reports that he has had a headache for the past 3 days, blood pressure under control  No change in headache with Tylenol  Will be seeing eye doctor later today  Not presenting with walking stick today  Reports knee is doing pretty well  Patient reports that he has a lot of lifting of heavy items today  Objective: See treatment diary below  Pt unable to lay prone for prone quad str    Assessment: Tolerated treatment well  Patient educated on log rolling to help rise from supine position to sitting  Fatigue reported with addition of anterior step up exercise and slight hip ache reported with SLR with reduced PT assistance  Plan: Continue per plan of care  Precautions AAA, HTN, asthma, COPD, ortic regurgitation, CHF, bipolar, anxiety and depression    Specialty Daily Treatment Diary     Insurance:         Visits: 1 2 3 4 5   Manual: 21   PROM knee: flex/ext  5' 5'     IASTM/STM: distal quad  Quad IASTM 5' Quad IASTM 5' Quad IASTM 5' (handlebar) Quad IASTM 5' (handlebar)                   Protocol:          There ex:         Rec bike/ nustep  5 min Lvl1 5 min lvl 1  8' lvl 1 nu step 8' lvl 1 nu step   prostretch  5x10s 10s10x 10s10x 10*10''   Heel slides  Peanut 20x Peanut 20x Peanut 20x Peanut, 2*15   Quad sets  5s10x 5sx15 2*10, 5" HEP   LAQ   20x 3*10 3*10   SLR   10x 10x 2*10 PT assist 3*8 PT assist   Total gym: Squats        Bridges  10x 7x 3*5 3*5   Seated hamstring stretch  Supine 5x10s Supine 5x10s Seated 5x10'' Seated 5x10''   Step up     3*10, 6" step Neuro Re-ed: Ther Activity:        reevaluation                Gait Training:                 HEP:                Modalities:        MH        Ice                  Access Code: [email or web address]  URL: [email or web address]  Date: 08/30/2021  Prepared by:  Ivett Juan    Exercises  Supine Heel Slide - 2 x daily - 7 x weekly - 2 sets - 10 reps  Supine Quadricep Sets - 2 x daily - 7 x weekly - 1 sets - 10 reps - 5 sec hold

## 2021-09-23 ENCOUNTER — OFFICE VISIT (OUTPATIENT)
Dept: PHYSICAL THERAPY | Facility: CLINIC | Age: 66
End: 2021-09-23
Payer: COMMERCIAL

## 2021-09-23 DIAGNOSIS — M17.12 PRIMARY OSTEOARTHRITIS OF LEFT KNEE: ICD-10-CM

## 2021-09-23 DIAGNOSIS — S83.92XA SPRAIN OF LEFT KNEE, UNSPECIFIED LIGAMENT, INITIAL ENCOUNTER: Primary | ICD-10-CM

## 2021-09-23 PROCEDURE — 97140 MANUAL THERAPY 1/> REGIONS: CPT | Performed by: PHYSICAL THERAPIST

## 2021-09-23 PROCEDURE — 97110 THERAPEUTIC EXERCISES: CPT | Performed by: PHYSICAL THERAPIST

## 2021-09-23 NOTE — PROGRESS NOTES
Daily Note     Today's date: 2021  Patient name: Tram Malone  : 1955  MRN: [id number]  Referring provider: Dorothy Hoff*  Dx:   Encounter Diagnosis     ICD-10-CM    1  Sprain of left knee, unspecified ligament, initial encounter  S83  92XA    2  Primary osteoarthritis of left knee  M17 12      Start Time: 0845  Stop Time: 0930  Total time in clinic (min): 45 minutes  Subjective: Pt reports no pain in LLE entering treatment today  Reports right knee is bothering him today  Objective: See treatment diary below  Pt unable to lay prone for prone quad str    Assessment: Tolerated treatment well  Patient reported feeling good with treatment today without any complaint of knee discomfort  Still requires PT assistance with SLR flexion  Plan: Continue to progress quadriceps strengthening  Precautions AAA, HTN, asthma, COPD, ortic regurgitation, CHF, bipolar, anxiety and depression    Specialty Daily Treatment Diary     Insurance:         Visits: 2 3 4 5 6   Manual: 21   PROM knee: flex/ext 5' 5'      IASTM/STM: distal quad Quad IASTM 5' Quad IASTM 5' Quad IASTM 5' (handlebar) Quad IASTM 5' (handlebar) Quad IASTM 5' (handlebar)                   Protocol: There ex:         Rec bike/ nustep 5 min Lvl1 5 min lvl 1  8' lvl 1 nu step 8' lvl 1 nu step 9' lvl 1 nu step   prostretch 5x10s 10s10x 10s10x 10*10'' 10*10''   Heel slides Peanut 20x Peanut 20x Peanut 20x Peanut, 2*15 Peanut 2*15   Quad sets 5s10x 5sx15 2*10, 5" HEP    LAQ  20x 3*10 3*10 3*10   SLR  10x 10x 2*10 PT assist 3*8 PT assist 3*8 PT assist   Total gym: Squats        Bridges 10x 7x 3*5 3*5 3*6   Seated hamstring stretch Supine 5x10s Supine 5x10s Seated 5x10'' Seated 5x10'' Seated 5x10''   Step up    3*10, 6" step 3*10, 6" step                           Neuro Re-ed:                         Ther Activity:        reevaluation                Gait Training:                 HEP: Modalities:        JV Islas                  Access Code: [email or web address]  URL: [email or web address]  Date: 08/30/2021  Prepared by:  Dom Trevizo    Exercises  Supine Heel Slide - 2 x daily - 7 x weekly - 2 sets - 10 reps  Supine Quadricep Sets - 2 x daily - 7 x weekly - 1 sets - 10 reps - 5 sec hold

## 2021-09-28 ENCOUNTER — OFFICE VISIT (OUTPATIENT)
Dept: PHYSICAL THERAPY | Facility: CLINIC | Age: 66
End: 2021-09-28
Payer: COMMERCIAL

## 2021-09-28 DIAGNOSIS — S83.92XA SPRAIN OF LEFT KNEE, UNSPECIFIED LIGAMENT, INITIAL ENCOUNTER: Primary | ICD-10-CM

## 2021-09-28 DIAGNOSIS — M17.12 PRIMARY OSTEOARTHRITIS OF LEFT KNEE: ICD-10-CM

## 2021-09-28 PROCEDURE — 97110 THERAPEUTIC EXERCISES: CPT | Performed by: PHYSICAL THERAPIST

## 2021-09-28 NOTE — PROGRESS NOTES
Daily Note     Today's date: 2021  Patient name: Bret John  : 1955  MRN: [id number]  Referring provider: Shanti Stark*  Dx:   Encounter Diagnosis     ICD-10-CM    1  Sprain of left knee, unspecified ligament, initial encounter  S83  92XA    2  Primary osteoarthritis of left knee  M17 12      Start Time: 0840  Stop Time: 8203  Total time in clinic (min): 45 minutes  Subjective: Pt reports that his left knee has been doing pretty good lately  Did a good amount of steps yesterday with minimal discomfort reported  Patient reports doing well overall and will make next visit his last      Objective: See treatment diary below  Pt unable to lay prone for prone quad str    Assessment: Tolerated treatment well  No reporting of knee pain post treatment  Improved quad strength demonstrated with reduced need for PT assist with SLR flexion  Plan: Patient to be [facility] visit  Precautions AAA, HTN, asthma, COPD, ortic regurgitation, CHF, bipolar, anxiety and depression    Specialty Daily Treatment Diary     Insurance:         Visits: 3 4 5 6 7   Manual: 21   PROM knee: flex/ext 5'       IASTM/STM: distal quad Quad IASTM 5' Quad IASTM 5' (handlebar) Quad IASTM 5' (handlebar) Quad IASTM 5' (handlebar) Quad IASTM 5' (handlebar)                   Protocol:          There ex:         Rec bike/ nustep 5 min lvl 1  8' lvl 1 nu step 8' lvl 1 nu step 9' lvl 1 nu step 10' nu step lvl 3   prostretch 10s10x 10s10x 10*10'' 10*10'' 10*10''   Heel slides Peanut 20x Peanut 20x Peanut, 2*15 Peanut 2*15 Peanut 2*15   Quad sets 5sx15 2*10, 5" HEP     LAQ 20x 3*10 3*10 3*10 3*10   SLR  10x 2*10 PT assist 3*8 PT assist 3*8 PT assist 3*8   Bridges 7x 3*5 3*5 3*6 3*8   Seated hamstring stretch Supine 5x10s Seated 5x10'' Seated 5x10'' Seated 5x10'' Seated 5*10''   Step up   3*10, 6" step 3*10, 6" step 3*10, 6" step   squats     10x                   Neuro Re-ed: Ther Activity:        reevaluation                Gait Training:                 HEP:                Modalities:        MH        Ice                  Access Code: [email or web address]  URL: [email or web address]  Date: 08/30/2021  Prepared by:  Julianna Shafer    Exercises  Supine Heel Slide - 2 x daily - 7 x weekly - 2 sets - 10 reps  Supine Quadricep Sets - 2 x daily - 7 x weekly - 1 sets - 10 reps - 5 sec hold

## 2021-09-30 ENCOUNTER — APPOINTMENT (OUTPATIENT)
Dept: PHYSICAL THERAPY | Facility: CLINIC | Age: 66
End: 2021-09-30
Payer: COMMERCIAL

## 2021-10-05 ENCOUNTER — APPOINTMENT (OUTPATIENT)
Dept: PHYSICAL THERAPY | Facility: CLINIC | Age: 66
End: 2021-10-05
Payer: COMMERCIAL

## 2021-10-06 ENCOUNTER — TELEMEDICINE (OUTPATIENT)
Dept: FAMILY MEDICINE CLINIC | Facility: CLINIC | Age: 66
End: 2021-10-06
Payer: COMMERCIAL

## 2021-10-06 DIAGNOSIS — R19.7 DIARRHEA, UNSPECIFIED TYPE: Primary | ICD-10-CM

## 2021-10-06 DIAGNOSIS — B34.9 VIRAL ILLNESS: ICD-10-CM

## 2021-10-06 DIAGNOSIS — Z11.52 ENCOUNTER FOR SCREENING FOR COVID-19: ICD-10-CM

## 2021-10-06 PROCEDURE — 99213 OFFICE O/P EST LOW 20 MIN: CPT | Performed by: NURSE PRACTITIONER

## 2021-10-06 PROCEDURE — U0005 INFEC AGEN DETEC AMPLI PROBE: HCPCS | Performed by: NURSE PRACTITIONER

## 2021-10-06 PROCEDURE — U0003 INFECTIOUS AGENT DETECTION BY NUCLEIC ACID (DNA OR RNA); SEVERE ACUTE RESPIRATORY SYNDROME CORONAVIRUS 2 (SARS-COV-2) (CORONAVIRUS DISEASE [COVID-19]), AMPLIFIED PROBE TECHNIQUE, MAKING USE OF HIGH THROUGHPUT TECHNOLOGIES AS DESCRIBED BY CMS-2020-01-R: HCPCS | Performed by: NURSE PRACTITIONER

## 2021-10-20 ENCOUNTER — OFFICE VISIT (OUTPATIENT)
Dept: PHYSICAL THERAPY | Facility: CLINIC | Age: 66
End: 2021-10-20
Payer: COMMERCIAL

## 2021-10-20 DIAGNOSIS — M17.12 PRIMARY OSTEOARTHRITIS OF LEFT KNEE: ICD-10-CM

## 2021-10-20 DIAGNOSIS — S83.92XA SPRAIN OF LEFT KNEE, UNSPECIFIED LIGAMENT, INITIAL ENCOUNTER: Primary | ICD-10-CM

## 2021-10-20 PROCEDURE — 97110 THERAPEUTIC EXERCISES: CPT | Performed by: PHYSICAL THERAPIST

## 2021-11-22 ENCOUNTER — OFFICE VISIT (OUTPATIENT)
Dept: FAMILY MEDICINE CLINIC | Facility: CLINIC | Age: 66
End: 2021-11-22
Payer: COMMERCIAL

## 2021-11-22 VITALS — DIASTOLIC BLOOD PRESSURE: 82 MMHG | HEART RATE: 60 BPM | TEMPERATURE: 98 F | SYSTOLIC BLOOD PRESSURE: 122 MMHG

## 2021-11-22 DIAGNOSIS — Z23 NEEDS FLU SHOT: Primary | ICD-10-CM

## 2021-11-22 PROCEDURE — G0008 ADMIN INFLUENZA VIRUS VAC: HCPCS | Performed by: NURSE PRACTITIONER

## 2021-11-22 PROCEDURE — 90662 IIV NO PRSV INCREASED AG IM: CPT | Performed by: NURSE PRACTITIONER

## 2021-12-10 ENCOUNTER — OFFICE VISIT (OUTPATIENT)
Dept: FAMILY MEDICINE CLINIC | Facility: CLINIC | Age: 66
End: 2021-12-10
Payer: COMMERCIAL

## 2021-12-10 VITALS
DIASTOLIC BLOOD PRESSURE: 70 MMHG | TEMPERATURE: 96.1 F | HEART RATE: 72 BPM | SYSTOLIC BLOOD PRESSURE: 136 MMHG | HEIGHT: 76 IN | BODY MASS INDEX: 38.36 KG/M2 | RESPIRATION RATE: 18 BRPM | WEIGHT: 315 LBS

## 2021-12-10 DIAGNOSIS — M76.61 ACHILLES TENDINITIS OF RIGHT LOWER EXTREMITY: ICD-10-CM

## 2021-12-10 DIAGNOSIS — K86.2 PANCREATIC PSEUDOCYST/CYST: Primary | ICD-10-CM

## 2021-12-10 DIAGNOSIS — Z71.2 ENCOUNTER TO DISCUSS TEST RESULTS: ICD-10-CM

## 2021-12-10 DIAGNOSIS — K86.3 PANCREATIC PSEUDOCYST/CYST: Primary | ICD-10-CM

## 2021-12-10 PROCEDURE — 99213 OFFICE O/P EST LOW 20 MIN: CPT | Performed by: NURSE PRACTITIONER

## 2021-12-21 ENCOUNTER — OFFICE VISIT (OUTPATIENT)
Dept: SURGERY | Facility: CLINIC | Age: 66
End: 2021-12-21
Payer: COMMERCIAL

## 2021-12-21 VITALS
SYSTOLIC BLOOD PRESSURE: 118 MMHG | HEIGHT: 76 IN | DIASTOLIC BLOOD PRESSURE: 68 MMHG | TEMPERATURE: 95.5 F | BODY MASS INDEX: 38.36 KG/M2 | HEART RATE: 74 BPM | WEIGHT: 315 LBS

## 2021-12-21 DIAGNOSIS — K86.3 PANCREATIC PSEUDOCYST/CYST: ICD-10-CM

## 2021-12-21 DIAGNOSIS — K86.2 PANCREATIC PSEUDOCYST/CYST: ICD-10-CM

## 2021-12-21 PROCEDURE — 99202 OFFICE O/P NEW SF 15 MIN: CPT | Performed by: SURGERY

## 2022-01-10 ENCOUNTER — TELEPHONE (OUTPATIENT)
Dept: OBGYN CLINIC | Facility: CLINIC | Age: 67
End: 2022-01-10

## 2022-01-10 NOTE — TELEPHONE ENCOUNTER
Patient called left message on clinical line asking for gel injections again  He would be eligible, he does require authorization  Now his past note says he should be seeing Dr Anh Harper for left knee  But is it okay if I start the process for both knees for visco injections?

## 2022-01-11 NOTE — TELEPHONE ENCOUNTER
Called patient to verify which need Knee he would like  Left or Bilateral? Once patient calls back I will place order for visco and get the authorization process started

## 2022-01-13 ENCOUNTER — TELEMEDICINE (OUTPATIENT)
Dept: FAMILY MEDICINE CLINIC | Facility: CLINIC | Age: 67
End: 2022-01-13
Payer: COMMERCIAL

## 2022-01-13 DIAGNOSIS — Z20.822 EXPOSURE TO COVID-19 VIRUS: ICD-10-CM

## 2022-01-13 DIAGNOSIS — B34.9 VIRAL INFECTION, UNSPECIFIED: ICD-10-CM

## 2022-01-13 PROCEDURE — 99213 OFFICE O/P EST LOW 20 MIN: CPT | Performed by: NURSE PRACTITIONER
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
## 2022-01-13 NOTE — LETTER
January 13, 2022     Patient: Jethro Henderson  YOB: 1955   Date of Visit: 1/13/2022       To Whom it May Concern:    Bonnie Steveisabel is under my professional care  He was seen in my office on 1/13/2022  Testing for covid 1/14/22  Excused from work until test results available  If you have any questions or concerns, please don't hesitate to call  Sincerely,          AYLA Rose        CC: [email or web address]

## 2022-01-14 ENCOUNTER — TELEMEDICINE (OUTPATIENT)
Dept: FAMILY MEDICINE CLINIC | Facility: CLINIC | Age: 67
End: 2022-01-14
Payer: COMMERCIAL

## 2022-01-14 DIAGNOSIS — Z20.822 EXPOSURE TO COVID-19 VIRUS: ICD-10-CM

## 2022-01-14 DIAGNOSIS — B34.9 VIRAL INFECTION, UNSPECIFIED: ICD-10-CM

## 2022-01-14 PROCEDURE — 99213 OFFICE O/P EST LOW 20 MIN: CPT | Performed by: NURSE PRACTITIONER

## 2022-01-14 PROCEDURE — U0003 INFECTIOUS AGENT DETECTION BY NUCLEIC ACID (DNA OR RNA); SEVERE ACUTE RESPIRATORY SYNDROME CORONAVIRUS 2 (SARS-COV-2) (CORONAVIRUS DISEASE [COVID-19]), AMPLIFIED PROBE TECHNIQUE, MAKING USE OF HIGH THROUGHPUT TECHNOLOGIES AS DESCRIBED BY CMS-2020-01-R: HCPCS | Performed by: NURSE PRACTITIONER

## 2022-01-14 PROCEDURE — U0005 INFEC AGEN DETEC AMPLI PROBE: HCPCS | Performed by: NURSE PRACTITIONER
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
## 2022-01-15 LAB — SARS-COV-2 RNA RESP QL NAA+PROBE: NEGATIVE

## 2022-01-17 ENCOUNTER — TELEPHONE (OUTPATIENT)
Dept: FAMILY MEDICINE CLINIC | Facility: CLINIC | Age: 67
End: 2022-01-17

## 2022-01-17 NOTE — TELEPHONE ENCOUNTER
Radha Han    Patient called for results of covid test  He needs letter excusing him from work, first day out 1/14  Please email note to patient

## 2022-01-17 NOTE — LETTER
January 17, 2022    Patient:  Chin Lam  YOB: 1955  Date of Last Encounter: 1/14/2022    To whom it may concern:    Chin Lam  has tested negative for COVID-19 (Coronavirus)  He may return to work on 1/18/22  Excused 1/14-1/17/22  Sincerely,        AYLA Byrnes       CC: [email or web address]

## 2022-01-17 NOTE — TELEPHONE ENCOUNTER
Please advise covid test was negative  I will email note with return to work date tomorrow  I will change if needed

## 2022-01-17 NOTE — TELEPHONE ENCOUNTER
Pt stated possible Wednesday, has a lot of gas taking gas pills not sure what else he can take and is very uncomfortable, he thanks you very much

## 2022-01-17 NOTE — TELEPHONE ENCOUNTER
Pt advised of negative covidt test and note will be emailed, verified email in chart as correct   Pt also stated still has abdominal pains and the runs

## 2022-01-28 ENCOUNTER — HOSPITAL ENCOUNTER (OUTPATIENT)
Dept: RADIOLOGY | Facility: HOSPITAL | Age: 67
Discharge: HOME | End: 2022-01-28
Attending: SURGERY
Payer: COMMERCIAL

## 2022-01-28 DIAGNOSIS — K86.2 PANCREATIC PSEUDOCYST/CYST: ICD-10-CM

## 2022-01-28 DIAGNOSIS — K86.3 PANCREATIC PSEUDOCYST/CYST: ICD-10-CM

## 2022-01-28 PROCEDURE — A9585 GADOBUTROL INJECTION: HCPCS | Performed by: SURGERY

## 2022-01-28 PROCEDURE — 74183 MRI ABD W/O CNTR FLWD CNTR: CPT

## 2022-01-28 PROCEDURE — G1004 CDSM NDSC: HCPCS

## 2022-01-28 RX ADMIN — GADOBUTROL 15 ML: 604.72 INJECTION INTRAVENOUS at 13:55

## 2022-02-04 ENCOUNTER — OFFICE VISIT (OUTPATIENT)
Dept: OBGYN CLINIC | Facility: CLINIC | Age: 67
End: 2022-02-04
Payer: OTHER MISCELLANEOUS

## 2022-02-04 ENCOUNTER — APPOINTMENT (OUTPATIENT)
Dept: RADIOLOGY | Facility: CLINIC | Age: 67
End: 2022-02-04
Payer: COMMERCIAL

## 2022-02-04 ENCOUNTER — OFFICE VISIT (OUTPATIENT)
Dept: FAMILY MEDICINE CLINIC | Facility: CLINIC | Age: 67
End: 2022-02-04
Payer: OTHER MISCELLANEOUS

## 2022-02-04 VITALS
HEART RATE: 75 BPM | DIASTOLIC BLOOD PRESSURE: 83 MMHG | SYSTOLIC BLOOD PRESSURE: 137 MMHG | HEIGHT: 76 IN | WEIGHT: 315 LBS | BODY MASS INDEX: 38.36 KG/M2

## 2022-02-04 VITALS
DIASTOLIC BLOOD PRESSURE: 80 MMHG | HEART RATE: 79 BPM | RESPIRATION RATE: 18 BRPM | HEIGHT: 76 IN | WEIGHT: 315 LBS | TEMPERATURE: 97.8 F | SYSTOLIC BLOOD PRESSURE: 158 MMHG | BODY MASS INDEX: 38.36 KG/M2 | OXYGEN SATURATION: 97 %

## 2022-02-04 DIAGNOSIS — M25.611 DECREASED ROM OF RIGHT SHOULDER: ICD-10-CM

## 2022-02-04 DIAGNOSIS — M54.42 ACUTE BILATERAL LOW BACK PAIN WITH BILATERAL SCIATICA: ICD-10-CM

## 2022-02-04 DIAGNOSIS — M19.011 ARTHRITIS OF RIGHT SHOULDER REGION: Primary | ICD-10-CM

## 2022-02-04 DIAGNOSIS — S40.011A CONTUSION OF RIGHT SHOULDER, INITIAL ENCOUNTER: ICD-10-CM

## 2022-02-04 DIAGNOSIS — G89.11 ACUTE PAIN OF RIGHT SHOULDER DUE TO TRAUMA: ICD-10-CM

## 2022-02-04 DIAGNOSIS — M25.511 ACUTE PAIN OF RIGHT SHOULDER DUE TO TRAUMA: ICD-10-CM

## 2022-02-04 DIAGNOSIS — M53.3 COCCYODYNIA: ICD-10-CM

## 2022-02-04 DIAGNOSIS — R29.898 UPPER EXTREMITY WEAKNESS: ICD-10-CM
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
## 2022-02-04 DIAGNOSIS — M54.41 ACUTE BILATERAL LOW BACK PAIN WITH BILATERAL SCIATICA: ICD-10-CM
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
## 2022-02-04 PROCEDURE — 99214 OFFICE O/P EST MOD 30 MIN: CPT | Performed by: ORTHOPAEDIC SURGERY

## 2022-02-04 PROCEDURE — 99213 OFFICE O/P EST LOW 20 MIN: CPT | Performed by: NURSE PRACTITIONER

## 2022-02-04 PROCEDURE — 72220 X-RAY EXAM SACRUM TAILBONE: CPT

## 2022-02-04 PROCEDURE — 73030 X-RAY EXAM OF SHOULDER: CPT

## 2022-02-04 NOTE — PROGRESS NOTES
Assessment/Plan:    1  Fall down steps, initial encounter  Comments:  2/3/22 am --dulce maria shea, fell down 2 steps  Orders:  -     XR shoulder 2+ vw right; Future; Expected date: 02/04/2022  -     XR sacrum and coccyx; Future; Expected date: 02/04/2022    2  Coccyodynia  -     XR shoulder 2+ vw right; Future; Expected date: 02/04/2022  -     XR sacrum and coccyx; Future; Expected date: 02/04/2022    3  Acute pain of right shoulder due to trauma  -     XR shoulder 2+ vw right; Future; Expected date: 02/04/2022    4  Upper extremity weakness  -     XR shoulder 2+ vw right; Future; Expected date: 02/04/2022    5  Decreased ROM of right shoulder  -     XR shoulder 2+ vw right; Future; Expected date: 02/04/2022    The case discussed with patient using patient centered shared decision making  The patient was counseled regarding instructions for management,-- risk factor reductions,-- prognosis,-- impressions,-- risks and benefits of treatment options,-- importance of compliance with treatment  I have reviewed the instructions with the patient, answering all questions to his satisfaction  R/o fractures  Restorative care as discussed  Heat/ice, topical lidocaine, rest  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  Will call patient with xr results  He is established with ortho, Dr Yong Perez, will likely need appointment  Will likely need shoulder MRI r/o tear    BMI Counseling: Body mass index is 42 73 kg/m²  The BMI is above normal  Nutrition recommendations include decreasing portion sizes, moderation in carbohydrate intake and increasing intake of lean protein  Exercise recommendations include exercising 3-5 times per week  Rationale for BMI follow-up plan is due to patient being overweight or obese  There are no Patient Instructions on file for this visit  Return in about 1 week (around 2/11/2022)  Subjective:      Patient ID: Tejas Atkins  is a 61 y o  male      Chief Complaint   Patient presents with   [facility] Shoulder Pain     right shoulder pain and back pain due to fall        Fall  Incident onset: yesterday  Fall occurred: hand rail broke away from wall while walking down a flight of steps  fell down 2 steps  land backwards on shoulder, coccyx  He landed on hard floor  There was no blood loss  The point of impact was the buttocks and right shoulder  Pain location: pelvis, right shoulders, neck, head, low back  The pain is moderate  The symptoms are aggravated by ambulation, movement, pressure on injury and sitting  Associated symptoms include tingling  Pertinent negatives include no abdominal pain, bowel incontinence, headaches, hearing loss, hematuria, loss of consciousness, nausea, visual change or vomiting  Associated symptoms comments: Right hand tingling    He has tried nothing for the symptoms  The following portions of the patient's history were reviewed and updated as appropriate: allergies, current medications, past family history, past medical history, past social history, past surgical history and problem list     Review of Systems   Constitutional: Negative  Eyes: Negative for visual disturbance  Respiratory: Negative  Cardiovascular: Negative  Gastrointestinal: Negative for abdominal pain, bowel incontinence, nausea and vomiting  Genitourinary: Negative for hematuria  Musculoskeletal:        Per hpi   Neurological: Positive for tingling  Negative for dizziness, loss of consciousness, weakness and headaches  Current Outpatient Medications   Medication Sig Dispense Refill    acetaminophen (TYLENOL) 325 mg tablet 2, by mouth, every 6 hours as needed for mild to moderate pain   30 tablet 0    albuterol (2 5 mg/3 mL) 0 083 % nebulizer solution Take 1 vial (2 5 mg total) by nebulization every 6 (six) hours as needed for wheezing or shortness of breath 30 vial 3    atenolol (TENORMIN) 50 mg tablet TAKE ONE TABLET BY MOUTH EVERY EVENING 90 tablet 1    enalapril (VASOTEC) 5 mg tablet TAKE ONE TABLET BY MOUTH EVERY DAY 90 tablet 1    furosemide (LASIX) 20 mg tablet TAKE ONE TABLET BY MOUTH EVERY DAY AS NEEDED (SEVERE SWELLING IN LEGS)      AS DIRECTED 90 tablet 1    furosemide (LASIX) 40 mg tablet TAKE ONE TABLET BY MOUTH EVERY MORNING 90 tablet 1    meloxicam (MOBIC) 15 mg tablet Take 1 tablet (15 mg total) by mouth daily 30 tablet 5    OXcarbazepine (TRILEPTAL) 300 mg tablet TAKE ONE AND ONE-HALF TABLETS BY MOUTH TWICE A DAY (Patient taking differently: 300 mg every 12 (twelve) hours ) 90 tablet 1    potassium chloride (KLOR-CON M20) 20 mEq tablet Take 1 tablet (20 mEq total) by mouth daily 30 tablet 6    sertraline (ZOLOFT) 100 mg tablet Take 1 tablet (100 mg total) by mouth 2 (two) times a day 60 tablet 1    umeclidinium-vilanterol (ANORO ELLIPTA) 62 5-25 MCG/INH inhaler Inhale 1 puff daily 1 Inhaler 5    albuterol (PROVENTIL HFA,VENTOLIN HFA) 90 mcg/act inhaler Inhale 2 puffs every 6 (six) hours as needed for wheezing or shortness of breath (Patient not taking: Reported on 2/4/2022 ) 1 Inhaler 0    aspirin (ECOTRIN LOW STRENGTH) 81 mg EC tablet Take 81 mg by mouth every evening  (Patient not taking: Reported on 2/4/2022 )      benzonatate (TESSALON) 200 MG capsule Take 1 capsule (200 mg total) by mouth 3 (three) times a day as needed for cough (Patient not taking: Reported on 5/11/2021) 20 capsule 0    cyclobenzaprine (FLEXERIL) 10 mg tablet Take 1 tablet (10 mg total) by mouth 2 (two) times a day as needed for muscle spasms (Patient not taking: Reported on 2/4/2022 ) 30 tablet 1    ibuprofen (MOTRIN) 600 mg tablet Take 1 tablet (600 mg total) by mouth every 6 (six) hours as needed for mild pain (Patient not taking: Reported on 2/4/2022 ) 30 tablet 0    levalbuterol (XOPENEX HFA) 45 mcg/act inhaler Inhale 1-2 puffs every 6 (six) hours as needed for wheezing 1 Inhaler 0    levocetirizine (XYZAL) 5 MG tablet Take 5 mg by mouth every evening (Patient not taking: Reported on 2/4/2022 )      OrthoVisc 30 MG/2ML SOSY injection  (Patient not taking: Reported on 2/4/2022 )       No current facility-administered medications for this visit  Objective:    /80 (BP Location: Left arm, Patient Position: Sitting, Cuff Size: Large)   Pulse 79   Temp 97 8 °F (36 6 °C)   Resp 18   Ht 6' 4" (1 93 m)   Wt (!) 159 kg (351 lb)   SpO2 97%   BMI 42 73 kg/m²        Physical Exam  Vitals and nursing note reviewed  Constitutional:       General: He is not in acute distress  Appearance: Normal appearance  Eyes:      Extraocular Movements: Extraocular movements intact  Pupils: Pupils are equal, round, and reactive to light  Neck:      Comments: Full PROM with some pain    Cardiovascular:      Rate and Rhythm: Normal rate and regular rhythm  Pulses: Normal pulses  Heart sounds: Normal heart sounds  Pulmonary:      Effort: Pulmonary effort is normal       Breath sounds: Normal breath sounds  Abdominal:      Tenderness: There is no abdominal tenderness  Musculoskeletal:      Right shoulder: No swelling  Decreased range of motion  Decreased strength  Normal pulse  Cervical back: No rigidity  Muscular tenderness present  No spinous process tenderness  Lumbar back: Tenderness present  No deformity  Comments: Antalgic gait       Skin:     Findings: No bruising  Neurological:      General: No focal deficit present  Mental Status: He is alert  Mental status is at baseline     Psychiatric:         Mood and Affect: Mood normal          Behavior: Behavior normal                 Lissett Estimable, CRNP

## 2022-02-04 NOTE — LETTER
February 4, 2022     Patient: Chin Lam  YOB: 1955   Date of Visit: 2/4/2022       To Whom it May Concern:    Marisela Damon is under my professional care  He was seen in my office on 2/4/2022  He may return to work on 2/11/22  excused 2/4-2/10  If you have any questions or concerns, please don't hesitate to call           Sincerely,          AYLA Byrnes        CC: No Recipients
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
## 2022-02-04 NOTE — LETTER
February 4, 2022     Patient: Irma Ocampo  YOB: 1955   Date of Visit: 2/4/2022       To Whom it May Concern:    Socorro Berman is under my professional care  He was seen in my office on 2/4/2022  No work at this time, follow-up in 1 week  If you have any questions or concerns, please don't hesitate to call           Sincerely,          Simon Tripathi, DO

## 2022-02-07 ENCOUNTER — TELEPHONE (OUTPATIENT)
Dept: OBGYN CLINIC | Facility: CLINIC | Age: 67
End: 2022-02-07

## 2022-02-07 ENCOUNTER — TELEPHONE (OUTPATIENT)
Dept: HEMATOLOGY ONCOLOGY | Facility: CLINIC | Age: 67
End: 2022-02-07

## 2022-02-07 DIAGNOSIS — K86.2 CYST OF PANCREAS: Primary | ICD-10-CM

## 2022-02-07 NOTE — TELEPHONE ENCOUNTER
Pharmacy just called stating he has a Meloxicam prescription from a Dr Yusuf Anaya  Is this ok to still fill ibuprofen    ( I was unaware of this at the time he called and asked)

## 2022-02-07 NOTE — PROGRESS NOTES
MRI reviewed  Case discussed with Dr Norris Loving  I spoke with the patient and he agrees to see Dr Norris Loving    Patient gave an alternate number to call if needed:[phone]

## 2022-02-07 NOTE — TELEPHONE ENCOUNTER
Patient states OTC Tylenol is not helping  Asking if you can prescribe him ibuprofen 600 mg? He had this in the past, he cannot be on it for a long period of time  He had two left over from another prescription  Asking if you can prescribe this for his back/shoulder since it helped?

## 2022-02-07 NOTE — TELEPHONE ENCOUNTER
Ibuprofen Rx sent to his pharmacy  Please remind him to be careful with this medication if he feels any abdominal pain he should stop  This could put him at a higher risk for GI bleed with some of his other medications

## 2022-02-07 NOTE — TELEPHONE ENCOUNTER
New Patient Intake Form   Patient Details:    Brenda Aleman Sr   1955  [id number]    Appointment Information   Who is calling to schedule? Patient    If not self, what is the caller's name? Please put name of [facility] nurse as well  Referring provider Dr Roberta Méndez    What is the diagnosis? pancreatic cyst   Is there a confirmed tissue diagnosis? No    Is patient aware of diagnosis? yes   Have you had any imaging or labs done? If yes, where? (If imaging done outside of [facility], please remind patient to bring a disk ) yes   Have you been seen by another Oncologist/Hematologist?  If so, who and where? no   Are the records in [facility] or Care Everywhere? yes   Are records needed from an outside facility? no   If yes, Name of facility, city and state where facility is located  N/a    Preferred [facility]   Is the patient willing to be seen by another provider?   (This is for breast patients only)    Miscellaneous Information:

## 2022-02-08 ENCOUNTER — PROCEDURE VISIT (OUTPATIENT)
Dept: OBGYN CLINIC | Facility: CLINIC | Age: 67
End: 2022-02-08
Payer: COMMERCIAL

## 2022-02-08 DIAGNOSIS — M17.11 PRIMARY OSTEOARTHRITIS OF RIGHT KNEE: ICD-10-CM

## 2022-02-08 DIAGNOSIS — M17.12 PRIMARY OSTEOARTHRITIS OF LEFT KNEE: Primary | ICD-10-CM

## 2022-02-08 PROCEDURE — 20610 DRAIN/INJ JOINT/BURSA W/O US: CPT | Performed by: ORTHOPAEDIC SURGERY
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
## 2022-02-08 NOTE — TELEPHONE ENCOUNTER
Spoke to pharmacy they will fill ibuprofen but will also put on label not to take with Meloxicam  I call and LMOM that they will fill ibuprofen to to give them sometime to fill

## 2022-02-11 ENCOUNTER — TELEPHONE (OUTPATIENT)
Dept: OTHER | Facility: OTHER | Age: 67
End: 2022-02-11

## 2022-02-11 ENCOUNTER — OFFICE VISIT (OUTPATIENT)
Dept: OBGYN CLINIC | Facility: CLINIC | Age: 67
End: 2022-02-11
Payer: OTHER MISCELLANEOUS

## 2022-02-11 ENCOUNTER — APPOINTMENT (OUTPATIENT)
Dept: RADIOLOGY | Facility: CLINIC | Age: 67
End: 2022-02-11
Payer: COMMERCIAL

## 2022-02-11 VITALS
SYSTOLIC BLOOD PRESSURE: 164 MMHG | TEMPERATURE: 96.4 F | DIASTOLIC BLOOD PRESSURE: 87 MMHG | HEART RATE: 73 BPM | BODY MASS INDEX: 42.73 KG/M2 | WEIGHT: 315 LBS

## 2022-02-11 DIAGNOSIS — S46.109A INJURY OF TENDON OF LONG HEAD OF BICEPS, INITIAL ENCOUNTER: Primary | ICD-10-CM

## 2022-02-11 DIAGNOSIS — S46.001A ROTATOR CUFF INJURY, RIGHT, INITIAL ENCOUNTER: ICD-10-CM

## 2022-02-11 DIAGNOSIS — M25.512 ACUTE PAIN OF LEFT SHOULDER: ICD-10-CM

## 2022-02-11 PROCEDURE — 73030 X-RAY EXAM OF SHOULDER: CPT

## 2022-02-11 PROCEDURE — 99214 OFFICE O/P EST MOD 30 MIN: CPT | Performed by: ORTHOPAEDIC SURGERY
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
## 2022-02-11 NOTE — TELEPHONE ENCOUNTER
Patient calling and yelling stating "I don't know where to go  I cant get into my chart"  When offered the address via phone  Patient yelled louder and stated "You know what, just cancel the appointment " Call abruptly ended

## 2022-02-11 NOTE — LETTER
February 11, 2022     Patient: Omid Garcia  YOB: 1955   Date of Visit: 2/11/2022       To Whom it May Concern:    Dorcas Cho is under my professional care  He was seen in my office on 2/11/2022  He will be out of work until he has his MRI done  If you have any questions or concerns, please don't hesitate to call           Sincerely,          Cristhian Slater, DO

## 2022-02-16 ENCOUNTER — TELEPHONE (OUTPATIENT)
Dept: OBGYN CLINIC | Facility: CLINIC | Age: 67
End: 2022-02-16

## 2022-02-16 NOTE — TELEPHONE ENCOUNTER
Worker's comp case  Dr Lozano  ordered [address] and MRI for patient  He recived a call to schedule this but was unsure about approval  He did leave a vm for his WC adjustor and has not heard back  Not sure if we have any information for him regarding this? Thank you!

## 2022-02-17 ENCOUNTER — PREP FOR PROCEDURE (OUTPATIENT)
Dept: GASTROENTEROLOGY | Facility: CLINIC | Age: 67
End: 2022-02-17

## 2022-02-17 ENCOUNTER — TELEPHONE (OUTPATIENT)
Dept: GASTROENTEROLOGY | Facility: CLINIC | Age: 67
End: 2022-02-17

## 2022-02-17 DIAGNOSIS — K86.2 PANCREATIC CYST: Primary | ICD-10-CM

## 2022-02-17 NOTE — TELEPHONE ENCOUNTER
----- Message from [address] sent at 2/15/2022 10:02 AM EST -----    ----- Message -----  From: Dev Yeboah MD  Sent: 2/7/2022   3:31 PM EST  To: Kavin Jhaveri MD, #    Hello  GI schedulign staff :  Please schedule for EUS  Thank you  ----- Message -----  From: Kavin Jhaveri MD  Sent: 2/7/2022  12:44 PM EST  To: Dev Yeboah MD, Brittney Altamirano MD, #    We will get him in  [facility], can you reach out to him please  Rafael  ----- Message -----  From: Brittney Altamirano MD  Sent: 2/7/2022  12:35 PM EST  To: Kavin Jhaveri MD, Dev Yeboah MD, #    Rafael,    I spoke to the patient and he agrees to see you  I placed a consult to you  Patient also gave an alternate number in case you need it: [phone]    Thanks for helping me out  Rich  ----- Message -----  From: Kavin Jhaveri MD  Sent: 2/4/2022   7:51 AM EST  To: Dev Yeboah MD, Brittney Altamirano MD, #    Tate,  I would get an EUS on him  I am more than happy to see him and follow if needed  This looks more like an IPMN to me, but at this size, I would sample the fluid  I can let Dr Ta Adan know as well   I would be concerned, only since I don't see a documented history of pancreatitis  Rafael

## 2022-02-17 NOTE — TELEPHONE ENCOUNTER
Scheduled date of EUS(as of today): 4/20/22  Physician performing EUS: Dr Casarez  Location of EUS: [facility]  Instructions reviewed with patient by: Meera/doris  Clearances: N/A

## 2022-02-18 ENCOUNTER — HOSPITAL ENCOUNTER (OUTPATIENT)
Dept: RADIOLOGY | Facility: HOSPITAL | Age: 67
Discharge: HOME | End: 2022-02-18
Attending: ORTHOPAEDIC SURGERY
Payer: OTHER MISCELLANEOUS

## 2022-02-18 DIAGNOSIS — S46.001A ROTATOR CUFF INJURY, RIGHT, INITIAL ENCOUNTER: ICD-10-CM

## 2022-02-18 PROCEDURE — 73221 MRI JOINT UPR EXTREM W/O DYE: CPT

## 2022-02-22 ENCOUNTER — HOSPITAL ENCOUNTER (OUTPATIENT)
Dept: RADIOLOGY | Facility: HOSPITAL | Age: 67
Discharge: HOME | End: 2022-02-22
Attending: ORTHOPAEDIC SURGERY
Payer: COMMERCIAL

## 2022-02-22 ENCOUNTER — PROCEDURE VISIT (OUTPATIENT)
Dept: OBGYN CLINIC | Facility: CLINIC | Age: 67
End: 2022-02-22
Payer: COMMERCIAL

## 2022-02-22 ENCOUNTER — TELEPHONE (OUTPATIENT)
Dept: OBGYN CLINIC | Facility: CLINIC | Age: 67
End: 2022-02-22

## 2022-02-22 DIAGNOSIS — S46.109A INJURY OF TENDON OF LONG HEAD OF BICEPS, INITIAL ENCOUNTER: ICD-10-CM

## 2022-02-22 DIAGNOSIS — M17.12 PRIMARY OSTEOARTHRITIS OF LEFT KNEE: Primary | ICD-10-CM

## 2022-02-22 DIAGNOSIS — M17.11 PRIMARY OSTEOARTHRITIS OF RIGHT KNEE: ICD-10-CM

## 2022-02-22 PROCEDURE — 76882 US LMTD JT/FCL EVL NVASC XTR: CPT

## 2022-02-22 PROCEDURE — 20610 DRAIN/INJ JOINT/BURSA W/O US: CPT | Performed by: ORTHOPAEDIC SURGERY

## 2022-02-22 NOTE — TELEPHONE ENCOUNTER
----- Message from Kaylyn Long DO sent at 2/22/2022  9:00 AM EST -----  César's MRI of the right shoulder did not show a rotator cuff tear  It showed tendinitis and osteoarthritis in his shoulder  If his pain continues we could start him in physical therapy however I do think he should start feeling better with some gentle motion of the shoulder
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
## 2022-02-22 NOTE — TELEPHONE ENCOUNTER
Patient was advised of below and they just completed his ultrasound on his left as well  He would like a script for PT for the right, but please look up for the ultrasound of the right as well  He is asking when he can go back to work as well  I spoke to Dr Braydon Herr who said his ultrasound came out okay no tears  Braydon Herr will place a script in for PT for both shoulders  Patient can return to work on Monday, 28th

## 2022-02-28 ENCOUNTER — TELEPHONE (OUTPATIENT)
Dept: OBGYN CLINIC | Facility: CLINIC | Age: 67
End: 2022-02-28

## 2022-02-28 NOTE — TELEPHONE ENCOUNTER
Spoke to patient and was able to clear up the concerns with him  There was an approval for PT at [facility] that was sent from his [facility]  [facility]  Pt stated he was not happy with whom he spoke with at PT and advised he wants to be seen in [place] or [place] instead  I did advise that he should talk to his  first bc the approval was for [facility] and to make sure that they don't need to send another approval for another office  He understood  I advised that he may need to call us back and we can send the script or whatever is needed to his [facility]

## 2022-02-28 NOTE — TELEPHONE ENCOUNTER
Patient called into the office upset, he left voicemail on our ortho phone  It was very hard to understand       We will call patient back

## 2022-03-01 ENCOUNTER — PROCEDURE VISIT (OUTPATIENT)
Dept: OBGYN CLINIC | Facility: CLINIC | Age: 67
End: 2022-03-01
Payer: COMMERCIAL

## 2022-03-01 ENCOUNTER — CONSULT (OUTPATIENT)
Dept: SURGICAL ONCOLOGY | Facility: CLINIC | Age: 67
End: 2022-03-01
Payer: COMMERCIAL

## 2022-03-01 VITALS
BODY MASS INDEX: 38.36 KG/M2 | OXYGEN SATURATION: 98 % | HEIGHT: 76 IN | DIASTOLIC BLOOD PRESSURE: 84 MMHG | TEMPERATURE: 98.1 F | SYSTOLIC BLOOD PRESSURE: 130 MMHG | HEART RATE: 71 BPM | WEIGHT: 315 LBS | RESPIRATION RATE: 16 BRPM

## 2022-03-01 DIAGNOSIS — K86.2 CYST OF PANCREAS: ICD-10-CM

## 2022-03-01 DIAGNOSIS — M17.11 PRIMARY OSTEOARTHRITIS OF RIGHT KNEE: Primary | ICD-10-CM

## 2022-03-01 DIAGNOSIS — K86.2 PANCREATIC CYST: Primary | ICD-10-CM

## 2022-03-01 DIAGNOSIS — M17.0 BILATERAL PRIMARY OSTEOARTHRITIS OF KNEE: ICD-10-CM

## 2022-03-01 PROCEDURE — 20610 DRAIN/INJ JOINT/BURSA W/O US: CPT | Performed by: ORTHOPAEDIC SURGERY

## 2022-03-01 PROCEDURE — 99204 OFFICE O/P NEW MOD 45 MIN: CPT | Performed by: SURGERY
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
## 2022-03-10 ENCOUNTER — EVALUATION (OUTPATIENT)
Dept: PHYSICAL THERAPY | Facility: CLINIC | Age: 67
End: 2022-03-10
Payer: OTHER MISCELLANEOUS

## 2022-03-10 DIAGNOSIS — M19.011 ARTHRITIS OF RIGHT SHOULDER REGION: ICD-10-CM

## 2022-03-10 DIAGNOSIS — G89.11 ACUTE PAIN OF LEFT SHOULDER DUE TO TRAUMA: Primary | ICD-10-CM

## 2022-03-10 DIAGNOSIS — S46.001A ROTATOR CUFF INJURY, RIGHT, INITIAL ENCOUNTER: ICD-10-CM

## 2022-03-10 DIAGNOSIS — M25.512 ACUTE PAIN OF LEFT SHOULDER DUE TO TRAUMA: Primary | ICD-10-CM

## 2022-03-10 DIAGNOSIS — S46.812A STRAIN OF LEFT DELTOID MUSCLE, INITIAL ENCOUNTER: ICD-10-CM

## 2022-03-10 PROCEDURE — 97161 PT EVAL LOW COMPLEX 20 MIN: CPT
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
## 2022-03-14 ENCOUNTER — OFFICE VISIT (OUTPATIENT)
Dept: PHYSICAL THERAPY | Facility: CLINIC | Age: 67
End: 2022-03-14
Payer: OTHER MISCELLANEOUS

## 2022-03-14 DIAGNOSIS — M25.512 ACUTE PAIN OF LEFT SHOULDER DUE TO TRAUMA: Primary | ICD-10-CM

## 2022-03-14 DIAGNOSIS — G89.11 ACUTE PAIN OF LEFT SHOULDER DUE TO TRAUMA: Primary | ICD-10-CM

## 2022-03-14 PROCEDURE — 97112 NEUROMUSCULAR REEDUCATION: CPT

## 2022-03-14 PROCEDURE — 97110 THERAPEUTIC EXERCISES: CPT
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
## 2022-03-16 ENCOUNTER — OFFICE VISIT (OUTPATIENT)
Dept: PHYSICAL THERAPY | Facility: CLINIC | Age: 67
End: 2022-03-16
Payer: OTHER MISCELLANEOUS

## 2022-03-16 DIAGNOSIS — G89.11 ACUTE PAIN OF LEFT SHOULDER DUE TO TRAUMA: Primary | ICD-10-CM

## 2022-03-16 DIAGNOSIS — M25.512 ACUTE PAIN OF LEFT SHOULDER DUE TO TRAUMA: Primary | ICD-10-CM

## 2022-03-16 DIAGNOSIS — S46.812A STRAIN OF LEFT DELTOID MUSCLE, INITIAL ENCOUNTER: ICD-10-CM

## 2022-03-16 DIAGNOSIS — S46.001A ROTATOR CUFF INJURY, RIGHT, INITIAL ENCOUNTER: ICD-10-CM

## 2022-03-16 DIAGNOSIS — M19.011 ARTHRITIS OF RIGHT SHOULDER REGION: ICD-10-CM

## 2022-03-16 PROCEDURE — 97112 NEUROMUSCULAR REEDUCATION: CPT

## 2022-03-16 PROCEDURE — 97110 THERAPEUTIC EXERCISES: CPT
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
## 2022-03-18 ENCOUNTER — OFFICE VISIT (OUTPATIENT)
Dept: PHYSICAL THERAPY | Facility: CLINIC | Age: 67
End: 2022-03-18
Payer: OTHER MISCELLANEOUS

## 2022-03-18 DIAGNOSIS — M25.512 ACUTE PAIN OF LEFT SHOULDER DUE TO TRAUMA: Primary | ICD-10-CM

## 2022-03-18 DIAGNOSIS — G89.11 ACUTE PAIN OF LEFT SHOULDER DUE TO TRAUMA: Primary | ICD-10-CM

## 2022-03-18 DIAGNOSIS — S46.812A STRAIN OF LEFT DELTOID MUSCLE, INITIAL ENCOUNTER: ICD-10-CM

## 2022-03-18 DIAGNOSIS — M19.011 ARTHRITIS OF RIGHT SHOULDER REGION: ICD-10-CM

## 2022-03-18 DIAGNOSIS — S46.001A ROTATOR CUFF INJURY, RIGHT, INITIAL ENCOUNTER: ICD-10-CM

## 2022-03-18 PROCEDURE — 97112 NEUROMUSCULAR REEDUCATION: CPT

## 2022-03-18 PROCEDURE — 97110 THERAPEUTIC EXERCISES: CPT
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
## 2022-03-21 ENCOUNTER — OFFICE VISIT (OUTPATIENT)
Dept: PHYSICAL THERAPY | Facility: CLINIC | Age: 67
End: 2022-03-21
Payer: OTHER MISCELLANEOUS

## 2022-03-21 DIAGNOSIS — M25.512 ACUTE PAIN OF LEFT SHOULDER DUE TO TRAUMA: Primary | ICD-10-CM

## 2022-03-21 DIAGNOSIS — M19.011 ARTHRITIS OF RIGHT SHOULDER REGION: ICD-10-CM

## 2022-03-21 DIAGNOSIS — S46.812A STRAIN OF LEFT DELTOID MUSCLE, INITIAL ENCOUNTER: ICD-10-CM

## 2022-03-21 DIAGNOSIS — G89.11 ACUTE PAIN OF LEFT SHOULDER DUE TO TRAUMA: Primary | ICD-10-CM

## 2022-03-21 PROCEDURE — 97110 THERAPEUTIC EXERCISES: CPT

## 2022-03-21 PROCEDURE — 97112 NEUROMUSCULAR REEDUCATION: CPT

## 2022-03-21 PROCEDURE — 97140 MANUAL THERAPY 1/> REGIONS: CPT

## 2022-03-21 NOTE — PROGRESS NOTES
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       Objective: see below       Precautions: AAA   Date 3/10/2022  3/14/22  3/16/22  3/18/22  3/21/22     Visit Number IE  2  3  4  5                     Manual               Shoulder mobs               Shoulder PROM               Scap dissociation               STM   5 min L upper trap, rhomboid Cervical distraction  3 min upper trap                                       Cervical distraction       5 min  5 min       Neuro Re-ed               Shoulder isos               Scap retract  HEP  2*20  2*20  2*20 2*20     Serratus push up      2*10         Cervical retraction HEP  2*20 standing against wall  2*20 with self over pressure against wall  2*20 w self over pressure against wall  2*20 w self over pressure against wall      tennis ball    on wall upper trap 3"                           Thera Ex               Pulleys       30  30  30     TB shoulder ext    2*10 RTB  2*10 RTB  20x RTB  20x BTB     TB shoulder ext walkout       20x RTB  20x BTB     TB row     2*10 RTB  2*10 RTB  20 RTB  20 BTB     TB IR/ER     2*20 RTB  2*10 RTB  20 RTB  20 BTB     Wall slides     18  20  20  20     Prone I, Y, T               SL ER    2#, 2*10    2# 2*10       No monies w RTB HEP    2*10  2*10 lt green loop  20 lt green loop     Iso ER/IR   Towel on wall 2*10 each, 5 sec hold Towel on wall 2*10 ea, 5 sec hold Towel on wall 2*10 ea, 5 sec hold  towel on wall 2*10 ea, 5 sec hold     Prone row     2*20, 2#         Standing row braced on plinth       2*10 5#  2*10 5                                     Thera Activity               Patient education               Posture education    2"                                                                                           Modalities               Ice

## 2022-03-23 ENCOUNTER — OFFICE VISIT (OUTPATIENT)
Dept: PHYSICAL THERAPY | Facility: CLINIC | Age: 67
End: 2022-03-23
Payer: OTHER MISCELLANEOUS

## 2022-03-23 DIAGNOSIS — G89.11 ACUTE PAIN OF LEFT SHOULDER DUE TO TRAUMA: Primary | ICD-10-CM

## 2022-03-23 DIAGNOSIS — M25.512 ACUTE PAIN OF LEFT SHOULDER DUE TO TRAUMA: Primary | ICD-10-CM

## 2022-03-23 DIAGNOSIS — S46.812A STRAIN OF LEFT DELTOID MUSCLE, INITIAL ENCOUNTER: ICD-10-CM

## 2022-03-23 DIAGNOSIS — M19.011 ARTHRITIS OF RIGHT SHOULDER REGION: ICD-10-CM

## 2022-03-23 PROCEDURE — 97110 THERAPEUTIC EXERCISES: CPT

## 2022-03-23 PROCEDURE — 97112 NEUROMUSCULAR REEDUCATION: CPT

## 2022-03-23 NOTE — PROGRESS NOTES
Daily Note     Today's date: 3/23/2022  Patient name: Shalonda Valentin  : 1955  MRN: [id number]  Referring provider: Dagoberto Leigh DO  Dx:   Encounter Diagnosis     ICD-10-CM    1  Acute pain of left shoulder due to trauma  M25 512     G89 11    2  Arthritis of right shoulder region  M19 011    3  Strain of left deltoid muscle, initial encounter  [id number]                   Subjective: Patient reports that nothing has changed since last session  Patient reported taking medication this morning for muscular spasms in his leg and 2 tylenol which brought his pain level down  Objective: See treatment diary below      Assessment: Tolerated treatment well  Patient demonstrated fatigue post treatment with the addition of standing resisted "W"s        Plan: Continue per plan of care             Precautions: AAA   Date 3/10/2022  3/14/22  3/16/22  3/18/22  3/21/22  3/23/22   Visit Number IE  2  3  4  5  6              FOTO     Manual               Shoulder mobs               Shoulder PROM               Scap dissociation               STM   5 min L upper trap, rhomboid Cervical distraction  3 min upper trap                                       Cervical distraction       5 min  5 min    5 min   Neuro Re-ed               Shoulder isos               Scap retract  HEP  2*20  2*20  2*20 2*20  2*20   Serratus push up      2*10         Cervical retraction HEP  2*20 standing against wall  2*20 with self over pressure against wall  2*20 w self over pressure against wall  2*20 w self over pressure against wall  2*20 w self over pressure against wall    tennis ball    on wall upper trap 3"            ball on wall            20x CW, 20x CCW   Thera Ex               Pulleys       30  30  30  30   TB shoulder ext    2*10 RTB  2*10 RTB  20x RTB  20x BTB  20x BTB   TB shoulder ext walkout       20x RTB  20x BTB  20x BTB   TB row     2*10 RTB  2*10 RTB  20 RTB  20 BTB  20 BTB   TB IR/ER     2*20 RTB  2*10 RTB  20 RTB  20 BTB  20 BTB   TB "w"      20 BTB   Wall slides     18  20  20  20  20   Prone I, Y, T               SL ER    2#, 2*10    2# 2*10       No monies w RTB HEP    2*10  2*10 lt green loop  20 lt green loop  20 lt green loop   Iso ER/IR   Towel on wall 2*10 each, 5 sec hold Towel on wall 2*10 ea, 5 sec hold Towel on wall 2*10 ea, 5 sec hold  towel on wall 2*10 ea, 5 sec hold  towel on wall 20 ea, 5 sec hold   Prone row     2*20, 2#         Standing row braced on plinth       2*10 5#  2*10 5                                     Thera Activity               Patient education               Posture education    2"                                                                                           Modalities               Ice

## 2022-03-25 ENCOUNTER — OFFICE VISIT (OUTPATIENT)
Dept: PHYSICAL THERAPY | Facility: CLINIC | Age: 67
End: 2022-03-25
Payer: OTHER MISCELLANEOUS

## 2022-03-25 DIAGNOSIS — M25.512 ACUTE PAIN OF LEFT SHOULDER DUE TO TRAUMA: Primary | ICD-10-CM

## 2022-03-25 DIAGNOSIS — G89.11 ACUTE PAIN OF LEFT SHOULDER DUE TO TRAUMA: Primary | ICD-10-CM

## 2022-03-25 DIAGNOSIS — S46.812A STRAIN OF LEFT DELTOID MUSCLE, INITIAL ENCOUNTER: ICD-10-CM

## 2022-03-25 PROCEDURE — 97110 THERAPEUTIC EXERCISES: CPT

## 2022-03-25 PROCEDURE — 97112 NEUROMUSCULAR REEDUCATION: CPT

## 2022-03-25 NOTE — PROGRESS NOTES
Daily Note     Today's date: 3/25/2022  Patient name: Mindy Johnson  : 1955  MRN: [id number]  Referring provider: Sallie Ornelas DO  Dx:   Encounter Diagnosis     ICD-10-CM    1  Acute pain of left shoulder due to trauma  M25 512     G89 11    2  Strain of left deltoid muscle, initial encounter  [id number]                   Subjective: Patient stated that he was currently in 0/10 pain and that he had been doing his HEP  Objective: See treatment diary below      Assessment: Tolerated treatment well  Patient demonstrated fatigue post treatment with standing resisted "Ws"  Patient is now self correcting foreward head posture more often  Add in serratus punches next session        Plan: Continue per plan of care           Precautions: AAA   Date 3/10/2022  3/14/22  3/16/22  3/18/22  3/21/22  3/23/22 3/25/22   Visit Number IE  2  3  4  5  6 7              FOTO      Manual                Shoulder mobs                Shoulder PROM                Scap dissociation                STM   5 min L upper trap, rhomboid Cervical distraction  3 min upper trap                                          Cervical distraction       5 min  5 min    5 min 5min   Neuro Re-ed                Shoulder isos                Scap retract  HEP  2*20  2*20  2*20 2*20  2*20 2*20   Serratus push up      2*10          Cervical retraction HEP  2*20 standing against wall  2*20 with self over pressure against wall  2*20 w self over pressure against wall  2*20 w self over pressure against wall  2*20 w self over pressure against wall 2*20 w self over pressure    tennis ball    on wall upper trap 3"             ball on wall            20x CW, 20x CCW 20x cw, 20x ccw   Thera Ex                Pulleys       30  30  30  30 30   TB shoulder ext    2*10 RTB  2*10 RTB  20x RTB  20x BTB  20x BTB    TB shoulder ext walkout       20x RTB  20x BTB  20x BTB 20 BTB   TB row     2*10 RTB  2*10 RTB  20 RTB  20 BTB  20 BTB 20 BTB   TB IR/ER     2*20 RTB  2*10 RTB  20 RTB  20 BTB  20 BTB 20 BTB   TB "w"      20 BTB 14 BTB   Wall slides     18  20  20  20  20 20   Prone I, Y, T                SL ER    2#, 2*10    2# 2*10     3# 20x   No monies w RTB HEP    2*10  2*10 lt green loop  20 lt green loop  20 lt green loop 20 lt green loop   Iso ER/IR   Towel on wall 2*10 each, 5 sec hold Towel on wall 2*10 ea, 5 sec hold Towel on wall 2*10 ea, 5 sec hold  towel on wall 2*10 ea, 5 sec hold  towel on wall 20 ea, 5 sec hold Towel on wall 20ea, 5 sec hold   Prone row     2*20, 2#          Standing row braced on plinth       2*10 5#  2*10 5   20x 5#                                     Thera Activity                Patient education                Posture education    2"                                                                                                 Modalities                Ice

## 2022-03-28 ENCOUNTER — APPOINTMENT (OUTPATIENT)
Dept: PHYSICAL THERAPY | Facility: CLINIC | Age: 67
End: 2022-03-28
Payer: OTHER MISCELLANEOUS

## 2022-03-29 ENCOUNTER — TELEMEDICINE (OUTPATIENT)
Dept: FAMILY MEDICINE CLINIC | Facility: CLINIC | Age: 67
End: 2022-03-29
Payer: COMMERCIAL

## 2022-03-29 DIAGNOSIS — J01.11 ACUTE RECURRENT FRONTAL SINUSITIS: Primary | ICD-10-CM

## 2022-03-29 PROCEDURE — 99213 OFFICE O/P EST LOW 20 MIN: CPT | Performed by: NURSE PRACTITIONER

## 2022-03-29 RX ORDER — AZITHROMYCIN 250 MG/1
TABLET, FILM COATED ORAL
Qty: 6 TABLET | Refills: 0 | Status: SHIPPED | OUTPATIENT
Start: 2022-03-29 | End: 2022-04-02
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
## 2022-03-30 ENCOUNTER — APPOINTMENT (OUTPATIENT)
Dept: PHYSICAL THERAPY | Facility: CLINIC | Age: 67
End: 2022-03-30
Payer: OTHER MISCELLANEOUS

## 2022-03-31 ENCOUNTER — OFFICE VISIT (OUTPATIENT)
Dept: PHYSICAL THERAPY | Facility: CLINIC | Age: 67
End: 2022-03-31
Payer: OTHER MISCELLANEOUS

## 2022-03-31 DIAGNOSIS — G89.11 ACUTE PAIN OF LEFT SHOULDER DUE TO TRAUMA: ICD-10-CM

## 2022-03-31 DIAGNOSIS — S46.812A STRAIN OF LEFT DELTOID MUSCLE, INITIAL ENCOUNTER: Primary | ICD-10-CM

## 2022-03-31 DIAGNOSIS — M25.512 ACUTE PAIN OF LEFT SHOULDER DUE TO TRAUMA: ICD-10-CM

## 2022-03-31 PROCEDURE — 97110 THERAPEUTIC EXERCISES: CPT

## 2022-03-31 PROCEDURE — 97112 NEUROMUSCULAR REEDUCATION: CPT

## 2022-03-31 NOTE — PROGRESS NOTES
Daily Note     Today's date: 3/31/2022  Patient name: Mindy Johnson  : 1955  MRN: [id number]  Referring provider: Sallie Ornelas DO  Dx:   Encounter Diagnosis     ICD-10-CM    1  Strain of left deltoid muscle, initial encounter  [phone]    2  Acute pain of left shoulder due to trauma  M27 512     G89 11                   Subjective: Patient reported that his shoulder is acting up a little bit today  Patient reports that his allergies are acting up today  Objective: See treatment diary below      Assessment: Tolerated treatment well  Patient demonstrated fatigue post treatment with standing IR/ ER resistance exercises  Patient continues to display forward head posture which is likely contributing to shoulder pain         Plan: Continue per plan of care           Precautions: AAA   Date 3/10/2022  3/14/22  3/16/22  3/18/22  3/21/22  3/23/22 3/25/22 3/31/22   Visit Number IE  2  3  4  5  6 7 8              FOTO       Manual                 Shoulder mobs                 Shoulder PROM                 Scap dissociation                 STM   5 min L upper trap, rhomboid Cervical distraction  3 min upper trap      3 min upper trap                                       Cervical distraction       5 min  5 min    5 min 5min 5 min/ glides   Neuro Re-ed                 Shoulder isos                 Scap retract  HEP  2*20  2*20  2*20 2*20  2*20 2*20 2*20   Serratus push up      2*10           Cervical retraction HEP  2*20 standing against wall  2*20 with self over pressure against wall  2*20 w self over pressure against wall  2*20 w self over pressure against wall  2*20 w self over pressure against wall 2*20 w self over pressure 2*020 w self over pressure     tennis ball    on wall upper trap 3"              ball on wall            20x CW, 20x CCW 20x cw, 20x ccw    Thera Ex                 Pulleys       30  30  30  30 30    TB shoulder ext    2*10 RTB  2*10 RTB  20x RTB  20x BTB  20x BTB  20 BTB   TB shoulder ext walkout       20x RTB  20x BTB  20x BTB 20 BTB 20 BTB   TB row     2*10 RTB  2*10 RTB  20 RTB  20 BTB  20 BTB 20 BTB 20 BTB   TB IR/ER     2*20 RTB  2*10 RTB  20 RTB  20 BTB  20 BTB 20 BTB 20 BTB   TB "w"      20 BTB 14 BTB 20 Btb   Wall slides     18  20  20  20  20 20    Prone I, Y, T                 SL ER    2#, 2*10    2# 2*10     3# 20x    No monies w RTB HEP    2*10  2*10 lt green loop  20 lt green loop  20 lt green loop 20 lt green loop    Iso ER/IR   Towel on wall 2*10 each, 5 sec hold Towel on wall 2*10 ea, 5 sec hold Towel on wall 2*10 ea, 5 sec hold  towel on wall 2*10 ea, 5 sec hold  towel on wall 20 ea, 5 sec hold Towel on wall 20ea, 5 sec hold Towel on wall 20 ea, 5 sec hold   Prone row     2*20, 2#           Standing row braced on plinth       2*10 5#  2*10 5   20x 5#                                        Thera Activity                 Patient education                 Posture education    2"                                                                                                       Modalities                 Ice

## 2022-04-01 ENCOUNTER — APPOINTMENT (OUTPATIENT)
Dept: PHYSICAL THERAPY | Facility: CLINIC | Age: 67
End: 2022-04-01
Payer: OTHER MISCELLANEOUS

## 2022-04-04 ENCOUNTER — APPOINTMENT (OUTPATIENT)
Dept: PHYSICAL THERAPY | Facility: CLINIC | Age: 67
End: 2022-04-04
Payer: OTHER MISCELLANEOUS

## 2022-04-07 ENCOUNTER — OFFICE VISIT (OUTPATIENT)
Dept: PHYSICAL THERAPY | Facility: CLINIC | Age: 67
End: 2022-04-07
Payer: OTHER MISCELLANEOUS

## 2022-04-07 DIAGNOSIS — M19.011 ARTHRITIS OF RIGHT SHOULDER REGION: ICD-10-CM

## 2022-04-07 DIAGNOSIS — M25.512 ACUTE PAIN OF LEFT SHOULDER DUE TO TRAUMA: ICD-10-CM

## 2022-04-07 DIAGNOSIS — G89.11 ACUTE PAIN OF LEFT SHOULDER DUE TO TRAUMA: ICD-10-CM

## 2022-04-07 DIAGNOSIS — S46.001A ROTATOR CUFF INJURY, RIGHT, INITIAL ENCOUNTER: ICD-10-CM

## 2022-04-07 DIAGNOSIS — S46.812A STRAIN OF LEFT DELTOID MUSCLE, INITIAL ENCOUNTER: Primary | ICD-10-CM

## 2022-04-07 PROCEDURE — 97110 THERAPEUTIC EXERCISES: CPT

## 2022-04-07 PROCEDURE — 97112 NEUROMUSCULAR REEDUCATION: CPT

## 2022-04-07 NOTE — PROGRESS NOTES
Daily Note     Today's date: 2022  Patient name: Leno Riddle  : 1955  MRN: [id number]  Referring provider: Michelle Hodge*  Dx:   Encounter Diagnosis     ICD-10-CM    1  Strain of left deltoid muscle, initial encounter  [phone]    2  Acute pain of left shoulder due to trauma  M25 512     G89 11    3  Arthritis of right shoulder region  M19 011    4  Rotator cuff injury, right, initial encounter  S46 001A                   Subjective: Patient reports to session with no new issues or complaints  Objective: See treatment diary below      Assessment: Tolerated treatment well  Increased focus on isometric hold for claudio-scapular strengthening exercises  Difficulty noted with wall slides with lift likely secondary to weak lower trapezius         Plan: Continue per plan of care           Precautions: AAA   Date 3/10/2022  3/14/22  3/16/22  3/18/22  3/21/22  3/23/22 3/25/22 3/31/22 4/7   Visit Number IE  2  3  4  5  6 7 8 9              FOTO        Manual                  Shoulder mobs                  Shoulder PROM                  Scap dissociation                  STM   5 min L upper trap, rhomboid Cervical distraction  3 min upper trap      3 min upper trap 5 min UT, SCM, scalenes                                         Cervical distraction       5 min  5 min    5 min 5min 5 min/ glides    Neuro Re-ed                  Shoulder isos                  Scap retract  HEP  2*20  2*20  2*20 2*20  2*20 2*20 2*20 2*20   Serratus push up      2*10            Cervical retraction HEP  2*20 standing against wall  2*20 with self over pressure against wall  2*20 w self over pressure against wall  2*20 w self over pressure against wall  2*20 w self over pressure against wall 2*20 w self over pressure 2*20 w self over pressure  2*20 w self over pressure    tennis ball    on wall upper trap 3"               ball on wall            20x CW, 20x CCW 20x cw, 20x ccw  20x cw, 20x ccw   Thera Ex             Pulleys       30  30  30  30 30  30x   TB shoulder ext    2*10 RTB  2*10 RTB  20x RTB  20x BTB  20x BTB  20 BTB 20 BTB   TB shoulder ext walkout       20x RTB  20x BTB  20x BTB 20 BTB 20 BTB 20 BTB   TB row     2*10 RTB  2*10 RTB  20 RTB  20 BTB  20 BTB 20 BTB 20 BTB 20 BTB   TB IR/ER     2*20 RTB  2*10 RTB  20 RTB  20 BTB  20 BTB 20 BTB 20 BTB 20 BTB   TB "w"      20 BTB 14 BTB 20 BTB 20 BTB   Wall slides     18  20  20  20  20 20  20 (+ liftoff)   Prone I, Y, T                  SL ER    2#, 2*10    2# 2*10     3# 20x  3# 20x   No monies w RTB HEP    2*10  2*10 lt green loop  20 lt green loop  20 lt green loop 20 lt green loop     Iso ER/IR   Towel on wall 2*10 each, 5 sec hold Towel on wall 2*10 ea, 5 sec hold Towel on wall 2*10 ea, 5 sec hold  towel on wall 2*10 ea, 5 sec hold  towel on wall 20 ea, 5 sec hold Towel on wall 20ea, 5 sec hold Towel on wall 20 ea, 5 sec hold Defer, pt performed at home   Prone row     2*20, 2#            Standing row braced on plinth       2*10 5#  2*10 5   20x 5#      UT stretch               3 x 30 sec               Thera Activity                  Patient education                  Posture education    2"                                                                                                             Modalities                  Ice

## 2022-04-08 ENCOUNTER — APPOINTMENT (OUTPATIENT)
Dept: PHYSICAL THERAPY | Facility: CLINIC | Age: 67
End: 2022-04-08
Payer: OTHER MISCELLANEOUS

## 2022-04-11 ENCOUNTER — APPOINTMENT (OUTPATIENT)
Dept: PHYSICAL THERAPY | Facility: CLINIC | Age: 67
End: 2022-04-11
Payer: OTHER MISCELLANEOUS

## 2022-04-14 ENCOUNTER — EVALUATION (OUTPATIENT)
Dept: PHYSICAL THERAPY | Facility: CLINIC | Age: 67
End: 2022-04-14
Payer: OTHER MISCELLANEOUS

## 2022-04-14 DIAGNOSIS — S46.812A STRAIN OF LEFT DELTOID MUSCLE, INITIAL ENCOUNTER: Primary | ICD-10-CM

## 2022-04-14 DIAGNOSIS — M19.011 ARTHRITIS OF RIGHT SHOULDER REGION: ICD-10-CM

## 2022-04-14 DIAGNOSIS — M25.512 ACUTE PAIN OF LEFT SHOULDER DUE TO TRAUMA: ICD-10-CM

## 2022-04-14 DIAGNOSIS — G89.11 ACUTE PAIN OF LEFT SHOULDER DUE TO TRAUMA: ICD-10-CM

## 2022-04-14 DIAGNOSIS — M12.9 ARTHRITIS, MULTIPLE JOINT INVOLVEMENT: Primary | ICD-10-CM

## 2022-04-14 PROCEDURE — 97112 NEUROMUSCULAR REEDUCATION: CPT

## 2022-04-14 PROCEDURE — 97110 THERAPEUTIC EXERCISES: CPT

## 2022-04-14 RX ORDER — MELOXICAM 15 MG/1
TABLET ORAL
Qty: 30 TABLET | Refills: 5 | Status: SHIPPED | OUTPATIENT
Start: 2022-04-14
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
## 2022-04-16 ENCOUNTER — TELEPHONE (OUTPATIENT)
Dept: FAMILY MEDICINE CLINIC | Facility: CLINIC | Age: 67
End: 2022-04-16

## 2022-04-16 DIAGNOSIS — M79.661 PAIN AND SWELLING OF LOWER LEG, RIGHT: Primary | ICD-10-CM

## 2022-04-16 DIAGNOSIS — M79.89 PAIN AND SWELLING OF LOWER LEG, RIGHT: Primary | ICD-10-CM

## 2022-04-16 NOTE — TELEPHONE ENCOUNTER
Devi Vega is prone to this  Would he like me to order a doppler and he can schedule? Or schedule in office on Monday for a recheck       In the meantime, he should elevate legs, monitor for worsening symptoms

## 2022-04-16 NOTE — TELEPHONE ENCOUNTER
Titi Patel    Patient says he has been having muscle cramps rt calf and it is swollen, almost double size of left calf  Per Marilou Milton, I advised patient to go to ER for doppler study  Patient declined ER treatment

## 2022-04-18 NOTE — TELEPHONE ENCOUNTER
Patient found his muscle relaxers, swelling is going down ,he thinks it is a bakers cyste behind the knee but his wife wants to get the doppler done , let me know when you order it and ill call and give him the information tc cma

## 2022-04-20 ENCOUNTER — ANESTHESIA EVENT (OUTPATIENT)
Dept: GASTROENTEROLOGY | Facility: HOSPITAL | Age: 67
End: 2022-04-20

## 2022-04-20 ENCOUNTER — ANESTHESIA (OUTPATIENT)
Dept: GASTROENTEROLOGY | Facility: HOSPITAL | Age: 67
End: 2022-04-20

## 2022-04-20 ENCOUNTER — HOSPITAL ENCOUNTER (OUTPATIENT)
Dept: GASTROENTEROLOGY | Facility: HOSPITAL | Age: 67
Setting detail: OUTPATIENT SURGERY
Discharge: HOME | End: 2022-04-20
Attending: INTERNAL MEDICINE | Admitting: INTERNAL MEDICINE
Payer: COMMERCIAL

## 2022-04-20 VITALS
WEIGHT: 315 LBS | HEIGHT: 76 IN | SYSTOLIC BLOOD PRESSURE: 139 MMHG | TEMPERATURE: 97.9 F | OXYGEN SATURATION: 96 % | RESPIRATION RATE: 18 BRPM | BODY MASS INDEX: 38.36 KG/M2 | DIASTOLIC BLOOD PRESSURE: 74 MMHG | HEART RATE: 74 BPM

## 2022-04-20 DIAGNOSIS — K86.2 PANCREATIC CYST: ICD-10-CM

## 2022-04-20 PROCEDURE — 88305 TISSUE EXAM BY PATHOLOGIST: CPT | Performed by: PATHOLOGY

## 2022-04-20 PROCEDURE — 88112 CYTOPATH CELL ENHANCE TECH: CPT | Performed by: PATHOLOGY

## 2022-04-20 PROCEDURE — 43242 EGD US FINE NEEDLE BX/ASPIR: CPT | Performed by: INTERNAL MEDICINE

## 2022-04-20 RX ORDER — PROPOFOL 10 MG/ML
INJECTION, EMULSION INTRAVENOUS AS NEEDED
Status: DISCONTINUED | OUTPATIENT
Start: 2022-04-20 | End: 2022-04-20

## 2022-04-20 RX ORDER — LIDOCAINE HYDROCHLORIDE 10 MG/ML
INJECTION, SOLUTION EPIDURAL; INFILTRATION; INTRACAUDAL; PERINEURAL AS NEEDED
Status: DISCONTINUED | OUTPATIENT
Start: 2022-04-20 | End: 2022-04-20

## 2022-04-20 RX ORDER — FENTANYL CITRATE 50 UG/ML
INJECTION, SOLUTION INTRAMUSCULAR; INTRAVENOUS AS NEEDED
Status: DISCONTINUED | OUTPATIENT
Start: 2022-04-20 | End: 2022-04-20

## 2022-04-20 RX ORDER — ALBUTEROL SULFATE 90 UG/1
AEROSOL, METERED RESPIRATORY (INHALATION) AS NEEDED
Status: DISCONTINUED | OUTPATIENT
Start: 2022-04-20 | End: 2022-04-20

## 2022-04-20 RX ORDER — SODIUM CHLORIDE 9 MG/ML
INJECTION, SOLUTION INTRAVENOUS CONTINUOUS PRN
Status: DISCONTINUED | OUTPATIENT
Start: 2022-04-20 | End: 2022-04-20

## 2022-04-20 RX ORDER — PROPOFOL 10 MG/ML
INJECTION, EMULSION INTRAVENOUS CONTINUOUS PRN
Status: DISCONTINUED | OUTPATIENT
Start: 2022-04-20 | End: 2022-04-20

## 2022-04-20 RX ADMIN — FENTANYL CITRATE 50 MCG: 50 INJECTION INTRAMUSCULAR; INTRAVENOUS at 15:12

## 2022-04-20 RX ADMIN — LIDOCAINE HYDROCHLORIDE 100 MG: 10 INJECTION, SOLUTION EPIDURAL; INFILTRATION; INTRACAUDAL; PERINEURAL at 15:08

## 2022-04-20 RX ADMIN — PROPOFOL 150 MCG/KG/MIN: 10 INJECTION, EMULSION INTRAVENOUS at 15:08

## 2022-04-20 RX ADMIN — SODIUM CHLORIDE: 0.9 INJECTION, SOLUTION INTRAVENOUS at 15:02

## 2022-04-20 RX ADMIN — PROPOFOL 100 MG: 10 INJECTION, EMULSION INTRAVENOUS at 15:08

## 2022-04-20 RX ADMIN — ALBUTEROL SULFATE 4 PUFF: 90 AEROSOL, METERED RESPIRATORY (INHALATION) at 15:13

## 2022-04-20 RX ADMIN — FENTANYL CITRATE 50 MCG: 50 INJECTION INTRAMUSCULAR; INTRAVENOUS at 15:08

## 2022-04-20 NOTE — ANESTHESIA PREPROCEDURE EVALUATION
Procedure:  ENDOSCOPIC ULTRASOUND (UPPER)    Relevant Problems   CARDIO   (+) AAA (abdominal aortic aneurysm) (HCC)   (+) Aortic aneurysm (HCC)   (+) Aortic regurgitation   (+) Exertional dyspnea   (+) Hyperlipidemia   (+) Hypertension      GI/HEPATIC   (+) Pancreatic cyst      MUSCULOSKELETAL   (+) Arthritis      NEURO/PSYCH   (+) Depression      PULMONARY   (+) Exertional dyspnea   (+) Obstructive sleep apnea   (+) Simple chronic bronchitis (HCC)             Anesthesia Plan  ASA Score- 3     Anesthesia Type- IV sedation with anesthesia with ASA Monitors  Additional Monitors:   Airway Plan:           Plan Factors-Exercise tolerance (METS): >4 METS  Chart reviewed  Patient summary reviewed  Patient is not a current smoker  Obstructive sleep apnea risk education given perioperatively  Induction- intravenous  Postoperative Plan-     Informed Consent- Anesthetic plan and risks discussed with patient  I personally reviewed this patient with the CRNA  Discussed and agreed on the Anesthesia Plan with the CRNA  Meg Peralta

## 2022-04-20 NOTE — ANESTHESIA POSTPROCEDURE EVALUATION
Post-Op Assessment Note    CV Status:  Stable  Pain Score: 0    Pain management: adequate     Mental Status:  Arousable   Hydration Status:  Stable   PONV Controlled:  None   Airway Patency:  Patent      Post Op Vitals Reviewed: Yes      Staff: Anesthesiologist, CRNA         No complications documented      /71 (04/20/22 1530)    Temp 97 9 °F (36 6 °C) (04/20/22 1530)    Pulse 71 (04/20/22 1530)   Resp 20 (04/20/22 1530)    SpO2 96 % (04/20/22 1530)

## 2022-04-25 ENCOUNTER — TELEPHONE (OUTPATIENT)
Dept: HEMATOLOGY ONCOLOGY | Facility: CLINIC | Age: 67
End: 2022-04-25

## 2022-04-25 NOTE — TELEPHONE ENCOUNTER
CALL RETURN FORM   Reason for patient call? Patient requesting to speak to Dr Orlando Johnson about endoscopy results    Patient's primary oncologist? Dr Orlando Johnson   Name of person the patient was calling for? Dr Orlando Johnson   Any additional information to add, if applicable? Informed patient that the message will be forwarded to the team and someone will get back to them as soon as possible    Did you relay this information to the patient?  Yes

## 2022-04-27 ENCOUNTER — PATIENT MESSAGE (OUTPATIENT)
Dept: PAIN MEDICINE | Facility: CLINIC | Age: 67
End: 2022-04-27

## 2022-05-03 ENCOUNTER — TELEPHONE (OUTPATIENT)
Dept: CARDIAC SURGERY | Facility: CLINIC | Age: 67
End: 2022-05-03

## 2022-05-03 NOTE — TELEPHONE ENCOUNTER
Left message for patient to call back and r/s appt with Dr Lena Klein on 6/7   Dr Lena Klein will be on vacation

## 2022-05-10 ENCOUNTER — OFFICE VISIT (OUTPATIENT)
Dept: FAMILY MEDICINE CLINIC | Facility: CLINIC | Age: 67
End: 2022-05-10
Payer: COMMERCIAL

## 2022-05-10 VITALS
HEIGHT: 76 IN | WEIGHT: 315 LBS | BODY MASS INDEX: 38.36 KG/M2 | RESPIRATION RATE: 18 BRPM | HEART RATE: 71 BPM | DIASTOLIC BLOOD PRESSURE: 88 MMHG | SYSTOLIC BLOOD PRESSURE: 154 MMHG | TEMPERATURE: 97.8 F | OXYGEN SATURATION: 97 %

## 2022-05-10 DIAGNOSIS — R73.01 ELEVATED FASTING BLOOD SUGAR: ICD-10-CM

## 2022-05-10 DIAGNOSIS — Z13.29 SCREENING FOR THYROID DISORDER: ICD-10-CM

## 2022-05-10 DIAGNOSIS — R73.9 HYPERGLYCEMIA: ICD-10-CM

## 2022-05-10 DIAGNOSIS — Z79.899 HIGH RISK MEDICATION USE: ICD-10-CM

## 2022-05-10 DIAGNOSIS — Z12.5 PROSTATE CANCER SCREENING: ICD-10-CM

## 2022-05-10 DIAGNOSIS — Z13.1 SCREENING FOR DIABETES MELLITUS: ICD-10-CM

## 2022-05-10 DIAGNOSIS — Z00.00 MEDICARE ANNUAL WELLNESS VISIT, SUBSEQUENT: Primary | ICD-10-CM

## 2022-05-10 DIAGNOSIS — E78.2 MIXED HYPERLIPIDEMIA: ICD-10-CM

## 2022-05-10 DIAGNOSIS — I10 PRIMARY HYPERTENSION: ICD-10-CM

## 2022-05-10 DIAGNOSIS — Z13.220 LIPID SCREENING: ICD-10-CM

## 2022-05-10 DIAGNOSIS — Z13.0 SCREENING, DEFICIENCY ANEMIA, IRON: ICD-10-CM

## 2022-05-10 PROCEDURE — G0439 PPPS, SUBSEQ VISIT: HCPCS | Performed by: NURSE PRACTITIONER

## 2022-05-10 NOTE — PATIENT INSTRUCTIONS
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
## 2022-05-12 ENCOUNTER — LAB (OUTPATIENT)
Dept: LAB | Facility: CLINIC | Age: 67
End: 2022-05-12
Payer: COMMERCIAL

## 2022-05-12 DIAGNOSIS — I10 PRIMARY HYPERTENSION: ICD-10-CM

## 2022-05-12 DIAGNOSIS — E78.2 MIXED HYPERLIPIDEMIA: ICD-10-CM

## 2022-05-12 DIAGNOSIS — Z79.899 HIGH RISK MEDICATION USE: ICD-10-CM

## 2022-05-12 DIAGNOSIS — R73.01 ELEVATED FASTING BLOOD SUGAR: ICD-10-CM

## 2022-05-12 DIAGNOSIS — Z12.5 PROSTATE CANCER SCREENING: ICD-10-CM

## 2022-05-12 LAB
ALBUMIN SERPL BCP-MCNC: 3.7 G/DL (ref 3.5–5)
ALP SERPL-CCNC: 149 U/L (ref 46–116)
ALT SERPL W P-5'-P-CCNC: 36 U/L (ref 12–78)
ANION GAP SERPL CALCULATED.3IONS-SCNC: 6 MMOL/L (ref 4–13)
AST SERPL W P-5'-P-CCNC: 23 U/L (ref 5–45)
BASOPHILS # BLD AUTO: 0.03 THOUSANDS/ΜL (ref 0–0.1)
BASOPHILS NFR BLD AUTO: 0 % (ref 0–1)
BILIRUB SERPL-MCNC: 0.62 MG/DL (ref 0.2–1)
BUN SERPL-MCNC: 17 MG/DL (ref 5–25)
CALCIUM SERPL-MCNC: 9.6 MG/DL (ref 8.3–10.1)
CHLORIDE SERPL-SCNC: 110 MMOL/L (ref 100–108)
CHOLEST SERPL-MCNC: 139 MG/DL
CO2 SERPL-SCNC: 26 MMOL/L (ref 21–32)
CREAT SERPL-MCNC: 1.07 MG/DL (ref 0.6–1.3)
EOSINOPHIL # BLD AUTO: 0.19 THOUSAND/ΜL (ref 0–0.61)
EOSINOPHIL NFR BLD AUTO: 3 % (ref 0–6)
ERYTHROCYTE [DISTWIDTH] IN BLOOD BY AUTOMATED COUNT: 13.6 % (ref 11.6–15.1)
EST. AVERAGE GLUCOSE BLD GHB EST-MCNC: 114 MG/DL
GFR SERPL CREATININE-BSD FRML MDRD: 72 ML/MIN/1.73SQ M
GLUCOSE P FAST SERPL-MCNC: 131 MG/DL (ref 65–99)
HBA1C MFR BLD: 5.6 %
HCT VFR BLD AUTO: 50.3 % (ref 36.5–49.3)
HDLC SERPL-MCNC: 30 MG/DL
HGB BLD-MCNC: 16.3 G/DL (ref 12–17)
IMM GRANULOCYTES # BLD AUTO: 0.02 THOUSAND/UL (ref 0–0.2)
IMM GRANULOCYTES NFR BLD AUTO: 0 % (ref 0–2)
LDLC SERPL CALC-MCNC: 83 MG/DL (ref 0–100)
LYMPHOCYTES # BLD AUTO: 2.44 THOUSANDS/ΜL (ref 0.6–4.47)
LYMPHOCYTES NFR BLD AUTO: 33 % (ref 14–44)
MCH RBC QN AUTO: 31.5 PG (ref 26.8–34.3)
MCHC RBC AUTO-ENTMCNC: 32.4 G/DL (ref 31.4–37.4)
MCV RBC AUTO: 97 FL (ref 82–98)
MONOCYTES # BLD AUTO: 0.7 THOUSAND/ΜL (ref 0.17–1.22)
MONOCYTES NFR BLD AUTO: 10 % (ref 4–12)
NEUTROPHILS # BLD AUTO: 3.97 THOUSANDS/ΜL (ref 1.85–7.62)
NEUTS SEG NFR BLD AUTO: 54 % (ref 43–75)
NONHDLC SERPL-MCNC: 109 MG/DL
NRBC BLD AUTO-RTO: 0 /100 WBCS
PLATELET # BLD AUTO: 257 THOUSANDS/UL (ref 149–390)
PMV BLD AUTO: 10.2 FL (ref 8.9–12.7)
POTASSIUM SERPL-SCNC: 4.4 MMOL/L (ref 3.5–5.3)
PROT SERPL-MCNC: 7.4 G/DL (ref 6.4–8.2)
PSA SERPL-MCNC: 0.3 NG/ML (ref 0–4)
RBC # BLD AUTO: 5.17 MILLION/UL (ref 3.88–5.62)
SODIUM SERPL-SCNC: 142 MMOL/L (ref 136–145)
TRIGL SERPL-MCNC: 132 MG/DL
TSH SERPL DL<=0.05 MIU/L-ACNC: 2.36 UIU/ML (ref 0.45–4.5)
WBC # BLD AUTO: 7.35 THOUSAND/UL (ref 4.31–10.16)

## 2022-05-12 PROCEDURE — 83036 HEMOGLOBIN GLYCOSYLATED A1C: CPT

## 2022-05-12 PROCEDURE — 80061 LIPID PANEL: CPT

## 2022-05-12 PROCEDURE — 80053 COMPREHEN METABOLIC PANEL: CPT

## 2022-05-12 PROCEDURE — 84443 ASSAY THYROID STIM HORMONE: CPT

## 2022-05-12 PROCEDURE — 85025 COMPLETE CBC W/AUTO DIFF WBC: CPT

## 2022-05-12 PROCEDURE — 36415 COLL VENOUS BLD VENIPUNCTURE: CPT

## 2022-05-12 PROCEDURE — G0103 PSA SCREENING: HCPCS

## 2022-05-31 DIAGNOSIS — J40 BRONCHITIS: ICD-10-CM

## 2022-05-31 NOTE — LETTER
May 31, 2022     Patient: Viry Willis  YOB: 1955      To Whom it May Concern:    Miky Hodges is under my professional care  Xavi Murillo may return to work on 6/1/22  pt excused 5/31/22  If you have any questions or concerns, please don't hesitate to call           Sincerely,          Darren Grant        CC: No Recipients

## 2022-05-31 NOTE — TELEPHONE ENCOUNTER
Note in chart  My apologies that I was not able to come to work  I am dealing with extraneous circumstance   breo refilled

## 2022-05-31 NOTE — TELEPHONE ENCOUNTER
Betina:    Patient took off from work today for his [address]  He's asking if you can write him an excuse for his employer? Please advise

## 2022-06-02 ENCOUNTER — TELEPHONE (OUTPATIENT)
Dept: SURGICAL ONCOLOGY | Facility: CLINIC | Age: 67
End: 2022-06-02

## 2022-06-02 NOTE — TELEPHONE ENCOUNTER
Called and spoke with Kumar Gregorio to reschedule appt with Dr Mac due to him being on vacation  Rescheduled to 7/12/22 at 8:45am at [facility]  He was agreeable

## 2022-06-10 ENCOUNTER — OFFICE VISIT (OUTPATIENT)
Dept: FAMILY MEDICINE CLINIC | Facility: CLINIC | Age: 67
End: 2022-06-10
Payer: COMMERCIAL

## 2022-06-10 VITALS
WEIGHT: 315 LBS | SYSTOLIC BLOOD PRESSURE: 120 MMHG | HEART RATE: 68 BPM | DIASTOLIC BLOOD PRESSURE: 76 MMHG | RESPIRATION RATE: 18 BRPM | BODY MASS INDEX: 38.36 KG/M2 | OXYGEN SATURATION: 98 % | TEMPERATURE: 98.3 F | HEIGHT: 76 IN

## 2022-06-10 DIAGNOSIS — F31.9 BIPOLAR 1 DISORDER (HCC): ICD-10-CM

## 2022-06-10 DIAGNOSIS — F10.11 HISTORY OF ALCOHOL ABUSE: ICD-10-CM

## 2022-06-10 DIAGNOSIS — R41.3 MEMORY CHANGES: Primary | ICD-10-CM

## 2022-06-10 PROCEDURE — 99214 OFFICE O/P EST MOD 30 MIN: CPT | Performed by: NURSE PRACTITIONER

## 2022-06-10 NOTE — LETTER
Marguerite 10, 2022     Patient: Jeyson Sesay  YOB: 1955  Date of Visit: 6/10/2022      To Whom it May Concern:    Joesph Kota is under my professional care  Carlos Calhoun was seen in my office on 6/10/2022  If you have any questions or concerns, please don't hesitate to call           Sincerely,          AYLA Colbert        CC: No Recipients

## 2022-06-12 NOTE — PROGRESS NOTES
Assessment/Plan:    1  Memory changes    2  Bipolar 1 disorder ([facility] )    3  History of alcohol abuse    The case discussed with patient using patient centered shared decision making  The patient was counseled regarding instructions for management,-- risk factor reductions,-- prognosis,-- impressions,-- risks and benefits of treatment options,-- importance of compliance with treatment  I have reviewed the instructions with the patient, answering all questions to his satisfaction  MMSE score 27  Not consistent with dementia, memory loss  Advised avoid driving after 8  Have family contacts in cell in case of getting lost  Monitor closely          There are no Patient Instructions on file for this visit  No follow-ups on file  I have spent 35 minutes with Patient  today in which greater than 50% of this time was spent in counseling/coordination of care regarding Diagnostic results, Prognosis, Intructions for management, Impressions and provision of MMSE  Subjective:      Patient ID: Vicky Arriaga  is a 61 y o  male  Chief Complaint   Patient presents with    Memory Loss     Pt wants memory test        Antonino Dyson presents with memory concerns  He had 2 episodes of "I didn't known where I was, I couldn't get home"  Episodes occurred in the late evenings approx 10pm  He was able to contract for assistance with family for both episodes  During daytime hours, he is fine    He has h/o etoh, recreational drug use    + family h/o dementia in advanced age      The following portions of the patient's history were reviewed and updated as appropriate: allergies, current medications, past family history, past medical history, past social history, past surgical history and problem list     Review of Systems   Constitutional: Negative  Neurological: Negative      Psychiatric/Behavioral:        Per hpi         Current Outpatient Medications   Medication Sig Dispense Refill    acetaminophen (TYLENOL) 325 mg tablet 2, by mouth, every 6 hours as needed for mild to moderate pain  30 tablet 0    atenolol (TENORMIN) 50 mg tablet TAKE ONE TABLET BY MOUTH EVERY EVENING 90 tablet 1    enalapril (VASOTEC) 5 mg tablet TAKE ONE TABLET BY MOUTH EVERY DAY 90 tablet 1    furosemide (LASIX) 20 mg tablet TAKE ONE TABLET BY MOUTH EVERY DAY AS NEEDED (SEVERE SWELLING IN LEGS)      AS DIRECTED 90 tablet 1    furosemide (LASIX) 40 mg tablet TAKE ONE TABLET BY MOUTH EVERY MORNING 90 tablet 1    meloxicam (MOBIC) 15 mg tablet TAKE ONE TABLET BY MOUTH EVERY DAY 30 tablet 5    potassium chloride (KLOR-CON M20) 20 mEq tablet Take 1 tablet (20 mEq total) by mouth daily 30 tablet 6    sertraline (ZOLOFT) 100 mg tablet Take 1 tablet (100 mg total) by mouth 2 (two) times a day 60 tablet 1    albuterol (2 5 mg/3 mL) 0 083 % nebulizer solution Take 1 vial (2 5 mg total) by nebulization every 6 (six) hours as needed for wheezing or shortness of breath (Patient not taking: No sig reported) 30 vial 3    albuterol (PROVENTIL HFA,VENTOLIN HFA) 90 mcg/act inhaler Inhale 2 puffs every 6 (six) hours as needed for wheezing or shortness of breath (Patient not taking: No sig reported) 1 Inhaler 0    aspirin (ECOTRIN LOW STRENGTH) 81 mg EC tablet Take 81 mg by mouth every evening  (Patient not taking: No sig reported)      benzonatate (TESSALON) 200 MG capsule Take 1 capsule (200 mg total) by mouth 3 (three) times a day as needed for cough (Patient not taking: No sig reported) 20 capsule 0    cyclobenzaprine (FLEXERIL) 10 mg tablet Take 1 tablet (10 mg total) by mouth 2 (two) times a day as needed for muscle spasms (Patient not taking: No sig reported) 30 tablet 1    ibuprofen (MOTRIN) 600 mg tablet Take 1 tablet (600 mg total) by mouth every 6 (six) hours as needed for mild pain (Patient not taking: No sig reported) 30 tablet 0    ibuprofen (MOTRIN) 600 mg tablet Take 1 tablet (600 mg total) by mouth every 6 (six) hours as needed for mild pain (Patient not taking: No sig reported) 30 tablet 0    levalbuterol (XOPENEX HFA) 45 mcg/act inhaler Inhale 1-2 puffs every 6 (six) hours as needed for wheezing 1 Inhaler 0    levocetirizine (XYZAL) 5 MG tablet Take 5 mg by mouth every evening (Patient not taking: No sig reported)      OrthoVisc 30 MG/2ML SOSY injection  (Patient not taking: No sig reported)      OXcarbazepine (TRILEPTAL) 300 mg tablet TAKE ONE AND ONE-HALF TABLETS BY MOUTH TWICE A DAY (Patient not taking: Reported on 6/10/2022) 90 tablet 1    umeclidinium-vilanterol (ANORO ELLIPTA) 62 5-25 MCG/INH inhaler Inhale 1 puff daily 1 blister 11     No current facility-administered medications for this visit         Objective:    /76 (BP Location: Left arm, Patient Position: Sitting, Cuff Size: Large)   Pulse 68   Temp 98 3 °F (36 8 °C)   Resp 18   Ht 6' 4" (1 93 m)   Wt (!) 157 kg (347 lb)   SpO2 98%   BMI 42 24 kg/m²        Physical Exam           Mickel Epley, CRNP

## 2022-06-15 ENCOUNTER — OFFICE VISIT (OUTPATIENT)
Dept: FAMILY MEDICINE CLINIC | Facility: CLINIC | Age: 67
End: 2022-06-15
Payer: COMMERCIAL

## 2022-06-15 VITALS
BODY MASS INDEX: 38.36 KG/M2 | RESPIRATION RATE: 18 BRPM | TEMPERATURE: 97.9 F | HEIGHT: 76 IN | HEART RATE: 65 BPM | SYSTOLIC BLOOD PRESSURE: 122 MMHG | WEIGHT: 315 LBS | OXYGEN SATURATION: 97 % | DIASTOLIC BLOOD PRESSURE: 72 MMHG

## 2022-06-15 DIAGNOSIS — R60.0 BILATERAL EDEMA OF LOWER EXTREMITY: Primary | ICD-10-CM

## 2022-06-15 DIAGNOSIS — I35.1 NONRHEUMATIC AORTIC VALVE INSUFFICIENCY: ICD-10-CM

## 2022-06-15 DIAGNOSIS — I71.9 AORTIC ANEURYSM WITHOUT RUPTURE, UNSPECIFIED PORTION OF AORTA (HCC): ICD-10-CM

## 2022-06-15 DIAGNOSIS — J44.1 CHRONIC OBSTRUCTIVE PULMONARY DISEASE WITH ACUTE EXACERBATION (HCC): ICD-10-CM

## 2022-06-15 DIAGNOSIS — I50.32 CHRONIC DIASTOLIC HEART FAILURE (HCC): ICD-10-CM

## 2022-06-15 PROCEDURE — 99213 OFFICE O/P EST LOW 20 MIN: CPT | Performed by: NURSE PRACTITIONER

## 2022-06-15 NOTE — PROGRESS NOTES
Subjective     Shelah Nissen Sr  is a 61 y o  male who presents for evaluation of edema in the right lower leg  The edema has been mild  Onset of symptoms was 4 days ago, and patient reports symptoms have completely resolved since that time  Patient shared photos taken on smart phone  He advised calf 3 4 inches larger  He took extra lasix 20 mg x 3 days  Edema resolved with same  He denies associated CHF sxs such as dizziness, palp, orthopnea, FERNANDES  The edema is present all day  The patient states the problem has been intermittent for several years  The swelling has been aggravated by dependency of involved area and increased salt intake  The swelling has been relieved by diuretics  Associated factors include: diagnosis of heart failure and venous insufficiency  Cardiac risk factors: advanced age (older than 54 for men, 72 for women), dyslipidemia, hypertension, male gender and obesity (BMI >= 30 kg/m2)  The following portions of the patient's history were reviewed and updated as appropriate: allergies, current medications, past family history, past medical history, past social history, past surgical history and problem list     Review of Systems  Pertinent items are noted in HPI       Objective     /72 (BP Location: Left arm, Patient Position: Sitting, Cuff Size: Large)   Pulse 65   Temp 97 9 °F (36 6 °C)   Resp 18   Ht 6' 4" (1 93 m)   Wt (!) 158 kg (348 lb 6 4 oz)   SpO2 97%   BMI 42 41 kg/m²   General appearance: alert and oriented, in no acute distress  Lungs: clear to auscultation bilaterally  Heart: regular rate and rhythm, S1, S2 normal, no murmur, click, rub or gallop  Extremities: Homans sign is negative, no sign of DVT, no edema, redness or tenderness in the calves or thighs and varicose veins noted  Pulses: 2+ and symmetric  Neurologic: Grossly normal     Assessment/Plan     Edema likely multifactorial->obesity, poor diet, venous insufficiency  Edema resolved at time of visit     The case discussed with patient using patient centered shared decision making  The patient was counseled regarding instructions for management,-- risk factor reductions,-- prognosis,-- impressions,-- risks and benefits of treatment options,-- importance of compliance with treatment  I have reviewed the instructions with the patient, answering all questions to his satisfaction  Recommendations: decrease sodium in the diet, elevate feet above the level of the heart whenever possible, use of compression stockings and weight loss  The patient was also instructed to call IMMEDIATELY (i e , day or night) if any cardiopulmonary symptoms occur, especially chest pain, shortness of breath, dyspnea on exertion, paroxysmal nocturnal dyspnea, or orthopnea, and these were explained    Due for Echo  Follow up in 4 weeks and PRN

## 2022-06-15 NOTE — LETTER
Marguerite 15, 2022     Patient: aDndre Avalos  YOB: 1955  Date of Visit: 6/15/2022      To Whom it May Concern:    Zeb John is under my professional care  Machelle Reeves was seen in my office on 6/15/2022  Machelle Pritchettan may return to work on 6/16/22  excused 6/15/22  If you have any questions or concerns, please don't hesitate to call           Sincerely,          AYLA Iraheta        CC: No Recipients

## 2022-06-16 ENCOUNTER — HOSPITAL ENCOUNTER (OUTPATIENT)
Dept: NON INVASIVE DIAGNOSTICS | Facility: HOSPITAL | Age: 67
Discharge: HOME | End: 2022-06-16
Payer: COMMERCIAL

## 2022-06-16 VITALS
BODY MASS INDEX: 38.36 KG/M2 | HEIGHT: 76 IN | SYSTOLIC BLOOD PRESSURE: 133 MMHG | HEART RATE: 62 BPM | WEIGHT: 315 LBS | DIASTOLIC BLOOD PRESSURE: 78 MMHG

## 2022-06-16 DIAGNOSIS — R60.0 BILATERAL EDEMA OF LOWER EXTREMITY: ICD-10-CM

## 2022-06-16 DIAGNOSIS — I50.32 CHRONIC DIASTOLIC HEART FAILURE (HCC): ICD-10-CM

## 2022-06-16 DIAGNOSIS — I35.1 NONRHEUMATIC AORTIC VALVE INSUFFICIENCY: ICD-10-CM

## 2022-06-16 PROCEDURE — 93306 TTE W/DOPPLER COMPLETE: CPT

## 2022-06-17 LAB
AORTIC ROOT: 5.1 CM
AORTIC VALVE MEAN VELOCITY: 13.4 M/S
APICAL FOUR CHAMBER EJECTION FRACTION: 52 %
AV LVOT PEAK GRADIENT: 3 MMHG
AV MEAN GRADIENT: 8 MMHG
AV PEAK GRADIENT: 16 MMHG
DOP CALC AO PEAK VEL: 2 M/S
DOP CALC AO VTI: 40.66 CM
DOP CALC LVOT AREA: 3.8 CM2
DOP CALC LVOT DIAMETER: 2.2 CM
E WAVE DECELERATION TIME: 169 MS
FRACTIONAL SHORTENING: 21 (ref 28–44)
INTERVENTRICULAR SEPTUM IN DIASTOLE (PARASTERNAL SHORT AXIS VIEW): 1.2 CM
INTERVENTRICULAR SEPTUM: 1.2 CM (ref 0.6–1.1)
LAAS-AP2: 13.8 CM2
LAAS-AP4: 18.5 CM2
LEFT ATRIUM AREA SYSTOLE SINGLE PLANE A4C: 19.4 CM2
LEFT ATRIUM SIZE: 3.3 CM
LEFT INTERNAL DIMENSION IN SYSTOLE: 3.1 CM (ref 2.1–4)
LEFT VENTRICULAR INTERNAL DIMENSION IN DIASTOLE: 3.9 CM (ref 3.5–6)
LEFT VENTRICULAR POSTERIOR WALL IN END DIASTOLE: 1.2 CM
LEFT VENTRICULAR STROKE VOLUME: 27 ML
LVSV (TEICH): 27 ML
MV E'TISSUE VEL-LAT: 8 CM/S
MV E'TISSUE VEL-SEP: 8 CM/S
MV PEAK A VEL: 0.68 M/S
MV PEAK E VEL: 73 CM/S
MV STENOSIS PRESSURE HALF TIME: 49 MS
MV VALVE AREA P 1/2 METHOD: 4.49
RIGHT ATRIUM AREA SYSTOLE A4C: 18.2 CM2
RIGHT VENTRICLE ID DIMENSION: 3.1 CM
SL CV LEFT ATRIUM LENGTH A2C: 4 CM
SL CV LV EF: 55
SL CV PED ECHO LEFT VENTRICLE DIASTOLIC VOLUME (MOD BIPLANE) 2D: 65 ML
SL CV PED ECHO LEFT VENTRICLE SYSTOLIC VOLUME (MOD BIPLANE) 2D: 38 ML
TR MAX PG: 31 MMHG
TR PEAK VELOCITY: 2.8 M/S
TRICUSPID VALVE PEAK REGURGITATION VELOCITY: 2.77 M/S

## 2022-06-17 PROCEDURE — 93306 TTE W/DOPPLER COMPLETE: CPT | Performed by: INTERNAL MEDICINE

## 2022-06-20 ENCOUNTER — TELEPHONE (OUTPATIENT)
Dept: FAMILY MEDICINE CLINIC | Facility: CLINIC | Age: 67
End: 2022-06-20

## 2022-06-20 NOTE — TELEPHONE ENCOUNTER
Spoke with James Jake  Advised of abn echo results (aneurysm getting larger and aortic valve) He is scheduled f/u with his CT surgeon at [facility] in July  He will call to move up appointment   I will fax report when he calls back with fax number
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
## 2022-07-11 ENCOUNTER — OFFICE VISIT (OUTPATIENT)
Dept: OBGYN CLINIC | Facility: CLINIC | Age: 67
End: 2022-07-11
Payer: OTHER MISCELLANEOUS

## 2022-07-11 VITALS
HEIGHT: 76 IN | DIASTOLIC BLOOD PRESSURE: 88 MMHG | SYSTOLIC BLOOD PRESSURE: 127 MMHG | WEIGHT: 315 LBS | BODY MASS INDEX: 38.36 KG/M2 | HEART RATE: 67 BPM

## 2022-07-11 DIAGNOSIS — M24.812 INTERNAL DERANGEMENT OF LEFT SHOULDER: Primary | ICD-10-CM

## 2022-07-11 PROCEDURE — 99213 OFFICE O/P EST LOW 20 MIN: CPT | Performed by: ORTHOPAEDIC SURGERY
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
## 2022-07-12 ENCOUNTER — OFFICE VISIT (OUTPATIENT)
Dept: SURGICAL ONCOLOGY | Facility: CLINIC | Age: 67
End: 2022-07-12
Payer: COMMERCIAL

## 2022-07-12 VITALS
HEART RATE: 67 BPM | DIASTOLIC BLOOD PRESSURE: 86 MMHG | HEIGHT: 76 IN | TEMPERATURE: 97.9 F | WEIGHT: 315 LBS | OXYGEN SATURATION: 97 % | RESPIRATION RATE: 18 BRPM | SYSTOLIC BLOOD PRESSURE: 128 MMHG | BODY MASS INDEX: 38.36 KG/M2

## 2022-07-12 DIAGNOSIS — K86.2 PANCREATIC CYST: Primary | ICD-10-CM

## 2022-07-12 PROCEDURE — 99214 OFFICE O/P EST MOD 30 MIN: CPT | Performed by: SURGERY
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
## 2022-07-15 ENCOUNTER — OFFICE VISIT (OUTPATIENT)
Dept: FAMILY MEDICINE CLINIC | Facility: CLINIC | Age: 67
End: 2022-07-15
Payer: COMMERCIAL

## 2022-07-15 ENCOUNTER — OFFICE VISIT (OUTPATIENT)
Dept: OBGYN CLINIC | Facility: CLINIC | Age: 67
End: 2022-07-15
Payer: COMMERCIAL

## 2022-07-15 ENCOUNTER — APPOINTMENT (OUTPATIENT)
Dept: RADIOLOGY | Facility: CLINIC | Age: 67
End: 2022-07-15
Payer: COMMERCIAL

## 2022-07-15 VITALS
TEMPERATURE: 97 F | OXYGEN SATURATION: 96 % | WEIGHT: 315 LBS | HEART RATE: 84 BPM | RESPIRATION RATE: 18 BRPM | HEIGHT: 76 IN | BODY MASS INDEX: 38.36 KG/M2 | SYSTOLIC BLOOD PRESSURE: 140 MMHG | DIASTOLIC BLOOD PRESSURE: 80 MMHG

## 2022-07-15 VITALS
DIASTOLIC BLOOD PRESSURE: 76 MMHG | BODY MASS INDEX: 38.36 KG/M2 | WEIGHT: 315 LBS | HEART RATE: 64 BPM | HEIGHT: 76 IN | SYSTOLIC BLOOD PRESSURE: 146 MMHG

## 2022-07-15 DIAGNOSIS — K86.2 PANCREATIC CYST: ICD-10-CM

## 2022-07-15 DIAGNOSIS — R41.3 MEMORY IMPAIRMENT: ICD-10-CM

## 2022-07-15 DIAGNOSIS — M17.11 TRICOMPARTMENT OSTEOARTHRITIS OF RIGHT KNEE: Primary | ICD-10-CM

## 2022-07-15 DIAGNOSIS — M25.561 RIGHT KNEE PAIN, UNSPECIFIED CHRONICITY: ICD-10-CM

## 2022-07-15 DIAGNOSIS — I71.40 ABDOMINAL AORTIC ANEURYSM (AAA) WITHOUT RUPTURE: Primary | ICD-10-CM

## 2022-07-15 DIAGNOSIS — E66.01 MORBID OBESITY WITH BMI OF 40.0-44.9, ADULT (HCC): ICD-10-CM

## 2022-07-15 DIAGNOSIS — Z71.2 ENCOUNTER TO DISCUSS TEST RESULTS: ICD-10-CM

## 2022-07-15 PROCEDURE — 20610 DRAIN/INJ JOINT/BURSA W/O US: CPT | Performed by: ORTHOPAEDIC SURGERY

## 2022-07-15 PROCEDURE — 73562 X-RAY EXAM OF KNEE 3: CPT

## 2022-07-15 PROCEDURE — 99213 OFFICE O/P EST LOW 20 MIN: CPT | Performed by: NURSE PRACTITIONER

## 2022-07-15 PROCEDURE — 99213 OFFICE O/P EST LOW 20 MIN: CPT | Performed by: ORTHOPAEDIC SURGERY

## 2022-07-15 RX ORDER — LIDOCAINE HYDROCHLORIDE 10 MG/ML
4 INJECTION, SOLUTION INFILTRATION; PERINEURAL
Status: COMPLETED | OUTPATIENT
Start: 2022-07-15 | End: 2022-07-15

## 2022-07-15 RX ORDER — DEXAMETHASONE SODIUM PHOSPHATE 100 MG/10ML
40 INJECTION INTRAMUSCULAR; INTRAVENOUS
Status: COMPLETED | OUTPATIENT
Start: 2022-07-15 | End: 2022-07-15

## 2022-07-15 RX ADMIN — DEXAMETHASONE SODIUM PHOSPHATE 40 MG: 100 INJECTION INTRAMUSCULAR; INTRAVENOUS at 11:32

## 2022-07-15 RX ADMIN — LIDOCAINE HYDROCHLORIDE 4 ML: 10 INJECTION, SOLUTION INFILTRATION; PERINEURAL at 11:32

## 2022-07-15 NOTE — PROGRESS NOTES
Assessment/Plan:    1  Abdominal aortic aneurysm (AAA) without rupture ([facility] )    2  Morbid obesity with BMI of 40 0-44 9, adult ([facility] )    3  Pancreatic cyst    4  Memory impairment  Comments:  stable    The case discussed with patient using patient centered shared decision making  The patient was counseled regarding instructions for management,-- risk factor reductions,-- prognosis,-- impressions,-- risks and benefits of treatment options,-- importance of compliance with treatment  I have reviewed the instructions with the patient, answering all questions to his satisfaction  Patient clinically stable at this time  Cont with current plan of care  Follow up after seeing CT surg to discuss hi impression/plan          There are no Patient Instructions on file for this visit  No follow-ups on file  Subjective:      Patient ID: Isidra Gaston  is a 61 y o  male  Chief Complaint   Patient presents with    Follow-up       Recent finding of progression of AAA on Echo to 5 3 cm    7/20- CV NP   7/26 having ct scan of aorta and seeing CT surgeon, Dr Lenka Caraballo    Both at [facility]    Memory problems stable no better no worse  No episodes since last visit    Reviewed surg onc note->pancreatic cyst stable  Plan to monitor      The following portions of the patient's history were reviewed and updated as appropriate: allergies, current medications, past family history, past medical history, past social history, past surgical history and problem list     Review of Systems   Constitutional: Positive for fatigue  Negative for unexpected weight change  Respiratory: Negative for shortness of breath  Cardiovascular: Negative for chest pain  LE edema stable   Musculoskeletal: Positive for arthralgias  Neurological: Negative for dizziness, syncope and weakness           Current Outpatient Medications   Medication Sig Dispense Refill    acetaminophen (TYLENOL) 325 mg tablet 2, by mouth, every 6 hours as needed for mild to moderate pain  30 tablet 0    albuterol (2 5 mg/3 mL) 0 083 % nebulizer solution Take 1 vial (2 5 mg total) by nebulization every 6 (six) hours as needed for wheezing or shortness of breath 30 vial 3    albuterol (PROVENTIL HFA,VENTOLIN HFA) 90 mcg/act inhaler Inhale 2 puffs every 6 (six) hours as needed for wheezing or shortness of breath 1 Inhaler 0    aspirin (ECOTRIN LOW STRENGTH) 81 mg EC tablet Take 81 mg by mouth every evening      atenolol (TENORMIN) 50 mg tablet TAKE ONE TABLET BY MOUTH EVERY EVENING 90 tablet 1    benzonatate (TESSALON) 200 MG capsule Take 1 capsule (200 mg total) by mouth 3 (three) times a day as needed for cough 20 capsule 0    cyclobenzaprine (FLEXERIL) 10 mg tablet Take 1 tablet (10 mg total) by mouth 2 (two) times a day as needed for muscle spasms 30 tablet 1    enalapril (VASOTEC) 5 mg tablet TAKE ONE TABLET BY MOUTH EVERY DAY 90 tablet 1    furosemide (LASIX) 20 mg tablet TAKE ONE TABLET BY MOUTH EVERY DAY AS NEEDED (SEVERE SWELLING IN LEGS)      AS DIRECTED 90 tablet 1    furosemide (LASIX) 40 mg tablet TAKE ONE TABLET BY MOUTH EVERY MORNING 90 tablet 1    ibuprofen (MOTRIN) 600 mg tablet Take 1 tablet (600 mg total) by mouth every 6 (six) hours as needed for mild pain 30 tablet 0    ibuprofen (MOTRIN) 600 mg tablet Take 1 tablet (600 mg total) by mouth every 6 (six) hours as needed for mild pain 30 tablet 0    levocetirizine (XYZAL) 5 MG tablet Take 5 mg by mouth every evening      meloxicam (MOBIC) 15 mg tablet TAKE ONE TABLET BY MOUTH EVERY DAY 30 tablet 5    OrthoVisc 30 MG/2ML SOSY injection       OXcarbazepine (TRILEPTAL) 300 mg tablet TAKE ONE AND ONE-HALF TABLETS BY MOUTH TWICE A DAY 90 tablet 1    potassium chloride (KLOR-CON M20) 20 mEq tablet Take 1 tablet (20 mEq total) by mouth daily 30 tablet 6    sertraline (ZOLOFT) 100 mg tablet Take 1 tablet (100 mg total) by mouth 2 (two) times a day 60 tablet 1    umeclidinium-vilanterol (ANORO ELLIPTA) 62 5-25 MCG/INH inhaler Inhale 1 puff daily 1 blister 11    levalbuterol (XOPENEX HFA) 45 mcg/act inhaler Inhale 1-2 puffs every 6 (six) hours as needed for wheezing 1 Inhaler 0     No current facility-administered medications for this visit  Objective:    /80 (BP Location: Left arm, Patient Position: Sitting, Cuff Size: Large)   Pulse 84   Temp (!) 97 °F (36 1 °C)   Resp 18   Ht 6' 4" (1 93 m)   Wt (!) 158 kg (347 lb 6 4 oz)   SpO2 96%   BMI 42 29 kg/m²        Physical Exam  Vitals and nursing note reviewed  Constitutional:       General: He is not in acute distress  Appearance: Normal appearance  Cardiovascular:      Rate and Rhythm: Normal rate and regular rhythm  Pulmonary:      Effort: Pulmonary effort is normal       Breath sounds: Normal breath sounds  Musculoskeletal:      Right lower leg: No edema  Left lower leg: No edema  Neurological:      General: No focal deficit present  Mental Status: He is alert  Mental status is at baseline                  Murali Rodriguez [place]
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
## 2022-07-15 NOTE — LETTER
July 15, 2022     Patient: Jamari Kearns  YOB: 1955  Date of Visit: 7/15/2022      To Whom it May Concern:    Tayler Medina is under my professional care  Rk Payne was seen in my office on 7/15/2022  If you have any questions or concerns, please don't hesitate to call           Sincerely,          Malcom Harris DO        CC: No Recipients

## 2022-08-08 DIAGNOSIS — I50.32 CHRONIC DIASTOLIC CONGESTIVE HEART FAILURE (HCC): ICD-10-CM

## 2022-08-08 RX ORDER — ENALAPRIL MALEATE 5 MG/1
TABLET ORAL
Qty: 90 TABLET | Refills: 1 | Status: SHIPPED | OUTPATIENT
Start: 2022-08-08

## 2022-08-24 ENCOUNTER — APPOINTMENT (OUTPATIENT)
Dept: RADIOLOGY | Facility: CLINIC | Age: 67
End: 2022-08-24
Payer: COMMERCIAL

## 2022-08-24 ENCOUNTER — OFFICE VISIT (OUTPATIENT)
Dept: FAMILY MEDICINE CLINIC | Facility: CLINIC | Age: 67
End: 2022-08-24
Payer: COMMERCIAL

## 2022-08-24 VITALS
DIASTOLIC BLOOD PRESSURE: 80 MMHG | HEIGHT: 76 IN | RESPIRATION RATE: 16 BRPM | WEIGHT: 315 LBS | OXYGEN SATURATION: 97 % | TEMPERATURE: 97.7 F | SYSTOLIC BLOOD PRESSURE: 130 MMHG | HEART RATE: 78 BPM | BODY MASS INDEX: 38.36 KG/M2

## 2022-08-24 DIAGNOSIS — J41.0 SIMPLE CHRONIC BRONCHITIS (HCC): Primary | ICD-10-CM

## 2022-08-24 DIAGNOSIS — R06.02 SHORTNESS OF BREATH: ICD-10-CM

## 2022-08-24 DIAGNOSIS — R05.9 COUGH: ICD-10-CM

## 2022-08-24 DIAGNOSIS — R07.89 CHEST TIGHTNESS: ICD-10-CM

## 2022-08-24 PROCEDURE — 99214 OFFICE O/P EST MOD 30 MIN: CPT | Performed by: FAMILY MEDICINE

## 2022-08-24 PROCEDURE — 71046 X-RAY EXAM CHEST 2 VIEWS: CPT

## 2022-08-24 RX ORDER — METHYLPREDNISOLONE 4 MG/1
TABLET ORAL
Qty: 21 EACH | Refills: 0 | Status: SHIPPED | OUTPATIENT
Start: 2022-08-24

## 2022-08-24 RX ORDER — FAMOTIDINE 20 MG/1
20 TABLET, FILM COATED ORAL
Qty: 30 TABLET | Refills: 0 | Status: SHIPPED | OUTPATIENT
Start: 2022-08-24
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
## 2022-08-25 ENCOUNTER — TELEPHONE (OUTPATIENT)
Dept: OBGYN CLINIC | Facility: CLINIC | Age: 67
End: 2022-08-25

## 2022-08-25 DIAGNOSIS — M17.0 BILATERAL PRIMARY OSTEOARTHRITIS OF KNEE: Primary | ICD-10-CM

## 2022-08-25 NOTE — TELEPHONE ENCOUNTER
This is for his left shoulder, he said [employer] made him go to their doctor and they ordered the MRI  He is asking if you can read this

## 2022-08-25 NOTE — TELEPHONE ENCOUNTER
The [employer] physician ordered MRI and [employer] did not like that you did not Order MRI first time so she sent him elsewhere,    He would actually like you to look at the disc to see what you think  He is trying to protect himself  He does not like what [employer] is doing with him  I can call for report as well  But let me know if he can drop off disc to be uploaded  MRI done [facility]  68

## 2022-08-25 NOTE — TELEPHONE ENCOUNTER
Called patient, spoke to patient confirmed apt for 08/18 with Dr. Crews. Provided office address and suite number as well as advised to come in 15 minutes before apt time. Pre check in done, reports no changes.   Patient will bring in disc to be uploaded  I called [facility]ul[facility] , I got a voicemail and [facility] to call me back  I will try again this afternoon

## 2022-08-26 NOTE — TELEPHONE ENCOUNTER
Called [facility] again at [phone], stated the tech has been out and Report may be ready by next Monday or Tuesday  If it is not if someone can call Wednesday for it?

## 2022-08-30 ENCOUNTER — TELEPHONE (OUTPATIENT)
Dept: PAIN MEDICINE | Facility: CLINIC | Age: 67
End: 2022-08-30

## 2022-08-30 NOTE — TELEPHONE ENCOUNTER
Patient contacted office and advised he had report and would be stopping in to have it scanned into chart

## 2022-08-31 NOTE — TELEPHONE ENCOUNTER
Spoke with patient and advised of below, he gave verbal understanding     He is going to do PT and FU with [facility]one if Pt is unsuccessful

## 2022-08-31 NOTE — TELEPHONE ENCOUNTER
MRI shows no rotator cuff tear  Only tendinosis of the rotator cuff  Typically this is treated with physical therapy  If he is not feeling better with therapy that he has previously done then he may want to follow-up with Dr Len Dupree and consider diagnostic arthroscopy of the shoulder

## 2022-09-01 ENCOUNTER — HOSPITAL ENCOUNTER (OUTPATIENT)
Dept: PULMONOLOGY | Facility: HOSPITAL | Age: 67
End: 2022-09-01
Attending: FAMILY MEDICINE
Payer: COMMERCIAL

## 2022-09-01 DIAGNOSIS — R06.02 SHORTNESS OF BREATH: ICD-10-CM

## 2022-09-01 PROCEDURE — 94060 EVALUATION OF WHEEZING: CPT | Performed by: INTERNAL MEDICINE

## 2022-09-01 PROCEDURE — 94726 PLETHYSMOGRAPHY LUNG VOLUMES: CPT

## 2022-09-01 PROCEDURE — 94727 GAS DIL/WSHOT DETER LNG VOL: CPT | Performed by: INTERNAL MEDICINE

## 2022-09-01 PROCEDURE — 94729 DIFFUSING CAPACITY: CPT

## 2022-09-01 PROCEDURE — 94729 DIFFUSING CAPACITY: CPT | Performed by: INTERNAL MEDICINE

## 2022-09-01 PROCEDURE — 94760 N-INVAS EAR/PLS OXIMETRY 1: CPT

## 2022-09-01 PROCEDURE — 94060 EVALUATION OF WHEEZING: CPT

## 2022-09-01 RX ORDER — ALBUTEROL SULFATE 2.5 MG/3ML
2.5 SOLUTION RESPIRATORY (INHALATION) ONCE
Status: COMPLETED | OUTPATIENT
Start: 2022-09-01 | End: 2022-09-01

## 2022-09-01 RX ADMIN — ALBUTEROL SULFATE 2.5 MG: 2.5 SOLUTION RESPIRATORY (INHALATION) at 16:38

## 2022-09-02 ENCOUNTER — TELEPHONE (OUTPATIENT)
Dept: FAMILY MEDICINE CLINIC | Facility: CLINIC | Age: 67
End: 2022-09-02

## 2022-09-02 DIAGNOSIS — J41.0 SIMPLE CHRONIC BRONCHITIS (HCC): ICD-10-CM

## 2022-09-02 RX ORDER — ALBUTEROL SULFATE 2.5 MG/3ML
2.5 SOLUTION RESPIRATORY (INHALATION) EVERY 6 HOURS PRN
Qty: 3 ML | Refills: 8 | Status: SHIPPED | OUTPATIENT
Start: 2022-09-02

## 2022-09-08 DIAGNOSIS — J41.0 SIMPLE CHRONIC BRONCHITIS (HCC): Primary | ICD-10-CM

## 2022-09-09 ENCOUNTER — PROCEDURE VISIT (OUTPATIENT)
Dept: OBGYN CLINIC | Facility: CLINIC | Age: 67
End: 2022-09-09
Payer: COMMERCIAL

## 2022-09-09 VITALS — TEMPERATURE: 98.4 F

## 2022-09-09 DIAGNOSIS — M17.0 BILATERAL PRIMARY OSTEOARTHRITIS OF KNEE: Primary | ICD-10-CM

## 2022-09-09 PROCEDURE — 20610 DRAIN/INJ JOINT/BURSA W/O US: CPT | Performed by: PHYSICIAN ASSISTANT

## 2022-09-09 RX ORDER — HYALURONATE SODIUM 10 MG/ML
20 SYRINGE (ML) INTRAARTICULAR
Status: COMPLETED | OUTPATIENT
Start: 2022-09-09 | End: 2022-09-09

## 2022-09-09 RX ADMIN — Medication 20 MG: at 14:43
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
## 2022-09-23 ENCOUNTER — PROCEDURE VISIT (OUTPATIENT)
Dept: OBGYN CLINIC | Facility: CLINIC | Age: 67
End: 2022-09-23
Payer: COMMERCIAL

## 2022-09-23 DIAGNOSIS — M17.0 BILATERAL PRIMARY OSTEOARTHRITIS OF KNEE: Primary | ICD-10-CM

## 2022-09-23 PROCEDURE — 20610 DRAIN/INJ JOINT/BURSA W/O US: CPT | Performed by: PHYSICIAN ASSISTANT

## 2022-09-23 RX ORDER — HYALURONATE SODIUM 10 MG/ML
20 SYRINGE (ML) INTRAARTICULAR
Status: COMPLETED | OUTPATIENT
Start: 2022-09-23 | End: 2022-09-23

## 2022-09-23 RX ADMIN — Medication 20 MG: at 14:58
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
## 2022-09-30 ENCOUNTER — PROCEDURE VISIT (OUTPATIENT)
Dept: OBGYN CLINIC | Facility: CLINIC | Age: 67
End: 2022-09-30
Payer: COMMERCIAL

## 2022-09-30 VITALS — TEMPERATURE: 98.5 F

## 2022-09-30 DIAGNOSIS — M17.0 BILATERAL PRIMARY OSTEOARTHRITIS OF KNEE: Primary | ICD-10-CM

## 2022-09-30 PROCEDURE — 20610 DRAIN/INJ JOINT/BURSA W/O US: CPT | Performed by: PHYSICIAN ASSISTANT
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
## 2022-10-11 ENCOUNTER — CONSULT (OUTPATIENT)
Dept: PULMONOLOGY | Facility: MEDICAL CENTER | Age: 67
End: 2022-10-11
Payer: COMMERCIAL

## 2022-10-11 VITALS
DIASTOLIC BLOOD PRESSURE: 82 MMHG | OXYGEN SATURATION: 97 % | SYSTOLIC BLOOD PRESSURE: 134 MMHG | RESPIRATION RATE: 12 BRPM | BODY MASS INDEX: 42.36 KG/M2 | HEART RATE: 81 BPM | HEIGHT: 76 IN | TEMPERATURE: 97.5 F

## 2022-10-11 DIAGNOSIS — J43.9 PULMONARY EMPHYSEMA, UNSPECIFIED EMPHYSEMA TYPE (HCC): Primary | ICD-10-CM

## 2022-10-11 DIAGNOSIS — J41.0 SIMPLE CHRONIC BRONCHITIS (HCC): ICD-10-CM

## 2022-10-11 DIAGNOSIS — Z99.89 OSA ON CPAP: ICD-10-CM

## 2022-10-11 DIAGNOSIS — G47.33 OSA ON CPAP: ICD-10-CM

## 2022-10-11 PROCEDURE — 99204 OFFICE O/P NEW MOD 45 MIN: CPT | Performed by: INTERNAL MEDICINE

## 2022-10-11 NOTE — PROGRESS NOTES
Assessment:    1  Pulmonary emphysema, unspecified emphysema type ([facility] )  fluticasone-umeclidinium-vilanterol (Trelegy Ellipta) 200-62 5-25 MCG/INH AEPB inhaler   2  Simple chronic bronchitis ([facility] )  Ambulatory Referral to Pulmonology   3  PHONG on CPAP  Diagnostic Sleep Study           Plan:     -noncomplaint with CPAP due to lack of mask,tubing and filters: he is willing to use; repeat PSG  - has extensive second hand smoking history, five pack year active smoking history  He had PFT attempted on 8/2022 but unable to complete PFT  He is stared on anoro by PMD with some improvement in breathing/dyspnea  He noticed improvement with albuterol as well  We will try Trelegy as he is willing to try something better  Subjective:     Patient ID: Srini Penaloza  is a 61 y o  male  Chief Complaint:  HPI: dyspnea on exertion that he worsening in last few years  He is on short term disability in last 5 weeks with shoulder problem and claims that weight gain is due to inability to do exercise  The following portions of the patient's history were reviewed in this encounter and updated as appropriate:   Review of Systems   Constitutional: Negative  HENT: Negative  Eyes: Negative  Respiratory: Positive for shortness of breath  Negative for apnea, choking and chest tightness  Cardiovascular: Negative  Endocrine: Negative  Genitourinary: Negative  Musculoskeletal: Negative  Neurological: Negative  Psychiatric/Behavioral: Negative  Objective:    Physical Exam  Constitutional:       General: He is not in acute distress  Appearance: He is obese  HENT:      Head: Normocephalic and atraumatic  Nose: Nose normal       Mouth/Throat:      Mouth: Mucous membranes are moist    Cardiovascular:      Rate and Rhythm: Normal rate and regular rhythm  Pulses: Normal pulses  Pulmonary:      Effort: Pulmonary effort is normal  No respiratory distress        Breath sounds: Normal breath sounds  No stridor  No wheezing  Abdominal:      General: Bowel sounds are normal       Palpations: Abdomen is soft  There is no mass  Musculoskeletal:      Cervical back: Neck supple  Skin:     General: Skin is warm  Neurological:      General: No focal deficit present  Mental Status: He is alert and oriented to person, place, and time  Psychiatric:         Mood and Affect: Mood normal          Behavior: Behavior normal          Lab Review:   No visits with results within 2 Month(s) from this visit     Latest known visit with results is:   Hospital Outpatient Visit on 06/16/2022   Component Date Value   • LA size 06/16/2022 3 3    • Aortic valve mean veloci* 06/16/2022 13 40    • Triscuspid Valve Regurgi* 06/16/2022 31 0    • Tricuspid valve peak reg* 06/16/2022 2 77    • LVPWd 06/16/2022 1 20    • Left Atrium Area-systoli* 06/16/2022 13 8    • Left Atrium Area-systoli* 06/16/2022 18 5    • MV E' Tissue Velocity Se* 06/16/2022 8    • MV E' Tissue Velocity La* 06/16/2022 8    • TR Peak David 06/16/2022 2 8    • IVSd 06/16/2022 6 10    • LV DIASTOLIC VOLUME (MOD* 84/76/2820 65    • LEFT VENTRICLE SYSTOLIC * 53/14/7627 38    • Left ventricular stroke * 06/16/2022 27 00    • A4C EF 06/16/2022 52    • LA length (A2C) 06/16/2022 4 00    • LVIDd 06/16/2022 3 90    • IVS 06/16/2022 1 2    • LVIDS 06/16/2022 3 10    • FS 06/16/2022 21    • Ao root 06/16/2022 5 10    • RVID d 06/16/2022 3 1    • AV mean gradient 06/16/2022 8    • AV LVOT peak gradient 06/16/2022 3    • MV valve area p 1/2 meth* 06/16/2022 4 49    • E wave deceleration time 06/16/2022 169    • LVOT diameter 06/16/2022 2 2    • Aortic valve peak veloci* 06/16/2022 2    • Ao VTI 06/16/2022 40 66    • AV peak gradient 06/16/2022 16    • MV Peak E David 06/16/2022 73    • MV Peak A David 06/16/2022 0 68    • RUSTY A4C 06/16/2022 19 4    • RAA A4C 06/16/2022 18 2    • MV stenosis pressure 1/2* 06/16/2022 49    • LVSV, 2D 06/16/2022 27    • LVOT area 06/16/2022 3 80    • LV EF 06/16/2022 55

## 2022-10-17 ENCOUNTER — OFFICE VISIT (OUTPATIENT)
Dept: OBGYN CLINIC | Facility: CLINIC | Age: 67
End: 2022-10-17
Payer: COMMERCIAL

## 2022-10-17 ENCOUNTER — APPOINTMENT (OUTPATIENT)
Dept: RADIOLOGY | Facility: CLINIC | Age: 67
End: 2022-10-17
Payer: COMMERCIAL

## 2022-10-17 VITALS
HEIGHT: 76 IN | HEART RATE: 84 BPM | DIASTOLIC BLOOD PRESSURE: 82 MMHG | SYSTOLIC BLOOD PRESSURE: 134 MMHG | BODY MASS INDEX: 38.36 KG/M2 | WEIGHT: 315 LBS

## 2022-10-17 DIAGNOSIS — M17.11 PRIMARY OSTEOARTHRITIS OF RIGHT KNEE: Primary | ICD-10-CM

## 2022-10-17 DIAGNOSIS — M25.561 RIGHT KNEE PAIN, UNSPECIFIED CHRONICITY: ICD-10-CM

## 2022-10-17 PROCEDURE — 73562 X-RAY EXAM OF KNEE 3: CPT

## 2022-10-17 PROCEDURE — 99213 OFFICE O/P EST LOW 20 MIN: CPT | Performed by: ORTHOPAEDIC SURGERY
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
## 2022-10-21 ENCOUNTER — TELEPHONE (OUTPATIENT)
Dept: OBGYN CLINIC | Facility: CLINIC | Age: 67
End: 2022-10-21

## 2022-10-21 NOTE — TELEPHONE ENCOUNTER
Patient called in asking for prescription motrin to be sent in states tylenol is not touching his pain       Advised of over the counter motrin, patient states that with over the counter he will take 4-5 pills and want's to avoid that

## 2022-10-24 DIAGNOSIS — S76.301A RIGHT HAMSTRING INJURY, INITIAL ENCOUNTER: ICD-10-CM

## 2022-10-24 DIAGNOSIS — M79.661 PAIN OF RIGHT CALF: ICD-10-CM

## 2022-10-24 RX ORDER — IBUPROFEN 600 MG/1
600 TABLET ORAL EVERY 6 HOURS PRN
Qty: 60 TABLET | Refills: 0 | Status: SHIPPED | OUTPATIENT
Start: 2022-10-24

## 2022-10-24 NOTE — TELEPHONE ENCOUNTER
Spoke with patient and advised of below, he gave verbal understanding and sends his thanks to the office

## 2022-10-25 ENCOUNTER — HOSPITAL ENCOUNTER (OUTPATIENT)
Dept: RADIOLOGY | Facility: HOSPITAL | Age: 67
Discharge: HOME | End: 2022-10-25
Payer: COMMERCIAL

## 2022-10-25 DIAGNOSIS — M79.89 PAIN AND SWELLING OF LOWER LEG, RIGHT: ICD-10-CM

## 2022-10-25 DIAGNOSIS — M79.661 PAIN AND SWELLING OF LOWER LEG, RIGHT: ICD-10-CM

## 2022-10-25 PROCEDURE — 93971 EXTREMITY STUDY: CPT

## 2022-10-25 PROCEDURE — 93971 EXTREMITY STUDY: CPT | Performed by: SURGERY

## 2022-11-04 ENCOUNTER — CLINICAL SUPPORT (OUTPATIENT)
Dept: FAMILY MEDICINE CLINIC | Facility: CLINIC | Age: 67
End: 2022-11-04

## 2022-11-04 DIAGNOSIS — Z23 NEED FOR VACCINATION: Primary | ICD-10-CM

## 2022-11-29 ENCOUNTER — OFFICE VISIT (OUTPATIENT)
Dept: FAMILY MEDICINE CLINIC | Facility: CLINIC | Age: 67
End: 2022-11-29

## 2022-11-29 ENCOUNTER — HOSPITAL ENCOUNTER (OUTPATIENT)
Dept: RADIOLOGY | Facility: HOSPITAL | Age: 67
Discharge: HOME | End: 2022-11-29
Attending: STUDENT IN AN ORGANIZED HEALTH CARE EDUCATION/TRAINING PROGRAM

## 2022-11-29 VITALS
HEART RATE: 78 BPM | TEMPERATURE: 97 F | HEIGHT: 76 IN | DIASTOLIC BLOOD PRESSURE: 80 MMHG | BODY MASS INDEX: 38.36 KG/M2 | RESPIRATION RATE: 16 BRPM | WEIGHT: 315 LBS | SYSTOLIC BLOOD PRESSURE: 120 MMHG

## 2022-11-29 DIAGNOSIS — I50.32 CHRONIC DIASTOLIC HEART FAILURE (HCC): ICD-10-CM

## 2022-11-29 DIAGNOSIS — G44.311 INTRACTABLE ACUTE POST-TRAUMATIC HEADACHE: ICD-10-CM

## 2022-11-29 DIAGNOSIS — I10 PRIMARY HYPERTENSION: ICD-10-CM

## 2022-11-29 DIAGNOSIS — R42 NONSPECIFIC DIZZINESS: ICD-10-CM

## 2022-11-29 DIAGNOSIS — S09.90XA TRAUMATIC INJURY OF HEAD, INITIAL ENCOUNTER: ICD-10-CM

## 2022-11-29 DIAGNOSIS — S09.90XA TRAUMATIC INJURY OF HEAD, INITIAL ENCOUNTER: Primary | ICD-10-CM

## 2022-11-29 RX ORDER — CYCLOBENZAPRINE HCL 10 MG
10 TABLET ORAL 2 TIMES DAILY PRN
Qty: 30 TABLET | Refills: 1 | Status: CANCELLED | OUTPATIENT
Start: 2022-11-29
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
## 2022-12-07 ENCOUNTER — TELEPHONE (OUTPATIENT)
Dept: FAMILY MEDICINE CLINIC | Facility: CLINIC | Age: 67
End: 2022-12-07

## 2022-12-07 DIAGNOSIS — Z12.11 SCREENING FOR COLON CANCER: Primary | ICD-10-CM

## 2022-12-07 NOTE — TELEPHONE ENCOUNTER
Pt keeps getting letter from [facility] stating he is overdue for another screening  His last was10/2019  Please put order in chart  He is aware that you are out of the office

## 2023-01-05 ENCOUNTER — TELEMEDICINE (OUTPATIENT)
Dept: FAMILY MEDICINE CLINIC | Facility: CLINIC | Age: 68
End: 2023-01-05

## 2023-01-05 VITALS — OXYGEN SATURATION: 95 % | TEMPERATURE: 97.3 F | HEART RATE: 81 BPM

## 2023-01-05 DIAGNOSIS — U07.1 COVID-19: Primary | ICD-10-CM

## 2023-01-05 DIAGNOSIS — J41.0 SIMPLE CHRONIC BRONCHITIS (HCC): ICD-10-CM

## 2023-01-05 RX ORDER — METHYLPREDNISOLONE 4 MG/1
TABLET ORAL
Qty: 21 EACH | Refills: 0 | Status: SHIPPED | OUTPATIENT
Start: 2023-01-05 | End: 2023-01-12 | Stop reason: SDUPTHER

## 2023-01-05 RX ORDER — AZITHROMYCIN 250 MG/1
TABLET, FILM COATED ORAL
Qty: 6 TABLET | Refills: 0 | Status: SHIPPED | OUTPATIENT
Start: 2023-01-05 | End: 2023-01-12

## 2023-01-06 NOTE — PROGRESS NOTES
COVID-19 Outpatient Progress Note    Assessment/Plan:    Problem List Items Addressed This Visit        Respiratory    Simple chronic bronchitis (HCC)    Relevant Medications    methylPREDNISolone 4 MG tablet therapy pack    azithromycin (ZITHROMAX) 250 mg tablet   Other Visit Diagnoses     COVID-19    -  Primary    Relevant Medications    methylPREDNISolone 4 MG tablet therapy pack    azithromycin (ZITHROMAX) 250 mg tablet         Disposition:      COVID-19 discussed that he is almost done with his quarantine  Patient COPD exacerbated therefore treatment needed to be given  Patient aware if his symptoms worsen to please contact our office  I have spent 15 minutes directly with the patient  Encounter provider: Yasmin Ye MD     Provider located at: [address] [place] [id number]     Recent Visits  No visits were found meeting these conditions  Showing recent visits within past 7 days and meeting all other requirements  Today's Visits  Date Type Provider Dept   01/05/23 Telemedicine Yasmin Ye  [facility] today's visits and meeting all other requirements  Future Appointments  No visits were found meeting these conditions  Showing future appointments within next 150 days and meeting all other requirements     This virtual check-in was done via telephone and he agrees to proceed  Patient agrees to participate in a virtual check in via telephone or video visit instead of presenting to the office to address urgent/immediate medical needs  Patient is aware this is a billable service  He acknowledged consent and understanding of privacy and security of the video platform  The patient has agreed to participate and understands they can discontinue the visit at any time  After connecting through Telephone, the patient was identified by name and date of birth   Khai Larkin Sr  was informed that this was a telemedicine visit and that the exam was being conducted confidentially over secure lines  My office door was closed  No one else was in the room  Manuelito Gregory acknowledged consent and understanding of privacy and security of the telemedicine visit  I informed the patient that I have reviewed his record in Epic and presented the opportunity for him to ask any questions regarding the visit today  The patient agreed to participate  It was my intent to perform this visit via video technology but the patient was not able to do a video connection so the visit was completed via audio telephone only  Verification of patient location:  Patient is located in the following state in which I hold an active license: [place]    Subjective:   Manuelito Gregory  is a 74 y o  male who has been screened for COVID-19  Symptom change since last report: worsening  Patient's symptoms include cough and chest tightness  Patient denies fever, chills, fatigue, malaise, congestion, rhinorrhea, sore throat, anosmia, loss of taste, shortness of breath, abdominal pain, nausea, vomiting, diarrhea, myalgias and headaches  - Date of symptom onset: 12/30/2022  - Date of positive COVID-19 test: 1/4/2023  Type of test: Rapid antigen  COVID-19 vaccination status: Fully vaccinated with booster    Joyce James has been staying home and has isolated themselves in his home  He is taking care to not share personal items and is cleaning all surfaces that are touched often, like counters, tabletops, and doorknobs using household cleaning sprays or wipes  He is wearing a mask when he leaves his room  Cough is worsening, therefore he got concerned about COPD    Lab Results   Component Value Date    SARSCOV2 Negative 01/14/2022    SARSCOV2 Not Detected 01/09/2021       Review of Systems   Constitutional: Negative for chills, fatigue and fever  HENT: Negative for congestion, rhinorrhea and sore throat      Respiratory: Positive for cough and chest tightness  Negative for shortness of breath  Gastrointestinal: Negative for abdominal pain, diarrhea, nausea and vomiting  Musculoskeletal: Negative for myalgias  Neurological: Negative for headaches  Current Outpatient Medications on File Prior to Visit   Medication Sig   • acetaminophen (TYLENOL) 325 mg tablet 2, by mouth, every 6 hours as needed for mild to moderate pain  • albuterol (2 5 mg/3 mL) 0 083 % nebulizer solution Take 3 mL (2 5 mg total) by nebulization every 6 (six) hours as needed for wheezing or shortness of breath   • albuterol (PROVENTIL HFA,VENTOLIN HFA) 90 mcg/act inhaler Inhale 2 puffs every 6 (six) hours as needed for wheezing or shortness of breath   • aspirin (ECOTRIN LOW STRENGTH) 81 mg EC tablet Take 81 mg by mouth every evening   • atenolol (TENORMIN) 50 mg tablet TAKE ONE TABLET BY MOUTH EVERY EVENING   • cyclobenzaprine (FLEXERIL) 10 mg tablet Take 1 tablet (10 mg total) by mouth 2 (two) times a day as needed for muscle spasms   • enalapril (VASOTEC) 5 mg tablet TAKE ONE TABLET BY MOUTH EVERY DAY   • famotidine (PEPCID) 20 mg tablet Take 1 tablet (20 mg total) by mouth daily at bedtime   • fluticasone-umeclidinium-vilanterol (Trelegy Ellipta) 200-62 5-25 MCG/INH AEPB inhaler Inhale 1 puff daily Rinse mouth after use  • furosemide (LASIX) 20 mg tablet TAKE ONE TABLET BY MOUTH EVERY DAY AS NEEDED (SEVERE SWELLING IN LEGS)      AS DIRECTED   • furosemide (LASIX) 40 mg tablet TAKE ONE TABLET BY MOUTH EVERY MORNING   • ibuprofen (MOTRIN) 600 mg tablet Take 1 tablet (600 mg total) by mouth every 6 (six) hours as needed for mild pain   • levocetirizine (XYZAL) 5 MG tablet Take 5 mg by mouth every evening   • OXcarbazepine (TRILEPTAL) 300 mg tablet TAKE ONE AND ONE-HALF TABLETS BY MOUTH TWICE A DAY   • potassium chloride (KLOR-CON M20) 20 mEq tablet Take 1 tablet (20 mEq total) by mouth daily   • Respiratory Therapy Supplies (Nebulizer) REBECCA Use daily as needed (wheezing)   • sertraline (ZOLOFT) 100 mg tablet Take 1 tablet (100 mg total) by mouth 2 (two) times a day       Objective:    Pulse 81   Temp (!) 97 3 °F (36 3 °C)   SpO2 95%      Physical Exam  Valarie Land MD

## 2023-01-10 ENCOUNTER — TELEPHONE (OUTPATIENT)
Dept: FAMILY MEDICINE CLINIC | Facility: CLINIC | Age: 68
End: 2023-01-10

## 2023-01-11 ENCOUNTER — RA CDI HCC (OUTPATIENT)
Dept: OTHER | Facility: HOSPITAL | Age: 68
End: 2023-01-11

## 2023-01-11 NOTE — PROGRESS NOTES
[facility]  coding opportunities     I11 0     Chart Reviewed number of suggestions sent to Provider: 1     Patients Insurance     Medicare Insurance: [address] [place] [address]

## 2023-01-12 ENCOUNTER — APPOINTMENT (OUTPATIENT)
Dept: RADIOLOGY | Facility: CLINIC | Age: 68
End: 2023-01-12

## 2023-01-12 ENCOUNTER — OFFICE VISIT (OUTPATIENT)
Dept: FAMILY MEDICINE CLINIC | Facility: CLINIC | Age: 68
End: 2023-01-12

## 2023-01-12 VITALS
SYSTOLIC BLOOD PRESSURE: 140 MMHG | DIASTOLIC BLOOD PRESSURE: 90 MMHG | OXYGEN SATURATION: 99 % | TEMPERATURE: 97.6 F | HEART RATE: 75 BPM | RESPIRATION RATE: 16 BRPM | HEIGHT: 76 IN | WEIGHT: 315 LBS | BODY MASS INDEX: 38.36 KG/M2

## 2023-01-12 DIAGNOSIS — U07.1 COVID-19: Primary | ICD-10-CM

## 2023-01-12 DIAGNOSIS — J41.0 SIMPLE CHRONIC BRONCHITIS (HCC): ICD-10-CM

## 2023-01-12 DIAGNOSIS — I71.40 ABDOMINAL AORTIC ANEURYSM (AAA) WITHOUT RUPTURE, UNSPECIFIED PART: ICD-10-CM

## 2023-01-12 DIAGNOSIS — I50.32 CHRONIC DIASTOLIC HEART FAILURE (HCC): ICD-10-CM

## 2023-01-12 DIAGNOSIS — E66.01 MORBID OBESITY WITH BMI OF 40.0-44.9, ADULT (HCC): ICD-10-CM

## 2023-01-12 DIAGNOSIS — U07.1 COVID-19: ICD-10-CM

## 2023-01-12 RX ORDER — METHYLPREDNISOLONE 4 MG/1
TABLET ORAL
Qty: 21 EACH | Refills: 0 | Status: SHIPPED | OUTPATIENT
Start: 2023-01-12

## 2023-01-12 RX ORDER — BENZONATATE 200 MG/1
200 CAPSULE ORAL 3 TIMES DAILY PRN
Qty: 30 CAPSULE | Refills: 2 | Status: SHIPPED | OUTPATIENT
Start: 2023-01-12
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
## 2023-01-17 DIAGNOSIS — I50.32 CHRONIC DIASTOLIC CONGESTIVE HEART FAILURE (HCC): ICD-10-CM

## 2023-01-17 DIAGNOSIS — R91.1 LUNG NODULE: Primary | ICD-10-CM

## 2023-01-17 DIAGNOSIS — I10 ESSENTIAL HYPERTENSION: ICD-10-CM

## 2023-01-17 RX ORDER — FUROSEMIDE 40 MG/1
TABLET ORAL
Qty: 90 TABLET | Refills: 1 | Status: SHIPPED | OUTPATIENT
Start: 2023-01-17

## 2023-01-17 RX ORDER — ATENOLOL 50 MG/1
TABLET ORAL
Qty: 90 TABLET | Refills: 1 | Status: SHIPPED | OUTPATIENT
Start: 2023-01-17

## 2023-01-19 ENCOUNTER — TELEPHONE (OUTPATIENT)
Dept: FAMILY MEDICINE CLINIC | Facility: CLINIC | Age: 68
End: 2023-01-19

## 2023-01-19 NOTE — TELEPHONE ENCOUNTER
----- Message from [address] 76  sent at 1/19/2023  9:32 AM EST -----  Regarding: Cat Scan  Contact: [phone]  Good morning, on the message on my chart it says German Baldwin follow-up  Are we talking about the old nodule or new nodules  Divina Mccracken says to me you're getting a CT on the 9th of February at [address] and [employer]  I don't know if it's a new German Baldwin I don't know if it's an old kory I don't know  I call your office in a freaking lady says to me or didn't you get your final summary  If I do what I be calling to ask questions  Nobody offered it to me I don't remember it  I need to get Jane off my back so I need something in writing telling her what the hell is going on because I can't handle this  Dr Bc Baez says oh you're not supposed to get upset  Because of the aneurysm  But people have a way to piss me off   But I guess it's better than being pissed on

## 2023-01-19 NOTE — TELEPHONE ENCOUNTER
I spoke to pt Re: CT scan   I reviewed pts chart it looks like it is a new nodule that the Dr wants to be sure is not abnormal  Pt is aware

## 2023-01-23 ENCOUNTER — TELEPHONE (OUTPATIENT)
Dept: FAMILY MEDICINE CLINIC | Facility: CLINIC | Age: 68
End: 2023-01-23

## 2023-01-23 NOTE — TELEPHONE ENCOUNTER
I called and spoke with pt he stated he is still having headache and pressure in his head going down his neck and it feels like a tone of bricks  I advised pt he should go to the ER, pt refused and wanted to see the doctor tomorrow  Advised pt to take 2 Enalaprill tonight per Dr Becca Aguilar

## 2023-01-24 ENCOUNTER — HOSPITAL ENCOUNTER (OUTPATIENT)
Dept: RADIOLOGY | Facility: HOSPITAL | Age: 68
Discharge: HOME | End: 2023-01-24
Attending: FAMILY MEDICINE

## 2023-01-24 ENCOUNTER — OFFICE VISIT (OUTPATIENT)
Dept: FAMILY MEDICINE CLINIC | Facility: CLINIC | Age: 68
End: 2023-01-24

## 2023-01-24 VITALS
RESPIRATION RATE: 18 BRPM | WEIGHT: 315 LBS | DIASTOLIC BLOOD PRESSURE: 90 MMHG | HEART RATE: 72 BPM | SYSTOLIC BLOOD PRESSURE: 140 MMHG | HEIGHT: 76 IN | BODY MASS INDEX: 38.36 KG/M2 | OXYGEN SATURATION: 97 % | TEMPERATURE: 97.3 F

## 2023-01-24 DIAGNOSIS — I50.32 CHRONIC DIASTOLIC CONGESTIVE HEART FAILURE (HCC): ICD-10-CM

## 2023-01-24 DIAGNOSIS — R91.1 LUNG NODULE: ICD-10-CM

## 2023-01-24 DIAGNOSIS — I10 ESSENTIAL HYPERTENSION: Primary | ICD-10-CM

## 2023-01-24 RX ORDER — ENALAPRIL MALEATE 5 MG/1
15 TABLET ORAL DAILY
Qty: 90 TABLET | Refills: 6 | Status: SHIPPED | OUTPATIENT
Start: 2023-01-24 | End: 2023-02-23
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
## 2023-01-25 ENCOUNTER — TELEPHONE (OUTPATIENT)
Dept: FAMILY MEDICINE CLINIC | Facility: CLINIC | Age: 68
End: 2023-01-25

## 2023-01-25 NOTE — TELEPHONE ENCOUNTER
Spoke with pt he refused ER said he will increase BP meds   Pt made appt the following day and BP was discussed at o/v

## 2023-01-25 NOTE — TELEPHONE ENCOUNTER
----- Message from Jose Manuel Guadalupe MD sent at 1/23/2023  3:31 PM EST -----  Regarding: FW: Blood Presure  Contact: [phone]  Recommend ED  But if he refuses Increase BP med to 2 tablets      ----- Message -----  From: Keenan Keith  Sent: 1/23/2023   2:25 PM EST  To: Jose Manuel Guadalupe MD  Subject: FW: Blood Presure                                  ----- Message -----  From: Siomara Boateng"  Sent: 1/23/2023  10:28 AM EST  To: [facility]  Subject: Blood Presure                                    The last four or five days I've had a headache  So I've been checking my blood pressure on the regular basis three times a day  It's been higher than I know the cardiologist wants it  And it's a high normal  It's been rained in one of 155 over 80 something  I think maybe we should increase my blood pressure medicine  I'm not a doctor so I'm turning it over to you   Grady Valerio [phone]

## 2023-01-30 DIAGNOSIS — R91.1 LUNG NODULE: Primary | ICD-10-CM

## 2023-02-16 ENCOUNTER — TELEPHONE (OUTPATIENT)
Dept: FAMILY MEDICINE CLINIC | Facility: CLINIC | Age: 68
End: 2023-02-16

## 2023-02-16 NOTE — TELEPHONE ENCOUNTER
Patient needs a note for his job stating his weight limit is 20 lbs  Advise when ready for   Aware you are out of office  Non urgent

## 2023-02-23 ENCOUNTER — HOSPITAL ENCOUNTER (OUTPATIENT)
Dept: RADIOLOGY | Age: 68
Discharge: HOME | End: 2023-02-23

## 2023-02-23 DIAGNOSIS — R91.1 LUNG NODULE: ICD-10-CM

## 2023-02-23 LAB — GLUCOSE SERPL-MCNC: 138 MG/DL (ref 65–140)

## 2023-03-05 NOTE — RESULT NOTES
Discussion/Summary   electrolytes and kidney function stable     Verified Results  (1) BASIC METABOLIC PROFILE 90PRZ5426 10:12AM [employer] File Order Number: [id number]     Test Name Result Flag Reference   GLUCOSE,RANDM 125 mg/dL     If the patient is fasting, the ADA then defines impaired fasting glucose as > 100 mg/dL and diabetes as > or equal to 123 mg/dL  SODIUM 141 mmol/L  136-145   POTASSIUM 4 0 mmol/L  3 5-5 3   CHLORIDE 108 mmol/L  100-108   CARBON DIOXIDE 25 mmol/L  21-32   ANION GAP (CALC) 8 mmol/L  4-13   BLOOD UREA NITROGEN 13 mg/dL  5-25   CREATININE 0 88 mg/dL  0 60-1 30   Standardized to IDMS reference method   CALCIUM 9 0 mg/dL  8 3-10 1   eGFR 94 ml/min/1 73sq m     National Kidney Disease Education Program recommendations are as follows:  GFR calculation is accurate only with a steady state creatinine  Chronic Kidney disease less than 60 ml/min/1 73 sq  meters  Kidney failure less than 15 ml/min/1 73 sq  meters       (1) MAGNESIUM [id number] 10:12AM [facility] Order Number: [id number]     Test Name Result Flag Reference   MAGNESIUM 2 4 mg/dL  1 6-2 6       Signatures   Electronically signed by : TOBY Dean; Jul 24 2017  8:27PM EST                       (Author) [Normal] : Psychiatric

## 2023-03-07 DIAGNOSIS — G50.0 TRIGEMINAL NEURALGIA: Primary | ICD-10-CM

## 2023-03-07 NOTE — PROGRESS NOTES
Patient with sudden pain over the right trigeminal  With numbness  Patient doesn't want to go to ED  Referred to ED  Recommend calling if no improvement

## 2023-03-23 DIAGNOSIS — J43.9 PULMONARY EMPHYSEMA, UNSPECIFIED EMPHYSEMA TYPE (HCC): ICD-10-CM

## 2023-03-23 DIAGNOSIS — J43.9 PULMONARY EMPHYSEMA, UNSPECIFIED EMPHYSEMA TYPE (HCC): Primary | ICD-10-CM

## 2023-03-23 RX ORDER — FLUTICASONE FUROATE, UMECLIDINIUM BROMIDE AND VILANTEROL TRIFENATATE 200; 62.5; 25 UG/1; UG/1; UG/1
1 POWDER RESPIRATORY (INHALATION) DAILY
Qty: 180 BLISTER | Refills: 0 | Status: SHIPPED | OUTPATIENT
Start: 2023-03-23 | End: 2023-07-10 | Stop reason: SDUPTHER

## 2023-04-03 ENCOUNTER — TELEPHONE (OUTPATIENT)
Dept: FAMILY MEDICINE CLINIC | Facility: CLINIC | Age: 68
End: 2023-04-03

## 2023-04-03 DIAGNOSIS — I10 ESSENTIAL HYPERTENSION: ICD-10-CM

## 2023-04-03 RX ORDER — ENALAPRIL MALEATE 5 MG/1
5 TABLET ORAL DAILY
Qty: 90 TABLET | Refills: 6 | Status: SHIPPED | OUTPATIENT
Start: 2023-04-03 | End: 2023-05-03

## 2023-04-03 RX ORDER — ENALAPRIL MALEATE 10 MG/1
10 TABLET ORAL DAILY
Qty: 90 TABLET | Refills: 5 | Status: SHIPPED | OUTPATIENT
Start: 2023-04-03

## 2023-04-03 NOTE — TELEPHONE ENCOUNTER
VM from pt:  Is Gennaro Kay senior 19 HCTD 2247  Message to doctor son My am now April  The insurance will not cover 3 pills a day  We need for her [facility] two pills a day and figure out what she's gonna do about the third pill  But insurance will only cover 2  So I need a new script because that script has been voided  Joni Perez, senior [age] [phone]  Thank you  Have a nice day  Pt is stating his ins will not cover enapril 1 tab 3 x daily they will only cover 2 x daily  Pt wants you to send in a script for 2 x daily to his pharmacy

## 2023-04-03 NOTE — TELEPHONE ENCOUNTER
I called and spoke with pharmacist he said that unfortunately he would not know until he gets the order and runs it through, however he does think that will wok

## 2023-04-03 NOTE — TELEPHONE ENCOUNTER
That can't just be changed  He is taking a total of 15mg that is making his BP normal  If I drop to 10mgs he won't be controlled   So please call pharmacy to see if I send in 10mgs, and a 5 mg if this would be accepted

## 2023-04-07 ENCOUNTER — TELEPHONE (OUTPATIENT)
Dept: OBGYN CLINIC | Facility: HOSPITAL | Age: 68
End: 2023-04-07

## 2023-04-07 DIAGNOSIS — M17.0 BILATERAL PRIMARY OSTEOARTHRITIS OF KNEE: Primary | ICD-10-CM

## 2023-04-07 NOTE — TELEPHONE ENCOUNTER
Caller: Patient  Doctor/office: Martha Payne  #: [phone]      Patient called to start auth/delivery for Gelsyn injections    Body Part: bilateral knees  Date of last Gel Injection: 9/30/2022

## 2023-04-25 ENCOUNTER — TELEPHONE (OUTPATIENT)
Dept: HEMATOLOGY ONCOLOGY | Facility: CLINIC | Age: 68
End: 2023-04-25

## 2023-04-25 NOTE — TELEPHONE ENCOUNTER
Appointment Change  Cancel, Reschedule, Change to Virtual      Who are you speaking with? Patient   If it is not the patient, are they listed on an active communication consent form? N/A   Which provider is the appointment scheduled with? AYLA Dsouza   When is the appointment scheduled? Please list date and time 07/20/23 9AM   At which location is the appointment scheduled to take place? [facility]   Was the appointment rescheduled or changed from an in person visit to a virtual visit? If so, please list the details of the change  07/27/23 8AM   What is the reason for the appointment change? Provider out of office   Was [facility] transport scheduled for this visit? No   Does [facility] transport need to be scheduled for the new visit (if applicable) N/A   Does the patient need an infusion appointment rescheduled? No   Does the patient have an infusion appointment scheduled? If so, when? No   Is the patient undergoing chemotherapy? No   Was the no-show policy reviewed for appointments being changed with less then 24 hours of notice?  N/A

## 2023-04-28 ENCOUNTER — PROCEDURE VISIT (OUTPATIENT)
Dept: OBGYN CLINIC | Facility: CLINIC | Age: 68
End: 2023-04-28

## 2023-04-28 VITALS
HEART RATE: 71 BPM | WEIGHT: 315 LBS | BODY MASS INDEX: 38.36 KG/M2 | HEIGHT: 76 IN | DIASTOLIC BLOOD PRESSURE: 84 MMHG | SYSTOLIC BLOOD PRESSURE: 144 MMHG

## 2023-04-28 DIAGNOSIS — M17.0 BILATERAL PRIMARY OSTEOARTHRITIS OF KNEE: Primary | ICD-10-CM
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
## 2023-05-15 ENCOUNTER — OFFICE VISIT (OUTPATIENT)
Dept: FAMILY MEDICINE CLINIC | Facility: CLINIC | Age: 68
End: 2023-05-15

## 2023-05-15 VITALS
DIASTOLIC BLOOD PRESSURE: 80 MMHG | HEIGHT: 76 IN | BODY MASS INDEX: 38.36 KG/M2 | HEART RATE: 78 BPM | TEMPERATURE: 97.8 F | SYSTOLIC BLOOD PRESSURE: 140 MMHG | WEIGHT: 315 LBS | OXYGEN SATURATION: 96 % | RESPIRATION RATE: 30 BRPM

## 2023-05-15 DIAGNOSIS — I50.32 CHRONIC DIASTOLIC CONGESTIVE HEART FAILURE (HCC): ICD-10-CM

## 2023-05-15 DIAGNOSIS — I71.21 ANEURYSM OF ASCENDING AORTA WITHOUT RUPTURE (HCC): ICD-10-CM

## 2023-05-15 DIAGNOSIS — R06.02 SOB (SHORTNESS OF BREATH): Primary | ICD-10-CM

## 2023-05-15 RX ORDER — METHYLPREDNISOLONE 4 MG/1
TABLET ORAL
Qty: 21 EACH | Refills: 0 | Status: SHIPPED | OUTPATIENT
Start: 2023-05-15
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
## 2023-05-23 ENCOUNTER — TELEPHONE (OUTPATIENT)
Dept: FAMILY MEDICINE CLINIC | Facility: CLINIC | Age: 68
End: 2023-05-23

## 2023-05-23 DIAGNOSIS — R06.02 SOB (SHORTNESS OF BREATH): Primary | ICD-10-CM

## 2023-05-25 ENCOUNTER — TELEPHONE (OUTPATIENT)
Dept: FAMILY MEDICINE CLINIC | Facility: CLINIC | Age: 68
End: 2023-05-25

## 2023-05-25 NOTE — TELEPHONE ENCOUNTER
----- Message from Jeanine Melchor MD sent at 5/25/2023 12:42 PM EDT -----  Can we try to get in contact with his cardiologist since Clinical

## 2023-06-19 ENCOUNTER — TELEPHONE (OUTPATIENT)
Dept: PAIN MEDICINE | Facility: CLINIC | Age: 68
End: 2023-06-19

## 2023-06-19 ENCOUNTER — OFFICE VISIT (OUTPATIENT)
Dept: OBGYN CLINIC | Facility: CLINIC | Age: 68
End: 2023-06-19
Payer: COMMERCIAL

## 2023-06-19 ENCOUNTER — APPOINTMENT (OUTPATIENT)
Dept: RADIOLOGY | Facility: CLINIC | Age: 68
End: 2023-06-19
Payer: COMMERCIAL

## 2023-06-19 VITALS
DIASTOLIC BLOOD PRESSURE: 91 MMHG | BODY MASS INDEX: 38.36 KG/M2 | SYSTOLIC BLOOD PRESSURE: 151 MMHG | WEIGHT: 315 LBS | HEART RATE: 73 BPM | HEIGHT: 76 IN

## 2023-06-19 DIAGNOSIS — M16.11 PRIMARY LOCALIZED OSTEOARTHRITIS OF RIGHT HIP: Primary | ICD-10-CM

## 2023-06-19 DIAGNOSIS — M25.551 RIGHT HIP PAIN: ICD-10-CM

## 2023-06-19 PROCEDURE — 73502 X-RAY EXAM HIP UNI 2-3 VIEWS: CPT

## 2023-06-19 PROCEDURE — 99214 OFFICE O/P EST MOD 30 MIN: CPT | Performed by: ORTHOPAEDIC SURGERY

## 2023-06-19 NOTE — TELEPHONE ENCOUNTER
Scheduled pt for hip 7/21/23  Went over pre-procedure instructions below:  Nothing to eat or drink 1 hr prior to procedure  Need to arrange transportation  Proper clothing for procedure  No vaccines 2 weeks prior or after procedure  If ill or placed on antibiotics please call to reschedule
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
## 2023-06-19 NOTE — TELEPHONE ENCOUNTER
----- Message from Percy Victoria DO sent at 6/19/2023  1:18 PM EDT -----  Johnathan Rodriguez saw me today for right hip pain and osteoarthritis    Could you please schedule him for the next available right hip guided injection under fluoro with Dr Maria Elena Noel or Dr Trish Ren      Thank you

## 2023-06-22 ENCOUNTER — TELEPHONE (OUTPATIENT)
Dept: PAIN MEDICINE | Facility: CLINIC | Age: 68
End: 2023-06-22

## 2023-06-26 ENCOUNTER — OFFICE VISIT (OUTPATIENT)
Dept: FAMILY MEDICINE CLINIC | Facility: CLINIC | Age: 68
End: 2023-06-26
Payer: COMMERCIAL

## 2023-06-26 VITALS
HEIGHT: 76 IN | DIASTOLIC BLOOD PRESSURE: 80 MMHG | OXYGEN SATURATION: 98 % | RESPIRATION RATE: 18 BRPM | HEART RATE: 75 BPM | SYSTOLIC BLOOD PRESSURE: 138 MMHG | WEIGHT: 315 LBS | BODY MASS INDEX: 38.36 KG/M2 | TEMPERATURE: 98.8 F

## 2023-06-26 DIAGNOSIS — I71.40 ABDOMINAL AORTIC ANEURYSM (AAA) WITHOUT RUPTURE, UNSPECIFIED PART (HCC): ICD-10-CM

## 2023-06-26 DIAGNOSIS — R06.02 SOB (SHORTNESS OF BREATH): ICD-10-CM

## 2023-06-26 DIAGNOSIS — J43.9 PULMONARY EMPHYSEMA, UNSPECIFIED EMPHYSEMA TYPE (HCC): Primary | ICD-10-CM

## 2023-06-26 DIAGNOSIS — G47.33 OBSTRUCTIVE SLEEP APNEA: ICD-10-CM

## 2023-06-26 DIAGNOSIS — I10 PRIMARY HYPERTENSION: ICD-10-CM

## 2023-06-26 PROCEDURE — 99214 OFFICE O/P EST MOD 30 MIN: CPT | Performed by: FAMILY MEDICINE

## 2023-06-26 RX ORDER — METHYLPREDNISOLONE 4 MG/1
TABLET ORAL
Qty: 21 EACH | Refills: 0 | Status: SHIPPED | OUTPATIENT
Start: 2023-06-26

## 2023-06-26 RX ORDER — LORATADINE 10 MG/1
1 TABLET ORAL DAILY
COMMUNITY

## 2023-06-26 RX ORDER — MELOXICAM 15 MG/1
1 TABLET ORAL DAILY
COMMUNITY
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
## 2023-06-29 ENCOUNTER — EVALUATION (OUTPATIENT)
Dept: PHYSICAL THERAPY | Facility: CLINIC | Age: 68
End: 2023-06-29
Payer: COMMERCIAL

## 2023-06-29 DIAGNOSIS — M16.11 PRIMARY LOCALIZED OSTEOARTHRITIS OF RIGHT HIP: ICD-10-CM

## 2023-06-29 DIAGNOSIS — M25.551 RIGHT HIP PAIN: Primary | ICD-10-CM

## 2023-06-29 PROCEDURE — 97161 PT EVAL LOW COMPLEX 20 MIN: CPT | Performed by: PHYSICAL THERAPIST

## 2023-06-29 NOTE — PROGRESS NOTES
PT Evaluation     Today's date: 2023  Patient name: Claudetta Roy  : 1955  MRN: [id number]  Referring provider: Marbiell Albarran DO  Dx:   Encounter Diagnosis     ICD-10-CM    1  Right hip pain  M25 551       2  Primary localized osteoarthritis of right hip  M16 11 Ambulatory referral to Physical Therapy                     Assessment  Assessment details: Claudetta Roy  is a 74 y o  male who presents with pain, decreased strength, decreased ROM and ambulatory dysfunction  Due to these impairments, patient has difficulty performing ADL's, ambulation, stair negotiation, transfers  Patient's clinical presentation is consistent with their referring diagnosis of Right hip pain  (primary encounter diagnosis)    Primary localized osteoarthritis of right hip  Plan: Ambulatory referral to Physical Therapy    Patient has been educated in home exercise program and plan of care   Patient would benefit from skilled physical therapy services to address their aforementioned functional limitations and progress towards prior level of function and independence with home exercise program      Impairments: abnormal gait, abnormal or restricted ROM, activity intolerance, impaired physical strength, lacks appropriate home exercise program, pain with function, weight-bearing intolerance and poor body mechanics  Understanding of Dx/Px/POC: good   Prognosis: fair    Goals  Short Term Goals to be accomplished in 3 weeks:  STG1: Pt will be I with HEP  STG2: Pt will demo 50% improvement in hip AROM  STG3: Pt will demo 1/2 MMT strength inc hip  STG4: Pt will demo nil gait deviations due to pain to improve ambulation in community     Long Term Goals to be accomplished in 6 weeks:   LTG1: Pt will achieve full hip AROM  LTG2: Pt will demo hip strength WNL as pee PLOF  LTG3: Pt will return to work/household duties without pain         Plan  Plan details:  HEP development, stretching, strengthening, A/AA/PROM, joint mobilizations, posture education, STM/MI as needed to reduce muscle tension, muscle reeducation, PLOC discussed and agreed upon with patient  Patient would benefit from: PT eval and skilled physical therapy  Planned modality interventions: cryotherapy and thermotherapy: hydrocollator packs  Planned therapy interventions: manual therapy, neuromuscular re-education, self care, therapeutic activities, therapeutic exercise and home exercise program  Frequency: 2x week  Duration in weeks: 6  Treatment plan discussed with: patient        Subjective Evaluation    History of Present Illness  Mechanism of injury: Pt fell down several steps after a misstep 3 weeks ago  He pulled his R groin  Pt reports his pain is stabbing, causes him to yell  Pt unable to determine which way he moves to cause this pain  Pt feels symptoms at rest, prolonged sitting will reduce symptoms in R hip  He has a long history of R LE symptoms related to nerve  He is hopeful to return to treadmill walking to reduce weight  Pain  Current pain ratin  At best pain ratin  At worst pain ratin          Objective     Lumbar Screen  Lumbar range of motion within normal limits with the following exceptions:Flexion: painful and mod limited  Extension: Pain free Min limited      Active Range of Motion     Right Hip   Flexion: 55 degrees with pain  Abduction: 45 degrees   External rotation (90/90):  [facility]  External rotation (prone): -15 degrees with pain    Strength/Myotome Testing     Left Hip   Normal muscle strength    Right Hip   Planes of Motion   Flexion: 3  Abduction: 4-  External rotation: 4-    General Comments:      Hip Comments   Gait: Slow robin, trunk sway, dec R knee flexion, painful, unweighting R LE in stance              Eval/ Re-eval Auth #/ Referral # Total visits Start date  Expiration date Total active units  Total manual units  PT only or  PT+OT?   23 Req                                                        Date:           Total authorized units:  Active:            Manual:           Total remaining units:  Active:               Manual:                Date:           Total authorized units: Active:            Manual:           Total remaining units:  Active:               Manual:                    Precautions: Standard  2 pillows under neck  AAA        Visit 1        6/29/23                               Neuro Re-Ed                                                                Ther Ex        SLR 5x       SL Clamshell 5x       Bridge 5x with belt around knees       Hip flexor stretch  Lunge at wall        ASMITA x5                               Ther Activity                        Gait Training                        Modalities

## 2023-07-06 ENCOUNTER — OFFICE VISIT (OUTPATIENT)
Dept: PHYSICAL THERAPY | Facility: CLINIC | Age: 68
End: 2023-07-06
Payer: COMMERCIAL

## 2023-07-06 DIAGNOSIS — M25.551 RIGHT HIP PAIN: ICD-10-CM

## 2023-07-06 DIAGNOSIS — M16.11 PRIMARY LOCALIZED OSTEOARTHRITIS OF RIGHT HIP: Primary | ICD-10-CM

## 2023-07-06 PROCEDURE — 97110 THERAPEUTIC EXERCISES: CPT | Performed by: PHYSICAL THERAPIST

## 2023-07-06 NOTE — PROGRESS NOTES
Daily Note     Today's date: 2023  Patient name: Luda Staples  : 1955  MRN: [id number]  Referring provider: Jackie Davis DO  Dx:   Encounter Diagnosis     ICD-10-CM    1. Primary localized osteoarthritis of right hip  M16.11       2. Right hip pain  M25.551                      Subjective:  Some improvement overall but he feels sore especially with flexing his hip and knee together. Objective: See treatment diary below      Assessment: Tolerated treatment fair. Patient would benefit from continued PT. He needs rests with certain exercise after 5 reps. He agreed to perform bent knee fallout, side steps and LAQ for HEP. Plan: Continue per plan of care. Progress treatment as tolerated. Eval/ Re-eval Auth #/ Referral # Total visits Start date  Expiration date Total active units  Total manual units  PT only or  PT+OT?   23 Req 8                                                       Date:          Total authorized units:  Active:           Manual:           Total remaining units:  Active:    7 6          Manual:                Date:           Total authorized units: Active:            Manual:           Total remaining units:  Active:               Manual:                    Precautions: Standard. 2 pillows under neck. AAA.       Visit 1 2       23              Actie warm up  5 min NS              Neuro Re-Ed        Adductor stretch  Manual  5x      HS stretch  5x ea w strap 10 " hold                                              Ther Ex        SLR 5x 10 ea      SL Clamshell 5x 2x5 red loop in supine      Bridge 5x with belt around knees 2x5      Hip flexor stretch  Lunge at wall  10 x 10"      ASMITA x5 2x5      Bent knee fallout  10      Knee ext w df  5 ea 5" hold      Side step  5 x 5 ft              Ther Activity                        Gait Training                        Modalities                            Access Code: [email or web address]  URL: [email or web address]  Date: 07/06/2023  Prepared by:  Melvina Myrick    Exercises  - Bent Knee Fallouts  - 1 x daily - 1 sets - 10 reps  - Seated Long Arc Quad  - 1 x daily - 1 sets - 10 reps  - Side Stepping with Counter Support  - 1 x daily - 1 sets - 5 reps

## 2023-07-10 ENCOUNTER — TELEPHONE (OUTPATIENT)
Dept: HEMATOLOGY ONCOLOGY | Facility: CLINIC | Age: 68
End: 2023-07-10

## 2023-07-10 DIAGNOSIS — J44.1 CHRONIC OBSTRUCTIVE PULMONARY DISEASE WITH ACUTE EXACERBATION (HCC): ICD-10-CM

## 2023-07-10 DIAGNOSIS — J43.9 PULMONARY EMPHYSEMA, UNSPECIFIED EMPHYSEMA TYPE (HCC): ICD-10-CM

## 2023-07-10 NOTE — TELEPHONE ENCOUNTER
Confirmed with patient appt. On 7/31/23 with Ernesto Hill at the [facility]. This appointment was rescheduled due to patient having [employer] which is not accepted at the [facility]; however, it is accepted in [facility].

## 2023-07-11 ENCOUNTER — OFFICE VISIT (OUTPATIENT)
Dept: PHYSICAL THERAPY | Facility: CLINIC | Age: 68
End: 2023-07-11
Payer: COMMERCIAL

## 2023-07-11 DIAGNOSIS — M16.11 PRIMARY LOCALIZED OSTEOARTHRITIS OF RIGHT HIP: ICD-10-CM

## 2023-07-11 DIAGNOSIS — M25.551 RIGHT HIP PAIN: Primary | ICD-10-CM

## 2023-07-11 PROCEDURE — 97110 THERAPEUTIC EXERCISES: CPT | Performed by: PHYSICAL THERAPIST

## 2023-07-11 RX ORDER — ALBUTEROL SULFATE 90 UG/1
2 AEROSOL, METERED RESPIRATORY (INHALATION) EVERY 6 HOURS PRN
Qty: 18 G | Refills: 11 | Status: SHIPPED | OUTPATIENT
Start: 2023-07-11

## 2023-07-11 RX ORDER — FLUTICASONE FUROATE, UMECLIDINIUM BROMIDE AND VILANTEROL TRIFENATATE 200; 62.5; 25 UG/1; UG/1; UG/1
1 POWDER RESPIRATORY (INHALATION) DAILY
Qty: 180 BLISTER | Refills: 11 | Status: SHIPPED | OUTPATIENT
Start: 2023-07-11 | End: 2023-10-09

## 2023-07-11 NOTE — PROGRESS NOTES
Daily Note     Today's date: 2023  Patient name: Yareli Vargas  : 1955  MRN: [id number]  Referring provider: German Jenkins DO  Dx:   Encounter Diagnosis     ICD-10-CM    1. Right hip pain  M25.551       2. Primary localized osteoarthritis of right hip  M16.11                      Subjective: Pt reports his hip is tight on a normal and regular basis but he does get sharp pain "when I move a certain way." He struggles to quantify any changes but does feel it is a bit better. Pt does report however that the sharp pain episodes are indeed way less frequent and can lift his leg. He also notes that when he does have the sharp pain episodes he is not yelling out in pain as he had been, which indicates it's a little less intense, per conversation. Objective: See treatment diary below. Therex progressed today      Assessment: Tolerated treatment well. Patient reports overall doing well, and while his gait remains antalgic and he demo inc trunk lurch due to R hip deficits however this is improving. Plan: Continue per plan of care. Eval/ Re-eval Auth #/ Referral # Total visits Start date  Expiration date Total active units  Total manual units  PT only or  PT+OT?   23 Req 8                                                       Date:          Total authorized units:  Active:           Manual:           Total remaining units:  Active:    7 6          Manual:                Date:           Total authorized units: Active:            Manual:           Total remaining units:  Active:               Manual:                    Precautions: Standard. 2 pillows under neck. AAA.       Visit 1 2 3 of 8      6/29/23 7/6/23 7/11/23             Actie warm up  5 min NS 5' NuStep L2             Neuro Re-Ed        Adductor stretch  Manual  5x Manual 5x     HS stretch  5x ea w strap 10 " hold 5x 10s SOS                                             Ther Ex        SLR 5x 10 ea 15x     SL Clamshell 5x 2x5 red loop in supine Green TB 2x10      Bridge 5x with belt around knees 2x5 3x5     Hip flexor stretch  Lunge at wall  10 x 10" 10x3-5s     ASMITA x5 2x5 2x8 (red ball fulcurm)     Bent knee fallout  10 Green TB 2x8     Knee ext w df  5 ea 5" hold 10x 5s hold     Side step  5 x 5 ft 10' x 5             Ther Activity                        Gait Training                        Modalities                            Access Code: [email or web address]  URL: [email or web address]  Date: 07/06/2023  Prepared by:  Bakari Catalan    Exercises  - Bent Knee Fallouts  - 1 x daily - 1 sets - 10 reps  - Seated Long Arc Quad  - 1 x daily - 1 sets - 10 reps  - Side Stepping with Counter Support  - 1 x daily - 1 sets - 5 reps

## 2023-07-13 ENCOUNTER — APPOINTMENT (OUTPATIENT)
Dept: PHYSICAL THERAPY | Facility: CLINIC | Age: 68
End: 2023-07-13
Payer: COMMERCIAL

## 2023-07-13 ENCOUNTER — HOSPITAL ENCOUNTER (OUTPATIENT)
Dept: RADIOLOGY | Facility: HOSPITAL | Age: 68
Discharge: HOME | End: 2023-07-13
Payer: COMMERCIAL

## 2023-07-13 DIAGNOSIS — K86.2 PANCREATIC CYST: ICD-10-CM

## 2023-07-13 PROCEDURE — 74183 MRI ABD W/O CNTR FLWD CNTR: CPT

## 2023-07-13 PROCEDURE — A9585 GADOBUTROL INJECTION: HCPCS | Performed by: SURGERY

## 2023-07-13 PROCEDURE — G1004 CDSM NDSC: HCPCS

## 2023-07-13 PROCEDURE — 76376 3D RENDER W/INTRP POSTPROCES: CPT

## 2023-07-13 RX ADMIN — GADOBUTROL 17 ML: 604.72 INJECTION INTRAVENOUS at 13:18

## 2023-07-18 ENCOUNTER — OFFICE VISIT (OUTPATIENT)
Dept: PHYSICAL THERAPY | Facility: CLINIC | Age: 68
End: 2023-07-18
Payer: COMMERCIAL

## 2023-07-18 DIAGNOSIS — M25.551 RIGHT HIP PAIN: ICD-10-CM

## 2023-07-18 DIAGNOSIS — M16.11 PRIMARY LOCALIZED OSTEOARTHRITIS OF RIGHT HIP: Primary | ICD-10-CM

## 2023-07-18 PROCEDURE — 97110 THERAPEUTIC EXERCISES: CPT

## 2023-07-18 PROCEDURE — 97112 NEUROMUSCULAR REEDUCATION: CPT

## 2023-07-18 NOTE — PROGRESS NOTES
Daily Note     Today's date: 2023  Patient name: Roby Schwartz  : 1955  MRN: [id number]  Referring provider: Deanne Singh DO  Dx:   Encounter Diagnosis     ICD-10-CM    1. Primary localized osteoarthritis of right hip  M16.11       2. Right hip pain  M25.551                      Subjective: Pt reports he is exhausted, otherwise no new complaints at this time. Objective: See treatment diary below      Assessment: Patient reports some adductor discomfort with stretching. Plan: Continue per plan of care. Eval/ Re-eval Auth #/ Referral # Total visits Start date  Expiration date Total active units  Total manual units  PT only or  PT+OT?   23 Req 8                                                       Date:         Total authorized units:  Active: 1/8 2/8 3/8         Manual:           Total remaining units:  Active:    7 6 5         Manual:                Date:           Total authorized units: Active:            Manual:           Total remaining units:  Active:               Manual:                    Precautions: Standard. 2 pillows under neck. AAA.       Visit 1 2 3 of 8 4 of 8     6/29/23 7/6/23 7/11/23 7/18/23            Actie warm up  5 min NS 5' NuStep L2 5' NuStep L2            Neuro Re-Ed        Adductor stretch  Manual  5x Manual 5x Manual x5    HS stretch  5x ea w strap 10 " hold 5x 10s SOS 5x 10" SOS                                            Ther Ex        SLR 5x 10 ea 15x x10    SL Clamshell 5x 2x5 red loop in supine Green TB 2x10  Green TB 2x10    Bridge 5x with belt around knees 2x5 3x5 3x5    Hip flexor stretch  Lunge at wall  10 x 10" 10x3-5s 3-5" x10    ASMITA x5 2x5 2x8 (red ball fulcurm) 2x10 red ball    Bent knee fallout  10 Green TB 2x8 Green TB 2x10    Knee ext w df  5 ea 5" hold 10x 5s hold 5" x10 ea    Side step  5 x 5 ft 10' x 5 10'x5            Ther Activity                        Gait Training                        Modalities Access Code: [email or web address]  URL: [email or web address]  Date: 07/06/2023  Prepared by:  Denton Mckeon    Exercises  - Bent Knee Fallouts  - 1 x daily - 1 sets - 10 reps  - Seated Long Arc Quad  - 1 x daily - 1 sets - 10 reps  - Side Stepping with Counter Support  - 1 x daily - 1 sets - 5 reps

## 2023-07-20 ENCOUNTER — APPOINTMENT (OUTPATIENT)
Dept: PHYSICAL THERAPY | Facility: CLINIC | Age: 68
End: 2023-07-20
Payer: COMMERCIAL

## 2023-07-25 ENCOUNTER — TELEPHONE (OUTPATIENT)
Dept: SURGICAL ONCOLOGY | Facility: CLINIC | Age: 68
End: 2023-07-25

## 2023-07-27 ENCOUNTER — OFFICE VISIT (OUTPATIENT)
Dept: PHYSICAL THERAPY | Facility: CLINIC | Age: 68
End: 2023-07-27
Payer: COMMERCIAL

## 2023-07-27 ENCOUNTER — TELEPHONE (OUTPATIENT)
Dept: FAMILY MEDICINE CLINIC | Facility: CLINIC | Age: 68
End: 2023-07-27

## 2023-07-27 DIAGNOSIS — M25.551 RIGHT HIP PAIN: Primary | ICD-10-CM

## 2023-07-27 DIAGNOSIS — M16.11 PRIMARY LOCALIZED OSTEOARTHRITIS OF RIGHT HIP: ICD-10-CM

## 2023-07-27 PROCEDURE — 97110 THERAPEUTIC EXERCISES: CPT | Performed by: PHYSICAL THERAPIST

## 2023-07-27 NOTE — PROGRESS NOTES
Daily Note     Today's date: 2023  Patient name: Sherron Lira  : 1955  MRN: [id number]  Referring provider: Coral Moon DO  Dx:   Encounter Diagnosis     ICD-10-CM    1. Right hip pain  M25.551       2. Primary localized osteoarthritis of right hip  M16.11                      Subjective: Pt reports his pain is in a very localized area of his R groin. He only feels it 1x/day with an awkward misstep. Objective: See treatment diary below      Assessment: Tolerated treatment well. Patient demo ongoing dec act daniel but is progressing very well through [address] [place] [address]. Plan: Continue per plan of care. Eval/ Re-eval Auth #/ Referral # Total visits Start date  Expiration date Total active units  Total manual units  PT only or  PT+OT?   23 Req 8                                                       Date:         Total authorized units:  Active: 1/8 2/8 3/8         Manual:           Total remaining units:  Active:    7 6 5         Manual:                Date:           Total authorized units: Active:            Manual:           Total remaining units:  Active:               Manual:                    Precautions: Standard. 2 pillows under neck. AAA.       Visit 1 2 3 of 8 4 of 8 5 of 8    6/29/23 7/6/23 7/11/23 7/18/23 7/27/23           Actie warm up  5 min NS 5' NuStep L2 5' NuStep L2 5' L2           Neuro Re-Ed        Adductor stretch  Manual  5x Manual 5x Manual x5 x5 SOS   HS stretch  5x ea w strap 10 " hold 5x 10s SOS 5x 10" SOS 5x10s SOS                                           Ther Ex        SLR 5x 10 ea 15x x10 x10 ea   SL Clamshell 5x 2x5 red loop in supine Green TB 2x10  Green TB 2x10 Blue TB 2x10 supine   Bridge 5x with belt around knees 2x5 3x5 3x5 14x   Hip flexor stretch  Lunge at wall  10 x 10" 10x3-5s 3-5" x10    ASMITA x5 2x5 2x8 (red ball fulcurm) 2x10 red ball    Bent knee fallout  10 Green TB 2x8 Green TB 2x10 Blue TB 2x10   Knee ext w df  5 ea 5" hold 10x 5s hold 5" x10 ea 3lbs 2x10   Side step  5 x 5 ft 10' x 5 10'x5 8' x 6           Ther Activity                        Gait Training                        Modalities                            Access Code: [email or web address]  URL: [email or web address]  Date: 07/06/2023  Prepared by:  Shankar Bone    Exercises  - Bent Knee Fallouts  - 1 x daily - 1 sets - 10 reps  - Seated Long Arc Quad  - 1 x daily - 1 sets - 10 reps  - Side Stepping with Counter Support  - 1 x daily - 1 sets - 5 reps

## 2023-07-27 NOTE — TELEPHONE ENCOUNTER
VM on appt line: Sherry Rutledge just talked to somebody in for appointment with Doctor Kelin Carmona on the 30th of August. Now that'll probably be gone the time you get back to me because I'm changing from doctor sno to somebody else for personal reasons. I had to think about it. I was Arnav Nose go to [employer] but then all the transfer of all the records and starting to someone new. At least Doctor Arlinda Buerger can look at everything [address] cause I'm just changing doctors I'm not changing the network and everything would be available to her. So the 30th would be fine. Mckay Asencio, [address] [facility], [phone] and I am going to try to call again and get through. Thank you very much and you have a wonderful day, [phone]. Spoke with pt and scheduled appt.

## 2023-08-01 ENCOUNTER — TELEPHONE (OUTPATIENT)
Age: 68
End: 2023-08-01

## 2023-08-01 ENCOUNTER — APPOINTMENT (OUTPATIENT)
Dept: PHYSICAL THERAPY | Facility: CLINIC | Age: 68
End: 2023-08-01
Payer: COMMERCIAL

## 2023-08-01 NOTE — TELEPHONE ENCOUNTER
Caller: patient    Doctor: AS    Reason for call: would like procedure canceled.  Did not want to reschedule    Call back#:

## 2023-08-03 ENCOUNTER — OFFICE VISIT (OUTPATIENT)
Dept: PHYSICAL THERAPY | Facility: CLINIC | Age: 68
End: 2023-08-03
Payer: COMMERCIAL

## 2023-08-03 DIAGNOSIS — M25.551 RIGHT HIP PAIN: Primary | ICD-10-CM

## 2023-08-03 DIAGNOSIS — M16.11 PRIMARY LOCALIZED OSTEOARTHRITIS OF RIGHT HIP: ICD-10-CM

## 2023-08-03 PROCEDURE — 97110 THERAPEUTIC EXERCISES: CPT | Performed by: PHYSICAL THERAPIST

## 2023-08-03 NOTE — PROGRESS NOTES
Daily Note - Discharge Summary    Today's date: 8/3/2023  Patient name: Sussy Logan  : 1955  MRN: [id number]  Referring provider: Ramona Ascencio DO  Dx:   Encounter Diagnosis     ICD-10-CM    1. Right hip pain  M25.551       2. Primary localized osteoarthritis of right hip  M16.11                      Subjective: Pt reports he is very happy, he is back to normal! Pt in agreement today can be his last appt. Pt is pain free, has not had pain. Objective: See treatment diary below. Lumbar AROM WNL pain free, R hip AROM WFL pain free. R hip MMT WNL t/o pain free. LTG accomplished. Assessment: Tolerated treatment well. Patient has regularly participated in skilled PT since time of IE and at this time has shown great progress through [address] [place] [address] and is DC to [facility] with return to PLOF. Pt is aware of necessary efforts for prevention. Pt has been a pleasure to work with. Plan: DC to [facility]     Eval/ Re-eval Auth #/ Referral # Total visits Start date  Expiration date Total active units  Total manual units  PT only or  PT+OT?   23 Req 8                                                       Date:         Total authorized units:  Active: 1/8 2/8 3/8         Manual:           Total remaining units:  Active:    7 6 5         Manual:                Date:           Total authorized units: Active:            Manual:           Total remaining units:  Active:               Manual:                    Precautions: Standard. 2 pillows under neck. AAA.       Visit 1 2 3 of 8 4 of 8 5 of 8 6     6/29/23 7/6/23 7/11/23 7/18/23 7/27/23 8/3/23            Actie warm up  5 min NS 5' NuStep L2 5' NuStep L2 5' L2 5' L3            Neuro Re-Ed         Adductor stretch  Manual  5x Manual 5x Manual x5 x5 SOS x10   HS stretch  5x ea w strap 10 " hold 5x 10s SOS 5x 10" SOS 5x10s SOS 10x with tactile and verbal cues                                                Ther Ex         SLR 5x 10 ea 15x x10 x10 ea 2x10 SL Clamshell 5x 2x5 red loop in supine Green TB 2x10  Green TB 2x10 Blue TB 2x10 supine 2x10   Bridge 5x with belt around knees 2x5 3x5 3x5 14x 10x   Hip flexor stretch  Lunge at wall  10 x 10" 10x3-5s 3-5" x10  x10   ASMITA x5 2x5 2x8 (red ball fulcurm) 2x10 red ball  2x10   Bent knee fallout  10 Green TB 2x8 Green TB 2x10 Blue TB 2x10 2x10   Knee ext w df  5 ea 5" hold 10x 5s hold 5" x10 ea 3lbs 2x10    Side step  5 x 5 ft 10' x 5 10'x5 8' x 6 6 laps            Ther Activity                           Gait Training                           Modalities                               Access Code: [email or web address]  URL: [email or web address]  Date: 07/06/2023  Prepared by:  Terrial Craw    Exercises  - Bent Knee Fallouts  - 1 x daily - 1 sets - 10 reps  - Seated Long Arc Quad  - 1 x daily - 1 sets - 10 reps  - Side Stepping with Counter Support  - 1 x daily - 1 sets - 5 reps

## 2023-08-08 ENCOUNTER — OFFICE VISIT (OUTPATIENT)
Dept: SURGICAL ONCOLOGY | Facility: CLINIC | Age: 68
End: 2023-08-08
Payer: COMMERCIAL

## 2023-08-08 VITALS
DIASTOLIC BLOOD PRESSURE: 90 MMHG | BODY MASS INDEX: 38.36 KG/M2 | WEIGHT: 315 LBS | OXYGEN SATURATION: 95 % | SYSTOLIC BLOOD PRESSURE: 168 MMHG | HEART RATE: 98 BPM | TEMPERATURE: 97.2 F | HEIGHT: 76 IN

## 2023-08-08 DIAGNOSIS — K86.2 PANCREATIC CYST: Primary | ICD-10-CM

## 2023-08-08 PROCEDURE — 99213 OFFICE O/P EST LOW 20 MIN: CPT
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
## 2023-08-30 ENCOUNTER — OFFICE VISIT (OUTPATIENT)
Dept: FAMILY MEDICINE CLINIC | Facility: CLINIC | Age: 68
End: 2023-08-30
Payer: COMMERCIAL

## 2023-08-30 VITALS
SYSTOLIC BLOOD PRESSURE: 155 MMHG | HEART RATE: 76 BPM | HEIGHT: 76 IN | WEIGHT: 315 LBS | RESPIRATION RATE: 18 BRPM | DIASTOLIC BLOOD PRESSURE: 79 MMHG | OXYGEN SATURATION: 95 % | BODY MASS INDEX: 38.36 KG/M2

## 2023-08-30 DIAGNOSIS — I71.40 ABDOMINAL AORTIC ANEURYSM (AAA) WITHOUT RUPTURE, UNSPECIFIED PART (HCC): ICD-10-CM

## 2023-08-30 DIAGNOSIS — I10 PRIMARY HYPERTENSION: ICD-10-CM

## 2023-08-30 DIAGNOSIS — I35.1 NONRHEUMATIC AORTIC VALVE INSUFFICIENCY: ICD-10-CM

## 2023-08-30 DIAGNOSIS — F31.9 BIPOLAR 1 DISORDER (HCC): ICD-10-CM

## 2023-08-30 DIAGNOSIS — K86.2 PANCREATIC CYST: ICD-10-CM

## 2023-08-30 DIAGNOSIS — G47.33 OBSTRUCTIVE SLEEP APNEA: ICD-10-CM

## 2023-08-30 DIAGNOSIS — M19.90 ARTHRITIS: ICD-10-CM

## 2023-08-30 DIAGNOSIS — I50.32 CHRONIC DIASTOLIC HEART FAILURE (HCC): ICD-10-CM

## 2023-08-30 DIAGNOSIS — J34.2 DNS (DEVIATED NASAL SEPTUM): ICD-10-CM

## 2023-08-30 DIAGNOSIS — Z76.89 ENCOUNTER TO ESTABLISH CARE: Primary | ICD-10-CM

## 2023-08-30 PROCEDURE — 99204 OFFICE O/P NEW MOD 45 MIN: CPT | Performed by: FAMILY MEDICINE
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
## 2023-08-31 ENCOUNTER — RA CDI HCC (OUTPATIENT)
Dept: OTHER | Facility: HOSPITAL | Age: 68
End: 2023-08-31

## 2023-08-31 NOTE — ASSESSMENT & PLAN NOTE
Chronic, diagnosed over 20 years ago per pt. Follows closely with [facility], last documented measurement 5 cm.

## 2023-08-31 NOTE — ASSESSMENT & PLAN NOTE
Hx of aortic regurgitation. Echo 2022: trace regurgitation.  Follows with [facility].

## 2023-08-31 NOTE — ASSESSMENT & PLAN NOTE
Pt has chronic severe PHONG, requires CPAP. Not currently using. Consider referral to sleep medicine.

## 2023-08-31 NOTE — ASSESSMENT & PLAN NOTE
Chronic. Advised patient to continue careful home BP monitoring and bring log to next appointment to discuss medication management.

## 2023-08-31 NOTE — PROGRESS NOTES
[facility] coding opportunities     I11.0   Chart Reviewed number of suggestions sent to Provider: 1     Patients Insurance     Medicare Insurance: [place] Complete

## 2023-08-31 NOTE — ASSESSMENT & PLAN NOTE
Wt Readings from Last 3 Encounters:   08/30/23 (!) 162 kg (358 lb)   08/08/23 (!) 164 kg (361 lb)   06/26/23 (!) 167 kg (368 lb)       Stable, chronic, follows with [facility]. Will continue daily weight checks. Discussed continuing medical management per cardiology.

## 2023-09-06 ENCOUNTER — OFFICE VISIT (OUTPATIENT)
Dept: FAMILY MEDICINE CLINIC | Facility: CLINIC | Age: 68
End: 2023-09-06
Payer: COMMERCIAL

## 2023-09-06 VITALS
TEMPERATURE: 98.5 F | WEIGHT: 315 LBS | DIASTOLIC BLOOD PRESSURE: 80 MMHG | RESPIRATION RATE: 21 BRPM | HEIGHT: 76 IN | HEART RATE: 85 BPM | BODY MASS INDEX: 38.36 KG/M2 | OXYGEN SATURATION: 95 % | SYSTOLIC BLOOD PRESSURE: 144 MMHG

## 2023-09-06 DIAGNOSIS — R73.9 HYPERGLYCEMIA: ICD-10-CM

## 2023-09-06 DIAGNOSIS — I35.1 NONRHEUMATIC AORTIC VALVE INSUFFICIENCY: ICD-10-CM

## 2023-09-06 DIAGNOSIS — M19.90 ARTHRITIS: ICD-10-CM

## 2023-09-06 DIAGNOSIS — J34.2 DNS (DEVIATED NASAL SEPTUM): ICD-10-CM

## 2023-09-06 DIAGNOSIS — I50.32 CHRONIC DIASTOLIC HEART FAILURE (HCC): ICD-10-CM

## 2023-09-06 DIAGNOSIS — E66.01 MORBID OBESITY WITH BMI OF 40.0-44.9, ADULT (HCC): ICD-10-CM

## 2023-09-06 DIAGNOSIS — R60.0 BILATERAL EDEMA OF LOWER EXTREMITY: ICD-10-CM

## 2023-09-06 DIAGNOSIS — G47.33 OBSTRUCTIVE SLEEP APNEA: ICD-10-CM

## 2023-09-06 DIAGNOSIS — I71.40 ABDOMINAL AORTIC ANEURYSM (AAA) WITHOUT RUPTURE, UNSPECIFIED PART (HCC): ICD-10-CM

## 2023-09-06 DIAGNOSIS — F31.9 BIPOLAR 1 DISORDER (HCC): ICD-10-CM

## 2023-09-06 DIAGNOSIS — K86.2 PANCREATIC CYST: ICD-10-CM

## 2023-09-06 DIAGNOSIS — I10 PRIMARY HYPERTENSION: ICD-10-CM

## 2023-09-06 DIAGNOSIS — Z00.00 MEDICARE ANNUAL WELLNESS VISIT, SUBSEQUENT: Primary | ICD-10-CM

## 2023-09-06 PROCEDURE — G0439 PPPS, SUBSEQ VISIT: HCPCS | Performed by: FAMILY MEDICINE
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
## 2023-09-06 NOTE — PATIENT INSTRUCTIONS
Medicare Preventive Visit Patient Instructions  Thank you for completing your Welcome to Medicare Visit or Medicare Annual Wellness Visit today. Your next wellness visit will be due in one year (9/6/2024). The screening/preventive services that you may require over the next 5-10 years are detailed below. Some tests may not apply to you based off risk factors and/or age. Screening tests ordered at today's visit but not completed yet may show as past due. Also, please note that scanned in results may not display below. Preventive Screenings:  Service Recommendations Previous Testing/Comments   Colorectal Cancer Screening  · Colonoscopy    · Fecal Occult Blood Test (FOBT)/Fecal Immunochemical Test (FIT)  · Fecal DNA/Cologuard Test  · Flexible Sigmoidoscopy Age: 43-73 years old   Colonoscopy: every 10 years (May be performed more frequently if at higher risk)  OR  FOBT/FIT: every 1 year  OR  Cologuard: every 3 years  OR  Sigmoidoscopy: every 5 years  Screening may be recommended earlier than age 39 if at higher risk for colorectal cancer. Also, an individualized decision between you and your healthcare provider will decide whether screening between the ages of 77-80 would be appropriate.  Colonoscopy: Not on file  FOBT/FIT: Not on file  Cologuard: Not on file  Sigmoidoscopy: Not on file          Prostate Cancer Screening Individualized decision between patient and health care provider in men between ages of 53-66   Medicare will cover every 12 months beginning on the day after your 50th birthday PSA: 0.3 ng/mL           Hepatitis C Screening Once for adults born between 1945 and 1965  More frequently in patients at high risk for Hepatitis C Hep C Antibody: 07/23/2020    Screening Current   Diabetes Screening 1-2 times per year if you're at risk for diabetes or have pre-diabetes Fasting glucose: 131 mg/dL (5/12/2022)  A1C: 5.6 % (5/12/2022)      Cholesterol Screening Once every 5 years if you don't have a lipid disorder. May order more often based on risk factors. Lipid panel: 05/12/2022  Screening Not Indicated  History Lipid Disorder      Other Preventive Screenings Covered by Medicare:  1. Abdominal Aortic Aneurysm (AAA) Screening: covered once if your at risk. You're considered to be at risk if you have a family history of AAA or a male between the age of 70-76 who smoking at least 100 cigarettes in your lifetime. 2. Lung Cancer Screening: covers low dose CT scan once per year if you meet all of the following conditions: (1) Age 48-67; (2) No signs or symptoms of lung cancer; (3) Current smoker or have quit smoking within the last 15 years; (4) You have a tobacco smoking history of at least 20 pack years (packs per day x number of years you smoked); (5) You get a written order from a healthcare provider. 3. Glaucoma Screening: covered annually if you're considered high risk: (1) You have diabetes OR (2) Family history of glaucoma OR (3)  aged 48 and older OR (3)  American aged 72 and older  3. Osteoporosis Screening: covered every 2 years if you meet one of the following conditions: (1) Have a vertebral abnormality; (2) On glucocorticoid therapy for more than 3 months; (3) Have primary hyperparathyroidism; (4) On osteoporosis medications and need to assess response to drug therapy. 5. HIV Screening: covered annually if you're between the age of 14-79. Also covered annually if you are younger than 13 and older than 72 with risk factors for HIV infection. For pregnant patients, it is covered up to 3 times per pregnancy.     Immunizations:  Immunization Recommendations   Influenza Vaccine Annual influenza vaccination during flu season is recommended for all persons aged >= 6 months who do not have contraindications   Pneumococcal Vaccine   * Pneumococcal conjugate vaccine = PCV13 (Prevnar 13), PCV15 (Vaxneuvance), PCV20 (Prevnar 20)  * Pneumococcal polysaccharide vaccine = PPSV23 (Pneumovax) Adults 20-63 years old: 1-3 doses may be recommended based on certain risk factors  Adults 72 years old: 1-2 doses may be recommended based off what pneumonia vaccine you previously received   Hepatitis B Vaccine 3 dose series if at intermediate or high risk (ex: diabetes, end stage renal disease, liver disease)   Tetanus (Td) Vaccine - COST NOT COVERED BY MEDICARE PART B Following completion of primary series, a booster dose should be given every 10 years to maintain immunity against tetanus. Td may also be given as tetanus wound prophylaxis. Tdap Vaccine - COST NOT COVERED BY MEDICARE PART B Recommended at least once for all adults. For pregnant patients, recommended with each pregnancy. Shingles Vaccine (Shingrix) - COST NOT COVERED BY MEDICARE PART B  2 shot series recommended in those aged 48 and above     Health Maintenance Due:      Topic Date Due   • Colorectal Cancer Screening  10/16/2022   • HIV Screening  Completed   • Hepatitis C Screening  Completed     Immunizations Due:      Topic Date Due   • Pneumococcal Vaccine: 65+ Years (2 - PCV) 07/12/2018   • COVID-19 Vaccine (4 - Moderna series) 01/27/2022   • Influenza Vaccine (1) 09/01/2023     Advance Directives   What are advance directives? Advance directives are legal documents that state your wishes and plans for medical care. These plans are made ahead of time in case you lose your ability to make decisions for yourself. Advance directives can apply to any medical decision, such as the treatments you want, and if you want to donate organs. What are the types of advance directives? There are many types of advance directives, and each state has rules about how to use them. You may choose a combination of any of the following:  · Living will: This is a written record of the treatment you want. You can also choose which treatments you do not want, which to limit, and which to stop at a certain time.  This includes surgery, medicine, IV fluid, and tube feedings. · Durable power of  for healthcare [facility]): This is a written record that states who you want to make healthcare choices for you when you are unable to make them for yourself. This person, called a proxy, is usually a family member or a friend. You may choose more than 1 proxy. · Do not resuscitate (DNR) order:  A DNR order is used in case your heart stops beating or you stop breathing. It is a request not to have certain forms of treatment, such as CPR. A DNR order may be included in other types of advance directives. · Medical directive: This covers the care that you want if you are in a coma, near death, or unable to make decisions for yourself. You can list the treatments you want for each condition. Treatment may include pain medicine, surgery, blood transfusions, dialysis, IV or tube feedings, and a ventilator (breathing machine). · Values history: This document has questions about your views, beliefs, and how you feel and think about life. This information can help others choose the care that you would choose. Why are advance directives important? An advance directive helps you control your care. Although spoken wishes may be used, it is better to have your wishes written down. Spoken wishes can be misunderstood, or not followed. Treatments may be given even if you do not want them. An advance directive may make it easier for your family to make difficult choices about your care. Fall Prevention    Fall prevention  includes ways to make your home and other areas safer. It also includes ways you can move more carefully to prevent a fall. Health conditions that cause changes in your blood pressure, vision, or muscle strength and coordination may increase your risk for falls. Medicines may also increase your risk for falls if they make you dizzy, weak, or sleepy. Fall prevention tips:   · Stand or sit up slowly. · Use assistive devices as directed.     · Wear shoes that fit well and have soles that . · Wear a personal alarm. · Stay active. · Manage your medical conditions. Home Safety Tips:  · Add items to prevent falls in the bathroom. · Keep paths clear. · Install bright lights in your home. · Keep items you use often on shelves within reach. · Paint or place reflective tape on the edges of your stairs. Weight Management   Why it is important to manage your weight:  Being overweight increases your risk of health conditions such as heart disease, high blood pressure, type 2 diabetes, and certain types of cancer. It can also increase your risk for osteoarthritis, sleep apnea, and other respiratory problems. Aim for a slow, steady weight loss. Even a small amount of weight loss can lower your risk of health problems. How to lose weight safely:  A safe and healthy way to lose weight is to eat fewer calories and get regular exercise. You can lose up about 1 pound a week by decreasing the number of calories you eat by 500 calories each day. Healthy meal plan for weight management:  A healthy meal plan includes a variety of foods, contains fewer calories, and helps you stay healthy. A healthy meal plan includes the following:  · Eat whole-grain foods more often. A healthy meal plan should contain fiber. Fiber is the part of grains, fruits, and vegetables that is not broken down by your body. Whole-grain foods are healthy and provide extra fiber in your diet. Some examples of whole-grain foods are whole-wheat breads and pastas, oatmeal, brown rice, and bulgur. · Eat a variety of vegetables every day. Include dark, leafy greens such as spinach, kale, brown greens, and mustard greens. Eat yellow and orange vegetables such as carrots, sweet potatoes, and winter squash. · Eat a variety of fruits every day. Choose fresh or canned fruit (canned in its own juice or light syrup) instead of juice. Fruit juice has very little or no fiber.   · Eat low-fat dairy foods.  Drink fat-free (skim) milk or 1% milk. Eat fat-free yogurt and low-fat cottage cheese. Try low-fat cheeses such as mozzarella and other reduced-fat cheeses. · Choose meat and other protein foods that are low in fat. Choose beans or other legumes such as split peas or lentils. Choose fish, skinless poultry (chicken or turkey), or lean cuts of red meat (beef or pork). Before you cook meat or poultry, cut off any visible fat. · Use less fat and oil. Try baking foods instead of frying them. Add less fat, such as margarine, sour cream, regular salad dressing and mayonnaise to foods. Eat fewer high-fat foods. Some examples of high-fat foods include french fries, doughnuts, ice cream, and cakes. · Eat fewer sweets. Limit foods and drinks that are high in sugar. This includes candy, cookies, regular soda, and sweetened drinks. Exercise:  Exercise at least 30 minutes per day on most days of the week. Some examples of exercise include walking, biking, dancing, and swimming. You can also fit in more physical activity by taking the stairs instead of the elevator or parking farther away from stores. Ask your healthcare provider about the best exercise plan for you. © Copyright [employer] 2018 Information is for End User's use only and may not be sold, redistributed or otherwise used for commercial purposes.  All illustrations and images included in CareNotes® are the copyrighted property of [employer] or  [place]
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
## 2023-09-07 NOTE — ASSESSMENT & PLAN NOTE
Chronic, severe, no history of sleep study documented. Not currently using for 1.5 years. Provided referral to sleep center.

## 2023-09-07 NOTE — ASSESSMENT & PLAN NOTE
Wt Readings from Last 3 Encounters:   09/06/23 (!) 162 kg (357 lb 5 oz)   08/30/23 (!) 162 kg (358 lb)   08/08/23 (!) 164 kg (361 lb)       Chronic, stable, follows with [address] [place] [address] patient to continue low salt diet and measure daily weights. Continue lasix 60 mg daily.

## 2023-09-07 NOTE — ASSESSMENT & PLAN NOTE
Chronic, patient did not bring BP log to appointment. Advised him to continue to log BP daily and bring to next appointment to discuss medication management. ED precautions advised.

## 2023-09-07 NOTE — ASSESSMENT & PLAN NOTE
Chronic, stable. Follows with orthopedics for injections. Pt is amenable to PT referral for concerns of worsening pain.

## 2023-09-08 ENCOUNTER — PATIENT OUTREACH (OUTPATIENT)
Dept: FAMILY MEDICINE CLINIC | Facility: CLINIC | Age: 68
End: 2023-09-08

## 2023-09-08 NOTE — PROGRESS NOTES
[facility] had received a referral from Jenni Sweeney, DO r/t bipolar 1 disorder. [facility] had completed a chart review. Per chart, patient getting frustrate at home and work. Per chart, patient would like to connect to [facility] services. [facility] will discuss patient's options based on insurance. [facility] had called the patient via phone. [facility] introduced herself and reason for consult. Patient told [facility] that he is at work so cannot talk. Patient stated he is off on Tues, Wed and Thurs. Patient stated he works Mon and Fri from 7:15am-5:15pm. [facility] agreed to call back on day patient is off. Patient thanked [facility]. [facility] will continue to be available.

## 2023-09-12 ENCOUNTER — PATIENT OUTREACH (OUTPATIENT)
Dept: FAMILY MEDICINE CLINIC | Facility: CLINIC | Age: 68
End: 2023-09-12

## 2023-09-12 DIAGNOSIS — F41.9 ANXIETY: ICD-10-CM

## 2023-09-12 DIAGNOSIS — F32.A DEPRESSION, UNSPECIFIED DEPRESSION TYPE: ICD-10-CM

## 2023-09-12 DIAGNOSIS — F31.9 BIPOLAR 1 DISORDER (HCC): Primary | ICD-10-CM

## 2023-09-12 NOTE — PROGRESS NOTES
[facility] had called the patient via phone. [facility] asked patient how he is doing. Patient reported he is doing well. Patient stated he has time to talk on the phone now. [facility] went over if patient was connected to therapy and/or psychiatrist. Patient stated he has a psychologist, Natalie Matt who is in [address]. Patient stated he has been seeing Natalie Matt for years. Patient stated he has a psychiatrist, Dr. Talon Veloz through [address] for [facility] [phone]. Patient stated he is considering getting a different psychiatrist. Patient stated he does not like Dr. Talon Veloz very much. [facility] suggested patient see if [facility] can switch him to another psychiatrist. Patient said "I didn't think of that but I will do find out."    [facility] informed the patient that if they cannot switch him then he will need to find another agency that will just do psychiatry/medication management. [facility] informed the patient that a few agencies in the area may want for him to see both. Patient stated he will not see anyone but Natalie Matt. SWCM understood. SWCM mentioned that the other option is [facility]. SW mentioned to the patient that there is a 6 month to a year wait. Patient stated he will consider that it he cannot get another psychiatrist through [address] for [facility]. SW agreed. Patient stated he lives with wife. Patient stated his wife is his main support. Patient stated he works. Patient stated he dries himself around. Patient denied any food or housing issues at this time. Patient denied any other needs at this time. [facility] provided her contact information. [facility] advised patient reach out if he has any other needs. Patient agreed. Patient denied any continued  outreach. [facility] closed referral. Please reconsult  for future needs.

## 2023-09-25 ENCOUNTER — TELEPHONE (OUTPATIENT)
Dept: FAMILY MEDICINE CLINIC | Facility: CLINIC | Age: 68
End: 2023-09-25

## 2023-09-25 DIAGNOSIS — E66.01 MORBID OBESITY WITH BMI OF 40.0-44.9, ADULT (HCC): ICD-10-CM

## 2023-09-25 DIAGNOSIS — J43.9 PULMONARY EMPHYSEMA, UNSPECIFIED EMPHYSEMA TYPE (HCC): ICD-10-CM

## 2023-09-25 DIAGNOSIS — I10 PRIMARY HYPERTENSION: ICD-10-CM

## 2023-09-25 DIAGNOSIS — I50.32 CHRONIC DIASTOLIC HEART FAILURE (HCC): Primary | ICD-10-CM

## 2023-09-25 NOTE — TELEPHONE ENCOUNTER
vm on clinical line: This is [facility]. Order blood work. I'm here to get it done and they can't find an order. I took a half a day off from work. Yes, I am PO. I mean $15.00 an hour, 5 hours is a lot of money because I want the [facility] and they told me I needed an appointment. I came here knowing I didn't need an appointment at [facility] on [address] in [place], and now they can't find it. Felipe Perez didn't want to stay with Taina Ban. I wanted to go to the [facility], but my wife says no, stay with Taina Ban. You're not giving me any good reason to stay with Taina Ban. You're really not So [phone]. I'm sorry, I'm pissed. Well, I lost money today because somebody's incompetent. Again, This is why I left this office up here, because they're incompetent. [phone]. Thank you. Have a nice day. Called pt back.  He answered "no bodys home, we all left"

## 2023-09-26 ENCOUNTER — OFFICE VISIT (OUTPATIENT)
Dept: FAMILY MEDICINE CLINIC | Facility: CLINIC | Age: 68
End: 2023-09-26
Payer: COMMERCIAL

## 2023-09-26 ENCOUNTER — APPOINTMENT (OUTPATIENT)
Dept: LAB | Facility: CLINIC | Age: 68
End: 2023-09-26
Payer: COMMERCIAL

## 2023-09-26 VITALS
HEART RATE: 72 BPM | SYSTOLIC BLOOD PRESSURE: 136 MMHG | OXYGEN SATURATION: 96 % | WEIGHT: 315 LBS | BODY MASS INDEX: 38.36 KG/M2 | TEMPERATURE: 96.8 F | DIASTOLIC BLOOD PRESSURE: 82 MMHG | HEIGHT: 76 IN

## 2023-09-26 DIAGNOSIS — L98.9 SKIN LESION: Primary | ICD-10-CM

## 2023-09-26 DIAGNOSIS — E66.01 MORBID OBESITY WITH BMI OF 40.0-44.9, ADULT (HCC): ICD-10-CM

## 2023-09-26 DIAGNOSIS — I10 PRIMARY HYPERTENSION: ICD-10-CM

## 2023-09-26 DIAGNOSIS — I50.32 CHRONIC DIASTOLIC HEART FAILURE (HCC): ICD-10-CM

## 2023-09-26 LAB
ALBUMIN SERPL BCP-MCNC: 4 G/DL (ref 3.5–5)
ALP SERPL-CCNC: 138 U/L (ref 34–104)
ALT SERPL W P-5'-P-CCNC: 28 U/L (ref 7–52)
ANION GAP SERPL CALCULATED.3IONS-SCNC: 8 MMOL/L
AST SERPL W P-5'-P-CCNC: 20 U/L (ref 13–39)
BASOPHILS # BLD AUTO: 0.04 THOUSANDS/ÂΜL (ref 0–0.1)
BASOPHILS NFR BLD AUTO: 1 % (ref 0–1)
BILIRUB SERPL-MCNC: 0.61 MG/DL (ref 0.2–1)
BNP SERPL-MCNC: 35 PG/ML (ref 0–100)
BUN SERPL-MCNC: 15 MG/DL (ref 5–25)
CALCIUM SERPL-MCNC: 9.1 MG/DL (ref 8.4–10.2)
CHLORIDE SERPL-SCNC: 107 MMOL/L (ref 96–108)
CHOLEST SERPL-MCNC: 148 MG/DL
CO2 SERPL-SCNC: 26 MMOL/L (ref 21–32)
CREAT SERPL-MCNC: 0.97 MG/DL (ref 0.6–1.3)
EOSINOPHIL # BLD AUTO: 0.12 THOUSAND/ÂΜL (ref 0–0.61)
EOSINOPHIL NFR BLD AUTO: 1 % (ref 0–6)
ERYTHROCYTE [DISTWIDTH] IN BLOOD BY AUTOMATED COUNT: 13.5 % (ref 11.6–15.1)
GFR SERPL CREATININE-BSD FRML MDRD: 81 ML/MIN/1.73SQ M
GLUCOSE P FAST SERPL-MCNC: 121 MG/DL (ref 65–99)
HCT VFR BLD AUTO: 48.7 % (ref 36.5–49.3)
HDLC SERPL-MCNC: 34 MG/DL
HGB BLD-MCNC: 16.4 G/DL (ref 12–17)
IMM GRANULOCYTES # BLD AUTO: 0.03 THOUSAND/UL (ref 0–0.2)
IMM GRANULOCYTES NFR BLD AUTO: 0 % (ref 0–2)
LDLC SERPL CALC-MCNC: 81 MG/DL (ref 0–100)
LYMPHOCYTES # BLD AUTO: 2.12 THOUSANDS/ÂΜL (ref 0.6–4.47)
LYMPHOCYTES NFR BLD AUTO: 24 % (ref 14–44)
MCH RBC QN AUTO: 32.7 PG (ref 26.8–34.3)
MCHC RBC AUTO-ENTMCNC: 33.7 G/DL (ref 31.4–37.4)
MCV RBC AUTO: 97 FL (ref 82–98)
MONOCYTES # BLD AUTO: 0.72 THOUSAND/ÂΜL (ref 0.17–1.22)
MONOCYTES NFR BLD AUTO: 8 % (ref 4–12)
NEUTROPHILS # BLD AUTO: 5.81 THOUSANDS/ÂΜL (ref 1.85–7.62)
NEUTS SEG NFR BLD AUTO: 66 % (ref 43–75)
NRBC BLD AUTO-RTO: 0 /100 WBCS
PLATELET # BLD AUTO: 261 THOUSANDS/UL (ref 149–390)
PMV BLD AUTO: 10.2 FL (ref 8.9–12.7)
POTASSIUM SERPL-SCNC: 4.1 MMOL/L (ref 3.5–5.3)
PROT SERPL-MCNC: 6.7 G/DL (ref 6.4–8.4)
RBC # BLD AUTO: 5.02 MILLION/UL (ref 3.88–5.62)
SODIUM SERPL-SCNC: 141 MMOL/L (ref 135–147)
TRIGL SERPL-MCNC: 165 MG/DL
WBC # BLD AUTO: 8.84 THOUSAND/UL (ref 4.31–10.16)

## 2023-09-26 PROCEDURE — 36415 COLL VENOUS BLD VENIPUNCTURE: CPT

## 2023-09-26 PROCEDURE — 80061 LIPID PANEL: CPT

## 2023-09-26 PROCEDURE — 99214 OFFICE O/P EST MOD 30 MIN: CPT | Performed by: FAMILY MEDICINE

## 2023-09-26 PROCEDURE — 85025 COMPLETE CBC W/AUTO DIFF WBC: CPT

## 2023-09-26 PROCEDURE — 83880 ASSAY OF NATRIURETIC PEPTIDE: CPT

## 2023-09-26 PROCEDURE — 11104 PUNCH BX SKIN SINGLE LESION: CPT | Performed by: FAMILY MEDICINE

## 2023-09-26 PROCEDURE — 80053 COMPREHEN METABOLIC PANEL: CPT

## 2023-09-26 PROCEDURE — 88305 TISSUE EXAM BY PATHOLOGIST: CPT | Performed by: PATHOLOGY

## 2023-09-26 NOTE — PROGRESS NOTES
Name: Fernie Worthy      : 1955      MRN: [id number]  Encounter Provider: Dev Alvarez DO  Encounter Date: 2023   Encounter department: [address]    1. Skin lesion  -     Biopsy  -     Tissue Exam; Future  -     Tissue Exam    2. Primary hypertension  Assessment & Plan:  Chronic, stable. Pt is bringing BP logs to appointment. Advised him to continue BP monitoring regularly. Continue medication management. Subjective    Patient presents to the clinic for left arm punch biopsy, follow-up of hypertension. He reports that he has been measuring his blood pressures at home and has been keeping a log in his phone. Notes that measures have been better than the previous visits to the doctors. Noting that he is compliant with his medications, denies any episodes of hypotension. reports that he will bring his blood pressure log to his suture removal appointment for follow-up on medication management. Denying any chest pain, shortness of breath, headaches, blurry vision, nausea, vomiting, diarrhea. Objective    /82 (BP Location: Left arm, Patient Position: Sitting, Cuff Size: Extra-Large)   Pulse 72   Temp (!) 96.8 °F (36 °C) (Tympanic)   Ht 6' 4" (1.93 m)   Wt (!) 163 kg (360 lb 4.8 oz)   SpO2 96%   BMI 43.86 kg/m²      Review of Systems   Constitutional: Negative. HENT: Negative. Negative for congestion, drooling, ear discharge, facial swelling, postnasal drip, rhinorrhea and sinus pain. Eyes: Negative for photophobia, discharge and visual disturbance. Respiratory: Negative for cough, chest tightness, shortness of breath and wheezing. Cardiovascular: Negative for chest pain and palpitations. Gastrointestinal: Negative. Musculoskeletal: Positive for arthralgias and myalgias. Skin:        Left arm lesion   Neurological: Negative. Physical Exam  Constitutional:       General: He is not in acute distress. Appearance: He is normal weight. HENT:      Head: Normocephalic and atraumatic. Right Ear: External ear normal.      Left Ear: External ear normal.      Mouth/Throat:      Pharynx: Oropharynx is clear. Eyes:      Conjunctiva/sclera: Conjunctivae normal.   Cardiovascular:      Rate and Rhythm: Normal rate. Heart sounds: No friction rub. Pulmonary:      Effort: Pulmonary effort is normal. No respiratory distress. Breath sounds: Normal breath sounds. No wheezing. Abdominal:      General: There is no distension. Tenderness: There is no abdominal tenderness. There is no guarding. Musculoskeletal:      Right lower leg: No edema. Left lower leg: No edema. Skin:     General: Skin is warm and dry. Comments: 3 sutures placed in left arm   Neurological:      General: No focal deficit present. Mental Status: He is alert and oriented to person, place, and time. Mental status is at baseline. Psychiatric:         Behavior: Behavior normal.         Biopsy    Date/Time: 9/26/2023 9:15 AM    Performed by: Rubens Mendez DO  Authorized by: Rubens Mendez DO  Universal Protocol:  Procedure performed by: (Dr. Tono Oliver)  Consent: Verbal consent obtained. Written consent obtained. Risks and benefits: risks, benefits and alternatives were discussed  Consent given by: patient  Timeout called at: 9/26/2023 9:45 AM.  Patient understanding: patient states understanding of the procedure being performed  Patient consent: the patient's understanding of the procedure matches consent given  Procedure consent: procedure consent matches procedure scheduled  Relevant documents: relevant documents present and verified  Test results: test results available and properly labeled  Site marked: the operative site was marked  Patient identity confirmed: verbally with patient      Procedure Details - Lesion Biopsy:      Body area:  Upper extremity    Upper extremity location:  L upper arm    Biopsy method: punch biopsy      Biopsy tissue type: skin    Initial size (mm):  5    Final defect size (mm):  6    Malignancy: malignancy unknown       Cleaned with alcohol swabs then Iodine swabs. Numbed with 1.5 cc Xylocaine 2%. Used 6 cm disposable punch biopsy keypunch with complete removal of lesion. 3 sutures placed with 3.0 vicryl suture. Pt tolerated the procedure well.       Lovenia Sandhoff, DO

## 2023-09-28 ENCOUNTER — TELEPHONE (OUTPATIENT)
Dept: FAMILY MEDICINE CLINIC | Facility: CLINIC | Age: 68
End: 2023-09-28

## 2023-09-28 NOTE — ASSESSMENT & PLAN NOTE
Chronic, stable. Pt is bringing BP logs to appointment. Advised him to continue BP monitoring regularly. Continue medication management.

## 2023-10-02 PROCEDURE — 88305 TISSUE EXAM BY PATHOLOGIST: CPT | Performed by: PATHOLOGY

## 2023-10-03 ENCOUNTER — PROCEDURE VISIT (OUTPATIENT)
Dept: FAMILY MEDICINE CLINIC | Facility: CLINIC | Age: 68
End: 2023-10-03
Payer: COMMERCIAL

## 2023-10-03 VITALS
HEART RATE: 77 BPM | SYSTOLIC BLOOD PRESSURE: 160 MMHG | OXYGEN SATURATION: 95 % | DIASTOLIC BLOOD PRESSURE: 90 MMHG | HEIGHT: 76 IN | RESPIRATION RATE: 18 BRPM | BODY MASS INDEX: 38.36 KG/M2 | WEIGHT: 315 LBS

## 2023-10-03 DIAGNOSIS — Z48.02 VISIT FOR SUTURE REMOVAL: Primary | ICD-10-CM

## 2023-10-03 DIAGNOSIS — I10 PRIMARY HYPERTENSION: ICD-10-CM

## 2023-10-03 DIAGNOSIS — G47.33 OBSTRUCTIVE SLEEP APNEA: ICD-10-CM

## 2023-10-03 DIAGNOSIS — E78.2 MIXED HYPERLIPIDEMIA: ICD-10-CM

## 2023-10-03 DIAGNOSIS — Z23 ENCOUNTER FOR IMMUNIZATION: ICD-10-CM

## 2023-10-03 PROCEDURE — 90677 PCV20 VACCINE IM: CPT | Performed by: FAMILY MEDICINE

## 2023-10-03 PROCEDURE — G0009 ADMIN PNEUMOCOCCAL VACCINE: HCPCS | Performed by: FAMILY MEDICINE

## 2023-10-03 PROCEDURE — 99214 OFFICE O/P EST MOD 30 MIN: CPT | Performed by: FAMILY MEDICINE

## 2023-10-03 RX ORDER — PRAVASTATIN SODIUM 10 MG
10 TABLET ORAL
Qty: 30 TABLET | Refills: 1 | Status: SHIPPED | OUTPATIENT
Start: 2023-10-03 | End: 2023-11-02

## 2023-10-03 NOTE — ASSESSMENT & PLAN NOTE
Chronic, severe PHONG, previously required CPAP, does not have at home.      Plan:  -Placed order for home sleep study  -Referral placed for sleep center

## 2023-10-03 NOTE — ASSESSMENT & PLAN NOTE
Chronic, stable. BP log at home shows BP is within goal. Home medications include enalapril 10 mg, lasix 40 mg.     Plan:   -Continue home BP medications   -Continue daily monitoring and logging values.  Bring BP log to next appointment  -Goal <140/90

## 2023-10-03 NOTE — ASSESSMENT & PLAN NOTE
Chronic, stable. cholesterol 148, , HDL 34, LDL 81. Home medications include Zetia 10 mg daily. Pt has history of intolerance of statins, amenable to trying one. Plan:  -START pravastatin 10 mg daily.  Monitor for myalgia and intolerance  -Continue Zetia 10 mg   -Follow up lipid panel in 3 months

## 2023-10-03 NOTE — PROGRESS NOTES
Name: Angely Quevedo.      : 1955      MRN: [id number]  Encounter Provider: Lolita Sandhoff, DO  Encounter Date: 10/3/2023   Encounter department: [address]     1. Visit for suture removal  -     Suture removal    2. Mixed hyperlipidemia  Assessment & Plan:  Chronic, stable. cholesterol 148, , HDL 34, LDL 81. Home medications include Zetia 10 mg daily. Pt has history of intolerance of statins, amenable to trying one. Plan:  -START pravastatin 10 mg daily. Monitor for myalgia and intolerance  -Continue Zetia 10 mg   -Follow up lipid panel in 3 months     Orders:  -     pravastatin (PRAVACHOL) 10 mg tablet; Take 1 tablet (10 mg total) by mouth daily at bedtime    3. Primary hypertension  Assessment & Plan:  Chronic, stable. BP log at home shows BP is within goal. Home medications include enalapril 10 mg, lasix 40 mg.     Plan:   -Continue home BP medications   -Continue daily monitoring and logging values. Bring BP log to next appointment  -Goal <140/90      4. Encounter for immunization  -     Pneumococcal Conjugate Vaccine 20-valent (Pcv20)    5. Obstructive sleep apnea  Assessment & Plan:  Chronic, severe PHONG, previously required CPAP, does not have at home. Plan:  -Placed order for home sleep study  -Referral placed for sleep center         Suture removal    Date/Time: 10/3/2023 2:15 PM    Performed by: Lolita Sandhoff, DO  Authorized by: Lolita Sandhoff, DO  Universal Protocol:  Procedure performed by: Angel Rangel)  Consent: Verbal consent obtained. Risks and benefits: risks, benefits and alternatives were discussed  Consent given by: patient  Timeout called at: 10/3/2023 3:00 PM.  Patient understanding: patient states understanding of the procedure being performed        Patient location:  Clinic  Location:     Laterality:  Left    Location:  Upper extremity    Upper extremity location:  Arm    Arm location:  L upper arm  Procedure details:      Tools used:  Suture removal kit    Wound appearance:  No sign(s) of infection and clean    Number of sutures removed:  3    Number of staples removed:  0  Post-procedure details:     Post-removal:  Steri-Strips applied    Patient tolerance of procedure: Tolerated well, no immediate complications        Subjective      Patient is a 45-year-old male presents the office for stitches removal, follow-up on hypercholesterolemia and hypertension. Stitches were placed 1 week ago for a 6 mm punch biopsy. Tissue biopsy revealed verrucous keratosis, negative for malignancy. Patient reports that he has been washing with warm water and paper towel, occasionally covering with Vaseline. Denying any drainage, bleeding, redness, tenderness, fevers, chills. Patient brought his blood pressure log to appointment, noting that his home blood pressure average was approximately 130/86. Denies any chest pain, shortness of breath, headaches, blurry vision, numbness, tingling. Says that at home he still continues to have episodes of witnessed apnea, has not followed up with sleep center yet. 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 Noting that he is getting along with his long-term partner better, less fights at home. He reports he has been attempting to improve his diet. He has used statins in the past but did not tolerate them due to myalgia. He tried lipitor and crestor previously. Is amenable to trying statin again. He notes he is still having some adenopathy after a recent infection and some minor sore throat. Denies fevers, chills, cough. Hx of tonsillectomy in childhood. CENTOR SCORE: 2    Review of Systems   Constitutional: Negative for activity change, chills, fatigue and fever. HENT: Positive for sore throat. Negative for congestion, ear discharge, ear pain, postnasal drip, rhinorrhea, sinus pressure, sinus pain and sneezing.     Respiratory: Negative for cough, chest tightness, shortness of breath and wheezing. Cardiovascular: Negative for chest pain and leg swelling. Gastrointestinal: Negative for constipation, diarrhea, nausea and vomiting. Musculoskeletal: Positive for arthralgias and back pain. Skin:        Stitches on left arm   Neurological: Negative for dizziness, seizures, numbness and headaches. Current Outpatient Medications on File Prior to Visit   Medication Sig   • acetaminophen (TYLENOL) 325 mg tablet 2, by mouth, every 6 hours as needed for mild to moderate pain. • albuterol (2.5 mg/3 mL) 0.083 % nebulizer solution Take 3 mL (2.5 mg total) by nebulization every 6 (six) hours as needed for wheezing or shortness of breath   • albuterol (PROVENTIL HFA,VENTOLIN HFA) 90 mcg/act inhaler Inhale 2 puffs every 6 (six) hours as needed for wheezing or shortness of breath   • aspirin (ECOTRIN LOW STRENGTH) 81 mg EC tablet Take 81 mg by mouth every evening   • atenolol (TENORMIN) 50 mg tablet TAKE ONE TABLET BY MOUTH EVERY EVENING   • cyclobenzaprine (FLEXERIL) 10 mg tablet Take 1 tablet (10 mg total) by mouth 2 (two) times a day as needed for muscle spasms   • enalapril (VASOTEC) 10 mg tablet Take 1 tablet (10 mg total) by mouth daily   • enalapril (VASOTEC) 5 mg tablet Take 1 tablet (5 mg total) by mouth daily (Patient not taking: Reported on 8/30/2023)   • ezetimibe (ZETIA) 10 mg tablet    • famotidine (PEPCID) 20 mg tablet Take 1 tablet (20 mg total) by mouth daily at bedtime   • fluticasone-umeclidinium-vilanterol (Trelegy Ellipta) 200-62.5-25 mcg/actuation AEPB inhaler Inhale 1 puff daily Rinse mouth after use. • fluticasone-umeclidinium-vilanterol (Trelegy Ellipta) 200-62.5-25 MCG/INH AEPB inhaler Inhale 1 puff daily Rinse mouth after use.    • furosemide (LASIX) 20 mg tablet TAKE ONE TABLET BY MOUTH EVERY DAY AS NEEDED (SEVERE SWELLING IN LEGS) AS DIRECTED   • furosemide (LASIX) 40 mg tablet TAKE ONE TABLET BY MOUTH EVERY MORNING   • ibuprofen (MOTRIN) 600 mg tablet Take 1 tablet (600 mg total) by mouth every 6 (six) hours as needed for mild pain   • levocetirizine (XYZAL) 5 MG tablet Take 5 mg by mouth every evening   • loratadine (Loradamed) 10 mg tablet Take 1 tablet by mouth daily   • meloxicam (Mobic) 15 mg tablet Take 1 tablet by mouth daily   • OXcarbazepine (TRILEPTAL) 300 mg tablet TAKE ONE AND ONE-HALF TABLETS BY MOUTH TWICE A DAY   • potassium chloride (KLOR-CON M20) 20 mEq tablet Take 1 tablet (20 mEq total) by mouth daily   • Respiratory Therapy Supplies (Nebulizer) REBECCA Use daily as needed (wheezing)   • sertraline (ZOLOFT) 100 mg tablet Take 1 tablet (100 mg total) by mouth 2 (two) times a day       Objective     /90 (BP Location: Left arm, Patient Position: Sitting, Cuff Size: Large)   Pulse 77   Resp 18   Ht 6' 4" (1.93 m)   Wt (!) 163 kg (359 lb)   SpO2 95%   BMI 43.70 kg/m²     Physical Exam  Vitals reviewed. HENT:      Head: Normocephalic and atraumatic. Right Ear: External ear normal.      Left Ear: External ear normal.   Eyes:      Conjunctiva/sclera: Conjunctivae normal.   Cardiovascular:      Rate and Rhythm: Normal rate and regular rhythm. Heart sounds: Murmur heard. Pulmonary:      Effort: Pulmonary effort is normal. No respiratory distress. Abdominal:      Palpations: Abdomen is soft. Tenderness: There is no abdominal tenderness. There is no guarding. Musculoskeletal:      Cervical back: No rigidity. Skin:     General: Skin is warm and dry. Coloration: Skin is not pale. Findings: No erythema or rash. Comments: 3 sutures removed from left arm, healing well, steri-strips placed   Neurological:      General: No focal deficit present. Mental Status: He is alert and oriented to person, place, and time. Mental status is at baseline.        Holly Brand DO

## 2023-10-06 ENCOUNTER — TELEPHONE (OUTPATIENT)
Dept: PSYCHIATRY | Facility: CLINIC | Age: 68
End: 2023-10-06

## 2023-10-24 ENCOUNTER — OFFICE VISIT (OUTPATIENT)
Dept: FAMILY MEDICINE CLINIC | Facility: CLINIC | Age: 68
End: 2023-10-24
Payer: COMMERCIAL

## 2023-10-24 VITALS
DIASTOLIC BLOOD PRESSURE: 70 MMHG | HEART RATE: 77 BPM | BODY MASS INDEX: 38.36 KG/M2 | WEIGHT: 315 LBS | SYSTOLIC BLOOD PRESSURE: 146 MMHG | OXYGEN SATURATION: 93 % | HEIGHT: 76 IN

## 2023-10-24 DIAGNOSIS — J06.9 URI, ACUTE: Primary | ICD-10-CM

## 2023-10-24 DIAGNOSIS — R06.02 SHORTNESS OF BREATH: ICD-10-CM

## 2023-10-24 PROCEDURE — 99213 OFFICE O/P EST LOW 20 MIN: CPT | Performed by: FAMILY MEDICINE

## 2023-10-24 RX ORDER — AZITHROMYCIN 250 MG/1
500 TABLET, FILM COATED ORAL EVERY 24 HOURS
Qty: 6 TABLET | Refills: 0 | Status: SHIPPED | OUTPATIENT
Start: 2023-10-24 | End: 2023-10-27

## 2023-10-24 NOTE — PROGRESS NOTES
[facility] Office visit    Assessment/Plan:     1. URI, acute  Comments:  Pt advised on symptomatic/Conservative therapy in addition to current orders. Pt given information on URIs. Pt advised to monitor for worsening symptoms. Orders:  -     azithromycin (ZITHROMAX) 250 mg tablet; Take 2 tablets (500 mg total) by mouth every 24 hours for 3 days  -     XR chest pa & lateral; Future; Expected date: 10/27/2023  -     predniSONE 20 mg tablet; Take 2 tablets (40 mg total) by mouth daily for 5 days    2. Shortness of breath  Comments:  Concern with patient's PMHx of COPD/asthma, CHF, and PHONG. On PE profuse wheezing neg for rhonchi or recent edema. F/u with [facility], take medication as prescribed  Orders:  -     azithromycin (ZITHROMAX) 250 mg tablet; Take 2 tablets (500 mg total) by mouth every 24 hours for 3 days  -     XR chest pa & lateral; Future; Expected date: 10/27/2023  -     predniSONE 20 mg tablet; Take 2 tablets (40 mg total) by mouth daily for 5 days          No follow-ups on file. Subjective:   DORIS Broussard. is a 74 y.o. male presents to clinic with a chief complaint of shortness of breath and malaise for 5 days. Patient reported associated symptoms such as post nasal drip, productive cough, fever, chills, and moderate wheezing at rest which have been getting progressively better. Patient reported since illness started patient's fever has broke however he is still struggling. Patient reported he went back to work on Monday and was unable to perform his duties and felt terrible the next day that is why he came to the office. Patient denies any known sick contacts and took 2 COVID test once when he started feeling symptoms and once yesterday to go back to work. Of note patient does have a history of PHONG, CHF, COPD/asthma. Patient denies any symptoms of vomiting, nausea, weakness, and weight loss. Patient reports a history of smoking.       Review of Systems   Constitutional: Positive for chills and fatigue. Negative for activity change, appetite change and fever. HENT:  Negative for congestion and sore throat. Eyes:  Negative for photophobia and visual disturbance. Respiratory:  Positive for chest tightness and shortness of breath. Negative for cough and wheezing. Cardiovascular:  Negative for chest pain, palpitations and leg swelling. Gastrointestinal:  Negative for abdominal distention, abdominal pain, blood in stool, constipation, diarrhea, nausea and vomiting. Endocrine: Negative for polydipsia and polyuria. Genitourinary:  Negative for dysuria and hematuria. Skin:  Negative for color change and rash. Neurological:  Negative for dizziness, syncope, light-headedness, numbness and headaches. Psychiatric/Behavioral:  Negative for sleep disturbance. All other systems reviewed and are negative. Objective:     /70 (BP Location: Left arm, Patient Position: Sitting, Cuff Size: Large)   Pulse 77   Ht 6' 4" (1.93 m)   Wt (!) 161 kg (356 lb)   SpO2 93%   BMI 43.33 kg/m²      Physical Exam  Vitals reviewed. Constitutional:       Appearance: He is obese. He is ill-appearing. HENT:      Head: Normocephalic and atraumatic. Right Ear: External ear normal.      Left Ear: External ear normal.      Nose: Nose normal.      Mouth/Throat:      Mouth: Mucous membranes are moist.      Pharynx: Oropharynx is clear. Eyes:      General: No scleral icterus. Extraocular Movements: Extraocular movements intact. Conjunctiva/sclera: Conjunctivae normal.   Cardiovascular:      Rate and Rhythm: Normal rate and regular rhythm. Pulses: Normal pulses. Heart sounds: Murmur heard. Pulmonary:      Breath sounds: No stridor. Wheezing present. No rhonchi or rales. Abdominal:      General: Bowel sounds are normal. There is no distension. Palpations: Abdomen is soft. Tenderness: There is no abdominal tenderness.  There is no right CVA tenderness, left CVA tenderness, guarding or rebound. Musculoskeletal:      Cervical back: No rigidity. Skin:     General: Skin is warm and dry. Coloration: Skin is not jaundiced or pale. Findings: No erythema or rash. Neurological:      Mental Status: He is alert and oriented to person, place, and time. Mental status is at baseline.           ** Please Note: This note has been constructed using a voice recognition system **     Felicia Houser MD  10/26/23  11:46 AM

## 2023-10-26 RX ORDER — PREDNISONE 20 MG/1
40 TABLET ORAL DAILY
Qty: 10 TABLET | Refills: 0 | Status: SHIPPED | OUTPATIENT
Start: 2023-10-26 | End: 2023-10-31

## 2023-10-31 ENCOUNTER — HOSPITAL ENCOUNTER (OUTPATIENT)
Dept: RADIOLOGY | Facility: HOSPITAL | Age: 68
Discharge: HOME | End: 2023-10-31
Payer: COMMERCIAL

## 2023-10-31 DIAGNOSIS — J06.9 URI, ACUTE: ICD-10-CM

## 2023-10-31 DIAGNOSIS — R06.02 SHORTNESS OF BREATH: ICD-10-CM

## 2023-10-31 PROCEDURE — 71046 X-RAY EXAM CHEST 2 VIEWS: CPT

## 2023-11-02 ENCOUNTER — TELEPHONE (OUTPATIENT)
Dept: FAMILY MEDICINE CLINIC | Facility: CLINIC | Age: 68
End: 2023-11-02

## 2023-11-02 NOTE — TELEPHONE ENCOUNTER
Pt called. Luis Alfredo Speak he has chest xray done 10/31 but cannot see results in [facility]. Advised they are not back yet. He asked if they could be expedited- seems symptoms are worsening. [facility] radiology. Reached their vm. Left msg with pt info asking for imagine to be read ASAP, if possible. Left my direct ph# for them to call back, if necessary.

## 2023-11-07 ENCOUNTER — OFFICE VISIT (OUTPATIENT)
Dept: FAMILY MEDICINE CLINIC | Facility: CLINIC | Age: 68
End: 2023-11-07
Payer: COMMERCIAL

## 2023-11-07 ENCOUNTER — APPOINTMENT (OUTPATIENT)
Dept: LAB | Facility: CLINIC | Age: 68
End: 2023-11-07
Payer: COMMERCIAL

## 2023-11-07 ENCOUNTER — TELEPHONE (OUTPATIENT)
Dept: FAMILY MEDICINE CLINIC | Facility: CLINIC | Age: 68
End: 2023-11-07

## 2023-11-07 VITALS
BODY MASS INDEX: 38.36 KG/M2 | HEIGHT: 76 IN | DIASTOLIC BLOOD PRESSURE: 74 MMHG | OXYGEN SATURATION: 95 % | SYSTOLIC BLOOD PRESSURE: 144 MMHG | WEIGHT: 315 LBS | HEART RATE: 83 BPM

## 2023-11-07 DIAGNOSIS — I50.32 CHRONIC DIASTOLIC HEART FAILURE (HCC): ICD-10-CM

## 2023-11-07 DIAGNOSIS — J41.0 SIMPLE CHRONIC BRONCHITIS (HCC): Primary | ICD-10-CM

## 2023-11-07 DIAGNOSIS — J44.1 COPD EXACERBATION (HCC): ICD-10-CM

## 2023-11-07 DIAGNOSIS — R06.02 SHORTNESS OF BREATH: ICD-10-CM

## 2023-11-07 LAB — BNP SERPL-MCNC: 37 PG/ML (ref 0–100)

## 2023-11-07 PROCEDURE — 83880 ASSAY OF NATRIURETIC PEPTIDE: CPT

## 2023-11-07 PROCEDURE — 36415 COLL VENOUS BLD VENIPUNCTURE: CPT

## 2023-11-07 PROCEDURE — 99214 OFFICE O/P EST MOD 30 MIN: CPT | Performed by: FAMILY MEDICINE

## 2023-11-07 RX ORDER — PREDNISONE 10 MG/1
TABLET ORAL
Qty: 66 TABLET | Refills: 0 | Status: SHIPPED | OUTPATIENT
Start: 2023-11-07 | End: 2023-12-05

## 2023-11-07 RX ORDER — DOXYCYCLINE HYCLATE 100 MG
100 TABLET ORAL 2 TIMES DAILY
Qty: 20 TABLET | Refills: 0 | Status: SHIPPED | OUTPATIENT
Start: 2023-11-07 | End: 2023-11-17

## 2023-11-07 NOTE — ASSESSMENT & PLAN NOTE
Patient with chronic bronchitis presents for worsening productive cough, SOB and conversational dyspnea. VSS, patient saturating well on RA, while using albuterol 4-6 daily as needed as well as Trelegy Ellipta as prescribed. On exam, lungs are clear however patient coughing and having conversational dyspnea during encounter. No wheezing or crackles noted. Recently treated with Prednisone burst and Zpack. Given concurrent hx of chronic diastolic congestive heart failure and 1+ pitting edema bilaterally, Ddx include COPD/Asthma exacerbation vs. CHF exacerbation.  - Start Prednisone taper, 40 for 4, 30 for 3, 20 for 2 and 10 for 1.  - Doxycycline 100 mg BID for 10 days. - CXR ordered PA and Lateral.  - ProBNP ordered. - Increase lasix to 40 BID for the next 5 days.  - Patient hesitant about going to ER. If no improvement in symptoms with above changes in 24-48 hours or if worsening starting today, then present to ED for further evaluation. Patient agreeable to the plan.

## 2023-11-07 NOTE — ASSESSMENT & PLAN NOTE
Wt Readings from Last 3 Encounters:   11/07/23 (!) 161 kg (354 lb)   10/24/23 (!) 161 kg (356 lb)   10/03/23 (!) 163 kg (359 lb)     Decrease in weight since last office visit, however patient now with SOB, cough and 1+ pedal edema bilaterally. Ddx include Asthma/COPD exacerbation vs. CHF exacerbation.  - Increase Lasix to 40 mg BID for 5 days. - ED precautions reviewed.

## 2023-11-07 NOTE — TELEPHONE ENCOUNTER
As discussed, it is supposed to be 20 for 2 days. Apologies for the mistake. Thank you for your help.

## 2023-11-07 NOTE — TELEPHONE ENCOUNTER
Called [facility] and correct dosing for 2 tablets = 20mg for 2 days and quantity dispensed changed per pharmacy

## 2023-11-07 NOTE — TELEPHONE ENCOUNTER
Dr. Zeina Hazel is question the prescription for Prednisol     4 40mg 4 day  3 30mg 3 day    2 20 mg 20 days  this is the question ???????? That correct   To many days never see this like that it is correct?     1 10 mg 1 day  [phone] dial 0 somebody will       Please call an confirm thanks

## 2023-11-07 NOTE — PROGRESS NOTES
Name: Rosibel Romeo      : 1955      MRN: [id number]  Encounter Provider: Lynn Delaney MD  Encounter Date: 2023   Encounter department: [address]     1. Simple chronic bronchitis ([address] )  Assessment & Plan:  Patient with chronic bronchitis presents for worsening productive cough, SOB and conversational dyspnea. VSS, patient saturating well on RA, while using albuterol 4-6 daily as needed as well as Trelegy Ellipta as prescribed. On exam, lungs are clear however patient coughing and having conversational dyspnea during encounter. No wheezing or crackles noted. Recently treated with Prednisone burst and Zpack. Given concurrent hx of chronic diastolic congestive heart failure and 1+ pitting edema bilaterally, Ddx include COPD/Asthma exacerbation vs. CHF exacerbation.  - Start Prednisone taper, 40 for 4, 30 for 3, 20 for 2 and 10 for 1.  - Doxycycline 100 mg BID for 10 days. - CXR ordered PA and Lateral.  - ProBNP ordered. - Increase lasix to 40 BID for the next 5 days.  - Patient hesitant about going to ER. If no improvement in symptoms with above changes in 24-48 hours or if worsening starting today, then present to ED for further evaluation. Patient agreeable to the plan. Orders:  -     doxycycline hyclate (VIBRA-TABS) 100 mg tablet; Take 1 tablet (100 mg total) by mouth 2 (two) times a day for 10 days  -     predniSONE 10 mg tablet; Take 4 tablets (40 mg total) by mouth daily for 4 days, THEN 3 tablets (30 mg total) daily for 3 days, THEN 2 tablets (20 mg total) daily for 20 days, THEN 1 tablet (10 mg total) daily for 1 day. 2. Chronic diastolic heart failure [facility])  Assessment & Plan:  Wt Readings from Last 3 Encounters:   23 (!) 161 kg (354 lb)   10/24/23 (!) 161 kg (356 lb)   10/03/23 (!) 163 kg (359 lb)     Decrease in weight since last office visit, however patient now with SOB, cough and 1+ pedal edema bilaterally.   Ddx include Asthma/COPD exacerbation vs. CHF exacerbation.  - Increase Lasix to 40 mg BID for 5 days. - ED precautions reviewed. Orders:  -     B-Type Natriuretic Peptide(BNP); Future  -     XR chest pa & lateral; Future; Expected date: 11/07/2023    3. Shortness of breath    4. COPD exacerbation (HCC)  -     doxycycline hyclate (VIBRA-TABS) 100 mg tablet; Take 1 tablet (100 mg total) by mouth 2 (two) times a day for 10 days  -     predniSONE 10 mg tablet; Take 4 tablets (40 mg total) by mouth daily for 4 days, THEN 3 tablets (30 mg total) daily for 3 days, THEN 2 tablets (20 mg total) daily for 20 days, THEN 1 tablet (10 mg total) daily for 1 day. Subjective      Patient is a very pleasant 74year old male with PMH of chronic bronchitis, CHF, Obesity who presents to the clinic for evaluation of cough and SOB. He recently had similar presentation and was prescribed a Zpack and Prednisone burst with improvement of symptoms however not completely resolved. Today, he has worsening symptoms of SOB, cough productive of green sputum. Denies any chest pain or recent URI symptoms. Review of Systems   Constitutional:  Positive for diaphoresis. Negative for chills and fever. HENT:  Positive for congestion. Negative for rhinorrhea. Respiratory:  Positive for cough and shortness of breath. Negative for apnea, chest tightness and wheezing. Cardiovascular:  Positive for leg swelling. Negative for chest pain and palpitations. Gastrointestinal:  Negative for abdominal pain. Neurological:  Negative for headaches. Current Outpatient Medications on File Prior to Visit   Medication Sig    acetaminophen (TYLENOL) 325 mg tablet 2, by mouth, every 6 hours as needed for mild to moderate pain.     albuterol (2.5 mg/3 mL) 0.083 % nebulizer solution Take 3 mL (2.5 mg total) by nebulization every 6 (six) hours as needed for wheezing or shortness of breath    albuterol (PROVENTIL HFA,VENTOLIN HFA) 90 mcg/act inhaler Inhale 2 puffs every 6 (six) hours as needed for wheezing or shortness of breath    aspirin (ECOTRIN LOW STRENGTH) 81 mg EC tablet Take 81 mg by mouth every evening    atenolol (TENORMIN) 50 mg tablet TAKE ONE TABLET BY MOUTH EVERY EVENING    cyclobenzaprine (FLEXERIL) 10 mg tablet Take 1 tablet (10 mg total) by mouth 2 (two) times a day as needed for muscle spasms    enalapril (VASOTEC) 10 mg tablet Take 1 tablet (10 mg total) by mouth daily    ezetimibe (ZETIA) 10 mg tablet     famotidine (PEPCID) 20 mg tablet Take 1 tablet (20 mg total) by mouth daily at bedtime    furosemide (LASIX) 20 mg tablet TAKE ONE TABLET BY MOUTH EVERY DAY AS NEEDED (SEVERE SWELLING IN LEGS) AS DIRECTED    furosemide (LASIX) 40 mg tablet TAKE ONE TABLET BY MOUTH EVERY MORNING    ibuprofen (MOTRIN) 600 mg tablet Take 1 tablet (600 mg total) by mouth every 6 (six) hours as needed for mild pain    levocetirizine (XYZAL) 5 MG tablet Take 5 mg by mouth every evening    loratadine (Loradamed) 10 mg tablet Take 1 tablet by mouth daily    OXcarbazepine (TRILEPTAL) 300 mg tablet TAKE ONE AND ONE-HALF TABLETS BY MOUTH TWICE A DAY    potassium chloride (KLOR-CON M20) 20 mEq tablet Take 1 tablet (20 mEq total) by mouth daily    Respiratory Therapy Supplies (Nebulizer) REBECCA Use daily as needed (wheezing)    sertraline (ZOLOFT) 100 mg tablet Take 1 tablet (100 mg total) by mouth 2 (two) times a day    enalapril (VASOTEC) 5 mg tablet Take 1 tablet (5 mg total) by mouth daily (Patient not taking: Reported on 8/30/2023)    fluticasone-umeclidinium-vilanterol (Trelegy Ellipta) 200-62.5-25 mcg/actuation AEPB inhaler Inhale 1 puff daily Rinse mouth after use. fluticasone-umeclidinium-vilanterol (Trelegy Ellipta) 200-62.5-25 MCG/INH AEPB inhaler Inhale 1 puff daily Rinse mouth after use.     meloxicam (Mobic) 15 mg tablet Take 1 tablet by mouth daily    pravastatin (PRAVACHOL) 10 mg tablet Take 1 tablet (10 mg total) by mouth daily at bedtime Objective     /74 (BP Location: Left arm, Patient Position: Sitting, Cuff Size: Standard)   Pulse 83   Ht 6' 4" (1.93 m)   Wt (!) 161 kg (354 lb)   SpO2 95%   BMI 43.09 kg/m²     Physical Exam  Constitutional:       General: He is in acute distress. Appearance: Normal appearance. He is obese. He is not ill-appearing, toxic-appearing or diaphoretic. HENT:      Head: Normocephalic and atraumatic. Right Ear: External ear normal.      Left Ear: External ear normal.      Nose: Nose normal. No congestion. Mouth/Throat:      Mouth: Mucous membranes are moist.      Pharynx: Oropharynx is clear. No oropharyngeal exudate. Cardiovascular:      Rate and Rhythm: Normal rate and regular rhythm. Pulses: Normal pulses. Heart sounds: Normal heart sounds. No murmur heard. Pulmonary:      Effort: Respiratory distress (conversational dyspnea. Cough noted.) present. Breath sounds: No stridor. No wheezing, rhonchi or rales. Chest:      Chest wall: No tenderness. Musculoskeletal:      Cervical back: Normal range of motion. No rigidity or tenderness. Right lower leg: Edema (1+ pitting) present. Left lower leg: Edema (1+ pitting) present. Lymphadenopathy:      Cervical: No cervical adenopathy. Neurological:      General: No focal deficit present. Mental Status: He is alert.    Psychiatric:         Mood and Affect: Mood normal.       Allan Perdue MD

## 2023-11-14 DIAGNOSIS — J41.0 SIMPLE CHRONIC BRONCHITIS (HCC): ICD-10-CM

## 2023-11-14 DIAGNOSIS — J44.1 CHRONIC OBSTRUCTIVE PULMONARY DISEASE WITH ACUTE EXACERBATION (HCC): ICD-10-CM

## 2023-11-14 RX ORDER — ALBUTEROL SULFATE 2.5 MG/3ML
2.5 SOLUTION RESPIRATORY (INHALATION) EVERY 6 HOURS PRN
Qty: 3 ML | Refills: 8 | Status: SHIPPED | OUTPATIENT
Start: 2023-11-14

## 2023-11-14 RX ORDER — ALBUTEROL SULFATE 90 UG/1
2 AEROSOL, METERED RESPIRATORY (INHALATION) EVERY 6 HOURS PRN
Qty: 18 G | Refills: 11 | Status: SHIPPED | OUTPATIENT
Start: 2023-11-14

## 2023-11-15 DIAGNOSIS — M17.0 BILATERAL PRIMARY OSTEOARTHRITIS OF KNEE: Primary | ICD-10-CM

## 2023-11-22 ENCOUNTER — PROCEDURE VISIT (OUTPATIENT)
Dept: OBGYN CLINIC | Facility: CLINIC | Age: 68
End: 2023-11-22
Payer: COMMERCIAL

## 2023-11-22 DIAGNOSIS — M17.0 BILATERAL PRIMARY OSTEOARTHRITIS OF KNEE: Primary | ICD-10-CM

## 2023-11-22 PROCEDURE — 20610 DRAIN/INJ JOINT/BURSA W/O US: CPT | Performed by: ORTHOPAEDIC SURGERY
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
## 2023-11-28 ENCOUNTER — OFFICE VISIT (OUTPATIENT)
Dept: PAIN MEDICINE | Facility: CLINIC | Age: 68
End: 2023-11-28
Payer: COMMERCIAL

## 2023-11-28 ENCOUNTER — TELEPHONE (OUTPATIENT)
Dept: PAIN MEDICINE | Facility: CLINIC | Age: 68
End: 2023-11-28

## 2023-11-28 VITALS
HEART RATE: 78 BPM | DIASTOLIC BLOOD PRESSURE: 75 MMHG | SYSTOLIC BLOOD PRESSURE: 189 MMHG | WEIGHT: 315 LBS | BODY MASS INDEX: 40.78 KG/M2

## 2023-11-28 DIAGNOSIS — M70.62 TROCHANTERIC BURSITIS OF BOTH HIPS: ICD-10-CM

## 2023-11-28 DIAGNOSIS — M16.0 BILATERAL HIP JOINT ARTHRITIS: Primary | ICD-10-CM

## 2023-11-28 DIAGNOSIS — M62.838 MUSCLE SPASM OF RIGHT LOWER EXTREMITY: ICD-10-CM

## 2023-11-28 DIAGNOSIS — M70.61 TROCHANTERIC BURSITIS OF BOTH HIPS: ICD-10-CM

## 2023-11-28 PROCEDURE — 99214 OFFICE O/P EST MOD 30 MIN: CPT | Performed by: PHYSICAL MEDICINE & REHABILITATION

## 2023-11-28 RX ORDER — METHOCARBAMOL 750 MG/1
750 TABLET, FILM COATED ORAL 3 TIMES DAILY PRN
Qty: 60 TABLET | Refills: 0 | Status: SHIPPED | OUTPATIENT
Start: 2023-11-28

## 2023-11-28 NOTE — TELEPHONE ENCOUNTER
Scheduled patient for B/L HIP INJECTIONS 01/03/2024  Patient takes low dose aspirin (okay for procedure)  Nothing to eat or drink 1 hour prior to procedure  Needs to arrange transportation  Proper clothing for procedure  No vaccines 2 weeks prior or after procedure  If ill or place on antibiotics, please call to reschedule
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
## 2023-11-29 ENCOUNTER — PROCEDURE VISIT (OUTPATIENT)
Dept: OBGYN CLINIC | Facility: CLINIC | Age: 68
End: 2023-11-29
Payer: COMMERCIAL

## 2023-11-29 DIAGNOSIS — M17.0 BILATERAL PRIMARY OSTEOARTHRITIS OF KNEE: Primary | ICD-10-CM

## 2023-11-29 PROCEDURE — 20610 DRAIN/INJ JOINT/BURSA W/O US: CPT | Performed by: ORTHOPAEDIC SURGERY
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
## 2023-12-04 ENCOUNTER — TELEPHONE (OUTPATIENT)
Dept: FAMILY MEDICINE CLINIC | Facility: CLINIC | Age: 68
End: 2023-12-04

## 2023-12-06 ENCOUNTER — PROCEDURE VISIT (OUTPATIENT)
Dept: OBGYN CLINIC | Facility: CLINIC | Age: 68
End: 2023-12-06
Payer: COMMERCIAL

## 2023-12-06 DIAGNOSIS — M17.0 BILATERAL PRIMARY OSTEOARTHRITIS OF KNEE: Primary | ICD-10-CM

## 2023-12-06 PROCEDURE — 20610 DRAIN/INJ JOINT/BURSA W/O US: CPT | Performed by: ORTHOPAEDIC SURGERY
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
## 2023-12-19 ENCOUNTER — PROCEDURE VISIT (OUTPATIENT)
Dept: PAIN MEDICINE | Facility: CLINIC | Age: 68
End: 2023-12-19
Payer: COMMERCIAL

## 2023-12-19 VITALS — HEART RATE: 80 BPM | TEMPERATURE: 98.2 F | SYSTOLIC BLOOD PRESSURE: 163 MMHG | DIASTOLIC BLOOD PRESSURE: 82 MMHG

## 2023-12-19 DIAGNOSIS — M70.61 TROCHANTERIC BURSITIS OF BOTH HIPS: Primary | ICD-10-CM

## 2023-12-19 DIAGNOSIS — M70.62 TROCHANTERIC BURSITIS OF BOTH HIPS: Primary | ICD-10-CM

## 2023-12-19 PROCEDURE — 20611 DRAIN/INJ JOINT/BURSA W/US: CPT | Performed by: PHYSICAL MEDICINE & REHABILITATION

## 2023-12-19 RX ORDER — BUPIVACAINE HYDROCHLORIDE 2.5 MG/ML
10 INJECTION, SOLUTION EPIDURAL; INFILTRATION; INTRACAUDAL ONCE
Status: COMPLETED | OUTPATIENT
Start: 2023-12-19 | End: 2023-12-19

## 2023-12-19 RX ORDER — METHYLPREDNISOLONE ACETATE 40 MG/ML
40 INJECTION, SUSPENSION INTRA-ARTICULAR; INTRALESIONAL; INTRAMUSCULAR; SOFT TISSUE ONCE
Status: COMPLETED | OUTPATIENT
Start: 2023-12-19 | End: 2023-12-19

## 2023-12-19 RX ORDER — LIDOCAINE HYDROCHLORIDE 10 MG/ML
5 INJECTION, SOLUTION INFILTRATION; PERINEURAL ONCE
Status: CANCELLED | OUTPATIENT
Start: 2023-12-19 | End: 2023-12-19

## 2023-12-19 RX ADMIN — METHYLPREDNISOLONE ACETATE 40 MG: 40 INJECTION, SUSPENSION INTRA-ARTICULAR; INTRALESIONAL; INTRAMUSCULAR; SOFT TISSUE at 12:06

## 2023-12-19 RX ADMIN — BUPIVACAINE HYDROCHLORIDE 10 ML: 2.5 INJECTION, SOLUTION EPIDURAL; INFILTRATION; INTRACAUDAL at 12:06

## 2023-12-19 RX ADMIN — METHYLPREDNISOLONE ACETATE 40 MG: 40 INJECTION, SUSPENSION INTRA-ARTICULAR; INTRALESIONAL; INTRAMUSCULAR; SOFT TISSUE at 12:07

## 2023-12-19 NOTE — H&P (VIEW-ONLY)
Indication: Lateral hip pain  Preprocedure diagnosis: 1. Trochanteric bursitis  2. Lateral hip pain  Postprocedure diagnosis: 1. Trochanteric bursitis  2. Lateral hip pain  Procedure: Ultrasound-guided bilateral trochanteric bursa injection(s)  After discussing the risks, benefits, and alternatives to the procedure, the patient expressed understanding and wished to proceed. The patient was brought to the procedure suite and placed in the sidelying position. A procedural pause was conducted to verify: correct patient identity, procedure to be performed and as applicable, correct side and site, correct patient position, and availability of implants, special equipment or special requirements. A simple surgical tray was used. Prior to the procedure, the lateral hip was examined with a 3.75 MHz curvilinear transducer to visualize the greater trochanter, tendons, and bursa and to determine the optimal needle path. Following this, the lateral hip was prepared with a ChloraPrep scrub, then re-examined using the same transducer, a sterile ultrasound transducer cover, and sterile ultrasound transducer gel. Thereafter, using continuous ultrasound guidance, a 2.5 inch 25-gauge needle was advanced into the peritrochanteric bursa region. After visualization of the tip in the target area and negative aspiration for blood, a mixture of 40 mg Depo-Medrol in 3 mL of 0.25% bupivacaine was injected into the trochanteric bursa. Following the injection, the needle was withdrawn. The patient tolerated the procedure well and there were no apparent complications.  The procedure was repeated in the exact same fashion on the opposite side.  After an appropriate amount of observation, the patient was dismissed from the clinic in good condition under their own power.

## 2023-12-26 ENCOUNTER — TELEPHONE (OUTPATIENT)
Dept: PAIN MEDICINE | Facility: CLINIC | Age: 68
End: 2023-12-26

## 2024-01-03 ENCOUNTER — HOSPITAL ENCOUNTER (OUTPATIENT)
Facility: AMBULARY SURGERY CENTER | Age: 69
Setting detail: OUTPATIENT SURGERY
Discharge: HOME | End: 2024-01-03
Attending: PHYSICAL MEDICINE & REHABILITATION | Admitting: PHYSICAL MEDICINE & REHABILITATION
Payer: COMMERCIAL

## 2024-01-03 ENCOUNTER — APPOINTMENT (OUTPATIENT)
Dept: RADIOLOGY | Facility: HOSPITAL | Age: 69
End: 2024-01-03
Payer: COMMERCIAL

## 2024-01-03 VITALS
TEMPERATURE: 96.4 F | RESPIRATION RATE: 18 BRPM | OXYGEN SATURATION: 98 % | SYSTOLIC BLOOD PRESSURE: 161 MMHG | DIASTOLIC BLOOD PRESSURE: 90 MMHG | HEART RATE: 74 BPM

## 2024-01-03 PROBLEM — M25.552 BILATERAL HIP PAIN: Status: ACTIVE | Noted: 2024-01-03

## 2024-01-03 PROBLEM — M25.551 BILATERAL HIP PAIN: Status: ACTIVE | Noted: 2024-01-03

## 2024-01-03 PROCEDURE — NC001 PR NO CHARGE: Performed by: PHYSICAL MEDICINE & REHABILITATION

## 2024-01-03 PROCEDURE — 77002 NEEDLE LOCALIZATION BY XRAY: CPT

## 2024-01-03 PROCEDURE — 20610 DRAIN/INJ JOINT/BURSA W/O US: CPT | Performed by: PHYSICAL MEDICINE & REHABILITATION

## 2024-01-03 PROCEDURE — 77002 NEEDLE LOCALIZATION BY XRAY: CPT | Performed by: PHYSICAL MEDICINE & REHABILITATION

## 2024-01-03 RX ORDER — LIDOCAINE HYDROCHLORIDE 10 MG/ML
INJECTION, SOLUTION EPIDURAL; INFILTRATION; INTRACAUDAL; PERINEURAL AS NEEDED
Status: DISCONTINUED | OUTPATIENT
Start: 2024-01-03 | End: 2024-01-03 | Stop reason: HOSPADM

## 2024-01-03 RX ORDER — BUPIVACAINE HYDROCHLORIDE 2.5 MG/ML
INJECTION, SOLUTION EPIDURAL; INFILTRATION; INTRACAUDAL AS NEEDED
Status: DISCONTINUED | OUTPATIENT
Start: 2024-01-03 | End: 2024-01-03 | Stop reason: HOSPADM

## 2024-01-03 RX ORDER — METHYLPREDNISOLONE ACETATE 40 MG/ML
INJECTION, SUSPENSION INTRA-ARTICULAR; INTRALESIONAL; INTRAMUSCULAR; SOFT TISSUE AS NEEDED
Status: DISCONTINUED | OUTPATIENT
Start: 2024-01-03 | End: 2024-01-03 | Stop reason: HOSPADM

## 2024-01-03 NOTE — DISCHARGE INSTRUCTIONS

## 2024-01-03 NOTE — OP NOTE
OPERATIVE REPORT  PATIENT NAME: Aime Fox Sr.    :  1955  MRN: [id number]  Pt Location: [facility] MINOR/PAIN ROOM 01    SURGERY DATE: 1/3/2024    Surgeons and Role:     * Ho Larson, DO - Primary    Preop Diagnosis:  Right hip pain [M25.551]  Left hip pain [M25.552]    Post-Op Diagnosis Codes:     * Right hip pain [M25.551]     * Left hip pain [M25.552]    Procedure(s):  Bilateral - BILATERAL HIP INJECTIONS ([id number])    Indication:  Hip pain  Preoperative diagnosis:                     Hip arthropathy  Postoperative diagnosis:                     Hip arthropathy     Procedure:  Fluoroscopically-guided bilateral intraarticular hip injection     EBL:  none  Specimens:  not applicable        After discussing the risks, benefits, and alternatives to the procedure, the patient expressed understanding and wished to proceed.  The patient was brought to the fluoroscopic suite and placed in the supine position. Procedural pause conducted to verify:  correct patient identity, procedure to be performed, and as applicable, correct side and site, correct patient position, and availability of implants, special equipment and special requirements.  The femoral artery was palpated.  Using fluoroscopy, an AP view was utilized to visualize the hip joint.  Next, a point at the junction of the ipsilateral femoral head and femoral neck was identified, and noted to be a safe distance lateral to the pulsation of the aforementioned femoral artery.  The skin was sterilely prepped and draped in the usual fashion using Chloraprep skin prep.  The skin and subcutaneous tissues were anesthetized with 1% lidocaine.  Using fluoroscopic guidance, a 3.5 inch 22 gauge spinal needle was advanced into the joint capsule.  After negative aspiration, Omnipaque 240 contrast was injected which confirmed intra-articular placement without evidence of intravascular uptake. A 4 ml solution consisting of 40 mg of Depo-Medrol in 3 mL of 0.25%  bupivacaine was injected.  The needle was withdrawn from the skin.  The patient tolerated the procedure well, and there were no apparent complications.  The procedure was repeated in the exact same fashion on the opposite right side.  After appropriate observation, the patient was dismissed from the outpatient procedure area in good condition under their own power.                                    SIGNATURE: Ho Larson, DO  DATE: January 3, 2024  TIME: 9:10 AM

## 2024-01-10 ENCOUNTER — TELEPHONE (OUTPATIENT)
Dept: PAIN MEDICINE | Facility: CLINIC | Age: 69
End: 2024-01-10

## 2024-01-10 NOTE — TELEPHONE ENCOUNTER
Pt reports 50% improvement post inj   Pain level 6-7/10  Pt aware I will call next week for an update

## 2024-01-17 ENCOUNTER — OFFICE VISIT (OUTPATIENT)
Dept: FAMILY MEDICINE CLINIC | Facility: CLINIC | Age: 69
End: 2024-01-17
Payer: COMMERCIAL

## 2024-01-17 VITALS
HEART RATE: 80 BPM | OXYGEN SATURATION: 96 % | DIASTOLIC BLOOD PRESSURE: 78 MMHG | SYSTOLIC BLOOD PRESSURE: 142 MMHG | TEMPERATURE: 97.9 F

## 2024-01-17 DIAGNOSIS — E66.01 MORBID OBESITY WITH BMI OF 40.0-44.9, ADULT (HCC): ICD-10-CM

## 2024-01-17 DIAGNOSIS — M25.551 BILATERAL HIP PAIN: ICD-10-CM

## 2024-01-17 DIAGNOSIS — E78.49 OTHER HYPERLIPIDEMIA: ICD-10-CM

## 2024-01-17 DIAGNOSIS — I50.32 CHRONIC DIASTOLIC HEART FAILURE (HCC): ICD-10-CM

## 2024-01-17 DIAGNOSIS — M25.562 CHRONIC PAIN OF BOTH KNEES: ICD-10-CM

## 2024-01-17 DIAGNOSIS — I10 ESSENTIAL HYPERTENSION: Primary | ICD-10-CM

## 2024-01-17 DIAGNOSIS — M79.18 GLUTEAL PAIN: ICD-10-CM

## 2024-01-17 DIAGNOSIS — M25.552 BILATERAL HIP PAIN: ICD-10-CM

## 2024-01-17 DIAGNOSIS — G89.29 CHRONIC PAIN OF BOTH KNEES: ICD-10-CM

## 2024-01-17 DIAGNOSIS — M25.561 CHRONIC PAIN OF BOTH KNEES: ICD-10-CM

## 2024-01-17 DIAGNOSIS — F31.9 BIPOLAR 1 DISORDER (HCC): ICD-10-CM

## 2024-01-17 DIAGNOSIS — R26.2 AMBULATORY DYSFUNCTION: ICD-10-CM

## 2024-01-17 PROCEDURE — 99214 OFFICE O/P EST MOD 30 MIN: CPT | Performed by: FAMILY MEDICINE

## 2024-01-17 RX ORDER — ENALAPRIL MALEATE 10 MG/1
15 TABLET ORAL DAILY
Qty: 90 TABLET | Refills: 5 | Status: SHIPPED | OUTPATIENT
Start: 2024-01-17

## 2024-01-17 NOTE — ASSESSMENT & PLAN NOTE
Patient has recently injured his right foot and is pending podiatry f/u. He reports difficulty walking and having right foot pain. Ambulating with walker but difficulty with sitting to standing.    Additionally noting right gluteal weakness.    Plan:  -Continue f/u with podiatry  -Ordered walker to prevent falling  -F/u with PT, referral placed

## 2024-01-17 NOTE — PROGRESS NOTES
Name: Aime Fox Sr.      : 1955      MRN: [id number]  Encounter Provider: Alcira Reyes DO  Encounter Date: 2024   Encounter department: [facility]    Assessment & Plan     1. Essential hypertension  -     enalapril (VASOTEC) 10 mg tablet; Take 1.5 tablets (15 mg total) by mouth daily    2. Morbid obesity with BMI of 40.0-44.9, adult (HCC)  Assessment & Plan:  Recent intentional weight loss, advise follow up with bariatrics. Dietician referral placed.    Orders:  -     Ambulatory Referral to Nutrition Services; Future    3. Bipolar 1 disorder (HCC)    4. Chronic diastolic heart failure (HCC)  Assessment & Plan:  Wt Readings from Last 3 Encounters:   23 (!) 152 kg (335 lb)   23 (!) 161 kg (354 lb)   10/24/23 (!) 161 kg (356 lb)       Chronic, stable.  Decreased weight over the past several months, has been attempting lifestyle modifications.     Plan:  -F/u with cardiology as scheduled  -Continue Lasix 60 mg daily  -Continue K-dur 20 mEq daily  -Continue to monitor weight  -        5. Other hyperlipidemia  Assessment & Plan:  Chronic, stable. cholesterol 148, , HDL 34, LDL 81. Home medications include Zetia 10 mg daily. Pt has history of intolerance of statins, amenable to trying one.     Plan:  -Consider continuing pravastatin  -Continue Zetia 10 mg   -Follow up lipid panel in 3 months    Orders:  -     Lipid panel; Future    6. Bilateral hip pain  Assessment & Plan:  Chronic, stable. Continue to follow with pain management      7. Gluteal pain  -     Ambulatory Referral to Physical Therapy; Future    8. Chronic pain of both knees  -     Ambulatory Referral to Physical Therapy; Future    9. Ambulatory dysfunction  Assessment & Plan:  Patient has recently injured his right foot and is pending podiatry f/u. He reports difficulty walking and having right foot pain. Ambulating with walker but difficulty with sitting to standing.    Additionally noting right gluteal  "weakness.    Plan:  -Continue f/u with podiatry  -Ordered walker to prevent falling  -F/u with PT, referral placed      Orders:  -     Walker           Subjective      Patient presents for follow up of chronic conditions. Reports his HTN appears improved, still out of range, did not bring log to appointment. \"Broke foot last week\", follows with [facility] with podiatry. Reports he was walking and felt something pull, placed in boot. Struggling with ambulation.     Seeing bariatric doctor , has had intentional weight loss. Is amenable to see dietician to discuss lifestyle management. Pain management with b/l hips, noting significant pain improvement. However, notes right gluteal pain, dull achey. Difficulty when sitting to standing.      Review of Systems   Constitutional:  Negative for fatigue, fever and unexpected weight change.   HENT: Negative.     Respiratory:  Negative for cough, shortness of breath and wheezing.    Cardiovascular:  Negative for chest pain.   Gastrointestinal: Negative.    Musculoskeletal:  Positive for arthralgias, gait problem and myalgias.   Skin: Negative.    Neurological:  Negative for dizziness, seizures and syncope.       Current Outpatient Medications on File Prior to Visit   Medication Sig    acetaminophen (TYLENOL) 325 mg tablet 2, by mouth, every 6 hours as needed for mild to moderate pain.    albuterol (2.5 mg/3 mL) 0.083 % nebulizer solution Take 3 mL (2.5 mg total) by nebulization every 6 (six) hours as needed for wheezing or shortness of breath    albuterol (PROVENTIL HFA,VENTOLIN HFA) 90 mcg/act inhaler Inhale 2 puffs every 6 (six) hours as needed for wheezing or shortness of breath    aspirin (ECOTRIN LOW STRENGTH) 81 mg EC tablet Take 81 mg by mouth every evening    atenolol (TENORMIN) 50 mg tablet TAKE ONE TABLET BY MOUTH EVERY EVENING    ezetimibe (ZETIA) 10 mg tablet     famotidine (PEPCID) 20 mg tablet Take 1 tablet (20 mg total) by mouth daily at bedtime    " furosemide (LASIX) 20 mg tablet TAKE ONE TABLET BY MOUTH EVERY DAY AS NEEDED (SEVERE SWELLING IN LEGS) AS DIRECTED    furosemide (LASIX) 40 mg tablet TAKE ONE TABLET BY MOUTH EVERY MORNING    ibuprofen (MOTRIN) 600 mg tablet Take 1 tablet (600 mg total) by mouth every 6 (six) hours as needed for mild pain    levocetirizine (XYZAL) 5 MG tablet Take 5 mg by mouth every evening    loratadine (Loradamed) 10 mg tablet Take 1 tablet by mouth daily    methocarbamol (Robaxin-750) 750 mg tablet Take 1 tablet (750 mg total) by mouth 3 (three) times a day as needed for muscle spasms    OXcarbazepine (TRILEPTAL) 300 mg tablet TAKE ONE AND ONE-HALF TABLETS BY MOUTH TWICE A DAY    potassium chloride (KLOR-CON M20) 20 mEq tablet Take 1 tablet (20 mEq total) by mouth daily    Respiratory Therapy Supplies (Nebulizer) REBECCA Use daily as needed (wheezing)    sertraline (ZOLOFT) 100 mg tablet Take 1 tablet (100 mg total) by mouth 2 (two) times a day    [DISCONTINUED] enalapril (VASOTEC) 10 mg tablet Take 1 tablet (10 mg total) by mouth daily    fluticasone-umeclidinium-vilanterol (Trelegy Ellipta) 200-62.5-25 mcg/actuation AEPB inhaler Inhale 1 puff daily Rinse mouth after use.    fluticasone-umeclidinium-vilanterol (Trelegy Ellipta) 200-62.5-25 MCG/INH AEPB inhaler Inhale 1 puff daily Rinse mouth after use.    pravastatin (PRAVACHOL) 10 mg tablet Take 1 tablet (10 mg total) by mouth daily at bedtime    [DISCONTINUED] enalapril (VASOTEC) 5 mg tablet Take 1 tablet (5 mg total) by mouth daily (Patient not taking: Reported on 8/30/2023)    [DISCONTINUED] meloxicam (Mobic) 15 mg tablet Take 1 tablet by mouth daily       Objective     /78 (BP Location: Left arm, Patient Position: Sitting, Cuff Size: Large)   Pulse 80   Temp 97.9 °F (36.6 °C) (Tympanic)   SpO2 96%     Physical Exam  Vitals reviewed.   HENT:      Head: Normocephalic and atraumatic.      Right Ear: External ear normal.      Left Ear: External ear normal.   Eyes:       Conjunctiva/sclera: Conjunctivae normal.   Cardiovascular:      Rate and Rhythm: Normal rate and regular rhythm.      Heart sounds: Murmur heard.   Pulmonary:      Effort: Pulmonary effort is normal. No respiratory distress.   Abdominal:      Palpations: Abdomen is soft.      Tenderness: There is no abdominal tenderness. There is no guarding.   Musculoskeletal:      Cervical back: No rigidity.      Comments: Right foot in boot   Skin:     General: Skin is warm and dry.      Coloration: Skin is not pale.      Findings: No erythema or rash.   Neurological:      General: No focal deficit present.      Mental Status: He is alert and oriented to person, place, and time. Mental status is at baseline.       Alcira Reyes,

## 2024-01-17 NOTE — ASSESSMENT & PLAN NOTE
Chronic, stable. cholesterol 148, , HDL 34, LDL 81. Home medications include Zetia 10 mg daily. Pt has history of intolerance of statins, amenable to trying one.     Plan:  -Consider continuing pravastatin  -Continue Zetia 10 mg   -Follow up lipid panel in 3 months

## 2024-01-17 NOTE — TELEPHONE ENCOUNTER
Patient reports 50-60% improvement 2 wk post inj   Pain level 5-6/10    Patient broke foot so its harder on everything right now.

## 2024-01-17 NOTE — ASSESSMENT & PLAN NOTE
Wt Readings from Last 3 Encounters:   11/28/23 (!) 152 kg (335 lb)   11/07/23 (!) 161 kg (354 lb)   10/24/23 (!) 161 kg (356 lb)       Chronic, stable.  Decreased weight over the past several months, has been attempting lifestyle modifications.     Plan:  -F/u with cardiology as scheduled  -Continue Lasix 60 mg daily  -Continue K-dur 20 mEq daily  -Continue to monitor weight  -

## 2024-01-23 ENCOUNTER — TELEPHONE (OUTPATIENT)
Dept: FAMILY MEDICINE CLINIC | Facility: CLINIC | Age: 69
End: 2024-01-23

## 2024-01-23 ENCOUNTER — EVALUATION (OUTPATIENT)
Dept: PHYSICAL THERAPY | Facility: CLINIC | Age: 69
End: 2024-01-23
Payer: COMMERCIAL

## 2024-01-23 DIAGNOSIS — M25.562 CHRONIC PAIN OF BOTH KNEES: Primary | ICD-10-CM

## 2024-01-23 DIAGNOSIS — G89.29 CHRONIC PAIN OF BOTH KNEES: Primary | ICD-10-CM

## 2024-01-23 DIAGNOSIS — M79.18 GLUTEAL PAIN: ICD-10-CM

## 2024-01-23 DIAGNOSIS — M25.561 CHRONIC PAIN OF BOTH KNEES: Primary | ICD-10-CM

## 2024-01-23 PROCEDURE — 97161 PT EVAL LOW COMPLEX 20 MIN: CPT | Performed by: PHYSICAL THERAPIST

## 2024-01-23 NOTE — PROGRESS NOTES
PT Evaluation     Today's date: 2024  Patient name: Aime Fox Sr.  : 1955  MRN: [id number]  Referring provider: Alcira Reyes DO  Dx:   Encounter Diagnosis     ICD-10-CM    1. Chronic pain of both knees  M25.561 Ambulatory Referral to Physical Therapy    M25.562     G89.29       2. Gluteal pain  M79.18 Ambulatory Referral to Physical Therapy                     Assessment  Assessment details: Aime Fox Sr. is a 68 y.o. male who presents with complaints of Chronic pain of both knees and right Gluteal pain.  No further referral appears necessary at this time based upon examination results.  Patient is presenting with decreased lower extremity strength and postural dysfunction as gluteal pain only present when sitting for more than 2 hours. Current leg weakness leading to limitations with rising and lowering to seated position and occasionally with stair navigation.  Prognosis is good given HEP compliance. Patient does report exercising for health with regards to using treadmill. Referred patient to our [facility] for personal training for total body strengthening. He will contact me with update in few weeks to determine progress being made. Positive prognostic indicators include positive attitude toward recovery.   Please contact me if you have any questions or recommendations.  Thank you for the opportunity to share in Aime's care.       Impairments: abnormal gait, abnormal movement, impaired physical strength, lacks appropriate home exercise program and poor posture     Symptom irritability: lowUnderstanding of Dx/Px/POC: good   Prognosis: good    Plan  Patient would benefit from: skilled physical therapy (fitness program)  Planned therapy interventions: joint mobilization, manual therapy, patient education, postural training, strengthening, stretching, therapeutic activities, therapeutic exercise, home exercise program, neuromuscular re-education, flexibility, functional  ROM exercises and balance  Frequency: 1-2x.  Duration in weeks: 8  Treatment plan discussed with: patient        Subjective Evaluation    History of Present Illness  Mechanism of injury: Patient reports that his arthritis in his foot is so bad that he was placed in a boot on his right foot. Had to take it off to drive and work. Wearing more supportive shoe which has helped. This has been going on for 2 weeks now and will follow up with foot doctor next week. Reports having trouble getting out of chair without BUE assist due to strength as well as entering/exiting car. Had injections in knees 3 months ago. Had hip injections 1/3/24. Performs reciprocal stair navigation at work with slow pace.    Pain in right gluteal region present when sitting for too long that can radiate down the leg.           Recurrent probem    Quality of life: good    Patient Goals  Patient goals for therapy: decreased pain and increased strength    Pain  Current pain rating: 3  At worst pain ratin  Quality: dull ache  Aggravating factors: sitting and stair climbing    Treatments  Previous treatment: physical therapy and injection treatment      Short Term:  Pt will report decreased levels of pain by at least 2 subjective ratings in 4 weeks  Pt will demonstrate improved ROM by at least 10 degrees in 4 weeks  Pt will demonstrate improved strength by 1/2 grade in 4 weeks.  Pt will be able to rise from seated position without use of UE in 4 weeks  Long Term:   Pt will be independent in their HEP in 8 weeks  Pt will be pain free with IADL's in 8 weeks.  Pt will be able to enter/exit car without limitations  in 8 weeks     Objective  GAIT: fwd trunk lean right foot antalgia  Squat assess: 25% depth    Lumbar  % of normal   Flex. 50   Extn. 10   SB Left 50   SB Right 50   ROT Left 50   ROT Right 50   Repetitive testing: extension in standing= reduced gluteal ache          MMT    Hip       L       R   Flex. 4 5   Extn. 3 3   Abd. 4 4                  MMT    Knee         L        R   Flex. 4 5   Extn. 4 4              MMT    Ankle       L        R   PF 4 2p!   DF. 5 5   EHL 5 5   Inv. 5 5   Ever. 5 5     Posture: poor sitting posture, correction reduces gluteal pain  Palpation: right gluteal region, piriformis  Slump test: L=  -   R=  -     Straight leg raise:   L=  -    R=  -             Hip/SI joint:   Joint mobility = decreased left hip mobility       Insurance:  AMA/CMS Eval/ Re-eval POC expires FOTO Auth #/ Referral # Total    Start date  Expiration date Extension  Visit limitation?  PT only or  PT+OT? Co-Insurance   CMS 1.23.24 3.19.24  needed                          AUTH #:  Date 1.23              Authed: Used                Remaining                  Precautions: HTN, AAA, CHF, standard  B/L hip injections 1/3/24  Patient provided verbal consent to treatment plan and recommended interventions.    Manuals 1.23        visit 1                                   Neuro Re-Ed         Tandem stance 3*20'' ea.                                   Ther Ex         Squats hi-lo table 3*10        SLR flexion with abd. 2*10 ea.                                                              Ther Activity         Pt ed 10'                 Modalities

## 2024-01-23 NOTE — TELEPHONE ENCOUNTER
Pt came in office and stated that PT is recommending that he does the get ready to thrive. He wanted to inform you that he is stopping PT and doing this program. Flyer attached to encounter

## 2024-02-01 ENCOUNTER — TELEPHONE (OUTPATIENT)
Dept: FAMILY MEDICINE CLINIC | Facility: CLINIC | Age: 69
End: 2024-02-01

## 2024-02-01 NOTE — TELEPHONE ENCOUNTER
----- Message from Lionel Godwin, DO sent at 2/1/2024  6:46 AM EST -----  Please see if patient is aware of his CT imaging results, needs office follow-up.  But also I am not sure if he still follows with us can we please confirm.

## 2024-02-01 NOTE — TELEPHONE ENCOUNTER
PT Called in requesting PCP and Dr. Argueta review his cat scan results in chart .   I adv I would route message to be reviewed.   Thank You

## 2024-02-05 DIAGNOSIS — I10 ESSENTIAL HYPERTENSION: ICD-10-CM

## 2024-02-06 RX ORDER — ATENOLOL 50 MG/1
50 TABLET ORAL EVERY EVENING
Qty: 90 TABLET | Refills: 1 | Status: SHIPPED | OUTPATIENT
Start: 2024-02-06

## 2024-03-05 ENCOUNTER — TELEPHONE (OUTPATIENT)
Age: 69
End: 2024-03-05

## 2024-03-05 NOTE — TELEPHONE ENCOUNTER
VM on appt line:    Aime LEWIS 1955  Since Saturday I've had some kind of sore throat and I've used my rescue inhaler twice. Just few days feels like my dog is on my chest and when I sneeze it feels like there's fire in my chest and I've been trying to see if I'm getting better and I'm not getting any better so over it's time to call. I would like to see Doctor Dayum or Doctor Richelle if possible. If not possible, I'll flip the coin and see who I who comes up, but I'd like to get in as soon as I can. Thank you very much. Aime TALITASTEPHANIE, [phone]. Thank you very much and have a wonderful day.    Spoke with pt - advised that we currently do not have any available appts

## 2024-03-14 ENCOUNTER — OFFICE VISIT (OUTPATIENT)
Age: 69
End: 2024-03-14

## 2024-03-14 ENCOUNTER — TELEPHONE (OUTPATIENT)
Dept: FAMILY MEDICINE CLINIC | Facility: CLINIC | Age: 69
End: 2024-03-14

## 2024-03-14 VITALS
DIASTOLIC BLOOD PRESSURE: 72 MMHG | OXYGEN SATURATION: 97 % | HEART RATE: 68 BPM | HEIGHT: 74 IN | WEIGHT: 315 LBS | BODY MASS INDEX: 40.43 KG/M2 | SYSTOLIC BLOOD PRESSURE: 138 MMHG

## 2024-03-14 DIAGNOSIS — R06.02 SHORTNESS OF BREATH: ICD-10-CM

## 2024-03-14 DIAGNOSIS — J41.0 SIMPLE CHRONIC BRONCHITIS (HCC): Primary | ICD-10-CM

## 2024-03-14 PROCEDURE — G2211 COMPLEX E/M VISIT ADD ON: HCPCS | Performed by: FAMILY MEDICINE

## 2024-03-14 PROCEDURE — 99213 OFFICE O/P EST LOW 20 MIN: CPT | Performed by: FAMILY MEDICINE

## 2024-03-14 RX ORDER — PREDNISONE 20 MG/1
40 TABLET ORAL DAILY
Qty: 10 TABLET | Refills: 0 | Status: SHIPPED | OUTPATIENT
Start: 2024-03-14 | End: 2024-03-19

## 2024-03-14 RX ORDER — AZITHROMYCIN 250 MG/1
TABLET, FILM COATED ORAL DAILY
Qty: 6 TABLET | Refills: 0 | Status: SHIPPED | OUTPATIENT
Start: 2024-03-14 | End: 2024-03-19

## 2024-03-14 NOTE — PROGRESS NOTES
Name: Aime Fox Sr.      : 1955      MRN: [id number]  Encounter Provider: Diane Delgado MD  Encounter Date: 3/14/2024   Encounter department: [facility]    Assessment & Plan     1. Simple chronic bronchitis (HCC)  Assessment & Plan:  Pt presents w/cough since last 10 days producing green colored mucus. Pt had associated sob. O2 saturation at home ranges from 92-97%. Has been using his albuterol and trelegy inhalers more since the last couple days. Intitally he had sore throat but has since improved. On PE pt is wheezing bilaterally with mild ronchi.     Start prednisone 40mg Q5  Start Z pack for 5 days  Recommended patient to call office if symptoms don't improve in 3 days or go to the ED if he gets increased short of breathe      Orders:  -     predniSONE 20 mg tablet; Take 2 tablets (40 mg total) by mouth daily for 5 days  -     azithromycin (Zithromax) 250 mg tablet; Take 2 tablets (500 mg total) by mouth daily for 1 day, THEN 1 tablet (250 mg total) daily for 4 days.    2. Shortness of breath  Comments:  -SOB since the last couple days, associated with cough. O2 stat is b/w 92-97% at home.           Subjective      HPI  Pt is a 69 yo male w/ pmh of htn, PHONG,  depression, bipolar 1, triple AAA, aortic aureysm, copd presents for cough since last 10 days. Brings up green phelgm with low grade fever. 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 Headache for 10 days, denies any lightheadedness, dizzness. Pt stopped smoking 40 years ago but wife still smokes and he does inhale 2nd hand smoke.         Review of Systems   Constitutional:  Negative for chills, fatigue, fever and unexpected weight change.   HENT:  Positive for congestion and sore throat.    Eyes:  Negative for pain and visual disturbance.   Respiratory:  Positive for cough and shortness of breath. Negative for chest tightness.    Cardiovascular:  Negative for chest pain and palpitations.    Gastrointestinal:  Negative for abdominal pain, constipation, diarrhea and vomiting.   Genitourinary:  Negative for dysuria and hematuria.   Musculoskeletal:  Negative for arthralgias and back pain.   Skin:  Negative for color change and rash.   Neurological:  Positive for headaches. Negative for dizziness, seizures, syncope and light-headedness.   All other systems reviewed and are negative.      Current Outpatient Medications on File Prior to Visit   Medication Sig    acetaminophen (TYLENOL) 325 mg tablet 2, by mouth, every 6 hours as needed for mild to moderate pain.    albuterol (2.5 mg/3 mL) 0.083 % nebulizer solution Take 3 mL (2.5 mg total) by nebulization every 6 (six) hours as needed for wheezing or shortness of breath    albuterol (PROVENTIL HFA,VENTOLIN HFA) 90 mcg/act inhaler Inhale 2 puffs every 6 (six) hours as needed for wheezing or shortness of breath    aspirin (ECOTRIN LOW STRENGTH) 81 mg EC tablet Take 81 mg by mouth every evening    atenolol (TENORMIN) 50 mg tablet Take 1 tablet (50 mg total) by mouth every evening    furosemide (LASIX) 20 mg tablet TAKE ONE TABLET BY MOUTH EVERY DAY AS NEEDED (SEVERE SWELLING IN LEGS) AS DIRECTED    furosemide (LASIX) 40 mg tablet TAKE ONE TABLET BY MOUTH EVERY MORNING    ibuprofen (MOTRIN) 600 mg tablet Take 1 tablet (600 mg total) by mouth every 6 (six) hours as needed for mild pain    levocetirizine (XYZAL) 5 MG tablet Take 5 mg by mouth every evening    loratadine (Loradamed) 10 mg tablet Take 1 tablet by mouth daily    methocarbamol (Robaxin-750) 750 mg tablet Take 1 tablet (750 mg total) by mouth 3 (three) times a day as needed for muscle spasms    OXcarbazepine (TRILEPTAL) 300 mg tablet TAKE ONE AND ONE-HALF TABLETS BY MOUTH TWICE A DAY    potassium chloride (KLOR-CON M20) 20 mEq tablet Take 1 tablet (20 mEq total) by mouth daily    Respiratory Therapy Supplies (Nebulizer) REBECCA Use daily as needed (wheezing)    sertraline (ZOLOFT) 100 mg tablet Take 1  "tablet (100 mg total) by mouth 2 (two) times a day    enalapril (VASOTEC) 10 mg tablet Take 1.5 tablets (15 mg total) by mouth daily    ezetimibe (ZETIA) 10 mg tablet     famotidine (PEPCID) 20 mg tablet Take 1 tablet (20 mg total) by mouth daily at bedtime    fluticasone-umeclidinium-vilanterol (Trelegy Ellipta) 200-62.5-25 mcg/actuation AEPB inhaler Inhale 1 puff daily Rinse mouth after use.    fluticasone-umeclidinium-vilanterol (Trelegy Ellipta) 200-62.5-25 MCG/INH AEPB inhaler Inhale 1 puff daily Rinse mouth after use.    pravastatin (PRAVACHOL) 10 mg tablet Take 1 tablet (10 mg total) by mouth daily at bedtime       Objective     /72 (BP Location: Right arm, Patient Position: Sitting, Cuff Size: Large)   Pulse 68   Ht 6' 2\" (1.88 m)   Wt (!) 160 kg (352 lb)   SpO2 97%   BMI 45.19 kg/m²     Physical Exam  Constitutional:       Appearance: He is obese.   HENT:      Right Ear: External ear normal. There is no impacted cerumen.      Left Ear: External ear normal. There is no impacted cerumen.      Ears:      Comments: Wax bilaterally     Nose: Nose normal.      Mouth/Throat:      Mouth: Mucous membranes are moist.   Cardiovascular:      Rate and Rhythm: Normal rate and regular rhythm.      Pulses: Normal pulses.      Heart sounds: Normal heart sounds.   Pulmonary:      Effort: Pulmonary effort is normal.      Breath sounds: Wheezing (bilaterally) and rhonchi (mild) present.   Abdominal:      General: Abdomen is flat. Bowel sounds are normal.   Musculoskeletal:         General: Normal range of motion.      Cervical back: Normal range of motion.   Skin:     General: Skin is warm.      Capillary Refill: Capillary refill takes less than 2 seconds.   Neurological:      General: No focal deficit present.      Mental Status: He is oriented to person, place, and time.   Psychiatric:         Mood and Affect: Mood normal.         Behavior: Behavior normal.         Thought Content: Thought content normal.         " Judgment: Judgment normal.       Diane Delgado MD

## 2024-03-14 NOTE — ASSESSMENT & PLAN NOTE
Pt presents w/cough since last 10 days producing green colored mucus. Pt had associated sob. O2 saturation at home ranges from 92-97%. Has been using his albuterol and trelegy inhalers more since the last couple days. Intitally he had sore throat but has since improved. On PE pt is wheezing bilaterally with mild ronchi.     Start prednisone 40mg Q5  Start Z pack for 5 days  Recommended patient to call office if symptoms don't improve in 3 days or go to the ED if he gets increased short of breathe

## 2024-03-14 NOTE — TELEPHONE ENCOUNTER
Spoke to patient and advised him since he is not a patient of this office he would need to call his PCP and schedule or go to urgent care.  Patient very upset.

## 2024-04-10 ENCOUNTER — OFFICE VISIT (OUTPATIENT)
Dept: OBGYN CLINIC | Facility: CLINIC | Age: 69
End: 2024-04-10
Payer: COMMERCIAL

## 2024-04-10 ENCOUNTER — APPOINTMENT (OUTPATIENT)
Dept: RADIOLOGY | Facility: CLINIC | Age: 69
End: 2024-04-10
Payer: COMMERCIAL

## 2024-04-10 VITALS
BODY MASS INDEX: 40.43 KG/M2 | HEART RATE: 68 BPM | DIASTOLIC BLOOD PRESSURE: 72 MMHG | WEIGHT: 315 LBS | SYSTOLIC BLOOD PRESSURE: 111 MMHG | HEIGHT: 74 IN

## 2024-04-10 DIAGNOSIS — S46.012A ROTATOR CUFF STRAIN, LEFT, INITIAL ENCOUNTER: Primary | ICD-10-CM

## 2024-04-10 DIAGNOSIS — M25.512 LEFT SHOULDER PAIN, UNSPECIFIED CHRONICITY: ICD-10-CM

## 2024-04-10 PROCEDURE — 73030 X-RAY EXAM OF SHOULDER: CPT

## 2024-04-10 PROCEDURE — 99214 OFFICE O/P EST MOD 30 MIN: CPT | Performed by: ORTHOPAEDIC SURGERY
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
## 2024-04-18 ENCOUNTER — OFFICE VISIT (OUTPATIENT)
Dept: SLEEP CENTER | Facility: CLINIC | Age: 69
End: 2024-04-18
Payer: COMMERCIAL

## 2024-04-18 VITALS
WEIGHT: 315 LBS | SYSTOLIC BLOOD PRESSURE: 130 MMHG | DIASTOLIC BLOOD PRESSURE: 72 MMHG | HEART RATE: 65 BPM | HEIGHT: 74 IN | BODY MASS INDEX: 40.43 KG/M2

## 2024-04-18 DIAGNOSIS — R40.0 DAYTIME SLEEPINESS: ICD-10-CM

## 2024-04-18 DIAGNOSIS — F31.9 BIPOLAR 1 DISORDER (HCC): ICD-10-CM

## 2024-04-18 DIAGNOSIS — I10 PRIMARY HYPERTENSION: ICD-10-CM

## 2024-04-18 DIAGNOSIS — K21.9 GASTROESOPHAGEAL REFLUX DISEASE, UNSPECIFIED WHETHER ESOPHAGITIS PRESENT: ICD-10-CM

## 2024-04-18 DIAGNOSIS — G47.33 OBSTRUCTIVE SLEEP APNEA: Primary | ICD-10-CM

## 2024-04-18 DIAGNOSIS — I50.32 CHRONIC DIASTOLIC HEART FAILURE (HCC): ICD-10-CM

## 2024-04-18 DIAGNOSIS — I71.40 ABDOMINAL AORTIC ANEURYSM (AAA) WITHOUT RUPTURE, UNSPECIFIED PART (HCC): ICD-10-CM

## 2024-04-18 DIAGNOSIS — G47.09 OTHER INSOMNIA: ICD-10-CM

## 2024-04-18 DIAGNOSIS — E66.01 MORBID OBESITY (HCC): ICD-10-CM

## 2024-04-18 DIAGNOSIS — J41.0 SIMPLE CHRONIC BRONCHITIS (HCC): ICD-10-CM

## 2024-04-18 DIAGNOSIS — R06.09 EXERTIONAL DYSPNEA: ICD-10-CM

## 2024-04-18 PROCEDURE — 99204 OFFICE O/P NEW MOD 45 MIN: CPT | Performed by: INTERNAL MEDICINE

## 2024-04-18 NOTE — PROGRESS NOTES
" Consultation - Sleep Center   Aime Fox Sr.  68 y.o. male  :1955  MRN:[id number]  DOS:2024    Physician Requesting Consult: Alcira Reyes DO             Reason for Consult : At your kind request I saw Aime Fox Sr. for initial sleep evaluation today. The patient is here to evaluate for suspected Obstructive Sleep Apnea.  He had a sleep study over 7 years ago but  did not follow-up for the results and they are no longer available.    PFSH, Problem List, Medications & Allergies were reviewed in EMR.    Aime  has a past medical history of Anxiety, Aortic aneurysm, abdominal ([facility]) (), Arthritis of right knee, Asthma, Bipolar 1 disorder (), CHF (congestive heart failure) ([facility]) (2015), CPAP (continuous positive airway pressure) dependence, Depression, Deviated nasal septum, Edema, Heart abnormality, Heart disease (), HLD (hyperlipidemia), Hypertension, Incidental lung nodule, Injury (2014), Kidney stone, Mass in neck, Morbid obesity ([facility]), Pancreatic cyst, Shortness of breath, Sleep apnea, Torn rotator cuff, and Wears glasses.      He has a current medication list which includes the following prescription(s): acetaminophen, albuterol, albuterol, aspirin, atenolol, enalapril, ezetimibe, famotidine, trelegy ellipta, furosemide, furosemide, ibuprofen, levocetirizine, loratadine, methocarbamol, oxcarbazepine, potassium chloride, nebulizer, sertraline, and trelegy ellipta.      HPI: He sleeps alone but in the past when sharing a room wife has reported snoring.  Aime reports awakening with dry throat, choking or gasping in spite of sleeping in a recliner chair \"with 2 fans blowing air on my face \"..   Other complaints: None. Restless Leg Syndrome: reports no suggestive symptoms but reports some paresthesias involving lower extremities..  .  Parasomnia: no features reported    Sleep Routine (averaged): Typical Bedtime: Around 11:30 PM.  Gets OOB: 5:30 AM twice a week on " "workdays otherwise 7 AM.. TIB: 6 - 7.5hrs.   Sleep latency:<  45 minutes and has been Tylenol PM as a sleep aid for many years.; Sleep Interruptions: 1-2, because of nocturia and able to fall back asleep. Awakens: Needing an alarm  Upon awakening: usually feels refreshed.  He estimates getting 5.5hrs sleep.  Daytime Function:Aime reports excessive daytime sleepiness feels like napping & does when has the opportunity several times a week for up to an hour and a half.. He rated himself at Total score: 11 /24 on the [place] Sleepiness Scale.     Habits:   reports that he quit smoking about 42 years ago. His smoking use included cigarettes, pipe, and cigars. He started smoking about 49 years ago. He has a 12.5 pack-year smoking history. He has never used smokeless tobacco.;  reports that he does not currently use alcohol.; Reports no history of drug use.;  E-Cigarette/Vaping  Never User; Caffeine use:moderate; Exercise routine: regular.    Occupation:     Family History: Negative for sleep disturbance.  ROS: Significant for weight has been stable.  He has postnasal drip.  He is on several inhalers for chronic bronchitis.  He reports dyspnea on effort.  He is reporting no cardiac symptoms.  Acid reflux is controlled.  He feels mood is \"95% better\" on current medications..     EXAM:  /72   Pulse 65   Ht 6' 2\" (1.88 m)   Wt (!) 163 kg (358 lb 9.6 oz)   BMI 46.04 kg/m²    General: Well groomed male, well appearing, in no apparent distress.   Neurological: Alert and orientated; cooperative; Cranial nerves intact;    Psychiatric: Speech: Clear and coherent; normal mood, affect & thought   Skin: Warm and dry; Color& Hydration good; no facial rashes or lesions   HEENT:  Craniofacial anatomy: obvious overjet Sinuses: Non-tender. TMJ: Normal    Eyes: EOM's intact; conjunctiva/corneas clear   Ears: Appear normal     Nasal Airway: airflow is reduced and assymetric nares Septum: Intact; Turbinates: Normal; Rhinorrhea: " "None  Mouth: Lips: Normal posture; Dentition: missing several. Mucosa: Moist; Hard Palate:normal    Oropharryx: crowded and AP narrowing Tongue: Mallampati:Class IV, Mobile, and MacroglossiaSoft Palate:  redundant  Tonsils: absent  Neck: Is thick and excess fatty tissue; neck Circumference: 18 \"; supple; no abnormal masses; Thyroid: Normal. Trachea: Central.    Lymph: No cervical Lymhadenopathy  Heart: S1,S2 normal; RRR; no gallop; no murmur   Lungs: Respiratory Effort: Normal. Air entry good bilaterally.  No wheezes.  No rales  Abdomen: Obese, soft & non-tender    Extremities: No pedal edema.  No clubbing or cyanosis.    Musculoskeletal:  Motor normal; Gait: Normal.       Serial CMP's have demonstrated normal CO2 levels, marginally elevated alk phos and otherwise unremarkable.  CBCs demonstrated high normal hemoglobin, hematocrit and red cell count.    IMPRESSION: Primary/Secondary Sleep Diagnoses (to Medical or Psych conditions) & Comorbidities   1. Obstructive sleep apnea  Ambulatory Referral to Sleep Medicine    Diagnostic Sleep Study    CPAP Study      2. Other insomnia        3. Daytime sleepiness        4. Chronic diastolic heart failure (HCC)        5. Primary hypertension        6. Abdominal aortic aneurysm (AAA) without rupture, unspecified part (HCC)        7. Simple chronic bronchitis (HCC)        8. Exertional dyspnea        9. Bipolar 1 disorder (HCC)        10. Gastroesophageal reflux disease, unspecified whether esophagitis present        11. Morbid obesity (HCC)             PLAN:   1. With respect to above conditions, comprehensive counseling provided on pathophysiology; effects on symptoms and comorbidities, diagnostic strategies & limitations; treatment options; risks or no treament; risks & benefits of the various therapeutic options; costs and insurance aspects.     2. I advised weight reduction, avoiding sleeping supine, using alcohol or sedating medications close to bed time and on safe " "driving practices.    3. Further evaluation is indicated and a sleep study will be scheduled.  Patient understands limitations of home sleep testing and in lab study may be necessary.  4.  Patient is willing to try Positive airway pressure therapy and will be scheduled for a titration study if indicated.  5.  Multi component Cognitive behavioral therapy for Insomnia undertaken - Sleep Restriction, Stimulus control, Relaxation techniques and Sleep hygiene were discussed.  With these strategies, he should be able to wean off Tylenol PM as a sleep aid.  5. Follow-up to be scheduled after testing/initiation of therapy to review results, further details of treatment options and to adjust therapy.    Sincerely,      Authenticated electronically on 04/18/24   Board Certified Specialist     Portions of the record may have been created with voice recognition software. Occasional wrong word or \"sound a like\" substitutions may have occurred due to the inherent limitations of voice recognition software. There may also be notations and random deletions of words or characters from malfunctioning software. Read the chart carefully and recognize, using context, where substitutions/deletions have occurred.     "

## 2024-04-18 NOTE — PATIENT INSTRUCTIONS
What is PHONG?   Obstructive sleep apnea is a common and serious sleep disorder that causes you to stop breathing during sleep. The airway repeatedly becomes blocked, limiting the amount of air that reaches your lungs. When this happens, you may snore loudly or making choking noises as you try to breathe. Your brain and body becomes oxygen deprived and you may wake up. This may happen a few times a night, or in more severe cases, several hundred times a night.   Sleep apnea can make you wake up in the morning feeling tired or unrefreshed even though you have had a full night of sleep. During the day, you may feel fatigued, have difficulty concentrating or you may even unintentionally fall asleep. This is because your body is waking up numerous times throughout the night, even though you might not be conscious of each awakening.  The lack of oxygen your body receives can have negative long-term consequences for your health. This includes:  High blood pressure  Heart disease  Irregular heart rhythms  Stroke  Pre-diabetes and diabetes  Depression  Testing  An objective evaluation of your sleep may be needed before your board certified sleep physician can make a diagnosis. Options include:   In-lab overnight sleep study  This type of sleep study requires you to stay overnight at a sleep center, in a bed that may resemble a hotel room. You will sleep with sensors hooked up to various parts of your body. These sensors record your brain waves, heartbeat, breathing and movement. An overnight sleep study also provides your doctor with the most complete information about your sleep. Learn more about an overnight sleep study.   Home sleep apnea test  Some patients with high risk factors for obstructive sleep apnea and no other medical disorders may be candidates for a home sleep apnea test. The testing equipment differs in that it is less complicated than what is used in an overnight sleep study. As such, does not give all the  data an in-lab will and if negative, may not mean you do not have the problem.  Treatment for sleep apnea includes using a continuous positive airway pressure (CPAP) machine to keep your airway open during sleep. A mask is placed over your nose and mouth, or just your nose. The mask is hooked to the CPAP machine that blows a gentle stream of air into the mask when you breathe. This helps keep your airway open so you can breathe more regularly. Extra oxygen may be given to you through the machine. You may be given a mouth device. It looks like a mouth guard or dental retainer and stops your tongue and mouth tissues from blocking your throat while you sleep. Surgery may be needed to remove extra tissues that block your mouth, throat, or nose.  Manage sleep apnea:   Do not smoke. Nicotine and other chemicals in cigarettes and cigars can cause lung damage. Ask your healthcare provider for information if you currently smoke and need help to quit. E-cigarettes or smokeless tobacco still contain nicotine. Talk to your healthcare provider before you use these products.  Do not drink alcohol or take sedative medicine before you go to sleep. Alcohol and sedatives can relax the muscles and tissues around your throat. This can block the airflow to your lungs.  Maintain a healthy weight. Excess tissue around your throat may restrict your breathing. Ask your healthcare provider for information if you need to lose weight.  Sleep on your side or use pillows designed to prevent sleep apnea. This prevents your tongue or other tissues from blocking your throat. You can also raise the head of your bed.  Driving Safety. Refrain from driving when drowsy.   Follow up with your healthcare provider as directed: Write down your questions so you remember to ask them during your visits.  Go to [employer]ASM website for more information: Sleepeducation.org  What is PHONG?   Obstructive sleep apnea is a common and serious sleep disorder that causes you to  stop breathing during sleep. The airway repeatedly becomes blocked, limiting the amount of air that reaches your lungs. When this happens, you may snore loudly or making choking noises as you try to breathe. Your brain and body becomes oxygen deprived and you may wake up. This may happen a few times a night, or in more severe cases, several hundred times a night.   Sleep apnea can make you wake up in the morning feeling tired or unrefreshed even though you have had a full night of sleep. During the day, you may feel fatigued, have difficulty concentrating or you may even unintentionally fall asleep. This is because your body is waking up numerous times throughout the night, even though you might not be conscious of each awakening.  The lack of oxygen your body receives can have negative long-term consequences for your health. This includes:  High blood pressure  Heart disease  Irregular heart rhythms  Stroke  Pre-diabetes and diabetes  Depression  Testing  An objective evaluation of your sleep may be needed before your board certified sleep physician can make a diagnosis. Options include:   In-lab overnight sleep study  This type of sleep study requires you to stay overnight at a sleep center, in a bed that may resemble a hotel room. You will sleep with sensors hooked up to various parts of your body. These sensors record your brain waves, heartbeat, breathing and movement. An overnight sleep study also provides your doctor with the most complete information about your sleep. Learn more about an overnight sleep study.   Home sleep apnea test  Some patients with high risk factors for obstructive sleep apnea and no other medical disorders may be candidates for a home sleep apnea test. The testing equipment differs in that it is less complicated than what is used in an overnight sleep study. As such, does not give all the data an in-lab will and if negative, may not mean you do not have the problem.  Treatment for  sleep apnea includes using a continuous positive airway pressure (CPAP) machine to keep your airway open during sleep. A mask is placed over your nose and mouth, or just your nose. The mask is hooked to the CPAP machine that blows a gentle stream of air into the mask when you breathe. This helps keep your airway open so you can breathe more regularly. Extra oxygen may be given to you through the machine. You may be given a mouth device. It looks like a mouth guard or dental retainer and stops your tongue and mouth tissues from blocking your throat while you sleep. Surgery may be needed to remove extra tissues that block your mouth, throat, or nose.  Manage sleep apnea:   Do not smoke. Nicotine and other chemicals in cigarettes and cigars can cause lung damage. Ask your healthcare provider for information if you currently smoke and need help to quit. E-cigarettes or smokeless tobacco still contain nicotine. Talk to your healthcare provider before you use these products.  Do not drink alcohol or take sedative medicine before you go to sleep. Alcohol and sedatives can relax the muscles and tissues around your throat. This can block the airflow to your lungs.  Maintain a healthy weight. Excess tissue around your throat may restrict your breathing. Ask your healthcare provider for information if you need to lose weight.  Sleep on your side or use pillows designed to prevent sleep apnea. This prevents your tongue or other tissues from blocking your throat. You can also raise the head of your bed.  Driving Safety. Refrain from driving when drowsy.   Follow up with your healthcare provider as directed: Write down your questions so you remember to ask them during your visits.  Go to AASM website for more information: Sleepeducation.org    Nursing Support:  When: Monday through Friday 7A-5PM except holidays  Where: Our direct line is [phone].    If you are having a true emergency please call 911.  In the event that the  line is busy or it is after hours please leave a voice message and we will return your call.  Please speak clearly, leaving your full name, birth date, best number to reach you and the reason for your call.   Medication refills: We will need the name of the medication, the dosage, the ordering provider, whether you get a 30 or 90 day refill, and the pharmacy name and address.  Medications will be ordered by the provider only.  Nurses cannot call in prescriptions.  Please allow 7 days for medication refills.  Physician requested updates: If your provider requested that you call with an update after starting medication, please be ready to provide us the medication and dosage, what time you take your medication, the time you attempt to fall asleep, time you fall asleep, when you wake up, and what time you get out of bed.  Sleep Study Results: We will contact you with sleep study results and/or next steps after the physician has reviewed your testing.

## 2024-04-19 ENCOUNTER — EVALUATION (OUTPATIENT)
Dept: PHYSICAL THERAPY | Facility: CLINIC | Age: 69
End: 2024-04-19
Payer: COMMERCIAL

## 2024-04-19 DIAGNOSIS — M25.512 LEFT SHOULDER PAIN, UNSPECIFIED CHRONICITY: ICD-10-CM

## 2024-04-19 DIAGNOSIS — M79.2 RADICULAR PAIN IN LEFT ARM: ICD-10-CM

## 2024-04-19 DIAGNOSIS — S46.012A ROTATOR CUFF STRAIN, LEFT, INITIAL ENCOUNTER: Primary | ICD-10-CM

## 2024-04-19 DIAGNOSIS — M54.2 NECK PAIN: ICD-10-CM

## 2024-04-19 PROCEDURE — 97162 PT EVAL MOD COMPLEX 30 MIN: CPT

## 2024-04-19 PROCEDURE — 97110 THERAPEUTIC EXERCISES: CPT
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
## 2024-04-24 ENCOUNTER — OFFICE VISIT (OUTPATIENT)
Age: 69
End: 2024-04-24

## 2024-04-24 ENCOUNTER — OFFICE VISIT (OUTPATIENT)
Dept: PHYSICAL THERAPY | Facility: CLINIC | Age: 69
End: 2024-04-24
Payer: COMMERCIAL

## 2024-04-24 VITALS
BODY MASS INDEX: 40.43 KG/M2 | OXYGEN SATURATION: 97 % | WEIGHT: 315 LBS | DIASTOLIC BLOOD PRESSURE: 80 MMHG | TEMPERATURE: 98.6 F | HEIGHT: 74 IN | RESPIRATION RATE: 18 BRPM | SYSTOLIC BLOOD PRESSURE: 142 MMHG | HEART RATE: 70 BPM

## 2024-04-24 DIAGNOSIS — M25.512 LEFT SHOULDER PAIN, UNSPECIFIED CHRONICITY: ICD-10-CM

## 2024-04-24 DIAGNOSIS — M79.2 RADICULAR PAIN IN LEFT ARM: ICD-10-CM

## 2024-04-24 DIAGNOSIS — S46.012A ROTATOR CUFF STRAIN, LEFT, INITIAL ENCOUNTER: Primary | ICD-10-CM

## 2024-04-24 DIAGNOSIS — L21.9 SEBORRHEIC DERMATITIS: ICD-10-CM

## 2024-04-24 DIAGNOSIS — R09.82 POST-NASAL DRIP: ICD-10-CM

## 2024-04-24 DIAGNOSIS — M54.2 NECK PAIN: ICD-10-CM

## 2024-04-24 DIAGNOSIS — R68.89 FLU-LIKE SYMPTOMS: Primary | ICD-10-CM

## 2024-04-24 LAB
S PYO AG THROAT QL: NEGATIVE
SARS-COV-2 AG UPPER RESP QL IA: NEGATIVE
VALID CONTROL: NORMAL

## 2024-04-24 PROCEDURE — 87811 SARS-COV-2 COVID19 W/OPTIC: CPT | Performed by: FAMILY MEDICINE

## 2024-04-24 PROCEDURE — 87880 STREP A ASSAY W/OPTIC: CPT | Performed by: FAMILY MEDICINE

## 2024-04-24 PROCEDURE — 97110 THERAPEUTIC EXERCISES: CPT

## 2024-04-24 PROCEDURE — 99213 OFFICE O/P EST LOW 20 MIN: CPT | Performed by: FAMILY MEDICINE

## 2024-04-24 PROCEDURE — 97112 NEUROMUSCULAR REEDUCATION: CPT

## 2024-04-24 PROCEDURE — 3077F SYST BP >= 140 MM HG: CPT | Performed by: FAMILY MEDICINE

## 2024-04-24 PROCEDURE — 3079F DIAST BP 80-89 MM HG: CPT | Performed by: FAMILY MEDICINE

## 2024-04-24 RX ORDER — FLUTICASONE PROPIONATE 50 MCG
2 SPRAY, SUSPENSION (ML) NASAL DAILY
Qty: 9.9 ML | Refills: 2 | Status: SHIPPED | OUTPATIENT
Start: 2024-04-24

## 2024-04-24 RX ORDER — BENZOCAINE/MENTHOL 6 MG-10 MG
LOZENGE MUCOUS MEMBRANE 2 TIMES DAILY
Qty: 144 G | Refills: 2 | Status: SHIPPED | OUTPATIENT
Start: 2024-04-24

## 2024-04-24 NOTE — PROGRESS NOTES
[facility] - Outpatient Clinic  Outpatient Visit - OMER: 24     Patient's Information      Name: Aime Fox Sr.  Age/Sex: 68 y.o. male  MRN: [id number]  : 1955      Assessment/Plan     A/P: Aime Fox Sr. is a 68 y.o. male patient that came to the clinic today for cold like symptoms.   Chart reviewed. Plan below.     Flu-like symptoms    Mild throat erythema and adenopathy noted on physical exam. Also bubbles behind both TM. POCT Strep and COVID test were negative.     Advised supportive measures   RTO if symptoms don't improve    Seborrheic dermatitis    Noted on both of his ears.     Sent Hydrocortisone 1% cream, topical, apply BID     Post-nasal drip    Noted on physical examination.     Sent Flonase 50 mcg/act, 2 sprays, each nostril, daily     Associated orders were discussed and explained to the pt. Pertinent care gaps were addressed.   Pt voiced understanding and acceptance with A/P. Pt will call the office if any further questions/concerns.     Next Visit: Return if symptoms worsen or fail to improve.    A/P of patient's case was discussed with the Attending, Dr. Minh Mendenhall.    Subjective     History of Present Illness      Chief Complaint   Patient presents with    Sinus Problem     Patient states that he feels like inside feels like it is on fire, and when he lays down he coughs a lot.  Started yesterday.     68 y.o. male patient came to the clinic for complaints of throat pain that started yesterday. Pt also refers associated sinus pain and burning sensation up his nose. States also having occasional HA. He mentioned difficulty and pain with swallowing. Pt states that hi coworker was sick since Monday and that they were in contact. No fevers.     I reviewed patient's hx and updated as appropriate if needed: allergies, current medications, PMHx, FHx, social hx, surgical hx, and problem list.      Objective     Vital Signs  "    Visit Vitals  /80 (BP Location: Left arm, Patient Position: Sitting, Cuff Size: Large)   Pulse 70   Temp 98.6 °F (37 °C) (Tympanic)   Resp 18   Ht 6' 2\" (1.88 m)   Wt (!) 162 kg (356 lb 3.2 oz)   SpO2 97%   BMI 45.73 kg/m²   Smoking Status Former   BSA 2.78 m²      Physical Exam      Vitals and nursing note reviewed.     Constitutional:       General: He is not in acute distress.     Appearance: Normal appearance. He is obese. He is not ill-appearing or toxic-appearing.   HENT:      Head: Normocephalic and atraumatic.      Right Ear: Tympanic membrane, ear canal and external ear normal. There is no impacted cerumen.      Left Ear: Tympanic membrane, ear canal and external ear normal. There is no impacted cerumen.      Ears:      Comments: Clear fluid bubbles behind both TM  Seborrheic dermatitis on both ear lobes externally      Nose:      Right Sinus: Maxillary sinus tenderness present.      Left Sinus: Maxillary sinus tenderness present.      Comments: R>L     Mouth/Throat:      Mouth: Mucous membranes are moist.      Pharynx: Posterior oropharyngeal erythema (Mild pharyngeal erythema) present.   Eyes:      General: No scleral icterus.        Right eye: No discharge.         Left eye: No discharge.      Conjunctiva/sclera: Conjunctivae normal.   Cardiovascular:      Rate and Rhythm: Normal rate.      Pulses: Normal pulses.   Pulmonary:      Effort: Pulmonary effort is normal. No respiratory distress.      Breath sounds: No wheezing.   Abdominal:      General: Abdomen is flat. There is no distension.      Palpations: Abdomen is soft.      Tenderness: There is no abdominal tenderness.   Musculoskeletal:         General: Normal range of motion.      Cervical back: Normal range of motion. Tenderness present.   Lymphadenopathy:      Cervical: Cervical adenopathy (Mild LDA, throat area) present.   Skin:     General: Skin is warm and dry.      Capillary Refill: Capillary refill takes less than 2 seconds.      " "Findings: Rash (Ears) present.   Neurological:      Mental Status: He is alert and oriented to person, place, and time.      Motor: No weakness.      Gait: Gait normal.   Psychiatric:         Mood and Affect: Mood normal.         Behavior: Behavior normal.         Thought Content: Thought content normal.         Judgment: Judgment normal.          It was a pleasure being of service to Aime Fox Sr.. Thank you.     Shira Hartman MD., MSMS.   [address]      04/24/24   10:57 AM          Portions of the record may have been created with voice recognition software. Occasional wrong word or \"sound a like\" substitutions may have occurred due to the inherent limitations of voice recognition software. Read the chart carefully and recognize, using context, where substitutions have occurred.   "
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
## 2024-04-25 ENCOUNTER — TELEPHONE (OUTPATIENT)
Age: 69
End: 2024-04-25

## 2024-04-25 ENCOUNTER — NURSE TRIAGE (OUTPATIENT)
Age: 69
End: 2024-04-25

## 2024-04-25 NOTE — TELEPHONE ENCOUNTER
GEORGE left on Clinical line:    Hello, my name is Aime HARRIS [phone]/08 [phone]. I was in there, I see Doctor Devan yesterday and she kept saying you. I stuck around. You know how COVID? Alright, try this. Explain the lady was she needs some constable that over here. I get sick in the matter of 10 to 12 hours. Now I got a fever. Now I'm puking up. I wanted to call [facility], but every time you call somewhere else other than here, they said. Did you call your doctor yet? So I don't know if I even want one of your doctors, but I guess I'm gonna have to sit take one. A virtual virtual visit would be nice. I am sicker than shit am, I told her yesterday. It comes on quick and she just put cotton in her ears and then listen to a damn thing, I said. I'm sorry. I'm pissed when I'm sick. You have a nice day, Aime. Kathryn LEWIS, [address] [place].    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 He is wondering if something can be called in due to how awful he feels?  He was just in the office yesterday.    Please advise at your earliest convenience

## 2024-04-25 NOTE — TELEPHONE ENCOUNTER
"Reason for Disposition  • [1] MODERATE difficulty breathing (e.g., speaks in phrases, SOB even at rest, pulse 100-120) AND [2] still present when not coughing    Answer Assessment - Initial Assessment Questions  1. ONSET: \"When did the cough begin?\"       Started yesterday  2. SEVERITY: \"How bad is the cough today?\"       Worse today  3. SPUTUM: \"Describe the color of your sputum\" (none, dry cough; clear, white, yellow, green)      Greenish-brown mucous  4. HEMOPTYSIS: \"Are you coughing up any blood?\" If so ask: \"How much?\" (flecks, streaks, tablespoons, etc.)      Denies  5. DIFFICULTY BREATHING: \"Are you having difficulty breathing?\" If Yes, ask: \"How bad is it?\" (e.g., mild, moderate, severe)     - MILD: No SOB at rest, mild SOB with walking, speaks normally in sentences, can lay down, no retractions, pulse < 100.     - MODERATE: SOB at rest, SOB with minimal exertion and prefers to sit, cannot lie down flat, speaks in phrases, mild retractions, audible wheezing, pulse 100-120.     - SEVERE: Very SOB at rest, speaks in single words, struggling to breathe, sitting hunched forward, retractions, pulse > 120       Wheezing and having some SOB. Talking in interrupted phrases  6. FEVER: \"Do you have a fever?\" If Yes, ask: \"What is your temperature, how was it measured, and when did it start?\"      99-orally  7. CARDIAC HISTORY: \"Do you have any history of heart disease?\" (e.g., heart attack, congestive heart failure)       HTN  8. LUNG HISTORY: \"Do you have any history of lung disease?\"  (e.g., pulmonary embolus, asthma, emphysema)      COPD Exuberated, Chronic Bronchitis  9. PE RISK FACTORS: \"Do you have a history of blood clots?\" (or: recent major surgery, recent prolonged travel, bedridden)      Denies  10. OTHER SYMPTOMS: \"Do you have any other symptoms?\" (e.g., runny nose, wheezing, chest pain)        Wheezing with conesgtion,  11. PREGNANCY: \"Is there any chance you are pregnant?\" \"When was your last menstrual " "period?\"        N/A  12. TRAVEL: \"Have you traveled out of the country in the last month?\" (e.g., travel history, exposures)        Denies    Protocols used: Cough - Acute Productive-ADULT-AH    "
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
## 2024-04-25 NOTE — TELEPHONE ENCOUNTER
"Regarding: wheezing/congested/vomiting  ----- Message from Paula Martinez sent at 4/25/2024  5:38 PM EDT -----  Pt stated, \"I am wheezing, congested and throwing up. I have tried calling the office three times with out a response.\"    "

## 2024-04-26 ENCOUNTER — APPOINTMENT (OUTPATIENT)
Dept: PHYSICAL THERAPY | Facility: CLINIC | Age: 69
End: 2024-04-26
Payer: COMMERCIAL

## 2024-04-28 NOTE — ASSESSMENT & PLAN NOTE
Mild throat erythema and adenopathy noted on physical exam. Also bubbles behind both TM. POCT Strep and COVID test were negative.     Advised supportive measures   RTO if symptoms don't improve

## 2024-04-29 ENCOUNTER — TELEMEDICINE (OUTPATIENT)
Age: 69
End: 2024-04-29

## 2024-04-29 DIAGNOSIS — R05.8 PRODUCTIVE COUGH: Primary | ICD-10-CM

## 2024-04-29 PROCEDURE — 99213 OFFICE O/P EST LOW 20 MIN: CPT | Performed by: FAMILY MEDICINE

## 2024-04-29 PROCEDURE — G2211 COMPLEX E/M VISIT ADD ON: HCPCS | Performed by: FAMILY MEDICINE

## 2024-04-29 PROCEDURE — 1160F RVW MEDS BY RX/DR IN RCRD: CPT | Performed by: FAMILY MEDICINE

## 2024-04-29 PROCEDURE — 1159F MED LIST DOCD IN RCRD: CPT | Performed by: FAMILY MEDICINE
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
## 2024-04-30 ENCOUNTER — TELEPHONE (OUTPATIENT)
Age: 69
End: 2024-04-30

## 2024-04-30 ENCOUNTER — HOSPITAL ENCOUNTER (OUTPATIENT)
Dept: RADIOLOGY | Facility: HOSPITAL | Age: 69
Discharge: HOME | End: 2024-04-30
Payer: COMMERCIAL

## 2024-04-30 PROCEDURE — 71046 X-RAY EXAM CHEST 2 VIEWS: CPT

## 2024-04-30 NOTE — TELEPHONE ENCOUNTER
Called patient to discuss results. Informed him that CXR was negative for PNA. Discussed starting tessalon perles and conservative management

## 2024-05-01 ENCOUNTER — OFFICE VISIT (OUTPATIENT)
Dept: PHYSICAL THERAPY | Facility: CLINIC | Age: 69
End: 2024-05-01
Payer: COMMERCIAL

## 2024-05-01 DIAGNOSIS — M25.512 LEFT SHOULDER PAIN, UNSPECIFIED CHRONICITY: ICD-10-CM

## 2024-05-01 DIAGNOSIS — M54.2 NECK PAIN: ICD-10-CM

## 2024-05-01 DIAGNOSIS — M79.2 RADICULAR PAIN IN LEFT ARM: ICD-10-CM

## 2024-05-01 DIAGNOSIS — S46.012A ROTATOR CUFF STRAIN, LEFT, INITIAL ENCOUNTER: Primary | ICD-10-CM

## 2024-05-01 PROCEDURE — 97110 THERAPEUTIC EXERCISES: CPT

## 2024-05-01 PROCEDURE — 97112 NEUROMUSCULAR REEDUCATION: CPT

## 2024-05-03 ENCOUNTER — APPOINTMENT (OUTPATIENT)
Dept: PHYSICAL THERAPY | Facility: CLINIC | Age: 69
End: 2024-05-03
Payer: COMMERCIAL

## 2024-05-07 ENCOUNTER — OFFICE VISIT (OUTPATIENT)
Dept: BARIATRICS | Facility: CLINIC | Age: 69
End: 2024-05-07
Payer: COMMERCIAL

## 2024-05-07 ENCOUNTER — OFFICE VISIT (OUTPATIENT)
Age: 69
End: 2024-05-07

## 2024-05-07 VITALS
HEIGHT: 74 IN | WEIGHT: 315 LBS | HEART RATE: 85 BPM | TEMPERATURE: 97.1 F | SYSTOLIC BLOOD PRESSURE: 130 MMHG | BODY MASS INDEX: 40.43 KG/M2 | DIASTOLIC BLOOD PRESSURE: 80 MMHG

## 2024-05-07 VITALS
TEMPERATURE: 98.4 F | DIASTOLIC BLOOD PRESSURE: 74 MMHG | SYSTOLIC BLOOD PRESSURE: 116 MMHG | WEIGHT: 315 LBS | OXYGEN SATURATION: 95 % | HEART RATE: 73 BPM | BODY MASS INDEX: 45.29 KG/M2

## 2024-05-07 DIAGNOSIS — F31.9 BIPOLAR 1 DISORDER (HCC): ICD-10-CM

## 2024-05-07 DIAGNOSIS — R73.01 IFG (IMPAIRED FASTING GLUCOSE): ICD-10-CM

## 2024-05-07 DIAGNOSIS — R26.2 AMBULATORY DYSFUNCTION: ICD-10-CM

## 2024-05-07 DIAGNOSIS — I15.8 OTHER SECONDARY HYPERTENSION: ICD-10-CM

## 2024-05-07 DIAGNOSIS — E78.2 MIXED HYPERLIPIDEMIA: ICD-10-CM

## 2024-05-07 DIAGNOSIS — I10 PRIMARY HYPERTENSION: ICD-10-CM

## 2024-05-07 DIAGNOSIS — M19.90 ARTHRITIS: ICD-10-CM

## 2024-05-07 DIAGNOSIS — G47.33 OBSTRUCTIVE SLEEP APNEA: ICD-10-CM

## 2024-05-07 DIAGNOSIS — R73.9 HYPERGLYCEMIA: ICD-10-CM

## 2024-05-07 DIAGNOSIS — E78.49 OTHER HYPERLIPIDEMIA: Primary | ICD-10-CM

## 2024-05-07 DIAGNOSIS — I50.32 CHRONIC DIASTOLIC HEART FAILURE (HCC): ICD-10-CM

## 2024-05-07 DIAGNOSIS — J41.0 SIMPLE CHRONIC BRONCHITIS (HCC): ICD-10-CM

## 2024-05-07 LAB — SL AMB POCT HEMOGLOBIN AIC: 5.5 (ref ?–6.5)

## 2024-05-07 PROCEDURE — 99214 OFFICE O/P EST MOD 30 MIN: CPT | Performed by: FAMILY MEDICINE

## 2024-05-07 PROCEDURE — 83036 HEMOGLOBIN GLYCOSYLATED A1C: CPT | Performed by: FAMILY MEDICINE

## 2024-05-07 PROCEDURE — 99203 OFFICE O/P NEW LOW 30 MIN: CPT | Performed by: INTERNAL MEDICINE

## 2024-05-07 RX ORDER — METFORMIN HYDROCHLORIDE 500 MG/1
TABLET, EXTENDED RELEASE ORAL
Qty: 60 TABLET | Refills: 2 | Status: SHIPPED | OUTPATIENT
Start: 2024-05-07

## 2024-05-07 NOTE — PROGRESS NOTES
Name: Aime Fox Sr.      : 1955      MRN: [id number]  Encounter Provider: Alcira Reyes DO  Encounter Date: 2024   Encounter department: [facility]    Assessment & Plan     1. Other hyperlipidemia  Assessment & Plan:  Chronic, currently on Zetia 10 mg, previously on pravastatin 10 mg which was discontinued for unclear reasons.     History of previous statin intolerance to lipitor and crestor     Plan:  -Repeat lipid panel  -Consider restarting pravastatin  -Continue Zetia 10 mg     Orders:  -     Lipid Panel with Direct LDL reflex; Future    2. Arthritis    3. Other secondary hypertension  Assessment & Plan:  Chronic, stable. BP log at home shows BP is within goal. Home medications include enalapril 10 mg, lasix 40 mg.      Plan:   -Continue home BP medications   -Continue daily monitoring and logging values. Bring BP log to next appointment  -Goal <140/90      4. Chronic diastolic heart failure (HCC)  Assessment & Plan:  Wt Readings from Last 3 Encounters:   24 (!) 158 kg (348 lb)   24 (!) 155 kg (342 lb 6.4 oz)   24 (!) 162 kg (356 lb 3.2 oz)     Chronic, stable, notable weight discrepancy within one day, suspect default within scale. Has f/u appointment with cardiology with f/u chest CT.    Plan:  -F/u with cardiology as scheduled  -Continue Lasix 60 mg daily  -Continue K-dur 20 mEq daily  -Continue to monitor weight          Orders:  -     B-Type Natriuretic Peptide(BNP); Future  -     TSH, 3rd generation with Free T4 reflex; Future    5. Simple chronic bronchitis (HCC)  Assessment & Plan:  Recent bronchitis episode, still having some wheezing which has significantly improved. Using albuterol 2x per day. Noting tessalon perles have significantly improved cough.     Plan:  -Continue nebulizers  -Continue scheduled inhalers  -Discussed continued use of flonase and claritin  -Increase albuterol up to 4x per day as needed       6. Bipolar 1  disorder (HCC)  Assessment & Plan:  Chronic, stable, mood appears to be improving. Continue to follow with psych      7. Ambulatory dysfunction  Assessment & Plan:  Acute injury to right foot during PT 3 days before presentation, ,plans to continue rest and follow up with PT recs. Using cane at this time. Significant improvement with gait, sitting, standing.     Plan:  -Continue with podiatry  -Continue PT    Orders:  -     CBC and differential; Future  -     Comprehensive metabolic panel; Future    8. Hyperglycemia  Assessment & Plan:  Hyperglycemia previously noted, POCT A1c 5.5. Noted he is improving diet and exercise    Orders:  -     POCT hemoglobin A1c           Subjective      HPI    Patient reports to the clinic for the follow up of chronic conditions. Reporting BP has been well controlled at home.     Reports he injured his foot and has been walking with a cane. Reports he injured it in during PT, reporting he used ice the first day. Now he reports he is using heat and states that he is improving.     States that his sore throat is improving, feels he is a little hoarse. States that the tessalon perles helped his cough. Recovering from bronchitis -     Weight is decreasing - continue same lasix dose. Meeting with bariatrics for weight management.     Reporting that he is continuing with psychiatry. Going to therapy - going to psychiatrist - impulsiveness is improving - anger is improving - mood is improving.     Concern for the leg pain from statins vs muscle aches. Reported that he was unsure of why pravastatin was stopped.      Review of Systems   Constitutional:  Negative for activity change, chills, fatigue and fever.   HENT:  Positive for congestion, postnasal drip, rhinorrhea, sinus pressure, sinus pain, sneezing, sore throat and voice change.    Eyes: Negative.    Respiratory:  Positive for wheezing. Negative for choking, chest tightness and shortness of breath.    Cardiovascular: Negative.     Gastrointestinal: Negative.    Musculoskeletal:  Positive for arthralgias and gait problem. Negative for joint swelling, neck pain and neck stiffness.   Skin: Negative.        Current Outpatient Medications on File Prior to Visit   Medication Sig    acetaminophen (TYLENOL) 325 mg tablet 2, by mouth, every 6 hours as needed for mild to moderate pain.    albuterol (PROVENTIL HFA,VENTOLIN HFA) 90 mcg/act inhaler Inhale 2 puffs every 6 (six) hours as needed for wheezing or shortness of breath    aspirin (ECOTRIN LOW STRENGTH) 81 mg EC tablet Take 81 mg by mouth every evening    atenolol (TENORMIN) 50 mg tablet Take 1 tablet (50 mg total) by mouth every evening    benzonatate (TESSALON PERLES) 100 mg capsule Take 1 capsule (100 mg total) by mouth 3 (three) times a day as needed for cough    enalapril (VASOTEC) 10 mg tablet Take 1.5 tablets (15 mg total) by mouth daily    ezetimibe (ZETIA) 10 mg tablet     famotidine (PEPCID) 20 mg tablet Take 1 tablet (20 mg total) by mouth daily at bedtime    fluticasone (FLONASE) 50 mcg/act nasal spray 2 sprays into each nostril daily    fluticasone-umeclidinium-vilanterol (Trelegy Ellipta) 200-62.5-25 mcg/actuation AEPB inhaler Inhale 1 puff daily Rinse mouth after use.    fluticasone-umeclidinium-vilanterol (Trelegy Ellipta) 200-62.5-25 MCG/INH AEPB inhaler Inhale 1 puff daily Rinse mouth after use.    furosemide (LASIX) 20 mg tablet TAKE ONE TABLET BY MOUTH EVERY DAY AS NEEDED (SEVERE SWELLING IN LEGS) AS DIRECTED    furosemide (LASIX) 40 mg tablet TAKE ONE TABLET BY MOUTH EVERY MORNING    hydrocortisone 1 % cream Apply topically 2 (two) times a day Apply 2-3 times daily on both ears externally    ibuprofen (MOTRIN) 600 mg tablet Take 1 tablet (600 mg total) by mouth every 6 (six) hours as needed for mild pain    levocetirizine (XYZAL) 5 MG tablet Take 5 mg by mouth every evening    loratadine (Loradamed) 10 mg tablet Take 1 tablet by mouth daily    metFORMIN (GLUCOPHAGE-XR) 500  mg 24 hr tablet Take one tablet by mouth daily for 2 weeks, then increase to 2 tablets by mouth daily.    methocarbamol (Robaxin-750) 750 mg tablet Take 1 tablet (750 mg total) by mouth 3 (three) times a day as needed for muscle spasms    OXcarbazepine (TRILEPTAL) 300 mg tablet TAKE ONE AND ONE-HALF TABLETS BY MOUTH TWICE A DAY    potassium chloride (KLOR-CON M20) 20 mEq tablet Take 1 tablet (20 mEq total) by mouth daily    Respiratory Therapy Supplies (Nebulizer) REBECCA Use daily as needed (wheezing)    sertraline (ZOLOFT) 100 mg tablet Take 1 tablet (100 mg total) by mouth 2 (two) times a day       Objective     /74 (BP Location: Left arm, Patient Position: Sitting, Cuff Size: Large)   Pulse 73   Temp 98.4 °F (36.9 °C)   Wt (!) 158 kg (348 lb)   SpO2 95%   BMI 45.29 kg/m²     Physical Exam  Constitutional:       General: He is not in acute distress.     Appearance: He is not ill-appearing.   HENT:      Head: Normocephalic and atraumatic.      Right Ear: External ear normal.      Left Ear: External ear normal.      Mouth/Throat:      Mouth: Mucous membranes are dry.      Pharynx: No oropharyngeal exudate or posterior oropharyngeal erythema.   Eyes:      Conjunctiva/sclera: Conjunctivae normal.   Cardiovascular:      Pulses: Normal pulses.      Heart sounds: Murmur heard.   Pulmonary:      Breath sounds: Wheezing (diffuse) present.   Abdominal:      General: There is no distension.      Palpations: Abdomen is soft.      Tenderness: There is no abdominal tenderness.   Musculoskeletal:      Right lower leg: No edema.      Left lower leg: No edema.   Skin:     General: Skin is warm and dry.   Neurological:      General: No focal deficit present.      Mental Status: He is alert and oriented to person, place, and time.       Alcira Reyes DO

## 2024-05-07 NOTE — PATIENT INSTRUCTIONS
- Discussed options of HealthyCORE-Intensive Lifestyle Intervention Program, Very Low Calorie Diet-VLCD, Conservative Program, Farheen-En-Y Gastric Bypass, and Vertical Sleeve Gastrectomy and the role of weight loss medications.  - Explained the importance of making lifestyle changes.  - Patient is interested in pursuing Conservative Program  - Initial weight loss goal of 5-10% weight loss for improved health as studies have shown this is where we see the greatest impact on improving health and decreasing risk of obesity related conditions.  - Weight loss can improve patient's co-morbid conditions and/or prevent weight-related complications.  - Labs reviewed: As below.      General Recommendations:  Nutrition:  Eat breakfast daily.  Do not skip meals.     Food log (ie.) www.[employer].com, sparkpeople.com, loseit.com, calorieking.com, etc.    Practice mindful eating.  Be sure to set aside time to eat, eat slowly, and savor your food.    Hydration:    At least 64oz of water daily.  No sugar sweetened beverages.  No juice (eat the fruit instead).    Exercise:  Studies have shown that the ideal exercise goal is somewhere between 150 to 300 minutes of moderate intensity exercise a week.  Start with exercising 10 minutes every other day and gradually increase physical activity with a goal of at least 150 minutes of moderate intensity exercise a week, divided over at least 3 days a week.  An example of this would be exercising 30 minutes a day, 5 days a week.  Resistance training can increase muscle mass and increase our resting metabolic rate.   FULL BODY resistance training is recommended 2-3 times a week.  Do not do this on consecutive days to allow for muscle recovery.    Aim for a bare minimum 5000 steps, even on days you do not exercise.    Monitoring:   Weigh yourself daily.  If this causes undue stress, then just weigh yourself once a week.  Weigh yourself the same time of the day with the same amount of clothing  on.  Preferably this should be done after waking up, before you eat, and with no clothing or minimal clothing on.    Specific Goals:  Food log (ie.) www.[employer]com,sparkpeople.com,loseit.com,cliniq.ly.com,etc.   Do this daily for a week then once a week thereafter.    Drink plain water.  Avoid artificial sweeteners as they train you to crave sweet.    Discuss Contrave (bupropion/naltrexone) combination with your psychiatrist.    Start metformin xr 500mg daily.  Increase to 1000mg daily after 2 weeks.    Explore if your insurance covers Wegovy, Zepbound, or Saxenda.    Calorie goal:  2000 calories a day (Provided with meal plan to follow).    Return visit:  3 months
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
## 2024-05-08 ENCOUNTER — APPOINTMENT (OUTPATIENT)
Dept: PHYSICAL THERAPY | Facility: CLINIC | Age: 69
End: 2024-05-08
Payer: COMMERCIAL

## 2024-05-08 ENCOUNTER — TELEPHONE (OUTPATIENT)
Dept: PHYSICAL THERAPY | Facility: CLINIC | Age: 69
End: 2024-05-08

## 2024-05-08 DIAGNOSIS — S46.012A ROTATOR CUFF STRAIN, LEFT, INITIAL ENCOUNTER: Primary | ICD-10-CM

## 2024-05-08 DIAGNOSIS — M25.512 LEFT SHOULDER PAIN, UNSPECIFIED CHRONICITY: ICD-10-CM

## 2024-05-08 DIAGNOSIS — M79.2 RADICULAR PAIN IN LEFT ARM: ICD-10-CM

## 2024-05-08 DIAGNOSIS — M54.2 NECK PAIN: ICD-10-CM

## 2024-05-08 NOTE — ASSESSMENT & PLAN NOTE
Recent bronchitis episode, still having some wheezing which has significantly improved. Using albuterol 2x per day. Noting tessalon perles have significantly improved cough.     Plan:  -Continue nebulizers  -Continue scheduled inhalers  -Discussed continued use of flonase and claritin  -Increase albuterol up to 4x per day as needed

## 2024-05-08 NOTE — ASSESSMENT & PLAN NOTE
Acute injury to right foot during PT 3 days before presentation, ,plans to continue rest and follow up with PT recs. Using cane at this time. Significant improvement with gait, sitting, standing.     Plan:  -Continue with podiatry  -Continue PT

## 2024-05-08 NOTE — ASSESSMENT & PLAN NOTE
Chronic, currently on Zetia 10 mg, previously on pravastatin 10 mg which was discontinued for unclear reasons.     History of previous statin intolerance to lipitor and crestor     Plan:  -Repeat lipid panel  -Consider restarting pravastatin  -Continue Zetia 10 mg

## 2024-05-08 NOTE — TELEPHONE ENCOUNTER
Pt left a message 5/7/24. He was called into work for today 5/8 and cancelled PT appt. He apologized for the late cancel.    Susie Stuart, PT, DPT
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
## 2024-05-08 NOTE — ASSESSMENT & PLAN NOTE
Wt Readings from Last 3 Encounters:   05/07/24 (!) 158 kg (348 lb)   05/07/24 (!) 155 kg (342 lb 6.4 oz)   04/24/24 (!) 162 kg (356 lb 3.2 oz)     Chronic, stable, notable weight discrepancy within one day, suspect default within scale. Has f/u appointment with cardiology with f/u chest CT.    Plan:  -F/u with cardiology as scheduled  -Continue Lasix 60 mg daily  -Continue K-dur 20 mEq daily  -Continue to monitor weight

## 2024-05-10 ENCOUNTER — TELEPHONE (OUTPATIENT)
Dept: PHYSICAL THERAPY | Facility: CLINIC | Age: 69
End: 2024-05-10

## 2024-05-10 ENCOUNTER — APPOINTMENT (OUTPATIENT)
Dept: PHYSICAL THERAPY | Facility: CLINIC | Age: 69
End: 2024-05-10
Payer: COMMERCIAL

## 2024-05-10 DIAGNOSIS — M54.2 NECK PAIN: ICD-10-CM

## 2024-05-10 DIAGNOSIS — M25.512 LEFT SHOULDER PAIN, UNSPECIFIED CHRONICITY: ICD-10-CM

## 2024-05-10 DIAGNOSIS — M79.2 RADICULAR PAIN IN LEFT ARM: ICD-10-CM

## 2024-05-10 DIAGNOSIS — S46.012A ROTATOR CUFF STRAIN, LEFT, INITIAL ENCOUNTER: Primary | ICD-10-CM
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
## 2024-05-10 NOTE — TELEPHONE ENCOUNTER
Called pt to inform him of missed appt. Pt states that he called and spoke with someone yesterday to cancel 5/10 appt due to having to work. Asked pt if he will be able to make next appt on 5/15. Pt stating that he is not sure w/ work schedule. Asked pt to call by 7:30pm Monday to confirm. Informed pt that next appointment may need to be removed if he does not call to confirm d/t attendance policy.     Susie Stuart, PT, DPT

## 2024-05-15 ENCOUNTER — OFFICE VISIT (OUTPATIENT)
Dept: PHYSICAL THERAPY | Facility: CLINIC | Age: 69
End: 2024-05-15
Payer: COMMERCIAL

## 2024-05-15 DIAGNOSIS — M54.2 NECK PAIN: ICD-10-CM

## 2024-05-15 DIAGNOSIS — M25.512 LEFT SHOULDER PAIN, UNSPECIFIED CHRONICITY: ICD-10-CM

## 2024-05-15 DIAGNOSIS — M79.2 RADICULAR PAIN IN LEFT ARM: ICD-10-CM

## 2024-05-15 DIAGNOSIS — S46.012A ROTATOR CUFF STRAIN, LEFT, INITIAL ENCOUNTER: Primary | ICD-10-CM

## 2024-05-15 PROCEDURE — 97110 THERAPEUTIC EXERCISES: CPT

## 2024-05-15 PROCEDURE — 97112 NEUROMUSCULAR REEDUCATION: CPT
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
## 2024-05-17 ENCOUNTER — APPOINTMENT (OUTPATIENT)
Dept: PHYSICAL THERAPY | Facility: CLINIC | Age: 69
End: 2024-05-17
Payer: COMMERCIAL

## 2024-05-21 ENCOUNTER — HOSPITAL ENCOUNTER (OUTPATIENT)
Dept: RADIOLOGY | Facility: HOSPITAL | Age: 69
Discharge: HOME | End: 2024-05-21
Payer: COMMERCIAL

## 2024-05-21 ENCOUNTER — OFFICE VISIT (OUTPATIENT)
Age: 69
End: 2024-05-21

## 2024-05-21 VITALS
SYSTOLIC BLOOD PRESSURE: 129 MMHG | DIASTOLIC BLOOD PRESSURE: 92 MMHG | HEART RATE: 73 BPM | OXYGEN SATURATION: 97 % | BODY MASS INDEX: 38.36 KG/M2 | RESPIRATION RATE: 17 BRPM | WEIGHT: 315 LBS | HEIGHT: 76 IN

## 2024-05-21 DIAGNOSIS — M25.572 PAIN IN JOINT INVOLVING LEFT ANKLE AND FOOT: ICD-10-CM

## 2024-05-21 DIAGNOSIS — M25.472 ANKLE SWELLING, LEFT: ICD-10-CM

## 2024-05-21 DIAGNOSIS — M17.0 BILATERAL PRIMARY OSTEOARTHRITIS OF KNEE: Primary | ICD-10-CM

## 2024-05-21 DIAGNOSIS — M79.672 LEFT FOOT PAIN: Primary | ICD-10-CM

## 2024-05-21 PROCEDURE — 73630 X-RAY EXAM OF FOOT: CPT

## 2024-05-21 PROCEDURE — 73610 X-RAY EXAM OF ANKLE: CPT

## 2024-05-21 PROCEDURE — 99213 OFFICE O/P EST LOW 20 MIN: CPT | Performed by: FAMILY MEDICINE

## 2024-05-21 PROCEDURE — G2211 COMPLEX E/M VISIT ADD ON: HCPCS | Performed by: FAMILY MEDICINE
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
## 2024-05-21 NOTE — PATIENT INSTRUCTIONS
Swollen Joint   AMBULATORY CARE:   Joint swelling  may occur in one or more joints. You may have other symptoms, such as pain, tenderness, or stiffness. A swollen joint may be caused by a variety of conditions such as arthritis, pseudogout, gout, tendinitis, or injury.  Seek care immediately if:   You cannot move your joint at all.    You have severe pain that does not get better with medicine.    Contact your healthcare provider if:   You have a fever.    You have redness or warmth over the joint.    The swelling does not decrease with treatment.    You have questions or concerns about your condition or care.    Treatment for a swollen joint  depends on the cause of your swollen joint. Your healthcare provider may recommend any of the following:  Rest  your swollen joint. Avoid activities that make the swelling or pain worse. You may need to avoid putting weight on your joint while you have pain. Crutches or a walker can be used to avoid putting weight on joints in your lower body.    Apply ice  on your swollen joint for 15 to 20 minutes every hour or as directed. Use an ice pack, or put crushed ice in a plastic bag. Cover it with a towel. Ice helps prevent tissue damage and decreases swelling and pain.    Apply heat  on your swollen joint for 20 to 30 minutes every 2 hours for as many days as directed. Heat helps decrease pain.    Elevate  your swollen joint above the level of your heart as often as you can. This will help decrease swelling and pain. Prop your joint on pillows or blankets to keep it elevated comfortably.    NSAIDs , such as ibuprofen, help decrease swelling, pain, and fever. This medicine is available with or without a doctor's order. NSAIDs can cause stomach bleeding or kidney problems in certain people. If you take blood thinner medicine, always ask your healthcare provider if NSAIDs are safe for you. Always read the medicine label and follow directions.    Follow up with your doctor as  directed:  Write down your questions so you remember to ask them during your visits.  © Copyright [employer] 2023 Information is for End User's use only and may not be sold, redistributed or otherwise used for commercial purposes.  The above information is an  only. It is not intended as medical advice for individual conditions or treatments. Talk to your doctor, nurse or pharmacist before following any medical regimen to see if it is safe and effective for you.

## 2024-05-21 NOTE — ASSESSMENT & PLAN NOTE
L foot pain and ankle swelling. Concern for sprain though pt denies recent trauma. Works as  and is on feet and up steps all day. Swelling, tenderness. No current concern for infection on exam   - due to tenderness on palpation of ankle and foot will obtain X rays   - continue tylenol prn with relief   - heat and ice therapy, consider topical if needed  - wrapping, elevation as tolerated. Recommend continued use of sports bandages which pt reports helps   - consider plantar fasciitis if arch and heel pain continues post acute injuries   - strict rtc/ed precautions reviewed landy if infectious signs develop   - f/u 2 weeks review X ray and further management

## 2024-05-23 DIAGNOSIS — I50.32 CHRONIC DIASTOLIC CONGESTIVE HEART FAILURE (HCC): ICD-10-CM

## 2024-05-24 RX ORDER — FUROSEMIDE 40 MG/1
TABLET ORAL
Qty: 90 TABLET | Refills: 1 | Status: SHIPPED | OUTPATIENT
Start: 2024-05-24

## 2024-05-28 DIAGNOSIS — M25.572 PAIN IN JOINT INVOLVING LEFT ANKLE AND FOOT: ICD-10-CM

## 2024-05-28 DIAGNOSIS — M79.672 LEFT FOOT PAIN: ICD-10-CM

## 2024-05-28 DIAGNOSIS — M77.52 BONE SPUR OF LEFT FOOT: Primary | ICD-10-CM

## 2024-05-28 DIAGNOSIS — M25.472 ANKLE SWELLING, LEFT: ICD-10-CM

## 2024-05-28 NOTE — PROGRESS NOTES
"Called pt to discuss results of recent X rays ordered for L foot/ankle pain and swelling. Pt reports he read the results on [employer] but did not know what they meant. Also was unaware that he had bone spurs. Pt reports his foot \"hurts like hell\" but not more than previous.    X ray L foot  \"Progression of soft tissue calcifications as compared with prior study dated 9/10/2019. There appears to be bony fusion across the first MTP joint with the prosthesis surrounded by prolific bone.\"    X ray L ankle  \"Progressive enlargement of soft tissue calcifications in the hindfoot and ankle region and bone spurring as compared with the study dated 2019. No acute pathology seen \"    No acute abnormalities to suggest cause for sudden pain and swelling however worsening calcifications and bone spur could certainly be contributing. At this point recommend podiatry referral for further evaluation and management, pt is amenable and order placed today. Continue conservative treatment and pain management. Has f/u appt with PCP on 6/5/24 evening, pt aware.     Leslie Núñez MD  [facility]  Date: 5/28/2024 Time: 2:49 PM     "

## 2024-05-31 ENCOUNTER — TELEPHONE (OUTPATIENT)
Dept: OBGYN CLINIC | Facility: CLINIC | Age: 69
End: 2024-05-31

## 2024-05-31 NOTE — TELEPHONE ENCOUNTER
Schedule after this date: 06/07/2024     Appointment Message Line: PPR [facility]UD[facility] ONLY- [facility] REP [facility] KNEES [facility]SY[facility] 3 # 1 2 3 BUY & BILL

## 2024-06-05 ENCOUNTER — OFFICE VISIT (OUTPATIENT)
Age: 69
End: 2024-06-05

## 2024-06-05 VITALS
SYSTOLIC BLOOD PRESSURE: 132 MMHG | HEART RATE: 77 BPM | DIASTOLIC BLOOD PRESSURE: 79 MMHG | BODY MASS INDEX: 38.36 KG/M2 | OXYGEN SATURATION: 97 % | WEIGHT: 315 LBS | HEIGHT: 76 IN | RESPIRATION RATE: 17 BRPM

## 2024-06-05 DIAGNOSIS — Z13.1 DIABETES MELLITUS SCREENING: ICD-10-CM

## 2024-06-05 DIAGNOSIS — S93.402D SPRAIN OF LEFT ANKLE, UNSPECIFIED LIGAMENT, SUBSEQUENT ENCOUNTER: Primary | ICD-10-CM

## 2024-06-05 PROBLEM — S93.402A SPRAIN OF LEFT ANKLE: Status: ACTIVE | Noted: 2024-06-05

## 2024-06-05 PROCEDURE — 99213 OFFICE O/P EST LOW 20 MIN: CPT | Performed by: FAMILY MEDICINE

## 2024-06-05 PROCEDURE — 83036 HEMOGLOBIN GLYCOSYLATED A1C: CPT | Performed by: FAMILY MEDICINE
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
## 2024-06-06 ENCOUNTER — APPOINTMENT (OUTPATIENT)
Dept: LAB | Facility: CLINIC | Age: 69
End: 2024-06-06
Payer: COMMERCIAL

## 2024-06-06 ENCOUNTER — TRANSCRIBE ORDERS (OUTPATIENT)
Dept: LAB | Facility: CLINIC | Age: 69
End: 2024-06-06

## 2024-06-06 DIAGNOSIS — F31.12 BIPOLAR I DISORDER, MOST RECENT EPISODE (OR CURRENT) MANIC, MODERATE (HCC): Primary | ICD-10-CM

## 2024-06-06 DIAGNOSIS — S93.402D SPRAIN OF LEFT ANKLE, UNSPECIFIED LIGAMENT, SUBSEQUENT ENCOUNTER: ICD-10-CM

## 2024-06-06 DIAGNOSIS — F31.12 BIPOLAR I DISORDER, MOST RECENT EPISODE (OR CURRENT) MANIC, MODERATE (HCC): ICD-10-CM

## 2024-06-06 DIAGNOSIS — E78.49 OTHER HYPERLIPIDEMIA: ICD-10-CM

## 2024-06-06 DIAGNOSIS — I50.32 CHRONIC DIASTOLIC HEART FAILURE (HCC): ICD-10-CM

## 2024-06-06 DIAGNOSIS — Z79.899 ENCOUNTER FOR LONG-TERM (CURRENT) USE OF OTHER MEDICATIONS: ICD-10-CM

## 2024-06-06 DIAGNOSIS — R26.2 AMBULATORY DYSFUNCTION: ICD-10-CM

## 2024-06-06 LAB
25(OH)D3 SERPL-MCNC: 32 NG/ML (ref 30–100)
BASOPHILS # BLD AUTO: 0.02 THOUSANDS/ÂΜL (ref 0–0.1)
BASOPHILS NFR BLD AUTO: 0 % (ref 0–1)
BNP SERPL-MCNC: 33 PG/ML (ref 0–100)
EOSINOPHIL # BLD AUTO: 0.12 THOUSAND/ÂΜL (ref 0–0.61)
EOSINOPHIL NFR BLD AUTO: 2 % (ref 0–6)
ERYTHROCYTE [DISTWIDTH] IN BLOOD BY AUTOMATED COUNT: 13.5 % (ref 11.6–15.1)
EST. AVERAGE GLUCOSE BLD GHB EST-MCNC: 111 MG/DL
HBA1C MFR BLD: 5.5 %
HCT VFR BLD AUTO: 47.6 % (ref 36.5–49.3)
HGB BLD-MCNC: 15.8 G/DL (ref 12–17)
IMM GRANULOCYTES # BLD AUTO: 0.02 THOUSAND/UL (ref 0–0.2)
IMM GRANULOCYTES NFR BLD AUTO: 0 % (ref 0–2)
LYMPHOCYTES # BLD AUTO: 1.81 THOUSANDS/ÂΜL (ref 0.6–4.47)
LYMPHOCYTES NFR BLD AUTO: 35 % (ref 14–44)
MCH RBC QN AUTO: 32.6 PG (ref 26.8–34.3)
MCHC RBC AUTO-ENTMCNC: 33.2 G/DL (ref 31.4–37.4)
MCV RBC AUTO: 98 FL (ref 82–98)
MONOCYTES # BLD AUTO: 0.49 THOUSAND/ÂΜL (ref 0.17–1.22)
MONOCYTES NFR BLD AUTO: 9 % (ref 4–12)
NEUTROPHILS # BLD AUTO: 2.79 THOUSANDS/ÂΜL (ref 1.85–7.62)
NEUTS SEG NFR BLD AUTO: 54 % (ref 43–75)
NRBC BLD AUTO-RTO: 0 /100 WBCS
PLATELET # BLD AUTO: 212 THOUSANDS/UL (ref 149–390)
PMV BLD AUTO: 10.6 FL (ref 8.9–12.7)
RBC # BLD AUTO: 4.85 MILLION/UL (ref 3.88–5.62)
T4 FREE SERPL-MCNC: 0.66 NG/DL (ref 0.61–1.12)
TSH SERPL DL<=0.05 MIU/L-ACNC: 1.44 UIU/ML (ref 0.45–4.5)
WBC # BLD AUTO: 5.25 THOUSAND/UL (ref 4.31–10.16)

## 2024-06-06 PROCEDURE — 84550 ASSAY OF BLOOD/URIC ACID: CPT

## 2024-06-06 PROCEDURE — 83036 HEMOGLOBIN GLYCOSYLATED A1C: CPT

## 2024-06-06 PROCEDURE — 84443 ASSAY THYROID STIM HORMONE: CPT

## 2024-06-06 PROCEDURE — 80061 LIPID PANEL: CPT

## 2024-06-06 PROCEDURE — 83735 ASSAY OF MAGNESIUM: CPT

## 2024-06-06 PROCEDURE — 84479 ASSAY OF THYROID (T3 OR T4): CPT

## 2024-06-06 PROCEDURE — 83880 ASSAY OF NATRIURETIC PEPTIDE: CPT

## 2024-06-06 PROCEDURE — 36415 COLL VENOUS BLD VENIPUNCTURE: CPT

## 2024-06-06 PROCEDURE — 82306 VITAMIN D 25 HYDROXY: CPT

## 2024-06-06 PROCEDURE — 80053 COMPREHEN METABOLIC PANEL: CPT

## 2024-06-06 PROCEDURE — 85025 COMPLETE CBC W/AUTO DIFF WBC: CPT

## 2024-06-06 PROCEDURE — 84439 ASSAY OF FREE THYROXINE: CPT

## 2024-06-07 LAB
ALBUMIN SERPL BCP-MCNC: 4.2 G/DL (ref 3.5–5)
ALP SERPL-CCNC: 124 U/L (ref 34–104)
ALT SERPL W P-5'-P-CCNC: 25 U/L (ref 7–52)
ANION GAP SERPL CALCULATED.3IONS-SCNC: 7 MMOL/L (ref 4–13)
AST SERPL W P-5'-P-CCNC: 21 U/L (ref 13–39)
BILIRUB SERPL-MCNC: 0.79 MG/DL (ref 0.2–1)
BUN SERPL-MCNC: 13 MG/DL (ref 5–25)
CALCIUM SERPL-MCNC: 9 MG/DL (ref 8.4–10.2)
CHLORIDE SERPL-SCNC: 109 MMOL/L (ref 96–108)
CHOLEST SERPL-MCNC: 137 MG/DL
CO2 SERPL-SCNC: 26 MMOL/L (ref 21–32)
CREAT SERPL-MCNC: 0.75 MG/DL (ref 0.6–1.3)
GFR SERPL CREATININE-BSD FRML MDRD: 95 ML/MIN/1.73SQ M
GLUCOSE P FAST SERPL-MCNC: 107 MG/DL (ref 65–99)
HDLC SERPL-MCNC: 35 MG/DL
LDLC SERPL CALC-MCNC: 75 MG/DL (ref 0–100)
MAGNESIUM SERPL-MCNC: 2.2 MG/DL (ref 1.9–2.7)
POTASSIUM SERPL-SCNC: 4.7 MMOL/L (ref 3.5–5.3)
PROT SERPL-MCNC: 6.9 G/DL (ref 6.4–8.4)
SODIUM SERPL-SCNC: 142 MMOL/L (ref 135–147)
TRIGL SERPL-MCNC: 133 MG/DL
URATE SERPL-MCNC: 5.7 MG/DL (ref 3.5–8.5)

## 2024-06-07 NOTE — ASSESSMENT & PLAN NOTE
Patient reporting injury left ankle approximately 5 weeks ago, evaluated by this practice approximately 2 weeks ago. Patient reports that the injury occurred when rolling ankle. Noting he only intermittently performed rest, ice, elevation - only intermittently compliant with wrapping.    XR revealed Progression of soft tissue calcifications as compared with prior study dated 9/10/2019. There appears to be bony fusion across the first MTP joint with the prosthesis surrounded by prolific bone. Progressive enlargement of soft tissue calcifications in the hindfoot and ankle region and bone spurring as compared with the study dated 2019. No acute pathology seen    Physical exam consistent with subacute ATFL sprain and known hx of bone spurs     Plan:  -Continue rest and elevation with pain  -Continue loose ace wrapping  -Continue extra dose of lasix for 2 days for edema  -Start PT  -MRI not currently indicated, no muscle weakness or numbness/tingling. Patient has not tried PT or extensive conservative therapy  -Rule out gout via uric acid

## 2024-06-08 LAB — T3RU NFR SERPL: 24 % (ref 24–39)

## 2024-06-09 ENCOUNTER — TELEPHONE (OUTPATIENT)
Dept: OTHER | Facility: HOSPITAL | Age: 69
End: 2024-06-09

## 2024-06-09 NOTE — TELEPHONE ENCOUNTER
Reviewed results with patient     Reports he has not made PT appointment - reports foot pain is reduced

## 2024-06-11 ENCOUNTER — TELEPHONE (OUTPATIENT)
Dept: OBGYN CLINIC | Facility: CLINIC | Age: 69
End: 2024-06-11

## 2024-06-11 ENCOUNTER — PROCEDURE VISIT (OUTPATIENT)
Dept: OBGYN CLINIC | Facility: CLINIC | Age: 69
End: 2024-06-11
Payer: COMMERCIAL

## 2024-06-11 DIAGNOSIS — M17.0 BILATERAL PRIMARY OSTEOARTHRITIS OF KNEE: Primary | ICD-10-CM

## 2024-06-11 PROCEDURE — 20610 DRAIN/INJ JOINT/BURSA W/O US: CPT | Performed by: PHYSICIAN ASSISTANT

## 2024-06-11 NOTE — TELEPHONE ENCOUNTER
Pt called in stating he has done PT for his left shoulder but is still having pain and trouble with ROM, asking for an MRI to be placed, please advise
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
## 2024-06-12 ENCOUNTER — EVALUATION (OUTPATIENT)
Dept: PHYSICAL THERAPY | Facility: CLINIC | Age: 69
End: 2024-06-12
Payer: COMMERCIAL

## 2024-06-12 DIAGNOSIS — S93.402D SPRAIN OF LEFT ANKLE, UNSPECIFIED LIGAMENT, SUBSEQUENT ENCOUNTER: Primary | ICD-10-CM

## 2024-06-12 DIAGNOSIS — S46.012A ROTATOR CUFF STRAIN, LEFT, INITIAL ENCOUNTER: Primary | ICD-10-CM

## 2024-06-12 PROCEDURE — 97161 PT EVAL LOW COMPLEX 20 MIN: CPT | Performed by: PHYSICAL THERAPIST
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
## 2024-06-13 ENCOUNTER — TELEPHONE (OUTPATIENT)
Dept: OBGYN CLINIC | Facility: CLINIC | Age: 69
End: 2024-06-13

## 2024-06-14 ENCOUNTER — PATIENT MESSAGE (OUTPATIENT)
Age: 69
End: 2024-06-14

## 2024-06-14 DIAGNOSIS — R05.8 PRODUCTIVE COUGH: ICD-10-CM

## 2024-06-14 RX ORDER — BENZONATATE 100 MG/1
100 CAPSULE ORAL 3 TIMES DAILY PRN
Qty: 20 CAPSULE | Refills: 0 | Status: SHIPPED | OUTPATIENT
Start: 2024-06-14

## 2024-06-18 ENCOUNTER — OFFICE VISIT (OUTPATIENT)
Dept: PHYSICAL THERAPY | Facility: CLINIC | Age: 69
End: 2024-06-18
Payer: COMMERCIAL

## 2024-06-18 ENCOUNTER — PROCEDURE VISIT (OUTPATIENT)
Dept: OBGYN CLINIC | Facility: CLINIC | Age: 69
End: 2024-06-18
Payer: COMMERCIAL

## 2024-06-18 DIAGNOSIS — M17.0 BILATERAL PRIMARY OSTEOARTHRITIS OF KNEE: Primary | ICD-10-CM

## 2024-06-18 DIAGNOSIS — S93.402D SPRAIN OF LEFT ANKLE, UNSPECIFIED LIGAMENT, SUBSEQUENT ENCOUNTER: Primary | ICD-10-CM

## 2024-06-18 PROCEDURE — 97110 THERAPEUTIC EXERCISES: CPT | Performed by: PHYSICAL THERAPIST

## 2024-06-18 PROCEDURE — 20610 DRAIN/INJ JOINT/BURSA W/O US: CPT | Performed by: PHYSICIAN ASSISTANT

## 2024-06-18 PROCEDURE — 97140 MANUAL THERAPY 1/> REGIONS: CPT | Performed by: PHYSICAL THERAPIST
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
## 2024-06-20 ENCOUNTER — OFFICE VISIT (OUTPATIENT)
Dept: PHYSICAL THERAPY | Facility: CLINIC | Age: 69
End: 2024-06-20
Payer: COMMERCIAL

## 2024-06-20 DIAGNOSIS — S93.402D SPRAIN OF LEFT ANKLE, UNSPECIFIED LIGAMENT, SUBSEQUENT ENCOUNTER: Primary | ICD-10-CM

## 2024-06-20 PROCEDURE — 97140 MANUAL THERAPY 1/> REGIONS: CPT | Performed by: PHYSICAL THERAPIST

## 2024-06-20 PROCEDURE — 97110 THERAPEUTIC EXERCISES: CPT | Performed by: PHYSICAL THERAPIST
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
## 2024-06-25 ENCOUNTER — OFFICE VISIT (OUTPATIENT)
Age: 69
End: 2024-06-25

## 2024-06-25 ENCOUNTER — PROCEDURE VISIT (OUTPATIENT)
Dept: OBGYN CLINIC | Facility: CLINIC | Age: 69
End: 2024-06-25
Payer: COMMERCIAL

## 2024-06-25 ENCOUNTER — APPOINTMENT (OUTPATIENT)
Dept: PHYSICAL THERAPY | Facility: CLINIC | Age: 69
End: 2024-06-25
Payer: COMMERCIAL

## 2024-06-25 ENCOUNTER — TELEPHONE (OUTPATIENT)
Age: 69
End: 2024-06-25

## 2024-06-25 VITALS
BODY MASS INDEX: 30.34 KG/M2 | DIASTOLIC BLOOD PRESSURE: 86 MMHG | HEART RATE: 63 BPM | OXYGEN SATURATION: 97 % | SYSTOLIC BLOOD PRESSURE: 126 MMHG | WEIGHT: 246 LBS

## 2024-06-25 DIAGNOSIS — M17.0 BILATERAL PRIMARY OSTEOARTHRITIS OF KNEE: Primary | ICD-10-CM

## 2024-06-25 DIAGNOSIS — J01.10 ACUTE NON-RECURRENT FRONTAL SINUSITIS: Primary | ICD-10-CM

## 2024-06-25 PROCEDURE — 20610 DRAIN/INJ JOINT/BURSA W/O US: CPT | Performed by: PHYSICIAN ASSISTANT

## 2024-06-25 PROCEDURE — G2211 COMPLEX E/M VISIT ADD ON: HCPCS | Performed by: FAMILY MEDICINE

## 2024-06-25 PROCEDURE — 99213 OFFICE O/P EST LOW 20 MIN: CPT | Performed by: FAMILY MEDICINE

## 2024-06-25 RX ORDER — AMOXICILLIN 875 MG/1
875 TABLET, COATED ORAL 2 TIMES DAILY
Qty: 14 TABLET | Refills: 0 | Status: CANCELLED | OUTPATIENT
Start: 2024-06-25 | End: 2024-07-02

## 2024-06-25 RX ORDER — DOXYCYCLINE HYCLATE 100 MG
100 TABLET ORAL 2 TIMES DAILY
Qty: 14 TABLET | Refills: 0 | Status: SHIPPED | OUTPATIENT
Start: 2024-06-25 | End: 2024-07-02

## 2024-06-25 RX ORDER — AMOXICILLIN AND CLAVULANATE POTASSIUM 875; 125 MG/1; MG/1
1 TABLET, FILM COATED ORAL EVERY 12 HOURS SCHEDULED
Qty: 20 TABLET | Refills: 0 | Status: SHIPPED | OUTPATIENT
Start: 2024-06-25 | End: 2024-06-25

## 2024-06-25 NOTE — TELEPHONE ENCOUNTER
Hazel called to adv that patient was prescribed   amoxicillin-clavulanate (AUGMENTIN) 875-125 mg per tablet , patient is allergic to Penicillins. Requesting an alternative. Please Review.

## 2024-06-25 NOTE — TELEPHONE ENCOUNTER
Patient reported he has tolerated PCN well in the past -- no documentation per chart review -- will change to doxycycline -- thanks
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
## 2024-07-02 ENCOUNTER — OFFICE VISIT (OUTPATIENT)
Dept: PHYSICAL THERAPY | Facility: CLINIC | Age: 69
End: 2024-07-02
Payer: COMMERCIAL

## 2024-07-02 DIAGNOSIS — S93.402D SPRAIN OF LEFT ANKLE, UNSPECIFIED LIGAMENT, SUBSEQUENT ENCOUNTER: Primary | ICD-10-CM

## 2024-07-02 PROCEDURE — 97140 MANUAL THERAPY 1/> REGIONS: CPT | Performed by: PHYSICAL THERAPIST

## 2024-07-02 PROCEDURE — 97110 THERAPEUTIC EXERCISES: CPT | Performed by: PHYSICAL THERAPIST

## 2024-07-02 PROCEDURE — 97112 NEUROMUSCULAR REEDUCATION: CPT | Performed by: PHYSICAL THERAPIST
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
## 2024-07-03 ENCOUNTER — APPOINTMENT (OUTPATIENT)
Dept: PHYSICAL THERAPY | Facility: CLINIC | Age: 69
End: 2024-07-03
Payer: COMMERCIAL

## 2024-07-09 ENCOUNTER — APPOINTMENT (OUTPATIENT)
Dept: PHYSICAL THERAPY | Facility: CLINIC | Age: 69
End: 2024-07-09
Payer: COMMERCIAL

## 2024-07-10 ENCOUNTER — OFFICE VISIT (OUTPATIENT)
Dept: PAIN MEDICINE | Facility: CLINIC | Age: 69
End: 2024-07-10
Payer: COMMERCIAL

## 2024-07-10 VITALS
DIASTOLIC BLOOD PRESSURE: 84 MMHG | WEIGHT: 315 LBS | HEART RATE: 64 BPM | HEIGHT: 76 IN | SYSTOLIC BLOOD PRESSURE: 139 MMHG | BODY MASS INDEX: 38.36 KG/M2

## 2024-07-10 DIAGNOSIS — M16.11 ARTHRITIS OF RIGHT HIP: Primary | ICD-10-CM

## 2024-07-10 DIAGNOSIS — M62.838 SPASM OF RIGHT PIRIFORMIS MUSCLE: ICD-10-CM

## 2024-07-10 DIAGNOSIS — M70.62 GREATER TROCHANTERIC BURSITIS OF LEFT HIP: ICD-10-CM

## 2024-07-10 PROCEDURE — 99214 OFFICE O/P EST MOD 30 MIN: CPT
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
## 2024-07-10 NOTE — PATIENT INSTRUCTIONS
Muscle Spasm   WHAT YOU NEED TO KNOW:   A muscle spasm is a sudden contraction of any muscle or group of muscles. A muscle cramp is a painful muscle spasm. Muscle cramps commonly occur after intense exercise or during pregnancy. They may also be caused by certain medications, dehydration, low calcium or magnesium levels, or another medical condition.   DISCHARGE INSTRUCTIONS:   Medicines:  You may need the following:  NSAIDs  help decrease swelling and pain or fever. This medicine is available with or without a doctor's order. NSAIDs can cause stomach bleeding or kidney problems in certain people. If you take blood thinner medicine, always ask your healthcare provider if NSAIDs are safe for you. Always read the medicine label and follow directions.    Take your medicine as directed.  Contact your healthcare provider if you think your medicine is not helping or if you have side effects. Tell him of her if you are allergic to any medicine. Keep a list of the medicines, vitamins, and herbs you take. Include the amounts, and when and why you take them. Bring the list or the pill bottles to follow-up visits. Carry your medicine list with you in case of an emergency.    Follow up with your healthcare provider as directed:  You may need other tests or treatment. You may also be referred to a physical therapist or other specialist. Write down your questions so you remember to ask them during your visits.   Self-care:   Stretch  your muscle to help relieve the cramp. It may be helpful to keep your muscle in the stretched position until the cramp is gone.     Apply heat  to help decrease pain and muscle spasms. Apply heat on the area for 20 to 30 minutes every 2 hours for as many days as directed.     Apply ice  to help decrease swelling and pain. Ice may also help prevent tissue damage. Use an ice pack, or put crushed ice in a plastic bag. Cover it with a towel and place it on your muscle for 15 to 20 minutes every hour or  as directed.    Drink more liquids  to help prevent muscle cramps caused by dehydration. Sports drinks may help replace electrolytes you lose through sweat during exercise. Ask your healthcare provider how much liquid to drink each day and which liquids are best for you.     Eat healthy foods , such as fruits, vegetables, whole grains, low-fat dairy products, and lean proteins (meat, beans, and fish). If you are pregnant, ask your healthcare provider about foods that are high in magnesium and sodium. They may help to relieve cramps during pregnancy.     Massage your muscle  to help relieve the cramp.     Take frequent deep breaths  until the cramp feels better. Lie down while you take the deep breaths so you do not get dizzy or lightheaded.    Contact your healthcare provider if:   You have signs of dehydration, such as a headache, dark yellow urine, dry eyes or mouth, or a fast heartbeat.     You have questions or concerns about your condition or care.    Return to the emergency department if:   You have warmth, swelling, or redness in the cramping muscle.     You have frequent or unrelieved muscle cramps in several different muscles.     You have muscle cramps with numbness, tingling, and burning in your hands and feet.    © Copyright [employer] 2022 Information is for End User's use only and may not be sold, redistributed or otherwise used for commercial purposes. All illustrations and images included in CareNotes® are the copyrighted property of [employer] ([employer], [employer]. or [employer]  The above information is an  only. It is not intended as medical advice for individual conditions or treatments. Talk to your doctor, nurse or pharmacist before following any medical regimen to see if it is safe and effective for you.

## 2024-07-11 ENCOUNTER — OFFICE VISIT (OUTPATIENT)
Dept: PHYSICAL THERAPY | Facility: CLINIC | Age: 69
End: 2024-07-11
Payer: COMMERCIAL

## 2024-07-11 DIAGNOSIS — S93.402D SPRAIN OF LEFT ANKLE, UNSPECIFIED LIGAMENT, SUBSEQUENT ENCOUNTER: Primary | ICD-10-CM

## 2024-07-11 PROCEDURE — 97110 THERAPEUTIC EXERCISES: CPT | Performed by: PHYSICAL THERAPIST

## 2024-07-11 PROCEDURE — 97530 THERAPEUTIC ACTIVITIES: CPT | Performed by: PHYSICAL THERAPIST
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
## 2024-07-16 ENCOUNTER — APPOINTMENT (OUTPATIENT)
Dept: PHYSICAL THERAPY | Facility: CLINIC | Age: 69
End: 2024-07-16
Payer: COMMERCIAL

## 2024-07-16 ENCOUNTER — HOSPITAL ENCOUNTER (OUTPATIENT)
Dept: MRI IMAGING | Facility: HOSPITAL | Age: 69
Discharge: HOME | End: 2024-07-16
Payer: COMMERCIAL

## 2024-07-16 DIAGNOSIS — S46.012A ROTATOR CUFF STRAIN, LEFT, INITIAL ENCOUNTER: ICD-10-CM

## 2024-07-16 PROCEDURE — 73221 MRI JOINT UPR EXTREM W/O DYE: CPT

## 2024-07-18 ENCOUNTER — APPOINTMENT (OUTPATIENT)
Dept: PHYSICAL THERAPY | Facility: CLINIC | Age: 69
End: 2024-07-18
Payer: COMMERCIAL

## 2024-07-23 ENCOUNTER — APPOINTMENT (OUTPATIENT)
Dept: PHYSICAL THERAPY | Facility: CLINIC | Age: 69
End: 2024-07-23
Payer: COMMERCIAL

## 2024-07-24 ENCOUNTER — TELEPHONE (OUTPATIENT)
Dept: PAIN MEDICINE | Facility: CLINIC | Age: 69
End: 2024-07-24

## 2024-07-24 NOTE — TELEPHONE ENCOUNTER
Pt scheduled   ----- Message from Mirza Stevens PA-C sent at 7/24/2024 12:00 PM EDT -----  Have him FU in office to discuss results.  ----- Message -----  From: Binu Mattson  Sent: 7/23/2024  11:04 AM EDT  To: Mirza Stevens PA-C; MD Dr. Enzo Owen is OOO until 8/5. Please review results for me to give patient a call for further steps  ----- Message -----  From: Interface, Radiology Results In  Sent: 7/18/2024   9:26 AM EDT  To: Benito Giraldo, DO

## 2024-07-25 ENCOUNTER — APPOINTMENT (OUTPATIENT)
Dept: PHYSICAL THERAPY | Facility: CLINIC | Age: 69
End: 2024-07-25
Payer: COMMERCIAL

## 2024-07-31 ENCOUNTER — OFFICE VISIT (OUTPATIENT)
Dept: OBGYN CLINIC | Facility: CLINIC | Age: 69
End: 2024-07-31
Payer: COMMERCIAL

## 2024-07-31 ENCOUNTER — TELEPHONE (OUTPATIENT)
Dept: SURGICAL ONCOLOGY | Facility: CLINIC | Age: 69
End: 2024-07-31

## 2024-07-31 VITALS
BODY MASS INDEX: 38.36 KG/M2 | WEIGHT: 315 LBS | SYSTOLIC BLOOD PRESSURE: 166 MMHG | HEIGHT: 76 IN | DIASTOLIC BLOOD PRESSURE: 89 MMHG | HEART RATE: 79 BPM

## 2024-07-31 DIAGNOSIS — M75.112 INCOMPLETE TEAR OF LEFT ROTATOR CUFF, UNSPECIFIED WHETHER TRAUMATIC: ICD-10-CM

## 2024-07-31 DIAGNOSIS — M75.82 ROTATOR CUFF TENDONITIS, LEFT: Primary | ICD-10-CM

## 2024-07-31 DIAGNOSIS — E66.01 MORBID OBESITY WITH BMI OF 40.0-44.9, ADULT (HCC): ICD-10-CM

## 2024-07-31 PROCEDURE — 1160F RVW MEDS BY RX/DR IN RCRD: CPT | Performed by: ORTHOPAEDIC SURGERY

## 2024-07-31 PROCEDURE — 20610 DRAIN/INJ JOINT/BURSA W/O US: CPT | Performed by: ORTHOPAEDIC SURGERY

## 2024-07-31 PROCEDURE — 1159F MED LIST DOCD IN RCRD: CPT | Performed by: ORTHOPAEDIC SURGERY

## 2024-07-31 PROCEDURE — 99214 OFFICE O/P EST MOD 30 MIN: CPT | Performed by: ORTHOPAEDIC SURGERY

## 2024-07-31 PROCEDURE — 3079F DIAST BP 80-89 MM HG: CPT | Performed by: ORTHOPAEDIC SURGERY

## 2024-07-31 PROCEDURE — 3077F SYST BP >= 140 MM HG: CPT | Performed by: ORTHOPAEDIC SURGERY

## 2024-07-31 RX ADMIN — BUPIVACAINE HYDROCHLORIDE 3.5 ML: 5 INJECTION, SOLUTION PERINEURAL at 14:00

## 2024-07-31 RX ADMIN — TRIAMCINOLONE ACETONIDE 40 MG: 40 INJECTION, SUSPENSION INTRA-ARTICULAR; INTRAMUSCULAR at 14:00
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
## 2024-07-31 NOTE — TELEPHONE ENCOUNTER
Called patient and spoke to César. We were able to get him rescheduled for his office visit with Dr. Mac to a day that works better for him. I confirmed the new date, time and location with the patient.

## 2024-08-01 RX ORDER — TRIAMCINOLONE ACETONIDE 40 MG/ML
40 INJECTION, SUSPENSION INTRA-ARTICULAR; INTRAMUSCULAR
Status: COMPLETED | OUTPATIENT
Start: 2024-07-31 | End: 2024-07-31

## 2024-08-01 RX ORDER — BUPIVACAINE HYDROCHLORIDE 5 MG/ML
3.5 INJECTION, SOLUTION PERINEURAL
Status: COMPLETED | OUTPATIENT
Start: 2024-07-31 | End: 2024-07-31

## 2024-08-05 ENCOUNTER — TELEPHONE (OUTPATIENT)
Dept: SURGICAL ONCOLOGY | Facility: CLINIC | Age: 69
End: 2024-08-05

## 2024-08-05 NOTE — TELEPHONE ENCOUNTER
Called patient and spoke to César, he requested that I call his wife to reschedule his appointment. Called and spoke to Lucinda (spouse) we were able to get César rescheduled for his appointment with Dr. Mac. I did confirm the new date, time and location with her.

## 2024-08-06 ENCOUNTER — HOSPITAL ENCOUNTER (OUTPATIENT)
Dept: RADIOLOGY | Facility: HOSPITAL | Age: 69
Discharge: HOME | End: 2024-08-06
Payer: COMMERCIAL

## 2024-08-06 ENCOUNTER — APPOINTMENT (OUTPATIENT)
Dept: SLEEP CENTER | Facility: CLINIC | Age: 69
End: 2024-08-06
Payer: COMMERCIAL

## 2024-08-06 ENCOUNTER — TELEPHONE (OUTPATIENT)
Dept: PAIN MEDICINE | Facility: CLINIC | Age: 69
End: 2024-08-06

## 2024-08-06 DIAGNOSIS — K86.2 PANCREATIC CYST: ICD-10-CM

## 2024-08-06 PROCEDURE — 76376 3D RENDER W/INTRP POSTPROCES: CPT

## 2024-08-06 PROCEDURE — G1004 CDSM NDSC: HCPCS

## 2024-08-06 PROCEDURE — 74183 MRI ABD W/O CNTR FLWD CNTR: CPT

## 2024-08-06 PROCEDURE — A9585 GADOBUTROL INJECTION: HCPCS

## 2024-08-06 RX ORDER — GADOBUTROL 604.72 MG/ML
16 INJECTION INTRAVENOUS
Status: COMPLETED | OUTPATIENT
Start: 2024-08-06 | End: 2024-08-06

## 2024-08-06 RX ADMIN — GADOBUTROL 16 ML: 604.72 INJECTION INTRAVENOUS at 09:54

## 2024-08-06 NOTE — TELEPHONE ENCOUNTER
Caller: César    Doctor: Marsha    Reason for call: patients wife unable to drive him to procedure on 8/7 can we facilitate a  for patient or do we need to reschedule?    Call back#: [phone]

## 2024-08-06 NOTE — TELEPHONE ENCOUNTER
Patient does not listen to voicemail per phone message.  Sent email to patient, need to r/s since he does not have transportation.  Offered next available date of 8/21/24.  Patient to message me back if that works.  Sent change request for OR case, no date yet.

## 2024-08-06 NOTE — TELEPHONE ENCOUNTER
Caller: César    Doctor: SHAUNA    Reason for call: adv pt he has been rescheduled for 08/21 and he will received a call the day prior    Call back#: [phone]

## 2024-08-12 ENCOUNTER — TELEPHONE (OUTPATIENT)
Dept: SURGICAL ONCOLOGY | Facility: CLINIC | Age: 69
End: 2024-08-12

## 2024-08-12 NOTE — TELEPHONE ENCOUNTER
Called patient to verify if he could come in for his appointment tomorrow 15 min earlier. He confirmed that he will come in at that earlier time.

## 2024-08-13 ENCOUNTER — OFFICE VISIT (OUTPATIENT)
Dept: SURGICAL ONCOLOGY | Facility: CLINIC | Age: 69
End: 2024-08-13
Payer: COMMERCIAL

## 2024-08-13 VITALS
OXYGEN SATURATION: 98 % | RESPIRATION RATE: 18 BRPM | SYSTOLIC BLOOD PRESSURE: 156 MMHG | HEIGHT: 76 IN | HEART RATE: 70 BPM | DIASTOLIC BLOOD PRESSURE: 88 MMHG | TEMPERATURE: 98.1 F | WEIGHT: 315 LBS | BODY MASS INDEX: 38.36 KG/M2

## 2024-08-13 DIAGNOSIS — K86.2 PANCREATIC CYST: Primary | ICD-10-CM

## 2024-08-13 PROCEDURE — 99213 OFFICE O/P EST LOW 20 MIN: CPT | Performed by: SURGERY
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
## 2024-08-13 NOTE — ASSESSMENT & PLAN NOTE
68 year-old male with a greater than 4 cm pancreatic tail cyst.  Despite the wall enhancing on MRI, there are no worrisome or suspicious features by EUS.  The etiology of this is unclear.  This may still be a mucinous cyst.  Despite the size being greater than 3 cm of the enhancing wall on MRI, I would recommend observation given the EUS findings as well as his current heart issues.  I have recommended continued observation.  Since this has been stable for 6 years, I would plan on repeating the MRI in a year.  We had discussed that if this is IPMN, with main duct IPMN, the risk of malignancy in his lifetime is 50-70%.  With side branch IPMN, the risk is 20%.  There is also a risk of malignancy elsewhere in the pancreas approximately 10%.  We will plan on seeing him again in 1 year with a repeat MRI.  Based on the size of the lesion, I would continue with yearly MRI.  Should you develop any worsening abdominal symptoms in the interim he will contact us.  He is agreeable to this plan.  All his questions were answered.
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
## 2024-08-21 ENCOUNTER — HOSPITAL ENCOUNTER (OUTPATIENT)
Facility: AMBULARY SURGERY CENTER | Age: 69
Setting detail: OUTPATIENT SURGERY
Discharge: HOME | End: 2024-08-21
Attending: PHYSICAL MEDICINE & REHABILITATION | Admitting: PHYSICAL MEDICINE & REHABILITATION
Payer: COMMERCIAL

## 2024-08-21 ENCOUNTER — APPOINTMENT (OUTPATIENT)
Dept: RADIOLOGY | Facility: HOSPITAL | Age: 69
End: 2024-08-21
Payer: COMMERCIAL

## 2024-08-21 VITALS
SYSTOLIC BLOOD PRESSURE: 129 MMHG | TEMPERATURE: 98.6 F | OXYGEN SATURATION: 95 % | HEART RATE: 65 BPM | DIASTOLIC BLOOD PRESSURE: 82 MMHG | RESPIRATION RATE: 16 BRPM

## 2024-08-21 PROCEDURE — 77002 NEEDLE LOCALIZATION BY XRAY: CPT | Performed by: PHYSICAL MEDICINE & REHABILITATION

## 2024-08-21 PROCEDURE — 77002 NEEDLE LOCALIZATION BY XRAY: CPT

## 2024-08-21 PROCEDURE — 20610 DRAIN/INJ JOINT/BURSA W/O US: CPT | Performed by: PHYSICAL MEDICINE & REHABILITATION

## 2024-08-21 RX ORDER — LIDOCAINE HYDROCHLORIDE 10 MG/ML
INJECTION, SOLUTION EPIDURAL; INFILTRATION; INTRACAUDAL; PERINEURAL AS NEEDED
Status: DISCONTINUED | OUTPATIENT
Start: 2024-08-21 | End: 2024-08-21 | Stop reason: HOSPADM
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
## 2024-08-21 NOTE — OP NOTE
OPERATIVE REPORT  PATIENT NAME: Aime Fox Sr.    :  1955  MRN: [id number]  Pt Location: [facility] MINOR/PAIN ROOM 01    SURGERY DATE: 2024    Surgeons and Role:     * Ho Larson, DO - Primary    Preop Diagnosis:  Primary osteoarthritis of right hip [M16.11]    Post-Op Diagnosis Codes:     * Primary osteoarthritis of right hip [M16.11]    Procedure(s):  Right - RIGHT INTRA-ARTICULAR HIP INJECTION    Indication:  Hip pain  Preoperative diagnosis:                     Hip arthropathy  Postoperative diagnosis:                     Hip arthropathy     Procedure:  Fluoroscopically-guided right intraarticular hip injection     EBL:  none  Specimens:  not applicable        After discussing the risks, benefits, and alternatives to the procedure, the patient expressed understanding and wished to proceed.  The patient was brought to the fluoroscopic suite and placed in the supine position. Procedural pause conducted to verify:  correct patient identity, procedure to be performed, and as applicable, correct side and site, correct patient position, and availability of implants, special equipment and special requirements.  The femoral artery was palpated.  Using fluoroscopy, an AP view was utilized to visualize the hip joint.  Next, a point at the junction of the ipsilateral femoral head and femoral neck was identified, and noted to be a safe distance lateral to the pulsation of the aforementioned femoral artery.  The skin was sterilely prepped and draped in the usual fashion using Chloraprep skin prep.  The skin and subcutaneous tissues were anesthetized with 1% lidocaine.  Using fluoroscopic guidance, a 3.5 inch 22 gauge spinal needle was advanced into the joint capsule.  After negative aspiration, Omnipaque 240 contrast was injected which confirmed intra-articular placement without evidence of intravascular uptake. A 4 ml solution consisting of 80 mg of Depo-Medrol in 3 mL of 0.2% ropivacaine was injected.   The needle was withdrawn from the skin.  The patient tolerated the procedure well, and there were no apparent complications.  After appropriate observation, the patient was dismissed from the outpatient procedure area in good condition under their own power.                                    SIGNATURE: Ho Larson, DO  DATE: August 21, 2024  TIME: 2:50 PM

## 2024-08-21 NOTE — DISCHARGE INSTRUCTIONS

## 2024-09-04 ENCOUNTER — TELEPHONE (OUTPATIENT)
Dept: RADIOLOGY | Facility: MEDICAL CENTER | Age: 69
End: 2024-09-04

## 2024-09-10 ENCOUNTER — OFFICE VISIT (OUTPATIENT)
Age: 69
End: 2024-09-10

## 2024-09-10 VITALS
HEART RATE: 65 BPM | SYSTOLIC BLOOD PRESSURE: 122 MMHG | WEIGHT: 315 LBS | DIASTOLIC BLOOD PRESSURE: 76 MMHG | BODY MASS INDEX: 38.36 KG/M2 | OXYGEN SATURATION: 97 % | TEMPERATURE: 98.3 F | HEIGHT: 76 IN

## 2024-09-10 DIAGNOSIS — G89.29 CHEST WALL PAIN, CHRONIC: Primary | ICD-10-CM

## 2024-09-10 DIAGNOSIS — F31.9 BIPOLAR 1 DISORDER (HCC): ICD-10-CM

## 2024-09-10 DIAGNOSIS — Z12.12 ENCOUNTER FOR COLORECTAL CANCER SCREENING: ICD-10-CM

## 2024-09-10 DIAGNOSIS — J43.9 PULMONARY EMPHYSEMA, UNSPECIFIED EMPHYSEMA TYPE (HCC): ICD-10-CM

## 2024-09-10 DIAGNOSIS — R07.89 CHEST WALL PAIN, CHRONIC: Primary | ICD-10-CM

## 2024-09-10 DIAGNOSIS — E66.01 MORBID OBESITY WITH BMI OF 40.0-44.9, ADULT (HCC): ICD-10-CM

## 2024-09-10 DIAGNOSIS — Z12.11 ENCOUNTER FOR COLORECTAL CANCER SCREENING: ICD-10-CM

## 2024-09-10 PROCEDURE — 99213 OFFICE O/P EST LOW 20 MIN: CPT | Performed by: FAMILY MEDICINE

## 2024-09-10 PROCEDURE — G2211 COMPLEX E/M VISIT ADD ON: HCPCS | Performed by: FAMILY MEDICINE

## 2024-09-10 RX ORDER — FLUTICASONE FUROATE, UMECLIDINIUM BROMIDE AND VILANTEROL TRIFENATATE 200; 62.5; 25 UG/1; UG/1; UG/1
1 POWDER RESPIRATORY (INHALATION) DAILY
Qty: 180 BLISTER | Refills: 11 | Status: SHIPPED | OUTPATIENT
Start: 2024-09-10 | End: 2024-12-09

## 2024-09-10 RX ORDER — SERTRALINE HYDROCHLORIDE 100 MG/1
100 TABLET, FILM COATED ORAL DAILY
Qty: 60 TABLET | Refills: 1 | Status: SHIPPED | OUTPATIENT
Start: 2024-09-10

## 2024-09-10 NOTE — PROGRESS NOTES
Ambulatory Visit  Name: Aime Fox Sr.      : 1955      MRN: [id number]  Encounter Provider: Alcira Reyes DO  Encounter Date: 9/10/2024   Encounter department: [facility]    Assessment & Plan  Chest wall pain, chronic  Chronic, left ribs within pec minor region - suspect secondary to recurrent bronchitis. Reproducible and tender to palpation - denies chest heaviness, SOB, fatigue above baseline.     Plan:  -Recommend PT  -Discussed lidocaine patches, voltaren gel    Orders:    Ambulatory Referral to Physical Therapy; Future    Bipolar 1 disorder (HCC)  Chronic, stable - patient is reporting that his symptoms have been stable at this time. Noting that he has taking Zoloft once daily - often forgetting to take his HS dose.   Orders:    sertraline (ZOLOFT) 100 mg tablet; Take 1 tablet (100 mg total) by mouth daily    Pulmonary emphysema, unspecified emphysema type (HCC)  Chronic, stable, continue Trelegy - discussed importance of medication compliance.   Orders:    fluticasone-umeclidinium-vilanterol (Trelegy Ellipta) 200-62.5-25 mcg/actuation AEPB inhaler; Inhale 1 puff daily Rinse mouth after use.    Encounter for colorectal cancer screening    Orders:    Cologuard    Morbid obesity with BMI of 40.0-44.9, adult (HCC)  Chronic - patient has been attempting to lose weight via diet and exercise. Not eligible for GLP-1a due to lack of diabetic or prediabetic status. Concern for topamax and phentermine in setting of significant cardiac hx.     Orders:    Naltrexone-buPROPion HCl ER 8-90 MG TB12; Take 1 tablet by mouth in the morning       History of Present Illness     HPI    Presents for f/u of acute and chronic conditions:  -noting let sided pain x 10 days with left under arm into axilla, denies chest pain, reproducible to palpation  -willing to try   -already taking zoloft once daily instead of twice daily   -starting contrave at low dose for weigt loss - cannot do  "GLP1   -lasix 20 mg daily  -lasix 40 mg PRN   -wearing compression stockings regularly   -amenable to PT for atelectasis and chronic chest wall pain       Review of Systems   Constitutional:  Negative for activity change, chills, fatigue and fever.   HENT: Negative.     Respiratory:  Positive for cough (chronic). Negative for chest tightness, shortness of breath and wheezing.    Cardiovascular:  Positive for chest pain (reproducible to palpation). Negative for palpitations.   Gastrointestinal: Negative.            Objective     /76 (BP Location: Right arm, Patient Position: Sitting, Cuff Size: Large)   Pulse 65   Temp 98.3 °F (36.8 °C) (Tympanic)   Ht 6' 3.5\" (1.918 m)   Wt (!) 156 kg (343 lb)   SpO2 97%   BMI 42.31 kg/m²     Physical Exam  Constitutional:       Appearance: He is obese.   HENT:      Head: Normocephalic and atraumatic.      Right Ear: External ear normal.      Left Ear: External ear normal.      Mouth/Throat:      Pharynx: Oropharynx is clear.   Eyes:      Conjunctiva/sclera: Conjunctivae normal.   Cardiovascular:      Rate and Rhythm: Normal rate.   Pulmonary:      Effort: Pulmonary effort is normal. No respiratory distress.      Breath sounds: No wheezing.   Abdominal:      General: There is no distension.      Palpations: Abdomen is soft.      Tenderness: There is no abdominal tenderness.   Musculoskeletal:         General: Tenderness (chest wall) present.   Skin:     General: Skin is warm and dry.   Neurological:      Mental Status: He is alert.         "

## 2024-09-10 NOTE — ASSESSMENT & PLAN NOTE
Chronic, stable - patient is reporting that his symptoms have been stable at this time. Noting that he has taking Zoloft once daily - often forgetting to take his HS dose.   Orders:    sertraline (ZOLOFT) 100 mg tablet; Take 1 tablet (100 mg total) by mouth daily

## 2024-09-13 NOTE — ASSESSMENT & PLAN NOTE
Chronic - patient has been attempting to lose weight via diet and exercise. Not eligible for GLP-1a due to lack of diabetic or prediabetic status. Concern for topamax and phentermine in setting of significant cardiac hx.     Orders:    Naltrexone-buPROPion HCl ER 8-90 MG TB12; Take 1 tablet by mouth in the morning

## 2024-09-19 ENCOUNTER — TELEPHONE (OUTPATIENT)
Dept: BARIATRICS | Facility: CLINIC | Age: 69
End: 2024-09-19

## 2024-09-19 ENCOUNTER — TELEPHONE (OUTPATIENT)
Age: 69
End: 2024-09-19

## 2024-09-19 ENCOUNTER — OFFICE VISIT (OUTPATIENT)
Dept: BARIATRICS | Facility: CLINIC | Age: 69
End: 2024-09-19

## 2024-09-19 VITALS
SYSTOLIC BLOOD PRESSURE: 128 MMHG | DIASTOLIC BLOOD PRESSURE: 70 MMHG | BODY MASS INDEX: 38.36 KG/M2 | HEART RATE: 63 BPM | WEIGHT: 315 LBS | HEIGHT: 76 IN

## 2024-09-19 DIAGNOSIS — E66.01 MORBID OBESITY WITH BMI OF 40.0-44.9, ADULT (HCC): Primary | ICD-10-CM

## 2024-09-19 PROCEDURE — RECHECK: Performed by: NURSE PRACTITIONER

## 2024-09-19 NOTE — TELEPHONE ENCOUNTER
Dr. Reyes,    Patient is very upset about the Weight Mgmt class he attended.  He walked out before he said something nasty.  He wanted me to let you know he would like to talk to you about finding another Weight Management clinic.      He can be contacted at:  [phone]

## 2024-09-19 NOTE — TELEPHONE ENCOUNTER
"Manager was made aware that patient has departed visit with [facility] on 9/19/2024 without being seen by provider. Manager received communication from [facility] CTS Deloris TEMPLETON Stating that pt had called following his departure and was upset with today's encounter at [facility]. Manager Ruddy Lo met with [facility] Staff who assisted pt at time of visit, Patient Michelle HONEYCUTT. And Medical Assistant Cleo BRANDON Per Viviana HONEYCUTT. Pt has no issues/ dissatisfaction throughout Check in Process. Met with Medical Assistant who informed manager that patient became agitated during the Medication Reconciliation Process. Per NORMAN Gallo pt took pill box out and said I have not been taking these as they are too large, and that his wife has not been able to cut them. MA Cleo stated she offered a solution for the pt by recommending he contact the pharmacy to have them do so. MA continued to attempt medication reconciliation per NORMAN Gallo pt was unable to do so as he was unsure of what meds he was/ was not taking. Pt then departed visit in displeasure without being seen. At approx 126pm Manager Ruddy Lo contacted patient for patient outreach. Pt confirmed that visit up until the Medication Reconciliation there was no dissatisfaction but felt like \"Nurse\" (NORMAN Gallo) was critical of his inability to answer questions pertaining to the med reconciliation process. Pt states that Nurse (NORMAN Gallo) made comment \"your a grown man, you can't cut them?\" Manager apologized to pt, and informed pt that follow up with MA would occur. pt states that when he is seen elsewhere he has been asked \"have there been any changes\" referring to medication reconciliation. Manager explained to pt what the intent and clinical need to conduct med reconciliation that information is used by providers to base clinical decisions on therapy/ care. Pt expressed understanding to manager. Manager asked if patient would like to reschedule his visit and pt stated not " "at this time, manager offered to have [facility] staff reach out the next day to call to assist with r/s as patient stated he may do so the next day. Pt then stated he may do so but at another location. At this time Manager provided pt again with his name \"Ruddy Lo\" and manager offered to have patient connect with him in future if he needs assistance. Pt expressed satisfaction with outcome of manager's call/ outreach.    "

## 2024-09-19 NOTE — TELEPHONE ENCOUNTER
"Patient of AYLA Coley. Was seen in the office today. Calling and asking to speak to the Manager. Asking what this was in reference to and if I could help, he stated he is very upset \"with that nurse\". He also states he will be leaving [facility] and will be filing a complaint. Also spoke about involving his . Then hung up. Message was sent to Ish   "

## 2024-09-21 DIAGNOSIS — I10 ESSENTIAL HYPERTENSION: ICD-10-CM

## 2024-09-22 RX ORDER — ATENOLOL 50 MG/1
50 TABLET ORAL EVERY EVENING
Qty: 90 TABLET | Refills: 1 | Status: SHIPPED | OUTPATIENT
Start: 2024-09-22

## 2024-09-23 ENCOUNTER — TELEPHONE (OUTPATIENT)
Age: 69
End: 2024-09-23

## 2024-09-23 NOTE — TELEPHONE ENCOUNTER
-Referral placed for bariatric appointment and weight management     -Pt reporting he had an episode of shaking without memory within the past week. Noting that he also had an episode at work of sweating with loss of bowel within past week. Denies acute concerns    -Advised pt to present to ED with any further episodes, declines   -Advised pt to make ASAP appointment

## 2024-09-23 NOTE — TELEPHONE ENCOUNTER
Medication therapy recommendation from [facility]  100 day rx requested for pravastatin tab 10mg  Dr. Reyes please review, I do not see this medication on his current medication list.   Scanned into encounter  Placed in pcps folder

## 2024-09-25 ENCOUNTER — OFFICE VISIT (OUTPATIENT)
Age: 69
End: 2024-09-25

## 2024-09-25 ENCOUNTER — OFFICE VISIT (OUTPATIENT)
Dept: OBGYN CLINIC | Facility: CLINIC | Age: 69
End: 2024-09-25
Payer: COMMERCIAL

## 2024-09-25 VITALS
HEART RATE: 73 BPM | HEIGHT: 76 IN | DIASTOLIC BLOOD PRESSURE: 87 MMHG | WEIGHT: 315 LBS | BODY MASS INDEX: 38.36 KG/M2 | SYSTOLIC BLOOD PRESSURE: 151 MMHG

## 2024-09-25 VITALS
HEART RATE: 69 BPM | TEMPERATURE: 98 F | BODY MASS INDEX: 38.36 KG/M2 | DIASTOLIC BLOOD PRESSURE: 74 MMHG | HEIGHT: 76 IN | WEIGHT: 315 LBS | OXYGEN SATURATION: 96 % | RESPIRATION RATE: 19 BRPM | SYSTOLIC BLOOD PRESSURE: 132 MMHG

## 2024-09-25 DIAGNOSIS — S46.012A TRAUMATIC INCOMPLETE TEAR OF LEFT ROTATOR CUFF, INITIAL ENCOUNTER: Primary | ICD-10-CM

## 2024-09-25 DIAGNOSIS — R55 BLACK-OUT (NOT AMNESIA): Primary | ICD-10-CM

## 2024-09-25 DIAGNOSIS — E78.49 OTHER HYPERLIPIDEMIA: ICD-10-CM

## 2024-09-25 DIAGNOSIS — M75.82 ROTATOR CUFF TENDONITIS, LEFT: ICD-10-CM

## 2024-09-25 PROCEDURE — 99213 OFFICE O/P EST LOW 20 MIN: CPT | Performed by: FAMILY MEDICINE

## 2024-09-25 PROCEDURE — G2211 COMPLEX E/M VISIT ADD ON: HCPCS | Performed by: FAMILY MEDICINE

## 2024-09-25 PROCEDURE — 99213 OFFICE O/P EST LOW 20 MIN: CPT | Performed by: ORTHOPAEDIC SURGERY

## 2024-09-25 RX ORDER — PRAVASTATIN SODIUM 10 MG
TABLET ORAL
COMMUNITY
Start: 2024-09-01 | End: 2024-09-25 | Stop reason: SDUPTHER

## 2024-09-25 RX ORDER — PRAVASTATIN SODIUM 10 MG
10 TABLET ORAL DAILY
Qty: 100 TABLET | Refills: 1 | Status: SHIPPED | OUTPATIENT
Start: 2024-09-25

## 2024-09-25 NOTE — PROGRESS NOTES
"Ambulatory Visit  Name: Aime Fox Sr.      : 1955      MRN: [id number]  Encounter Provider: Alcira Reyes DO  Encounter Date: 2024   Encounter department: [facility]    Assessment & Plan  Black-out (not amnesia)  Noting several episodes of blacking out where he has no memory. Stating he has had 3 episodes of \"blacking out\" observed where pt loses memory of the incident and has staring spells with sweating. Denies falling, dizziness, hitting head. Stating he has had significant stress at home and feels this may be contributing. Denies starting any new medications - did not start contrave per pt. Endorses loss of bowels during one episodes, no other episodes of incontinence     Orders:    CBC and differential; Future    Comprehensive metabolic panel; Future    Echo complete w/ contrast if indicated; Future    Holter monitor; Future    CT head wo contrast; Future    Ambulatory Referral to Neurology; Future    Other hyperlipidemia    Orders:    pravastatin (PRAVACHOL) 10 mg tablet; Take 1 tablet (10 mg total) by mouth daily       History of Present Illness     HPI    Reporting concerns:  -last Tuesday, got upset, leaned against car and then was \"out\" for a minute for 2, feeling SOB - no memory of this incident, sweating profusely   -last Friday, sitting eating and then upset and then had episode where he blacked out and then he lost control of his bowels, sweating   -missed AA meetings in 3 weeks   -last  noting episode where he had a black out in [place] and then felt off with nausea, chest pain and then sweating episode   -reporting he is getting increasingly upset, has been meeting with therapist more recently   -reporting increasingly stressed at home with partner      Review of Systems   Neurological:  Positive for syncope. Negative for facial asymmetry, light-headedness, numbness and headaches.           Objective     /74 (BP Location: Left " "arm, Patient Position: Sitting, Cuff Size: Large)   Pulse 69   Temp 98 °F (36.7 °C)   Resp 19   Ht 6' 3.5\" (1.918 m)   Wt (!) 155 kg (341 lb 6.4 oz)   SpO2 96%   BMI 42.11 kg/m²     Physical Exam  Vitals and nursing note reviewed.   Constitutional:       General: He is not in acute distress.     Appearance: He is well-developed.   HENT:      Head: Normocephalic and atraumatic.   Eyes:      Conjunctiva/sclera: Conjunctivae normal.   Cardiovascular:      Rate and Rhythm: Normal rate and regular rhythm.   Pulmonary:      Effort: Pulmonary effort is normal. No respiratory distress.      Breath sounds: Normal breath sounds.   Abdominal:      Palpations: Abdomen is soft.      Tenderness: There is no abdominal tenderness.   Musculoskeletal:         General: No swelling.      Cervical back: Neck supple.   Skin:     General: Skin is warm and dry.      Capillary Refill: Capillary refill takes less than 2 seconds.   Neurological:      Mental Status: He is alert.   Psychiatric:         Mood and Affect: Mood normal.         "
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
## 2024-10-02 ENCOUNTER — LAB (OUTPATIENT)
Dept: LAB | Facility: CLINIC | Age: 69
End: 2024-10-02
Payer: COMMERCIAL

## 2024-10-02 ENCOUNTER — APPOINTMENT (OUTPATIENT)
Dept: LAB | Facility: CLINIC | Age: 69
End: 2024-10-02
Payer: COMMERCIAL

## 2024-10-02 ENCOUNTER — HOSPITAL ENCOUNTER (OUTPATIENT)
Dept: RADIOLOGY | Facility: HOSPITAL | Age: 69
Discharge: HOME | End: 2024-10-02
Payer: COMMERCIAL

## 2024-10-02 DIAGNOSIS — R55 BLACK-OUT (NOT AMNESIA): ICD-10-CM

## 2024-10-02 LAB
ALBUMIN SERPL BCG-MCNC: 4.4 G/DL (ref 3.5–5)
ALP SERPL-CCNC: 146 U/L (ref 34–104)
ALT SERPL W P-5'-P-CCNC: 36 U/L (ref 7–52)
ANION GAP SERPL CALCULATED.3IONS-SCNC: 8 MMOL/L (ref 4–13)
AST SERPL W P-5'-P-CCNC: 20 U/L (ref 13–39)
BASOPHILS # BLD AUTO: 0.03 THOUSANDS/ΜL (ref 0–0.1)
BASOPHILS NFR BLD AUTO: 1 % (ref 0–1)
BILIRUB SERPL-MCNC: 0.63 MG/DL (ref 0.2–1)
BUN SERPL-MCNC: 11 MG/DL (ref 5–25)
CALCIUM SERPL-MCNC: 9.1 MG/DL (ref 8.4–10.2)
CHLORIDE SERPL-SCNC: 108 MMOL/L (ref 96–108)
CO2 SERPL-SCNC: 27 MMOL/L (ref 21–32)
CREAT SERPL-MCNC: 0.84 MG/DL (ref 0.6–1.3)
EOSINOPHIL # BLD AUTO: 0.11 THOUSAND/ΜL (ref 0–0.61)
EOSINOPHIL NFR BLD AUTO: 2 % (ref 0–6)
ERYTHROCYTE [DISTWIDTH] IN BLOOD BY AUTOMATED COUNT: 14.1 % (ref 11.6–15.1)
GFR SERPL CREATININE-BSD FRML MDRD: 91 ML/MIN/1.73SQ M
GLUCOSE P FAST SERPL-MCNC: 116 MG/DL (ref 65–99)
HCT VFR BLD AUTO: 49.1 % (ref 36.5–49.3)
HGB BLD-MCNC: 16.1 G/DL (ref 12–17)
IMM GRANULOCYTES # BLD AUTO: 0.02 THOUSAND/UL (ref 0–0.2)
IMM GRANULOCYTES NFR BLD AUTO: 0 % (ref 0–2)
LYMPHOCYTES # BLD AUTO: 1.7 THOUSANDS/ΜL (ref 0.6–4.47)
LYMPHOCYTES NFR BLD AUTO: 28 % (ref 14–44)
MCH RBC QN AUTO: 31.9 PG (ref 26.8–34.3)
MCHC RBC AUTO-ENTMCNC: 32.8 G/DL (ref 31.4–37.4)
MCV RBC AUTO: 97 FL (ref 82–98)
MONOCYTES # BLD AUTO: 0.51 THOUSAND/ΜL (ref 0.17–1.22)
MONOCYTES NFR BLD AUTO: 8 % (ref 4–12)
NEUTROPHILS # BLD AUTO: 3.71 THOUSANDS/ΜL (ref 1.85–7.62)
NEUTS SEG NFR BLD AUTO: 61 % (ref 43–75)
NRBC BLD AUTO-RTO: 0 /100 WBCS
PLATELET # BLD AUTO: 235 THOUSANDS/UL (ref 149–390)
PMV BLD AUTO: 10 FL (ref 8.9–12.7)
POTASSIUM SERPL-SCNC: 4.4 MMOL/L (ref 3.5–5.3)
PROT SERPL-MCNC: 7.2 G/DL (ref 6.4–8.4)
RBC # BLD AUTO: 5.05 MILLION/UL (ref 3.88–5.62)
SODIUM SERPL-SCNC: 143 MMOL/L (ref 135–147)
WBC # BLD AUTO: 6.08 THOUSAND/UL (ref 4.31–10.16)

## 2024-10-02 PROCEDURE — 36415 COLL VENOUS BLD VENIPUNCTURE: CPT

## 2024-10-02 PROCEDURE — 70450 CT HEAD/BRAIN W/O DYE: CPT

## 2024-10-02 PROCEDURE — 85025 COMPLETE CBC W/AUTO DIFF WBC: CPT

## 2024-10-02 PROCEDURE — 80053 COMPREHEN METABOLIC PANEL: CPT

## 2024-10-06 ENCOUNTER — TELEPHONE (OUTPATIENT)
Age: 69
End: 2024-10-06

## 2024-10-16 ENCOUNTER — OFFICE VISIT (OUTPATIENT)
Age: 69
End: 2024-10-16

## 2024-10-16 VITALS
TEMPERATURE: 98 F | RESPIRATION RATE: 18 BRPM | SYSTOLIC BLOOD PRESSURE: 132 MMHG | OXYGEN SATURATION: 99 % | HEIGHT: 76 IN | HEART RATE: 71 BPM | BODY MASS INDEX: 38.36 KG/M2 | DIASTOLIC BLOOD PRESSURE: 82 MMHG | WEIGHT: 315 LBS

## 2024-10-16 DIAGNOSIS — Z20.2 POSSIBLE EXPOSURE TO STI: ICD-10-CM

## 2024-10-16 DIAGNOSIS — I10 HYPERTENSION, UNSPECIFIED TYPE: ICD-10-CM

## 2024-10-16 DIAGNOSIS — E66.01 MORBID OBESITY WITH BMI OF 40.0-44.9, ADULT (HCC): ICD-10-CM

## 2024-10-16 DIAGNOSIS — R55 BLACK-OUT (NOT AMNESIA): Primary | ICD-10-CM

## 2024-10-16 DIAGNOSIS — R26.2 AMBULATORY DYSFUNCTION: ICD-10-CM

## 2024-10-16 DIAGNOSIS — F31.9 BIPOLAR 1 DISORDER (HCC): ICD-10-CM

## 2024-10-16 PROBLEM — R68.89 FLU-LIKE SYMPTOMS: Status: RESOLVED | Noted: 2024-04-24 | Resolved: 2024-10-16

## 2024-10-16 PROCEDURE — G2211 COMPLEX E/M VISIT ADD ON: HCPCS | Performed by: FAMILY MEDICINE

## 2024-10-16 PROCEDURE — 99214 OFFICE O/P EST MOD 30 MIN: CPT | Performed by: FAMILY MEDICINE

## 2024-10-16 NOTE — ASSESSMENT & PLAN NOTE
Chronic, has not started Contrave as will need prior authorization for medication. Would like to follow up with bariatrics again .     Orders:    Ambulatory Referral to Weight Management; Future
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
## 2024-10-16 NOTE — ASSESSMENT & PLAN NOTE
Chronic, stable - no recent falls. Presented to appointment without cane or service dog and is noting difficutly with initiating standing. Plans to continue home exercises from previous PT evaluation.

## 2024-10-23 ENCOUNTER — OFFICE VISIT (OUTPATIENT)
Dept: OBGYN CLINIC | Facility: CLINIC | Age: 69
End: 2024-10-23
Payer: COMMERCIAL

## 2024-10-23 VITALS
BODY MASS INDEX: 38.36 KG/M2 | HEIGHT: 76 IN | HEART RATE: 61 BPM | DIASTOLIC BLOOD PRESSURE: 78 MMHG | SYSTOLIC BLOOD PRESSURE: 123 MMHG | WEIGHT: 315 LBS

## 2024-10-23 DIAGNOSIS — S46.012A TRAUMATIC INCOMPLETE TEAR OF LEFT ROTATOR CUFF, INITIAL ENCOUNTER: Primary | ICD-10-CM

## 2024-10-23 PROCEDURE — 99213 OFFICE O/P EST LOW 20 MIN: CPT | Performed by: ORTHOPAEDIC SURGERY
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
## 2024-10-29 ENCOUNTER — OFFICE VISIT (OUTPATIENT)
Age: 69
End: 2024-10-29
Payer: COMMERCIAL

## 2024-10-29 VITALS
BODY MASS INDEX: 38.36 KG/M2 | HEIGHT: 76 IN | TEMPERATURE: 97.6 F | HEART RATE: 78 BPM | SYSTOLIC BLOOD PRESSURE: 150 MMHG | DIASTOLIC BLOOD PRESSURE: 80 MMHG | RESPIRATION RATE: 16 BRPM | WEIGHT: 315 LBS

## 2024-10-29 DIAGNOSIS — I50.32 CHRONIC DIASTOLIC HEART FAILURE (HCC): ICD-10-CM

## 2024-10-29 DIAGNOSIS — G47.33 OBSTRUCTIVE SLEEP APNEA: ICD-10-CM

## 2024-10-29 DIAGNOSIS — E66.01 MORBID OBESITY WITH BMI OF 40.0-44.9, ADULT (HCC): Primary | ICD-10-CM

## 2024-10-29 DIAGNOSIS — R73.01 IFG (IMPAIRED FASTING GLUCOSE): ICD-10-CM

## 2024-10-29 DIAGNOSIS — E78.2 MIXED HYPERLIPIDEMIA: ICD-10-CM

## 2024-10-29 DIAGNOSIS — I10 PRIMARY HYPERTENSION: ICD-10-CM

## 2024-10-29 PROCEDURE — 99213 OFFICE O/P EST LOW 20 MIN: CPT | Performed by: PHYSICIAN ASSISTANT
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
## 2024-10-30 ENCOUNTER — HOSPITAL ENCOUNTER (OUTPATIENT)
Dept: RADIOLOGY | Facility: HOSPITAL | Age: 69
Discharge: HOME | End: 2024-10-30
Payer: COMMERCIAL

## 2024-10-30 DIAGNOSIS — S46.012A TRAUMATIC INCOMPLETE TEAR OF LEFT ROTATOR CUFF, INITIAL ENCOUNTER: ICD-10-CM

## 2024-10-30 PROCEDURE — 73221 MRI JOINT UPR EXTREM W/O DYE: CPT

## 2024-11-06 ENCOUNTER — OFFICE VISIT (OUTPATIENT)
Dept: OBGYN CLINIC | Facility: CLINIC | Age: 69
End: 2024-11-06
Payer: COMMERCIAL

## 2024-11-06 VITALS — BODY MASS INDEX: 38.36 KG/M2 | HEIGHT: 76 IN | WEIGHT: 315 LBS

## 2024-11-06 DIAGNOSIS — M25.512 CHRONIC LEFT SHOULDER PAIN: ICD-10-CM

## 2024-11-06 DIAGNOSIS — M75.82 TENDINITIS OF LEFT ROTATOR CUFF: Primary | ICD-10-CM

## 2024-11-06 DIAGNOSIS — G89.29 CHRONIC LEFT SHOULDER PAIN: ICD-10-CM

## 2024-11-06 PROCEDURE — 99213 OFFICE O/P EST LOW 20 MIN: CPT | Performed by: ORTHOPAEDIC SURGERY

## 2024-11-06 PROCEDURE — 20610 DRAIN/INJ JOINT/BURSA W/O US: CPT | Performed by: ORTHOPAEDIC SURGERY

## 2024-11-06 RX ORDER — TRIAMCINOLONE ACETONIDE 40 MG/ML
40 INJECTION, SUSPENSION INTRA-ARTICULAR; INTRAMUSCULAR
Status: COMPLETED | OUTPATIENT
Start: 2024-11-06 | End: 2024-11-06

## 2024-11-06 RX ORDER — ROPIVACAINE HYDROCHLORIDE 2 MG/ML
4 INJECTION, SOLUTION EPIDURAL; INFILTRATION; PERINEURAL
Status: COMPLETED | OUTPATIENT
Start: 2024-11-06 | End: 2024-11-06

## 2024-11-06 RX ADMIN — TRIAMCINOLONE ACETONIDE 40 MG: 40 INJECTION, SUSPENSION INTRA-ARTICULAR; INTRAMUSCULAR at 09:15

## 2024-11-06 RX ADMIN — ROPIVACAINE HYDROCHLORIDE 4 ML: 2 INJECTION, SOLUTION EPIDURAL; INFILTRATION; PERINEURAL at 09:15

## 2024-11-06 NOTE — LETTER
November 6, 2024     Patient: Aime Fox Sr.  YOB: 1955  Date of Visit: 11/6/2024      To Whom it May Concern:    Aime Fox is under my professional care. Aime was seen in my office on 11/6/2024. [employer] requires use of a chair with arms when working.    If you have any questions or concerns, please don't hesitate to call.         Sincerely,          Sj Alcazar MD        CC: No Recipients
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
## 2024-11-19 DIAGNOSIS — I50.32 CHRONIC DIASTOLIC HEART FAILURE (HCC): ICD-10-CM

## 2024-11-19 DIAGNOSIS — R60.0 BILATERAL EDEMA OF LOWER EXTREMITY: ICD-10-CM

## 2024-11-20 RX ORDER — FUROSEMIDE 20 MG/1
20 TABLET ORAL DAILY PRN
Qty: 90 TABLET | Refills: 0 | Status: SHIPPED | OUTPATIENT
Start: 2024-11-20

## 2024-12-10 ENCOUNTER — OFFICE VISIT (OUTPATIENT)
Age: 69
End: 2024-12-10

## 2024-12-10 ENCOUNTER — TELEPHONE (OUTPATIENT)
Age: 69
End: 2024-12-10

## 2024-12-10 VITALS
DIASTOLIC BLOOD PRESSURE: 84 MMHG | SYSTOLIC BLOOD PRESSURE: 146 MMHG | HEIGHT: 76 IN | BODY MASS INDEX: 38.36 KG/M2 | OXYGEN SATURATION: 96 % | RESPIRATION RATE: 17 BRPM | WEIGHT: 315 LBS | HEART RATE: 84 BPM

## 2024-12-10 DIAGNOSIS — I10 ESSENTIAL HYPERTENSION: ICD-10-CM

## 2024-12-10 DIAGNOSIS — Z00.00 MEDICARE ANNUAL WELLNESS VISIT, SUBSEQUENT: Primary | ICD-10-CM

## 2024-12-10 DIAGNOSIS — R05.8 PRODUCTIVE COUGH: ICD-10-CM

## 2024-12-10 PROCEDURE — G0439 PPPS, SUBSEQ VISIT: HCPCS | Performed by: FAMILY MEDICINE

## 2024-12-10 RX ORDER — ENALAPRIL MALEATE 20 MG/1
20 TABLET ORAL DAILY
Qty: 30 TABLET | Refills: 1 | Status: SHIPPED | OUTPATIENT
Start: 2024-12-10

## 2024-12-10 RX ORDER — BENZONATATE 100 MG/1
100 CAPSULE ORAL 3 TIMES DAILY PRN
Qty: 20 CAPSULE | Refills: 0 | Status: SHIPPED | OUTPATIENT
Start: 2024-12-10 | End: 2024-12-20 | Stop reason: SDUPTHER

## 2024-12-10 NOTE — PROGRESS NOTES
Name: Aime Fox Sr.      : 1955      MRN: [id number]  Encounter Provider: Alcira Reyes DO  Encounter Date: 12/10/2024   Encounter department: [facility]    Assessment & Plan  Medicare annual wellness visit, subsequent         Productive cough  Suspect improving viral URI, continue Tessalon Perles as needed for cough  Orders:    benzonatate (TESSALON PERLES) 100 mg capsule; Take 1 capsule (100 mg total) by mouth 3 (three) times a day as needed for cough    Essential hypertension  Chronic, continue medications as below  Patient asymptomatic at this time, denies chest pain/shortness of breath/change in vision  Orders:    enalapril (VASOTEC) 20 mg tablet; Take 1 tablet (20 mg total) by mouth daily      Falls Plan of Care: balance, strength, and gait training instructions were provided.       Preventive health issues were discussed with patient, and age appropriate screening tests were ordered as noted in patient's After Visit Summary. Personalized health advice and appropriate referrals for health education or preventive services given if needed, as noted in patient's After Visit Summary.    History of Present Illness     HPI     Patient presents for medicare annual wellness:  -Reporting improvement in mental status, noting he is feeling more like himself  -Planning for going for lab work in May   -Has not completed cologuard due to confusion, declines colonoscopy   -BP elevated at this visit ->   -metformin is helping with weight loss, down 8 lbs   -Noting Saturday started having cold with fevers x 2 days, noting improvement with only cough   -improved ROM with left shoulder, following with ortho, getting shoulder injections   -contrave is working, increased to BID  -taking BP pills at night now   -excited about grandson   -increase enalapril to 20 mg   -needs placard for 3,5     Patient Care Team:  Alcira Reyes DO as PCP - General (Family Medicine)  [place]  DAVE Jacobson as PCP - PCP-[facility] ([facility])  Jayla Argueta MD as PCP - PCP-[facility] ([facility])  Rafael Mac MD as Surgeon (Surgical Oncology)    Review of Systems   Constitutional:  Negative for fever.   HENT:  Positive for congestion. Negative for hearing loss, postnasal drip, rhinorrhea, sneezing and sore throat.    Eyes:  Negative for visual disturbance.   Respiratory:  Positive for cough. Negative for shortness of breath and wheezing.    Cardiovascular:  Negative for chest pain and palpitations.   Gastrointestinal:  Negative for abdominal pain, constipation, diarrhea, nausea and vomiting.   Musculoskeletal:  Negative for arthralgias, myalgias and neck pain.   Skin:  Negative for rash and wound.   Neurological:  Negative for weakness and numbness.     Medical History Reviewed by provider this encounter:  Tobacco  Allergies  Meds  Problems  Med Hx  Surg Hx  Fam Hx       Annual Wellness Visit Questionnaire   Aime is here for his Subsequent Wellness visit. Last Medicare Wellness visit information reviewed, patient interviewed and updates made to the record today.      Health Risk Assessment:   Patient rates overall health as fair. Patient feels that their physical health rating is slightly worse. Patient is satisfied with their life. Eyesight was rated as slightly worse. Hearing was rated as same. Patient feels that their emotional and mental health rating is same. Patients states they are sometimes angry. Patient states they are sometimes unusually tired/fatigued. Pain experienced in the last 7 days has been a lot. Patient's pain rating has been 6/10. Patient states that he has experienced weight loss or gain in last 6 months. Pain right leg. Swollen pain. Arthritis pain    Fall Risk Screening:   In the past year, patient has experienced: history of falling in past year      Home Safety:  Patient has trouble with stairs inside or outside of their home. Patient has working smoke alarms and has  working carbon monoxide detector. Home safety hazards include: none. Which steps. I have an elevator at home and an elevator at work so I'm okay there it's when I have to do steps. Out in the public. I have to hold on and I have to go one step at a time    Nutrition:   Current diet is Limited junk food.     Medications:   Patient is currently taking over-the-counter supplements. OTC medications include: see medication list. Patient is not able to manage medications. My wife takes care of my medications. When it comes to medication I'm irresponsible    Activities of Daily Living (ADLs)/Instrumental Activities of Daily Living (IADLs):   Walk and transfer into and out of bed and chair?: Yes  Dress and groom yourself?: No    Bathe or shower yourself?: No    Feed yourself? Yes  Do your laundry/housekeeping?: Yes  Manage your money, pay your bills and track your expenses?: Yes  Make your own meals?: Yes    Do your own shopping?: Yes    ADL comments: I have tough time getting in and out of the bathtub. That's why I don't showers often as I should.    Durable Medical Equipment Suppliers  N/A    Previous Hospitalizations:   Any hospitalizations or ED visits within the last 12 months?: No      Hospitalization Comments: There's only one way I go in an emergency room DOA    Advance Care Planning:   Living will: No    Durable POA for healthcare: No    Advanced directive: No      PREVENTIVE SCREENINGS      Cardiovascular Screening:    General: Screening Not Indicated and History Lipid Disorder      Diabetes Screening:     General: Screening Current      Abdominal Aortic Aneurysm (AAA) Screening:    Risk factors include: age between 65-76 yo and tobacco use        General: Screening Not Indicated and History AAA      Lung Cancer Screening:     General: Screening Not Indicated      Hepatitis C Screening:    General: Screening Current    Screening, Brief Intervention, and Referral to Treatment (SBIRT)    Screening  Typical number of  drinks in a day: 0  Typical number of drinks in a week: 0  Interpretation: Low risk drinking behavior.    AUDIT-C Screenin) How often did you have a drink containing alcohol in the past year? never  2) How many drinks did you have on a typical day when you were drinking in the past year? 0  3) How often did you have 6 or more drinks on one occasion in the past year? never    AUDIT-C Score: 0  Interpretation: Score 0-3 (male): Negative screen for alcohol misuse    Single Item Drug Screening:  How often have you used an illegal drug (including marijuana) or a prescription medication for non-medical reasons in the past year? never    Single Item Drug Screen Score: 0  Interpretation: Negative screen for possible drug use disorder    Social Drivers of Health     Financial Resource Strain: Low Risk  (12/10/2024)    Overall Financial Resource Strain (CARDIA)     Difficulty of Paying Living Expenses: Not very hard   Recent Concern: Financial Resource Strain - Medium Risk (12/3/2024)    Overall Financial Resource Strain (CARDIA)     Difficulty of Paying Living Expenses: Somewhat hard   Food Insecurity: No Food Insecurity (12/10/2024)    Hunger Vital Sign     Worried About Running Out of Food in the Last Year: Never true     Ran Out of Food in the Last Year: Never true   Recent Concern: Food Insecurity - Food Insecurity Present (12/3/2024)    Hunger Vital Sign     Worried About Running Out of Food in the Last Year: Sometimes true     Ran Out of Food in the Last Year: Sometimes true   Transportation Needs: No Transportation Needs (12/10/2024)    PRAPARE - Transportation     Lack of Transportation (Medical): No     Lack of Transportation (Non-Medical): No   Housing Stability: Low Risk  (12/10/2024)    Housing Stability Vital Sign     Unable to Pay for Housing in the Last Year: No     Number of Times Moved in the Last Year: 0     Homeless in the Last Year: No   Utilities: Not At Risk (12/10/2024)    [facility] Utilities      "Threatened with loss of utilities: No     No results found.    Objective   /84   Pulse 84   Resp 17   Ht 6' 4\" (1.93 m)   Wt (!) 152 kg (335 lb)   SpO2 96%   BMI 40.78 kg/m²     Physical Exam  Vitals and nursing note reviewed.   Constitutional:       General: He is not in acute distress.     Appearance: He is well-developed.   HENT:      Head: Normocephalic and atraumatic.      Nose: Congestion present.   Eyes:      Conjunctiva/sclera: Conjunctivae normal.   Cardiovascular:      Rate and Rhythm: Normal rate and regular rhythm.      Heart sounds: No murmur heard.  Pulmonary:      Effort: Pulmonary effort is normal. No respiratory distress.      Breath sounds: Normal breath sounds.   Abdominal:      Palpations: Abdomen is soft.      Tenderness: There is no abdominal tenderness.   Musculoskeletal:         General: No swelling.      Cervical back: Neck supple.   Skin:     General: Skin is warm and dry.      Capillary Refill: Capillary refill takes less than 2 seconds.   Neurological:      Mental Status: He is alert.   Psychiatric:         Mood and Affect: Mood normal.         "

## 2024-12-10 NOTE — PATIENT INSTRUCTIONS
Medicare Preventive Visit Patient Instructions  Thank you for completing your Welcome to Medicare Visit or Medicare Annual Wellness Visit today. Your next wellness visit will be due in one year (12/11/2025).  The screening/preventive services that you may require over the next 5-10 years are detailed below. Some tests may not apply to you based off risk factors and/or age. Screening tests ordered at today's visit but not completed yet may show as past due. Also, please note that scanned in results may not display below.  Preventive Screenings:  Service Recommendations Previous Testing/Comments   Colorectal Cancer Screening  Colonoscopy    Fecal Occult Blood Test (FOBT)/Fecal Immunochemical Test (FIT)  Fecal DNA/Cologuard Test  Flexible Sigmoidoscopy Age: 45-75 years old   Colonoscopy: every 10 years (May be performed more frequently if at higher risk)  OR  FOBT/FIT: every 1 year  OR  Cologuard: every 3 years  OR  Sigmoidoscopy: every 5 years  Screening may be recommended earlier than age 45 if at higher risk for colorectal cancer. Also, an individualized decision between you and your healthcare provider will decide whether screening between the ages of 76-85 would be appropriate. Colonoscopy: Not on file  FOBT/FIT: Not on file  Cologuard: Not on file  Sigmoidoscopy: Not on file          Prostate Cancer Screening Individualized decision between patient and health care provider in men between ages of 55-69   Medicare will cover every 12 months beginning on the day after your 50th birthday PSA: 0.3 ng/mL           Hepatitis C Screening Once for adults born between 1945 and 1965  More frequently in patients at high risk for Hepatitis C Hep C Antibody: 07/23/2020    Screening Current   Diabetes Screening 1-2 times per year if you're at risk for diabetes or have pre-diabetes Fasting glucose: 116 mg/dL (10/2/2024)  A1C: 5.5 % (6/6/2024)  Screening Current   Cholesterol Screening Once every 5 years if you don't have a lipid  disorder. May order more often based on risk factors. Lipid panel: 06/06/2024  Screening Not Indicated  History Lipid Disorder      Other Preventive Screenings Covered by Medicare:  Abdominal Aortic Aneurysm (AAA) Screening: covered once if your at risk. You're considered to be at risk if you have a family history of AAA or a male between the age of 65-75 who smoking at least 100 cigarettes in your lifetime.  Lung Cancer Screening: covers low dose CT scan once per year if you meet all of the following conditions: (1) Age 55-77; (2) No signs or symptoms of lung cancer; (3) Current smoker or have quit smoking within the last 15 years; (4) You have a tobacco smoking history of at least 20 pack years (packs per day x number of years you smoked); (5) You get a written order from a healthcare provider.  Glaucoma Screening: covered annually if you're considered high risk: (1) You have diabetes OR (2) Family history of glaucoma OR (3)  aged 50 and older OR (4)  American aged 65 and older  Osteoporosis Screening: covered every 2 years if you meet one of the following conditions: (1) Have a vertebral abnormality; (2) On glucocorticoid therapy for more than 3 months; (3) Have primary hyperparathyroidism; (4) On osteoporosis medications and need to assess response to drug therapy.  HIV Screening: covered annually if you're between the age of 15-65. Also covered annually if you are younger than 15 and older than 65 with risk factors for HIV infection. For pregnant patients, it is covered up to 3 times per pregnancy.    Immunizations:  Immunization Recommendations   Influenza Vaccine Annual influenza vaccination during flu season is recommended for all persons aged >= 6 months who do not have contraindications   Pneumococcal Vaccine   * Pneumococcal conjugate vaccine = PCV13 (Prevnar 13), PCV15 (Vaxneuvance), PCV20 (Prevnar 20)  * Pneumococcal polysaccharide vaccine = PPSV23 (Pneumovax) Adults 19-65 yo  with certain risk factors or if 65+ yo  If never received any pneumonia vaccine: recommend Prevnar 20 (PCV20)  Give PCV20 if previously received 1 dose of PCV13 or PPSV23   Hepatitis B Vaccine 3 dose series if at intermediate or high risk (ex: diabetes, end stage renal disease, liver disease)   Respiratory syncytial virus (RSV) Vaccine - COVERED BY MEDICARE PART D  * RSVPreF3 (Arexvy) CDC recommends that adults 60 years of age and older may receive a single dose of RSV vaccine using shared clinical decision-making (SCDM)   Tetanus (Td) Vaccine - COST NOT COVERED BY MEDICARE PART B Following completion of primary series, a booster dose should be given every 10 years to maintain immunity against tetanus. Td may also be given as tetanus wound prophylaxis.   Tdap Vaccine - COST NOT COVERED BY MEDICARE PART B Recommended at least once for all adults. For pregnant patients, recommended with each pregnancy.   Shingles Vaccine (Shingrix) - COST NOT COVERED BY MEDICARE PART B  2 shot series recommended in those 19 years and older who have or will have weakened immune systems or those 50 years and older     Health Maintenance Due:      Topic Date Due   • Colorectal Cancer Screening  10/16/2022   • Hepatitis C Screening  Completed     Immunizations Due:      Topic Date Due   • COVID-19 Vaccine (4 - 2024-25 season) 09/01/2024     Advance Directives   What are advance directives?  Advance directives are legal documents that state your wishes and plans for medical care. These plans are made ahead of time in case you lose your ability to make decisions for yourself. Advance directives can apply to any medical decision, such as the treatments you want, and if you want to donate organs.   What are the types of advance directives?  There are many types of advance directives, and each state has rules about how to use them. You may choose a combination of any of the following:  Living will:  This is a written record of the treatment  you want. You can also choose which treatments you do not want, which to limit, and which to stop at a certain time. This includes surgery, medicine, IV fluid, and tube feedings.   Durable power of  for healthcare (DPAHC):  This is a written record that states who you want to make healthcare choices for you when you are unable to make them for yourself. This person, called a proxy, is usually a family member or a friend. You may choose more than 1 proxy.  Do not resuscitate (DNR) order:  A DNR order is used in case your heart stops beating or you stop breathing. It is a request not to have certain forms of treatment, such as CPR. A DNR order may be included in other types of advance directives.  Medical directive:  This covers the care that you want if you are in a coma, near death, or unable to make decisions for yourself. You can list the treatments you want for each condition. Treatment may include pain medicine, surgery, blood transfusions, dialysis, IV or tube feedings, and a ventilator (breathing machine).  Values history:  This document has questions about your views, beliefs, and how you feel and think about life. This information can help others choose the care that you would choose.  Why are advance directives important?  An advance directive helps you control your care. Although spoken wishes may be used, it is better to have your wishes written down. Spoken wishes can be misunderstood, or not followed. Treatments may be given even if you do not want them. An advance directive may make it easier for your family to make difficult choices about your care.   Fall Prevention    Fall prevention  includes ways to make your home and other areas safer. It also includes ways you can move more carefully to prevent a fall. Health conditions that cause changes in your blood pressure, vision, or muscle strength and coordination may increase your risk for falls. Medicines may also increase your risk for falls  if they make you dizzy, weak, or sleepy.   Fall prevention tips:   Stand or sit up slowly.    Use assistive devices as directed.    Wear shoes that fit well and have soles that .    Wear a personal alarm.    Stay active.    Manage your medical conditions.    Home Safety Tips:  Add items to prevent falls in the bathroom.    Keep paths clear.    Install bright lights in your home.    Keep items you use often on shelves within reach.    Paint or place reflective tape on the edges of your stairs.    Weight Management   Why it is important to manage your weight:  Being overweight increases your risk of health conditions such as heart disease, high blood pressure, type 2 diabetes, and certain types of cancer. It can also increase your risk for osteoarthritis, sleep apnea, and other respiratory problems. Aim for a slow, steady weight loss. Even a small amount of weight loss can lower your risk of health problems.  How to lose weight safely:  A safe and healthy way to lose weight is to eat fewer calories and get regular exercise. You can lose up about 1 pound a week by decreasing the number of calories you eat by 500 calories each day.   Healthy meal plan for weight management:  A healthy meal plan includes a variety of foods, contains fewer calories, and helps you stay healthy. A healthy meal plan includes the following:  Eat whole-grain foods more often.  A healthy meal plan should contain fiber. Fiber is the part of grains, fruits, and vegetables that is not broken down by your body. Whole-grain foods are healthy and provide extra fiber in your diet. Some examples of whole-grain foods are whole-wheat breads and pastas, oatmeal, brown rice, and bulgur.  Eat a variety of vegetables every day.  Include dark, leafy greens such as spinach, kale, brown greens, and mustard greens. Eat yellow and orange vegetables such as carrots, sweet potatoes, and winter squash.   Eat a variety of fruits every day.  Choose fresh or  canned fruit (canned in its own juice or light syrup) instead of juice. Fruit juice has very little or no fiber.  Eat low-fat dairy foods.  Drink fat-free (skim) milk or 1% milk. Eat fat-free yogurt and low-fat cottage cheese. Try low-fat cheeses such as mozzarella and other reduced-fat cheeses.  Choose meat and other protein foods that are low in fat.  Choose beans or other legumes such as split peas or lentils. Choose fish, skinless poultry (chicken or turkey), or lean cuts of red meat (beef or pork). Before you cook meat or poultry, cut off any visible fat.   Use less fat and oil.  Try baking foods instead of frying them. Add less fat, such as margarine, sour cream, regular salad dressing and mayonnaise to foods. Eat fewer high-fat foods. Some examples of high-fat foods include french fries, doughnuts, ice cream, and cakes.  Eat fewer sweets.  Limit foods and drinks that are high in sugar. This includes candy, cookies, regular soda, and sweetened drinks.  Exercise:  Exercise at least 30 minutes per day on most days of the week. Some examples of exercise include walking, biking, dancing, and swimming. You can also fit in more physical activity by taking the stairs instead of the elevator or parking farther away from stores. Ask your healthcare provider about the best exercise plan for you.      © Copyright [employer] 2018 Information is for End User's use only and may not be sold, redistributed or otherwise used for commercial purposes. All illustrations and images included in CareNotes® are the copyrighted property of [employer] or [employer]

## 2024-12-18 ENCOUNTER — OFFICE VISIT (OUTPATIENT)
Age: 69
End: 2024-12-18

## 2024-12-18 VITALS
OXYGEN SATURATION: 95 % | SYSTOLIC BLOOD PRESSURE: 94 MMHG | RESPIRATION RATE: 22 BRPM | TEMPERATURE: 99.3 F | DIASTOLIC BLOOD PRESSURE: 56 MMHG | HEART RATE: 78 BPM | WEIGHT: 315 LBS | BODY MASS INDEX: 41.02 KG/M2

## 2024-12-18 DIAGNOSIS — J06.9 URI, ACUTE: Primary | ICD-10-CM

## 2024-12-18 LAB
SARS-COV-2 AG UPPER RESP QL IA: NEGATIVE
VALID CONTROL: NORMAL

## 2024-12-18 PROCEDURE — 99213 OFFICE O/P EST LOW 20 MIN: CPT | Performed by: FAMILY MEDICINE

## 2024-12-18 PROCEDURE — 87811 SARS-COV-2 COVID19 W/OPTIC: CPT | Performed by: FAMILY MEDICINE

## 2024-12-18 PROCEDURE — G2211 COMPLEX E/M VISIT ADD ON: HCPCS | Performed by: FAMILY MEDICINE

## 2024-12-18 RX ORDER — FLUTICASONE PROPIONATE 50 MCG
1 SPRAY, SUSPENSION (ML) NASAL DAILY
Qty: 9.9 ML | Refills: 2 | Status: SHIPPED | OUTPATIENT
Start: 2024-12-18

## 2024-12-18 RX ORDER — AZITHROMYCIN 250 MG/1
TABLET, FILM COATED ORAL
Qty: 6 TABLET | Refills: 0 | Status: SHIPPED | OUTPATIENT
Start: 2024-12-18 | End: 2024-12-23

## 2024-12-18 NOTE — PROGRESS NOTES
=Name: Aime Fox Sr.      : 1955      MRN: [id number]  Encounter Provider: Diane Delgado MD  Encounter Date: 2024   Encounter department: [facility]  :  Assessment & Plan  URI, acute  Pt was seen in clinic on 12/10 and had productive cough at that time and was given tessalon pearls. Pt states that since last couple days he has nasal congestion, fatigue,and Fever. Fever started yesterday, Tmax 100.9, Subsided with tylenol. Has been coughing and bringing up green mucus since last couple days. Lungs clear to auscultation bilaterally. COVID/Flu negative    Flonase BID  Continue using trilogy  Z-pack ordered  Return precautions reviewed  Increase water intake including Gatorade or lemonade   Drink warm honey for sore throat.    Orders:    POCT Rapid Covid Ag    fluticasone (FLONASE) 50 mcg/act nasal spray; 1 spray into each nostril daily    azithromycin (Zithromax) 250 mg tablet; Take 2 tablets (500 mg total) by mouth daily for 1 day, THEN 1 tablet (250 mg total) daily for 4 days.           History of Present Illness     HPI  Pt is a 68 yo male presents for congestion since last week.     Review of Systems   Constitutional:  Positive for chills and fatigue. Negative for activity change.   HENT:  Positive for congestion, postnasal drip and rhinorrhea.    Eyes:  Negative for pain.   Respiratory:  Positive for cough. Negative for shortness of breath.    Cardiovascular:  Negative for chest pain.   Genitourinary:  Negative for difficulty urinating and frequency.   Musculoskeletal:  Negative for gait problem, neck pain and neck stiffness.   Neurological:  Positive for weakness and light-headedness. Negative for headaches.   Hematological:  Negative for adenopathy.       Objective   BP 94/56 (BP Location: Right arm, Patient Position: Sitting, Cuff Size: Large)   Pulse 78   Temp 99.3 °F (37.4 °C)   Resp 22   Wt (!) 153 kg (337 lb)   SpO2 95%   BMI 41.02 kg/m²       Physical Exam  Vitals and nursing note reviewed.   Constitutional:       General: He is not in acute distress.     Appearance: He is well-developed.   HENT:      Head: Normocephalic and atraumatic.      Right Ear: Tympanic membrane and ear canal normal.      Left Ear: Ear canal normal.      Nose: Congestion and rhinorrhea present.      Mouth/Throat:      Mouth: Mucous membranes are moist.   Eyes:      Conjunctiva/sclera: Conjunctivae normal.   Cardiovascular:      Rate and Rhythm: Normal rate and regular rhythm.      Heart sounds: No murmur heard.  Pulmonary:      Effort: Pulmonary effort is normal. No respiratory distress.      Breath sounds: Normal breath sounds. No stridor. No wheezing or rhonchi.   Abdominal:      Palpations: Abdomen is soft.      Tenderness: There is no abdominal tenderness.   Musculoskeletal:         General: No swelling.      Cervical back: Neck supple.   Skin:     General: Skin is warm and dry.      Capillary Refill: Capillary refill takes less than 2 seconds.   Neurological:      Mental Status: He is alert.   Psychiatric:         Mood and Affect: Mood normal.

## 2024-12-20 ENCOUNTER — PATIENT MESSAGE (OUTPATIENT)
Age: 69
End: 2024-12-20

## 2024-12-20 DIAGNOSIS — R05.8 PRODUCTIVE COUGH: ICD-10-CM

## 2024-12-20 RX ORDER — BENZONATATE 100 MG/1
100 CAPSULE ORAL 3 TIMES DAILY PRN
Qty: 20 CAPSULE | Refills: 0 | Status: SHIPPED | OUTPATIENT
Start: 2024-12-20

## 2025-01-14 ENCOUNTER — OFFICE VISIT (OUTPATIENT)
Dept: OBGYN CLINIC | Facility: CLINIC | Age: 70
End: 2025-01-14
Payer: COMMERCIAL

## 2025-01-14 VITALS — BODY MASS INDEX: 38.36 KG/M2 | WEIGHT: 315 LBS | HEIGHT: 76 IN

## 2025-01-14 DIAGNOSIS — M79.661 PAIN OF RIGHT CALF: Primary | ICD-10-CM

## 2025-01-14 DIAGNOSIS — E66.01 MORBID OBESITY WITH BMI OF 40.0-44.9, ADULT (HCC): ICD-10-CM

## 2025-01-14 DIAGNOSIS — M17.11 PRIMARY OSTEOARTHRITIS OF RIGHT KNEE: ICD-10-CM

## 2025-01-14 PROCEDURE — 99214 OFFICE O/P EST MOD 30 MIN: CPT | Performed by: ORTHOPAEDIC SURGERY
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
## 2025-01-15 ENCOUNTER — HOSPITAL ENCOUNTER (OUTPATIENT)
Dept: RADIOLOGY | Facility: HOSPITAL | Age: 70
Discharge: HOME | End: 2025-01-15
Attending: ORTHOPAEDIC SURGERY
Payer: COMMERCIAL

## 2025-01-15 DIAGNOSIS — M79.661 PAIN OF RIGHT CALF: ICD-10-CM

## 2025-01-15 PROCEDURE — 93971 EXTREMITY STUDY: CPT | Performed by: SURGERY

## 2025-01-15 PROCEDURE — 93971 EXTREMITY STUDY: CPT

## 2025-01-16 ENCOUNTER — TELEPHONE (OUTPATIENT)
Dept: OBGYN CLINIC | Facility: CLINIC | Age: 70
End: 2025-01-16

## 2025-01-16 DIAGNOSIS — M17.0 BILATERAL PRIMARY OSTEOARTHRITIS OF KNEE: Primary | ICD-10-CM

## 2025-01-16 NOTE — TELEPHONE ENCOUNTER
Sw pt and advised that his US was negative for a DVT so we will proceed with visco as planned. Order has been placed

## 2025-02-04 ENCOUNTER — OFFICE VISIT (OUTPATIENT)
Age: 70
End: 2025-02-04
Payer: COMMERCIAL

## 2025-02-04 VITALS — RESPIRATION RATE: 17 BRPM | BODY MASS INDEX: 38.36 KG/M2 | WEIGHT: 315 LBS | HEIGHT: 76 IN

## 2025-02-04 DIAGNOSIS — B35.1 ONYCHOMYCOSIS: ICD-10-CM

## 2025-02-04 DIAGNOSIS — B35.9 DERMATOPHYTOSIS: ICD-10-CM

## 2025-02-04 DIAGNOSIS — M79.671 PAIN IN BOTH FEET: ICD-10-CM

## 2025-02-04 DIAGNOSIS — M79.672 PAIN IN BOTH FEET: ICD-10-CM

## 2025-02-04 DIAGNOSIS — M77.42 METATARSALGIA OF BOTH FEET: Primary | ICD-10-CM

## 2025-02-04 DIAGNOSIS — R60.9 CHRONIC EDEMA: ICD-10-CM

## 2025-02-04 DIAGNOSIS — M77.41 METATARSALGIA OF BOTH FEET: Primary | ICD-10-CM

## 2025-02-04 DIAGNOSIS — M21.962 ACQUIRED DEFORMITY OF BOTH FEET: ICD-10-CM

## 2025-02-04 DIAGNOSIS — M21.961 ACQUIRED DEFORMITY OF BOTH FEET: ICD-10-CM

## 2025-02-04 PROCEDURE — 99203 OFFICE O/P NEW LOW 30 MIN: CPT | Performed by: PODIATRIST
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
## 2025-02-19 ENCOUNTER — OFFICE VISIT (OUTPATIENT)
Age: 70
End: 2025-02-19

## 2025-02-19 VITALS
TEMPERATURE: 98.6 F | SYSTOLIC BLOOD PRESSURE: 154 MMHG | WEIGHT: 315 LBS | RESPIRATION RATE: 18 BRPM | HEART RATE: 74 BPM | DIASTOLIC BLOOD PRESSURE: 76 MMHG | BODY MASS INDEX: 40.66 KG/M2 | OXYGEN SATURATION: 98 %

## 2025-02-19 DIAGNOSIS — T14.8XXA WOUND INFECTION: Primary | ICD-10-CM

## 2025-02-19 DIAGNOSIS — L08.9 WOUND INFECTION: Primary | ICD-10-CM

## 2025-02-19 PROCEDURE — 99213 OFFICE O/P EST LOW 20 MIN: CPT | Performed by: NURSE PRACTITIONER

## 2025-02-19 PROCEDURE — G2211 COMPLEX E/M VISIT ADD ON: HCPCS | Performed by: NURSE PRACTITIONER

## 2025-02-19 RX ORDER — AZITHROMYCIN 250 MG/1
TABLET, FILM COATED ORAL
Qty: 6 TABLET | Refills: 0 | Status: SHIPPED | OUTPATIENT
Start: 2025-02-19 | End: 2025-02-24

## 2025-02-19 NOTE — PROGRESS NOTES
:  Assessment & Plan  Wound infection  -continue warm compresses daily  -return if wound does not heal or eye becomes reddened    Orders:    azithromycin (Zithromax) 250 mg tablet; Take 2 tablets (500 mg total) by mouth daily for 1 day, THEN 1 tablet (250 mg total) daily for 4 days.        History of Present Illness     Aime Fox Sr. is a 69 y.o. male   Patient reports cutting outer eye a week ago.  Has been applying warm rag to outer eye.  Now draining pus per patient-from wound area.      Wound Check  He was originally treated 5 to 10 days ago. Previous treatment included wound cleansing or irrigation. His temperature was unmeasured prior to arrival. There has been colored discharge from the wound. There is no redness present. There is no swelling present. There is no pain present.     Review of Systems   Constitutional: Negative.    HENT:          Wound of  outer right eye with drainage.  Eye is not infected.     Eyes: Negative.    Respiratory: Negative.     Cardiovascular: Negative.    Gastrointestinal: Negative.    Endocrine: Negative.    Genitourinary: Negative.    Musculoskeletal: Negative.    Skin:  Positive for wound.        Outer right eye wound with scant drainage   Allergic/Immunologic: Negative.    Neurological: Negative.    Hematological: Negative.    Psychiatric/Behavioral: Negative.       Objective   /76 (BP Location: Left arm, Patient Position: Sitting, Cuff Size: Standard)   Pulse 74   Temp 98.6 °F (37 °C) (Tympanic)   Resp 18   Wt (!) 152 kg (334 lb)   SpO2 98%   BMI 40.66 kg/m²      Physical Exam  Constitutional:       General: He is not in acute distress.     Appearance: Normal appearance. He is obese. He is not ill-appearing, toxic-appearing or diaphoretic.   HENT:      Head: Normocephalic and atraumatic.      Right Ear: External ear normal.      Left Ear: External ear normal.      Nose: Nose normal. No congestion.      Mouth/Throat:      Mouth: Mucous membranes are moist.    Eyes:      General:         Right eye: No discharge.         Left eye: No discharge.      Conjunctiva/sclera: Conjunctivae normal.   Cardiovascular:      Rate and Rhythm: Normal rate and regular rhythm.      Pulses: Normal pulses.      Heart sounds: Normal heart sounds.   Pulmonary:      Effort: Pulmonary effort is normal. No respiratory distress.      Breath sounds: Normal breath sounds. No wheezing, rhonchi or rales.   Abdominal:      General: Bowel sounds are normal.      Palpations: Abdomen is soft.   Musculoskeletal:         General: Normal range of motion.      Cervical back: Normal range of motion and neck supple. No rigidity.      Right lower leg: No edema.      Left lower leg: No edema.   Lymphadenopathy:      Cervical: No cervical adenopathy.   Skin:     Findings: Lesion present.      Comments: Small wound outer right eye with slight redness and states drains pus some am's   Neurological:      Mental Status: He is alert and oriented to person, place, and time. Mental status is at baseline.      Motor: No weakness.      Gait: Gait normal.   Psychiatric:         Mood and Affect: Mood normal.         Behavior: Behavior normal.         Thought Content: Thought content normal.         Judgment: Judgment normal.

## 2025-02-22 NOTE — TELEPHONE ENCOUNTER
Problem: Safety - Medical Restraint  Goal: Remains free of injury from restraints (Restraint for Interference with Medical Device)  Description: INTERVENTIONS:  1. Determine that other, less restrictive measures have been tried or would not be effective before applying the restraint  2. Evaluate the patient's condition at the time of restraint application  3. Inform patient/family regarding the reason for restraint  4. Q2H: Monitor safety, psychosocial status, comfort, nutrition and hydration  Recent Flowsheet Documentation  Taken 2/21/2025 2000 by Yaneth Kingsley RN  Remains free of injury from restraints (restraint for interference with medical device):   Determine that other, less restrictive measures have been tried or would not be effective before applying the restraint   Evaluate the patient's condition at the time of restraint application   Inform patient/family regarding the reason for restraint   Every 2 hours: Monitor safety, psychosocial status, comfort, nutrition and hydration  Taken 2/21/2025 1800 by Ellen Faust RN  Remains free of injury from restraints (restraint for interference with medical device):   Determine that other, less restrictive measures have been tried or would not be effective before applying the restraint   Inform patient/family regarding the reason for restraint   Evaluate the patient's condition at the time of restraint application   Every 2 hours: Monitor safety, psychosocial status, comfort, nutrition and hydration      PLEASE WRITE EXCUSE

## 2025-02-25 ENCOUNTER — OFFICE VISIT (OUTPATIENT)
Dept: OBGYN CLINIC | Facility: CLINIC | Age: 70
End: 2025-02-25
Payer: COMMERCIAL

## 2025-02-25 VITALS — BODY MASS INDEX: 38.36 KG/M2 | WEIGHT: 315 LBS | HEIGHT: 76 IN

## 2025-02-25 DIAGNOSIS — G89.29 CHRONIC LEFT SHOULDER PAIN: Primary | ICD-10-CM

## 2025-02-25 DIAGNOSIS — M75.82 TENDINITIS OF LEFT ROTATOR CUFF: ICD-10-CM

## 2025-02-25 DIAGNOSIS — M25.512 CHRONIC LEFT SHOULDER PAIN: Primary | ICD-10-CM

## 2025-02-25 PROCEDURE — 20610 DRAIN/INJ JOINT/BURSA W/O US: CPT | Performed by: ORTHOPAEDIC SURGERY

## 2025-02-25 PROCEDURE — 99213 OFFICE O/P EST LOW 20 MIN: CPT | Performed by: ORTHOPAEDIC SURGERY

## 2025-02-25 RX ADMIN — TRIAMCINOLONE ACETONIDE 40 MG: 40 INJECTION, SUSPENSION INTRA-ARTICULAR; INTRAMUSCULAR at 13:45

## 2025-02-25 RX ADMIN — BUPIVACAINE HYDROCHLORIDE 4 ML: 5 INJECTION, SOLUTION PERINEURAL at 13:45
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
## 2025-02-26 ENCOUNTER — OFFICE VISIT (OUTPATIENT)
Dept: OBGYN CLINIC | Facility: CLINIC | Age: 70
End: 2025-02-26
Payer: COMMERCIAL

## 2025-02-26 VITALS — BODY MASS INDEX: 38.36 KG/M2 | WEIGHT: 315 LBS | HEIGHT: 76 IN

## 2025-02-26 DIAGNOSIS — M17.11 PRIMARY OSTEOARTHRITIS OF RIGHT KNEE: Primary | ICD-10-CM

## 2025-02-26 PROCEDURE — 20610 DRAIN/INJ JOINT/BURSA W/O US: CPT | Performed by: ORTHOPAEDIC SURGERY

## 2025-02-26 PROCEDURE — 99213 OFFICE O/P EST LOW 20 MIN: CPT | Performed by: ORTHOPAEDIC SURGERY

## 2025-02-26 RX ORDER — TRIAMCINOLONE ACETONIDE 40 MG/ML
40 INJECTION, SUSPENSION INTRA-ARTICULAR; INTRAMUSCULAR
Status: COMPLETED | OUTPATIENT
Start: 2025-02-25 | End: 2025-02-25

## 2025-02-26 RX ORDER — BUPIVACAINE HYDROCHLORIDE 5 MG/ML
4 INJECTION, SOLUTION PERINEURAL
Status: COMPLETED | OUTPATIENT
Start: 2025-02-25 | End: 2025-02-25

## 2025-02-26 RX ORDER — LIDOCAINE HYDROCHLORIDE 10 MG/ML
4 INJECTION, SOLUTION INFILTRATION; PERINEURAL
Status: COMPLETED | OUTPATIENT
Start: 2025-02-26 | End: 2025-02-26

## 2025-02-26 RX ORDER — DEXAMETHASONE SODIUM PHOSPHATE 10 MG/ML
40 INJECTION, SOLUTION INTRAMUSCULAR; INTRAVENOUS
Status: COMPLETED | OUTPATIENT
Start: 2025-02-26 | End: 2025-02-26

## 2025-02-26 RX ADMIN — LIDOCAINE HYDROCHLORIDE 4 ML: 10 INJECTION, SOLUTION INFILTRATION; PERINEURAL at 13:15

## 2025-02-26 RX ADMIN — DEXAMETHASONE SODIUM PHOSPHATE 40 MG: 10 INJECTION, SOLUTION INTRAMUSCULAR; INTRAVENOUS at 13:15
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
## 2025-03-03 ENCOUNTER — RA CDI HCC (OUTPATIENT)
Dept: OTHER | Facility: HOSPITAL | Age: 70
End: 2025-03-03

## 2025-03-03 DIAGNOSIS — I11.0 HYPERTENSIVE HEART DISEASE WITH HEART FAILURE (HCC): Primary | ICD-10-CM

## 2025-03-03 NOTE — PROGRESS NOTES
HCC coding opportunities          Chart Reviewed number of suggestions sent to Provider: 1  I11.0     Patients Insurance     Medicare Insurance: [facility] Medicare Advantage

## 2025-03-10 ENCOUNTER — OFFICE VISIT (OUTPATIENT)
Age: 70
End: 2025-03-10

## 2025-03-10 VITALS
DIASTOLIC BLOOD PRESSURE: 72 MMHG | SYSTOLIC BLOOD PRESSURE: 122 MMHG | WEIGHT: 315 LBS | HEIGHT: 76 IN | OXYGEN SATURATION: 96 % | RESPIRATION RATE: 19 BRPM | HEART RATE: 88 BPM | BODY MASS INDEX: 38.36 KG/M2 | TEMPERATURE: 97.1 F

## 2025-03-10 DIAGNOSIS — E78.49 OTHER HYPERLIPIDEMIA: ICD-10-CM

## 2025-03-10 DIAGNOSIS — E66.01 MORBID OBESITY WITH BMI OF 40.0-44.9, ADULT (HCC): ICD-10-CM

## 2025-03-10 DIAGNOSIS — R73.9 HYPERGLYCEMIA: ICD-10-CM

## 2025-03-10 DIAGNOSIS — M76.31 ILIOTIBIAL BAND SYNDROME OF RIGHT SIDE: Primary | ICD-10-CM

## 2025-03-10 PROCEDURE — 99213 OFFICE O/P EST LOW 20 MIN: CPT | Performed by: FAMILY MEDICINE

## 2025-03-10 PROCEDURE — G2211 COMPLEX E/M VISIT ADD ON: HCPCS | Performed by: FAMILY MEDICINE

## 2025-03-10 NOTE — PROGRESS NOTES
Name: Aime Fox Sr.      : 1955      MRN: [id number]  Encounter Provider: Alcira Reyes DO  Encounter Date: 3/10/2025   Encounter department: [facility]    Assessment & Plan  Iliotibial band syndrome of right side  Chronic - noting tenderness to palpation of distal IT band attachment  Discussed that muscle relaxers are not indicated for this  Advised voltaren gel and PT - reporting he is amenable   Orders:    Diclofenac Sodium (VOLTAREN) 1 %; Apply 2 g topically 4 (four) times a day    Ambulatory Referral to Physical Therapy; Future    Morbid obesity with BMI of 40.0-44.9, adult (HCC)  Prior Authorization Clinical Questions for Weight Management Pharmacotherapy    2. Does the patient have a diagnosis of obesity, confirmed by a BMI greater than or equal to 30 kg/m^2?: Yes  3. Does the patient have a BMI of greater than or equal to 27 kg/m^2 with at least one weight-related comorbidity/risk factor/complication (e.g. diabetes, dyslipidemia, coronary artery disease)?: Yes  9. Does the patient have a history of type 2 diabetes?: No     Baseline weight (in pounds): 346 lbs  Current weight (in pounds): 334 lbs  Weight loss percentage: -3.47%       Chronic - recommend increasing contrave to 2 tablets daily  Re-enforced diet and lifestyle modifications  Pending f/u with dietician  Continue f/u with bariatrics   Orders:    Naltrexone-buPROPion HCl ER 8-90 MG TB12; Take 2 tablets by mouth in the morning for 7 days    Hyperglycemia  Chronic  Pending CMP   Orders:    Magnesium; Future    Comprehensive metabolic panel; Future    Other hyperlipidemia  Chronic pending repeat lipid panel   Orders:    Lipid Panel with Direct LDL reflex; Future           History of Present Illness       Presents for the following concerns:   -has not been measuring BP   -still noting right thigh pain which he feels needs muscle relaxers - IT band tenderness   -noting pain moves throughout leg  "  -noting he wants increase in meds, noting he is eating a lot again   -pending dietician appointment      Review of Systems   Constitutional:  Negative for fever.   HENT:  Negative for congestion, hearing loss, postnasal drip, rhinorrhea, sneezing and sore throat.    Eyes:  Negative for visual disturbance.   Respiratory:  Negative for cough, shortness of breath and wheezing.    Cardiovascular:  Negative for chest pain and palpitations.   Gastrointestinal:  Negative for abdominal pain, constipation, diarrhea, nausea and vomiting.   Musculoskeletal:  Positive for myalgias. Negative for arthralgias and neck pain.   Skin:  Negative for rash and wound.   Neurological:  Negative for weakness and numbness.     Objective   /72 (BP Location: Right arm)   Pulse 88   Temp (!) 97.1 °F (36.2 °C)   Resp 19   Ht 6' 4\" (1.93 m)   Wt (!) 152 kg (334 lb)   SpO2 96%   BMI 40.66 kg/m²       Physical Exam  Vitals and nursing note reviewed.   Constitutional:       General: He is not in acute distress.     Appearance: He is well-developed.   HENT:      Head: Normocephalic and atraumatic.   Eyes:      Conjunctiva/sclera: Conjunctivae normal.   Cardiovascular:      Rate and Rhythm: Normal rate and regular rhythm.   Pulmonary:      Effort: Pulmonary effort is normal. No respiratory distress.      Breath sounds: Normal breath sounds.   Abdominal:      Palpations: Abdomen is soft.      Tenderness: There is no abdominal tenderness.   Musculoskeletal:         General: Tenderness present. No swelling.      Cervical back: Neck supple.   Skin:     General: Skin is warm and dry.      Capillary Refill: Capillary refill takes less than 2 seconds.   Neurological:      Mental Status: He is alert.   Psychiatric:         Mood and Affect: Mood normal.         "

## 2025-03-10 NOTE — ASSESSMENT & PLAN NOTE
Prior Authorization Clinical Questions for Weight Management Pharmacotherapy    2. Does the patient have a diagnosis of obesity, confirmed by a BMI greater than or equal to 30 kg/m^2?: Yes  3. Does the patient have a BMI of greater than or equal to 27 kg/m^2 with at least one weight-related comorbidity/risk factor/complication (e.g. diabetes, dyslipidemia, coronary artery disease)?: Yes  9. Does the patient have a history of type 2 diabetes?: No     Baseline weight (in pounds): 346 lbs  Current weight (in pounds): 334 lbs  Weight loss percentage: -3.47%       Chronic - recommend increasing contrave to 2 tablets daily  Re-enforced diet and lifestyle modifications  Pending f/u with dietician  Continue f/u with bariatrics   Orders:    Naltrexone-buPROPion HCl ER 8-90 MG TB12; Take 2 tablets by mouth in the morning for 7 days

## 2025-03-20 ENCOUNTER — EVALUATION (OUTPATIENT)
Dept: PHYSICAL THERAPY | Facility: CLINIC | Age: 70
End: 2025-03-20
Payer: COMMERCIAL

## 2025-03-20 DIAGNOSIS — M76.31 ILIOTIBIAL BAND SYNDROME OF RIGHT SIDE: Primary | ICD-10-CM

## 2025-03-20 PROCEDURE — 97161 PT EVAL LOW COMPLEX 20 MIN: CPT
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
## 2025-03-21 DIAGNOSIS — M70.62 TROCHANTERIC BURSITIS OF BOTH HIPS: ICD-10-CM

## 2025-03-21 DIAGNOSIS — M70.61 TROCHANTERIC BURSITIS OF BOTH HIPS: ICD-10-CM

## 2025-03-21 DIAGNOSIS — M16.0 BILATERAL HIP JOINT ARTHRITIS: ICD-10-CM

## 2025-03-21 DIAGNOSIS — M62.838 MUSCLE SPASM OF RIGHT LOWER EXTREMITY: ICD-10-CM

## 2025-03-21 RX ORDER — METHOCARBAMOL 750 MG/1
750 TABLET, FILM COATED ORAL 3 TIMES DAILY PRN
Qty: 30 TABLET | Refills: 0 | Status: SHIPPED | OUTPATIENT
Start: 2025-03-21

## 2025-03-25 ENCOUNTER — TELEPHONE (OUTPATIENT)
Age: 70
End: 2025-03-25

## 2025-03-25 ENCOUNTER — OFFICE VISIT (OUTPATIENT)
Dept: PHYSICAL THERAPY | Facility: CLINIC | Age: 70
End: 2025-03-25
Payer: COMMERCIAL

## 2025-03-25 ENCOUNTER — OFFICE VISIT (OUTPATIENT)
Age: 70
End: 2025-03-25
Payer: COMMERCIAL

## 2025-03-25 VITALS
TEMPERATURE: 96.8 F | HEIGHT: 76 IN | WEIGHT: 315 LBS | BODY MASS INDEX: 38.36 KG/M2 | DIASTOLIC BLOOD PRESSURE: 72 MMHG | HEART RATE: 78 BPM | SYSTOLIC BLOOD PRESSURE: 118 MMHG

## 2025-03-25 DIAGNOSIS — E66.01 MORBID OBESITY WITH BMI OF 40.0-44.9, ADULT (HCC): ICD-10-CM

## 2025-03-25 DIAGNOSIS — R73.01 IFG (IMPAIRED FASTING GLUCOSE): Primary | ICD-10-CM

## 2025-03-25 DIAGNOSIS — M76.31 ILIOTIBIAL BAND SYNDROME OF RIGHT SIDE: Primary | ICD-10-CM

## 2025-03-25 PROCEDURE — 99213 OFFICE O/P EST LOW 20 MIN: CPT | Performed by: PHYSICIAN ASSISTANT

## 2025-03-25 PROCEDURE — 97140 MANUAL THERAPY 1/> REGIONS: CPT

## 2025-03-25 PROCEDURE — 97110 THERAPEUTIC EXERCISES: CPT

## 2025-03-25 RX ORDER — NALTREXONE HYDROCHLORIDE 50 MG/1
50 TABLET, FILM COATED ORAL DAILY
Qty: 30 TABLET | Refills: 3 | Status: SHIPPED | OUTPATIENT
Start: 2025-03-25

## 2025-03-25 RX ORDER — BUPROPION HYDROCHLORIDE 150 MG/1
150 TABLET ORAL DAILY
Qty: 30 TABLET | Refills: 3 | Status: SHIPPED | OUTPATIENT
Start: 2025-03-25

## 2025-03-25 NOTE — TELEPHONE ENCOUNTER
Called pharmacy and clarified - oxcarbazepine used for bipolar disorder. Patient denies any past history of seizures.
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
## 2025-03-25 NOTE — TELEPHONE ENCOUNTER
FOLLOW UP: [facility]: [phone]    REASON FOR CONVERSATION: Medication Problem    SYMPTOMS: n/a    OTHER:   Name of Medication: “What is the medication you are calling about?” bupropion    Question: “What is your question?” (Medication refill, side effect, reaction) bupropion is contraindicated for hx seizures. PT also takes oxcarbazepine, but pharmacist does not know indication.    Prescribing HCP: “Who prescribed it?” ERIKA Eldridge PA-C    DISPOSITION: Order Prescription
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
## 2025-03-27 ENCOUNTER — OFFICE VISIT (OUTPATIENT)
Dept: PHYSICAL THERAPY | Facility: CLINIC | Age: 70
End: 2025-03-27
Payer: COMMERCIAL

## 2025-03-27 DIAGNOSIS — M76.31 ILIOTIBIAL BAND SYNDROME OF RIGHT SIDE: Primary | ICD-10-CM

## 2025-03-27 PROCEDURE — 97112 NEUROMUSCULAR REEDUCATION: CPT

## 2025-03-27 PROCEDURE — 97110 THERAPEUTIC EXERCISES: CPT

## 2025-03-27 PROCEDURE — 97140 MANUAL THERAPY 1/> REGIONS: CPT
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
## 2025-04-01 ENCOUNTER — OFFICE VISIT (OUTPATIENT)
Dept: PHYSICAL THERAPY | Facility: CLINIC | Age: 70
End: 2025-04-01
Payer: COMMERCIAL

## 2025-04-01 DIAGNOSIS — M76.31 ILIOTIBIAL BAND SYNDROME OF RIGHT SIDE: Primary | ICD-10-CM

## 2025-04-01 PROCEDURE — 97110 THERAPEUTIC EXERCISES: CPT

## 2025-04-01 PROCEDURE — 97112 NEUROMUSCULAR REEDUCATION: CPT
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
## 2025-04-08 ENCOUNTER — OFFICE VISIT (OUTPATIENT)
Dept: PHYSICAL THERAPY | Facility: CLINIC | Age: 70
End: 2025-04-08
Payer: COMMERCIAL

## 2025-04-08 ENCOUNTER — OFFICE VISIT (OUTPATIENT)
Age: 70
End: 2025-04-08
Payer: COMMERCIAL

## 2025-04-08 VITALS — BODY MASS INDEX: 38.36 KG/M2 | RESPIRATION RATE: 17 BRPM | WEIGHT: 315 LBS | HEIGHT: 76 IN

## 2025-04-08 DIAGNOSIS — B35.9 DERMATOPHYTOSIS: ICD-10-CM

## 2025-04-08 DIAGNOSIS — M79.672 PAIN IN BOTH FEET: ICD-10-CM

## 2025-04-08 DIAGNOSIS — M77.41 METATARSALGIA OF BOTH FEET: Primary | ICD-10-CM

## 2025-04-08 DIAGNOSIS — M79.671 PAIN IN BOTH FEET: ICD-10-CM

## 2025-04-08 DIAGNOSIS — B35.1 ONYCHOMYCOSIS: ICD-10-CM

## 2025-04-08 DIAGNOSIS — M77.42 METATARSALGIA OF BOTH FEET: Primary | ICD-10-CM

## 2025-04-08 DIAGNOSIS — M21.961 ACQUIRED DEFORMITY OF BOTH FEET: ICD-10-CM

## 2025-04-08 DIAGNOSIS — M76.31 ILIOTIBIAL BAND SYNDROME OF RIGHT SIDE: Primary | ICD-10-CM

## 2025-04-08 DIAGNOSIS — R60.9 CHRONIC EDEMA: ICD-10-CM

## 2025-04-08 DIAGNOSIS — M21.962 ACQUIRED DEFORMITY OF BOTH FEET: ICD-10-CM

## 2025-04-08 PROCEDURE — 97112 NEUROMUSCULAR REEDUCATION: CPT

## 2025-04-08 PROCEDURE — 97110 THERAPEUTIC EXERCISES: CPT

## 2025-04-08 PROCEDURE — 99213 OFFICE O/P EST LOW 20 MIN: CPT | Performed by: PODIATRIST
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
## 2025-04-10 ENCOUNTER — OFFICE VISIT (OUTPATIENT)
Dept: PHYSICAL THERAPY | Facility: CLINIC | Age: 70
End: 2025-04-10
Payer: COMMERCIAL

## 2025-04-10 DIAGNOSIS — M76.31 ILIOTIBIAL BAND SYNDROME OF RIGHT SIDE: Primary | ICD-10-CM

## 2025-04-10 PROCEDURE — 97112 NEUROMUSCULAR REEDUCATION: CPT
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
## 2025-04-14 ENCOUNTER — OFFICE VISIT (OUTPATIENT)
Dept: OBGYN CLINIC | Facility: CLINIC | Age: 70
End: 2025-04-14
Payer: COMMERCIAL

## 2025-04-14 ENCOUNTER — TELEPHONE (OUTPATIENT)
Dept: PAIN MEDICINE | Facility: CLINIC | Age: 70
End: 2025-04-14

## 2025-04-14 ENCOUNTER — APPOINTMENT (OUTPATIENT)
Dept: RADIOLOGY | Facility: CLINIC | Age: 70
End: 2025-04-14
Attending: ORTHOPAEDIC SURGERY
Payer: COMMERCIAL

## 2025-04-14 VITALS — HEIGHT: 76 IN | BODY MASS INDEX: 38.36 KG/M2 | WEIGHT: 315 LBS

## 2025-04-14 DIAGNOSIS — M25.061 HEMARTHROSIS INVOLVING KNEE JOINT, RIGHT: ICD-10-CM

## 2025-04-14 DIAGNOSIS — M54.16 RADICULOPATHY, LUMBAR REGION: ICD-10-CM

## 2025-04-14 DIAGNOSIS — M17.11 PRIMARY OSTEOARTHRITIS OF RIGHT KNEE: ICD-10-CM

## 2025-04-14 DIAGNOSIS — M16.11 PRIMARY OSTEOARTHRITIS OF ONE HIP, RIGHT: Primary | ICD-10-CM

## 2025-04-14 DIAGNOSIS — M25.551 RIGHT HIP PAIN: ICD-10-CM

## 2025-04-14 DIAGNOSIS — M25.561 RIGHT KNEE PAIN, UNSPECIFIED CHRONICITY: ICD-10-CM

## 2025-04-14 PROCEDURE — 99214 OFFICE O/P EST MOD 30 MIN: CPT | Performed by: ORTHOPAEDIC SURGERY

## 2025-04-14 PROCEDURE — 20611 DRAIN/INJ JOINT/BURSA W/US: CPT | Performed by: ORTHOPAEDIC SURGERY

## 2025-04-14 PROCEDURE — 73502 X-RAY EXAM HIP UNI 2-3 VIEWS: CPT

## 2025-04-14 PROCEDURE — 73562 X-RAY EXAM OF KNEE 3: CPT
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
## 2025-04-14 NOTE — TELEPHONE ENCOUNTER
Caller: César    Doctor: Dr. Larson    Reason for call: patient called to schedule another procedure I was going to schedule ovs with Dr. Marsha dumont for procedure but I got stuck on the screen and needed to refresh unable to call patient back.     Last procedure 8/2024    Call back#: [phone]

## 2025-04-16 ENCOUNTER — OFFICE VISIT (OUTPATIENT)
Dept: PHYSICAL THERAPY | Facility: CLINIC | Age: 70
End: 2025-04-16
Payer: COMMERCIAL

## 2025-04-16 ENCOUNTER — APPOINTMENT (OUTPATIENT)
Dept: PHYSICAL THERAPY | Facility: CLINIC | Age: 70
End: 2025-04-16
Payer: COMMERCIAL

## 2025-04-16 DIAGNOSIS — M76.31 ILIOTIBIAL BAND SYNDROME OF RIGHT SIDE: Primary | ICD-10-CM

## 2025-04-16 PROCEDURE — 97112 NEUROMUSCULAR REEDUCATION: CPT
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
## 2025-04-22 ENCOUNTER — OFFICE VISIT (OUTPATIENT)
Age: 70
End: 2025-04-22
Payer: COMMERCIAL

## 2025-04-22 ENCOUNTER — OFFICE VISIT (OUTPATIENT)
Dept: PHYSICAL THERAPY | Facility: CLINIC | Age: 70
End: 2025-04-22
Payer: COMMERCIAL

## 2025-04-22 VITALS — WEIGHT: 315 LBS | HEIGHT: 76 IN | BODY MASS INDEX: 38.36 KG/M2

## 2025-04-22 DIAGNOSIS — M47.816 LUMBAR SPONDYLOSIS: ICD-10-CM

## 2025-04-22 DIAGNOSIS — M99.02 SEGMENTAL DYSFUNCTION OF THORACIC REGION: ICD-10-CM

## 2025-04-22 DIAGNOSIS — M99.04 SEGMENTAL DYSFUNCTION OF SACRAL REGION: Primary | ICD-10-CM

## 2025-04-22 DIAGNOSIS — M76.31 IT BAND SYNDROME, RIGHT: ICD-10-CM

## 2025-04-22 DIAGNOSIS — M99.03 SEGMENTAL DYSFUNCTION OF LUMBAR REGION: ICD-10-CM

## 2025-04-22 DIAGNOSIS — M76.31 ILIOTIBIAL BAND SYNDROME OF RIGHT SIDE: Primary | ICD-10-CM

## 2025-04-22 PROCEDURE — 99203 OFFICE O/P NEW LOW 30 MIN: CPT | Performed by: CHIROPRACTOR

## 2025-04-22 PROCEDURE — 98941 CHIROPRACT MANJ 3-4 REGIONS: CPT | Performed by: CHIROPRACTOR

## 2025-04-22 PROCEDURE — 97112 NEUROMUSCULAR REEDUCATION: CPT
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
## 2025-04-23 ENCOUNTER — TELEPHONE (OUTPATIENT)
Age: 70
End: 2025-04-23

## 2025-04-23 ENCOUNTER — PATIENT MESSAGE (OUTPATIENT)
Age: 70
End: 2025-04-23

## 2025-04-23 DIAGNOSIS — F31.9 BIPOLAR 1 DISORDER (HCC): Primary | ICD-10-CM

## 2025-04-23 NOTE — TELEPHONE ENCOUNTER
Patient has been added to the Medication Management and Talk Therapy wait list without a referral.    Insurance: [facility]  Insurance Type:    Commercial []   Medicaid []   County (if applicable)   Medicare [x]  Location Preference: any  Provider Preference: any  Virtual: Yes [x] No []  Were outside resources sent: Yes [x] No []

## 2025-04-24 ENCOUNTER — HOSPITAL ENCOUNTER (OUTPATIENT)
Dept: RADIOLOGY | Facility: HOSPITAL | Age: 70
Discharge: HOME | End: 2025-04-24
Attending: ORTHOPAEDIC SURGERY
Payer: COMMERCIAL

## 2025-04-24 ENCOUNTER — OFFICE VISIT (OUTPATIENT)
Dept: PHYSICAL THERAPY | Facility: CLINIC | Age: 70
End: 2025-04-24
Payer: COMMERCIAL

## 2025-04-24 DIAGNOSIS — M17.11 PRIMARY OSTEOARTHRITIS OF RIGHT KNEE: ICD-10-CM

## 2025-04-24 DIAGNOSIS — M76.31 ILIOTIBIAL BAND SYNDROME OF RIGHT SIDE: Primary | ICD-10-CM

## 2025-04-24 DIAGNOSIS — M25.061 HEMARTHROSIS INVOLVING KNEE JOINT, RIGHT: ICD-10-CM

## 2025-04-24 PROCEDURE — 97110 THERAPEUTIC EXERCISES: CPT

## 2025-04-24 PROCEDURE — 73721 MRI JNT OF LWR EXTRE W/O DYE: CPT

## 2025-04-24 PROCEDURE — 97140 MANUAL THERAPY 1/> REGIONS: CPT
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
## 2025-04-29 ENCOUNTER — OFFICE VISIT (OUTPATIENT)
Dept: PHYSICAL THERAPY | Facility: CLINIC | Age: 70
End: 2025-04-29
Payer: COMMERCIAL

## 2025-04-29 DIAGNOSIS — M76.31 ILIOTIBIAL BAND SYNDROME OF RIGHT SIDE: Primary | ICD-10-CM

## 2025-04-29 DIAGNOSIS — I10 ESSENTIAL HYPERTENSION: ICD-10-CM

## 2025-04-29 DIAGNOSIS — I50.32 CHRONIC DIASTOLIC HEART FAILURE (HCC): ICD-10-CM

## 2025-04-29 DIAGNOSIS — E78.49 OTHER HYPERLIPIDEMIA: ICD-10-CM

## 2025-04-29 DIAGNOSIS — R60.0 BILATERAL EDEMA OF LOWER EXTREMITY: ICD-10-CM

## 2025-04-29 PROCEDURE — 97112 NEUROMUSCULAR REEDUCATION: CPT

## 2025-04-29 PROCEDURE — 97110 THERAPEUTIC EXERCISES: CPT
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
## 2025-04-30 RX ORDER — FUROSEMIDE 20 MG/1
20 TABLET ORAL DAILY PRN
Qty: 90 TABLET | Refills: 0 | Status: SHIPPED | OUTPATIENT
Start: 2025-04-30

## 2025-04-30 RX ORDER — ENALAPRIL MALEATE 20 MG/1
20 TABLET ORAL DAILY
Qty: 30 TABLET | Refills: 1 | Status: SHIPPED | OUTPATIENT
Start: 2025-04-30

## 2025-04-30 RX ORDER — PRAVASTATIN SODIUM 10 MG
10 TABLET ORAL DAILY
Qty: 100 TABLET | Refills: 1 | Status: SHIPPED | OUTPATIENT
Start: 2025-04-30

## 2025-05-01 ENCOUNTER — OFFICE VISIT (OUTPATIENT)
Dept: PHYSICAL THERAPY | Facility: CLINIC | Age: 70
End: 2025-05-01
Payer: COMMERCIAL

## 2025-05-01 ENCOUNTER — OFFICE VISIT (OUTPATIENT)
Dept: OBGYN CLINIC | Facility: CLINIC | Age: 70
End: 2025-05-01
Payer: COMMERCIAL

## 2025-05-01 ENCOUNTER — PROCEDURE VISIT (OUTPATIENT)
Age: 70
End: 2025-05-01
Payer: COMMERCIAL

## 2025-05-01 VITALS — HEIGHT: 76 IN | BODY MASS INDEX: 38.36 KG/M2 | WEIGHT: 315 LBS

## 2025-05-01 VITALS — BODY MASS INDEX: 38.36 KG/M2 | WEIGHT: 315 LBS | HEIGHT: 76 IN

## 2025-05-01 DIAGNOSIS — M17.11 PRIMARY OSTEOARTHRITIS OF RIGHT KNEE: Primary | ICD-10-CM

## 2025-05-01 DIAGNOSIS — M76.31 IT BAND SYNDROME, RIGHT: ICD-10-CM

## 2025-05-01 DIAGNOSIS — M47.816 LUMBAR SPONDYLOSIS: Primary | ICD-10-CM

## 2025-05-01 DIAGNOSIS — M76.31 ILIOTIBIAL BAND SYNDROME OF RIGHT SIDE: Primary | ICD-10-CM

## 2025-05-01 DIAGNOSIS — M99.04 SEGMENTAL DYSFUNCTION OF SACRAL REGION: ICD-10-CM

## 2025-05-01 DIAGNOSIS — M99.03 SEGMENTAL DYSFUNCTION OF LUMBAR REGION: ICD-10-CM

## 2025-05-01 DIAGNOSIS — M99.02 SEGMENTAL DYSFUNCTION OF THORACIC REGION: ICD-10-CM

## 2025-05-01 DIAGNOSIS — D17.9 BENIGN LIPOMATOUS TUMOR: ICD-10-CM

## 2025-05-01 PROCEDURE — 99213 OFFICE O/P EST LOW 20 MIN: CPT | Performed by: ORTHOPAEDIC SURGERY

## 2025-05-01 PROCEDURE — 97112 NEUROMUSCULAR REEDUCATION: CPT

## 2025-05-01 PROCEDURE — 98941 CHIROPRACT MANJ 3-4 REGIONS: CPT | Performed by: CHIROPRACTOR
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
## 2025-05-01 NOTE — ASSESSMENT & PLAN NOTE
César has an MRI of the right knee demonstrating severe osteoarthritis and degenerative changes involving degenerative meniscus tears and several loose bodies within the knee.  There is a mild to moderate knee effusion visible on MRI and a lipomatous mass in the superior medial patellofemoral region which can likely be attributed to the underlying hemarthrosis.  I did review the MRI findings with Dr. Mehta who offered to evaluate the patient and discussed potential knee replacement options versus nonoperative options going forward.  If he chooses to forego knee replacement and continue with viscosupplementation I am happy to continue this for him.    Orders:    Ambulatory referral to Orthopedic Surgery; Future
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
## 2025-05-06 ENCOUNTER — APPOINTMENT (OUTPATIENT)
Dept: PHYSICAL THERAPY | Facility: CLINIC | Age: 70
End: 2025-05-06
Payer: COMMERCIAL

## 2025-05-08 ENCOUNTER — OFFICE VISIT (OUTPATIENT)
Dept: PHYSICAL THERAPY | Facility: CLINIC | Age: 70
End: 2025-05-08
Payer: COMMERCIAL

## 2025-05-08 DIAGNOSIS — M76.31 ILIOTIBIAL BAND SYNDROME OF RIGHT SIDE: Primary | ICD-10-CM

## 2025-05-08 PROCEDURE — 97112 NEUROMUSCULAR REEDUCATION: CPT
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
## 2025-05-12 NOTE — PROGRESS NOTES
Name: Aime Fox Sr.      : 1955      MRN: [id number]  Encounter Provider: Michele Hendrix MD  Encounter Date: 2025   Encounter department: [facility]  :  Assessment & Plan  Viral URI with cough  Onset x 4 days  - Presents with congestion, productive cough, poor appetite. No known sick contacts   - On exam, V/S stable, afebrile. Lungs CTA bilaterally  - Will treat symptomatically given onset  - Patient reports cannot tolerate Flonase or Mucinex  - Advised to try warm tea with honey and lemon, hot showers/steam  - May continue Tylenol alternating with NSAID for inflammation  - Continue Xyzal for postnasal drip  - Refilled Tessalon perles for cough  - Strict return precautions advised       Productive cough    Orders:  •  benzonatate (TESSALON PERLES) 100 mg capsule; Take 1 capsule (100 mg total) by mouth 3 (three) times a day as needed for cough           History of Present Illness {?Quick Links Encounters * My Last Note * Last Note in Specialty * Snapshot * Since Last Visit * History :02858}  URI   This is a new problem. The current episode started in the past 7 days. The problem has been unchanged. There has been no fever. Associated symptoms include congestion, coughing and headaches. Pertinent negatives include no abdominal pain, chest pain, nausea or vomiting. He has tried acetaminophen (Soup) for the symptoms.     Review of Systems   Constitutional:  Positive for appetite change. Negative for chills and fever.   HENT:  Positive for congestion.    Respiratory:  Positive for cough.    Cardiovascular:  Negative for chest pain.   Gastrointestinal:  Negative for abdominal pain, nausea and vomiting.   Neurological:  Positive for headaches.       Objective {?Quick Links Trend Vitals * Enter New Vitals * Results Review * Timeline (Adult) * Labs * Imaging * Cardiology * Procedures * Lung Cancer Screening * Surgical eConsent :76063}  /77 (BP Location: Left arm,  "Patient Position: Sitting, Cuff Size: Large)   Pulse 76   Temp 98.5 °F (36.9 °C)   Ht 6' 3.5\" (1.918 m)   Wt (!) 152 kg (334 lb)   SpO2 96%   BMI 41.20 kg/m²      Physical Exam  Vitals reviewed.   Constitutional:       Appearance: Normal appearance.   HENT:      Nose: Congestion present.      Mouth/Throat:      Mouth: Mucous membranes are moist.   Eyes:      Conjunctiva/sclera: Conjunctivae normal.   Cardiovascular:      Rate and Rhythm: Normal rate and regular rhythm.      Pulses: Normal pulses.      Heart sounds: Normal heart sounds.   Pulmonary:      Effort: No respiratory distress.      Breath sounds: Normal breath sounds. No wheezing or rhonchi.   Skin:     General: Skin is warm and dry.   Neurological:      Mental Status: He is alert.   Psychiatric:         Mood and Affect: Mood normal.         Behavior: Behavior normal.         "

## 2025-05-13 ENCOUNTER — OFFICE VISIT (OUTPATIENT)
Age: 70
End: 2025-05-13

## 2025-05-13 VITALS
BODY MASS INDEX: 38.36 KG/M2 | SYSTOLIC BLOOD PRESSURE: 112 MMHG | OXYGEN SATURATION: 96 % | HEART RATE: 76 BPM | HEIGHT: 76 IN | DIASTOLIC BLOOD PRESSURE: 77 MMHG | TEMPERATURE: 98.5 F | WEIGHT: 315 LBS

## 2025-05-13 DIAGNOSIS — R05.8 PRODUCTIVE COUGH: ICD-10-CM

## 2025-05-13 DIAGNOSIS — J06.9 VIRAL URI WITH COUGH: Primary | ICD-10-CM

## 2025-05-13 PROCEDURE — 99213 OFFICE O/P EST LOW 20 MIN: CPT | Performed by: FAMILY MEDICINE

## 2025-05-13 PROCEDURE — G2211 COMPLEX E/M VISIT ADD ON: HCPCS | Performed by: FAMILY MEDICINE

## 2025-05-13 RX ORDER — BENZONATATE 100 MG/1
100 CAPSULE ORAL 3 TIMES DAILY PRN
Qty: 20 CAPSULE | Refills: 0 | Status: SHIPPED | OUTPATIENT
Start: 2025-05-13

## 2025-05-13 NOTE — PATIENT INSTRUCTIONS
Try warm tea with HONEY and lemon  Hot showers / or breathe in the steam from hot soup  Continue Xyzal, Tessalon perles

## 2025-05-13 NOTE — LETTER
May 13, 2025     Patient: Aime Fox Sr.  YOB: 1955  Date of Visit: 5/13/2025      To Whom it May Concern:    Aime Fox is under my professional care. Aime was seen in my office on 5/13/2025. Aime may return to work on 5/16/25 .    If you have any questions or concerns, please don't hesitate to call.         Sincerely,          Michele Hendrix MD

## 2025-05-15 ENCOUNTER — PROCEDURE VISIT (OUTPATIENT)
Age: 70
End: 2025-05-15
Payer: COMMERCIAL

## 2025-05-15 ENCOUNTER — OFFICE VISIT (OUTPATIENT)
Dept: OBGYN CLINIC | Facility: CLINIC | Age: 70
End: 2025-05-15
Payer: COMMERCIAL

## 2025-05-15 VITALS — HEIGHT: 76 IN | BODY MASS INDEX: 38.36 KG/M2 | WEIGHT: 315 LBS

## 2025-05-15 DIAGNOSIS — M25.561 CHRONIC PAIN OF BOTH KNEES: ICD-10-CM

## 2025-05-15 DIAGNOSIS — M47.816 LUMBAR SPONDYLOSIS: Primary | ICD-10-CM

## 2025-05-15 DIAGNOSIS — M99.03 SEGMENTAL DYSFUNCTION OF LUMBAR REGION: ICD-10-CM

## 2025-05-15 DIAGNOSIS — M76.31 IT BAND SYNDROME, RIGHT: ICD-10-CM

## 2025-05-15 DIAGNOSIS — M16.11 PRIMARY OSTEOARTHRITIS OF ONE HIP, RIGHT: ICD-10-CM

## 2025-05-15 DIAGNOSIS — G89.29 CHRONIC PAIN OF BOTH KNEES: ICD-10-CM

## 2025-05-15 DIAGNOSIS — M99.02 SEGMENTAL DYSFUNCTION OF THORACIC REGION: ICD-10-CM

## 2025-05-15 DIAGNOSIS — M17.0 PRIMARY OSTEOARTHRITIS OF BOTH KNEES: Primary | ICD-10-CM

## 2025-05-15 DIAGNOSIS — M25.562 CHRONIC PAIN OF BOTH KNEES: ICD-10-CM

## 2025-05-15 DIAGNOSIS — M99.04 SEGMENTAL DYSFUNCTION OF SACRAL REGION: ICD-10-CM

## 2025-05-15 DIAGNOSIS — E66.01 MORBID OBESITY WITH BMI OF 40.0-44.9, ADULT (HCC): ICD-10-CM

## 2025-05-15 PROCEDURE — 98941 CHIROPRACT MANJ 3-4 REGIONS: CPT | Performed by: CHIROPRACTOR

## 2025-05-15 PROCEDURE — 99214 OFFICE O/P EST MOD 30 MIN: CPT | Performed by: ORTHOPAEDIC SURGERY
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
## 2025-05-16 ENCOUNTER — TELEPHONE (OUTPATIENT)
Age: 70
End: 2025-05-16

## 2025-05-21 ENCOUNTER — TELEPHONE (OUTPATIENT)
Age: 70
End: 2025-05-21

## 2025-05-21 NOTE — TELEPHONE ENCOUNTER
Vm on appt line:    A rubber semaolaf [phone] You called me. I'm just returning your call. Jerry called. If it's important, get her to call me back. Thank you. Mary.  You received a voice mail from [phone].      Spoke with patient. Patient confirmed appt.

## 2025-05-22 ENCOUNTER — OFFICE VISIT (OUTPATIENT)
Age: 70
End: 2025-05-22

## 2025-05-22 VITALS
SYSTOLIC BLOOD PRESSURE: 143 MMHG | HEART RATE: 65 BPM | TEMPERATURE: 98 F | WEIGHT: 315 LBS | DIASTOLIC BLOOD PRESSURE: 76 MMHG | BODY MASS INDEX: 40.78 KG/M2 | OXYGEN SATURATION: 99 %

## 2025-05-22 DIAGNOSIS — J41.0 SIMPLE CHRONIC BRONCHITIS (HCC): Primary | ICD-10-CM

## 2025-05-22 DIAGNOSIS — E66.01 MORBID OBESITY WITH BMI OF 40.0-44.9, ADULT (HCC): ICD-10-CM

## 2025-05-22 DIAGNOSIS — E78.49 OTHER HYPERLIPIDEMIA: ICD-10-CM

## 2025-05-22 DIAGNOSIS — F31.9 BIPOLAR 1 DISORDER (HCC): ICD-10-CM

## 2025-05-22 DIAGNOSIS — I10 ESSENTIAL HYPERTENSION: ICD-10-CM

## 2025-05-22 DIAGNOSIS — R73.01 IFG (IMPAIRED FASTING GLUCOSE): ICD-10-CM

## 2025-05-22 DIAGNOSIS — J43.9 PULMONARY EMPHYSEMA, UNSPECIFIED EMPHYSEMA TYPE (HCC): ICD-10-CM

## 2025-05-22 DIAGNOSIS — G47.33 OBSTRUCTIVE SLEEP APNEA: ICD-10-CM

## 2025-05-22 DIAGNOSIS — R30.0 DYSURIA: ICD-10-CM

## 2025-05-22 RX ORDER — BUPROPION HYDROCHLORIDE 150 MG/1
150 TABLET ORAL DAILY
Qty: 30 TABLET | Refills: 3 | Status: SHIPPED | OUTPATIENT
Start: 2025-05-22

## 2025-05-22 RX ORDER — FLUTICASONE FUROATE, UMECLIDINIUM BROMIDE AND VILANTEROL TRIFENATATE 200; 62.5; 25 UG/1; UG/1; UG/1
1 POWDER RESPIRATORY (INHALATION) DAILY
Qty: 180 BLISTER | Refills: 11 | Status: SHIPPED | OUTPATIENT
Start: 2025-05-22 | End: 2025-08-20

## 2025-05-22 RX ORDER — PRAVASTATIN SODIUM 10 MG
10 TABLET ORAL DAILY
Qty: 100 TABLET | Refills: 1 | Status: SHIPPED | OUTPATIENT
Start: 2025-05-22

## 2025-05-22 RX ORDER — ENALAPRIL MALEATE 20 MG/1
20 TABLET ORAL DAILY
Qty: 30 TABLET | Refills: 1 | Status: SHIPPED | OUTPATIENT
Start: 2025-05-22

## 2025-05-22 RX ORDER — ATENOLOL 50 MG/1
50 TABLET ORAL EVERY EVENING
Qty: 90 TABLET | Refills: 1 | Status: SHIPPED | OUTPATIENT
Start: 2025-05-22

## 2025-05-22 RX ORDER — NALTREXONE HYDROCHLORIDE 50 MG/1
50 TABLET, FILM COATED ORAL DAILY
Qty: 30 TABLET | Refills: 3 | Status: SHIPPED | OUTPATIENT
Start: 2025-05-22

## 2025-05-22 RX ORDER — SERTRALINE HYDROCHLORIDE 100 MG/1
100 TABLET, FILM COATED ORAL DAILY
Qty: 60 TABLET | Refills: 1 | Status: SHIPPED | OUTPATIENT
Start: 2025-05-22

## 2025-05-22 NOTE — ASSESSMENT & PLAN NOTE
Chronic - continue statin  Orders:    pravastatin (PRAVACHOL) 10 mg tablet; Take 1 tablet (10 mg total) by mouth daily

## 2025-05-22 NOTE — ASSESSMENT & PLAN NOTE
Chronic - previously had been advised to f/u with pulmonology  Last PFT (2022): mild restrictive, mildly reduced diffusion capacity  Using inhalers regularly  Recommend repeat PFT    Orders:    Complete PFT with post bronchodilator; Future

## 2025-05-22 NOTE — PROGRESS NOTES
Name: Aime Fox Sr.      : 1955      MRN: [id number]  Encounter Provider: Alcira Reyes DO  Encounter Date: 2025   Encounter department: [facility]  :  Assessment & Plan  Simple chronic bronchitis (HCC)  Chronic - previously had been advised to f/u with pulmonology  Last PFT (): mild restrictive, mildly reduced diffusion capacity  Using inhalers regularly  Recommend repeat PFT    Orders:    Complete PFT with post bronchodilator; Future    Morbid obesity with BMI of 40.0-44.9, adult (HCC)    Chronic - following with weight management  Recommend wellbutrin-naltrexone per bariatric dosing   Continue metformin 850 mg QD  Orders:    buPROPion (Wellbutrin XL) 150 mg 24 hr tablet; Take 1 tablet (150 mg total) by mouth daily    metFORMIN (GLUCOPHAGE) 850 mg tablet; Take 1 tablet (850 mg total) by mouth 2 (two) times a day with meals    naltrexone (REVIA) 50 mg tablet; Take 1 tablet (50 mg total) by mouth daily    Pulmonary emphysema, unspecified emphysema type (HCC)  Chronic - continue trelegy   Continue f/u pulmonology - follows at [facility]   Orders:    fluticasone-umeclidinium-vilanterol (Trelegy Ellipta) 200-62.5-25 mcg/actuation AEPB inhaler; Inhale 1 puff daily Rinse mouth after use.    Essential hypertension  Chronic   Continue vasotec 20 mg QD, atenolol 50 mg QD  Orders:    enalapril (VASOTEC) 20 mg tablet; Take 1 tablet (20 mg total) by mouth daily    atenolol (TENORMIN) 50 mg tablet; Take 1 tablet (50 mg total) by mouth every evening    Other hyperlipidemia  Chronic - continue statin  Orders:    pravastatin (PRAVACHOL) 10 mg tablet; Take 1 tablet (10 mg total) by mouth daily    Obstructive sleep apnea  Recommend f/u with sleep medicine   Orders:    Ambulatory Referral to Sleep Medicine; Future    Dysuria  Endorses one day of dysuria and frequency   Denies fevers or chills  Negative CVA  Orders:    UA w Reflex to Microscopic w Reflex to Culture;  Future           History of Present Illness   HPI    Presents for follow up:  -noting he is frustrated since he had been feeling sick  -taking tessalon perles which is helping   -noting he is having some upset stomach, ? Diarrhea   -wife had been sick first   -noting his wife has parkinson's and has been struggling   -going to see pulm with [facility]   -has not started the wellbutrin/naltrexone combination (contrave)  -needs to complete lab work   -noting he needs tylenol PM nightly  -needs to do home study   -referral to sleep medicine   -has been taking the allergy medicine     Review of Systems   Constitutional:  Negative for fever.   HENT:  Negative for congestion, hearing loss, postnasal drip, rhinorrhea, sneezing and sore throat.    Eyes:  Negative for visual disturbance.   Respiratory:  Negative for cough, shortness of breath and wheezing.    Cardiovascular:  Negative for chest pain and palpitations.   Gastrointestinal:  Negative for abdominal pain, constipation, diarrhea, nausea and vomiting.   Genitourinary:  Positive for dysuria and urgency. Negative for flank pain and frequency.   Musculoskeletal:  Negative for arthralgias, myalgias and neck pain.   Skin:  Negative for rash and wound.   Neurological:  Negative for weakness and numbness.       Objective   /76 (BP Location: Left arm, Patient Position: Sitting, Cuff Size: Standard)   Pulse 65   Temp 98 °F (36.7 °C) (Tympanic)   Wt (!) 152 kg (335 lb)   SpO2 99%   BMI 40.78 kg/m²      Physical Exam  Vitals and nursing note reviewed.   Constitutional:       General: He is not in acute distress.     Appearance: He is well-developed.   HENT:      Head: Normocephalic and atraumatic.     Eyes:      Conjunctiva/sclera: Conjunctivae normal.       Cardiovascular:      Rate and Rhythm: Normal rate and regular rhythm.      Heart sounds: No murmur heard.  Pulmonary:      Effort: Pulmonary effort is normal. No respiratory distress.      Breath sounds: Normal  breath sounds.   Abdominal:      Palpations: Abdomen is soft.      Tenderness: There is no abdominal tenderness. There is no right CVA tenderness.     Musculoskeletal:         General: No swelling.      Cervical back: Neck supple.     Skin:     General: Skin is warm and dry.      Capillary Refill: Capillary refill takes less than 2 seconds.     Neurological:      Mental Status: He is alert.     Psychiatric:         Mood and Affect: Mood normal.

## 2025-05-22 NOTE — ASSESSMENT & PLAN NOTE
Chronic - following with weight management  Recommend wellbutrin-naltrexone per bariatric dosing   Continue metformin 850 mg QD  Orders:    buPROPion (Wellbutrin XL) 150 mg 24 hr tablet; Take 1 tablet (150 mg total) by mouth daily    metFORMIN (GLUCOPHAGE) 850 mg tablet; Take 1 tablet (850 mg total) by mouth 2 (two) times a day with meals    naltrexone (REVIA) 50 mg tablet; Take 1 tablet (50 mg total) by mouth daily

## 2025-05-27 ENCOUNTER — PROCEDURE VISIT (OUTPATIENT)
Age: 70
End: 2025-05-27
Payer: COMMERCIAL

## 2025-05-27 VITALS
WEIGHT: 315 LBS | HEIGHT: 76 IN | SYSTOLIC BLOOD PRESSURE: 137 MMHG | HEART RATE: 69 BPM | DIASTOLIC BLOOD PRESSURE: 83 MMHG | BODY MASS INDEX: 38.36 KG/M2

## 2025-05-27 DIAGNOSIS — M99.03 SEGMENTAL DYSFUNCTION OF LUMBAR REGION: ICD-10-CM

## 2025-05-27 DIAGNOSIS — M47.816 LUMBAR SPONDYLOSIS: Primary | ICD-10-CM

## 2025-05-27 DIAGNOSIS — M76.31 IT BAND SYNDROME, RIGHT: ICD-10-CM

## 2025-05-27 DIAGNOSIS — M99.02 SEGMENTAL DYSFUNCTION OF THORACIC REGION: ICD-10-CM

## 2025-05-27 DIAGNOSIS — M99.04 SEGMENTAL DYSFUNCTION OF SACRAL REGION: ICD-10-CM

## 2025-05-27 PROCEDURE — 98941 CHIROPRACT MANJ 3-4 REGIONS: CPT | Performed by: CHIROPRACTOR
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
## 2025-05-28 ENCOUNTER — OFFICE VISIT (OUTPATIENT)
Dept: OBGYN CLINIC | Facility: CLINIC | Age: 70
End: 2025-05-28
Payer: COMMERCIAL

## 2025-05-28 ENCOUNTER — TELEPHONE (OUTPATIENT)
Dept: OBGYN CLINIC | Facility: CLINIC | Age: 70
End: 2025-05-28

## 2025-05-28 VITALS — HEIGHT: 76 IN | BODY MASS INDEX: 38.36 KG/M2 | WEIGHT: 315 LBS

## 2025-05-28 DIAGNOSIS — M17.11 PRIMARY OSTEOARTHRITIS OF RIGHT KNEE: Primary | ICD-10-CM

## 2025-05-28 DIAGNOSIS — D17.9 BENIGN LIPOMATOUS TUMOR: ICD-10-CM

## 2025-05-28 PROCEDURE — 99213 OFFICE O/P EST LOW 20 MIN: CPT | Performed by: ORTHOPAEDIC SURGERY

## 2025-05-28 NOTE — TELEPHONE ENCOUNTER
As discussed per MARCELLA jacob    Quinn state he has itchy cough for 3 weeks. Patient stated he was reading online that it could be due to medication.

## 2025-05-28 NOTE — ASSESSMENT & PLAN NOTE
César has right-sided knee pain consistent with osteoarthritis and is planning on having a knee replacement in the future but needs to lose a little bit more weight.  We discussed today proceeding with a potential PRP injection to help with his ongoing pain or viscosupplement ejections which are helpful but are too expensive for him.  He would like to proceed with PRP at this time we will set him up for the procedure in our [place] office.  We could potentially aspirate the right knee prior to procedure if necessary.
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
## 2025-05-28 NOTE — TELEPHONE ENCOUNTER
Caller: César    Doctor: Dr. Giraldo / [facility]    Reason for call: Patient is scheduled for appointment tomorrow, 5/28 to see Dr. Giraldo.      Patient is requesting to be billed for copay tomorrow since he does not have funds for copay at this time.         Call back#: [phone]

## 2025-05-29 ENCOUNTER — PATIENT MESSAGE (OUTPATIENT)
Age: 70
End: 2025-05-29

## 2025-05-29 DIAGNOSIS — J44.1 CHRONIC OBSTRUCTIVE PULMONARY DISEASE WITH ACUTE EXACERBATION (HCC): ICD-10-CM

## 2025-05-31 ENCOUNTER — RESULTS FOLLOW-UP (OUTPATIENT)
Age: 70
End: 2025-05-31

## 2025-05-31 DIAGNOSIS — R91.8 LUNG NODULES: Primary | ICD-10-CM

## 2025-06-04 ENCOUNTER — PATIENT MESSAGE (OUTPATIENT)
Dept: OBGYN CLINIC | Facility: CLINIC | Age: 70
End: 2025-06-04

## 2025-06-05 ENCOUNTER — PROCEDURE VISIT (OUTPATIENT)
Dept: OBGYN CLINIC | Facility: CLINIC | Age: 70
End: 2025-06-05
Payer: COMMERCIAL

## 2025-06-05 VITALS — WEIGHT: 315 LBS | HEIGHT: 76 IN | BODY MASS INDEX: 38.36 KG/M2

## 2025-06-05 DIAGNOSIS — M17.11 PRIMARY OSTEOARTHRITIS OF RIGHT KNEE: Primary | ICD-10-CM

## 2025-06-05 PROCEDURE — 20610 DRAIN/INJ JOINT/BURSA W/O US: CPT | Performed by: ORTHOPAEDIC SURGERY
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
## 2025-06-05 NOTE — ASSESSMENT & PLAN NOTE
César has ongoing right-sided knee pain consistent with osteoarthritis and recurrent effusions in the right knee.  We did proceed with a right knee PRP injection and he tolerated this very well today.  Hopefully this gives him less effusion and pain relief in the coming future.  He was instructed to follow-up with me and provide pain score in 2 to 3 weeks to determine how he is doing.

## 2025-06-17 ENCOUNTER — PROCEDURE VISIT (OUTPATIENT)
Age: 70
End: 2025-06-17
Payer: COMMERCIAL

## 2025-06-17 VITALS
HEIGHT: 76 IN | HEART RATE: 86 BPM | BODY MASS INDEX: 38.36 KG/M2 | WEIGHT: 315 LBS | DIASTOLIC BLOOD PRESSURE: 87 MMHG | SYSTOLIC BLOOD PRESSURE: 138 MMHG

## 2025-06-17 DIAGNOSIS — M76.31 IT BAND SYNDROME, RIGHT: ICD-10-CM

## 2025-06-17 DIAGNOSIS — M47.816 LUMBAR SPONDYLOSIS: Primary | ICD-10-CM

## 2025-06-17 DIAGNOSIS — M99.02 SEGMENTAL DYSFUNCTION OF THORACIC REGION: ICD-10-CM

## 2025-06-17 DIAGNOSIS — M99.04 SEGMENTAL DYSFUNCTION OF SACRAL REGION: ICD-10-CM

## 2025-06-17 DIAGNOSIS — M99.03 SEGMENTAL DYSFUNCTION OF LUMBAR REGION: ICD-10-CM

## 2025-06-17 PROCEDURE — 98941 CHIROPRACT MANJ 3-4 REGIONS: CPT | Performed by: CHIROPRACTOR
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
## 2025-07-01 ENCOUNTER — PROCEDURE VISIT (OUTPATIENT)
Age: 70
End: 2025-07-01
Payer: COMMERCIAL

## 2025-07-01 VITALS
HEART RATE: 84 BPM | BODY MASS INDEX: 38.36 KG/M2 | DIASTOLIC BLOOD PRESSURE: 83 MMHG | HEIGHT: 76 IN | WEIGHT: 315 LBS | SYSTOLIC BLOOD PRESSURE: 140 MMHG

## 2025-07-01 DIAGNOSIS — M76.31 IT BAND SYNDROME, RIGHT: ICD-10-CM

## 2025-07-01 DIAGNOSIS — M47.816 LUMBAR SPONDYLOSIS: Primary | ICD-10-CM

## 2025-07-01 DIAGNOSIS — M99.04 SEGMENTAL DYSFUNCTION OF SACRAL REGION: ICD-10-CM

## 2025-07-01 DIAGNOSIS — M99.02 SEGMENTAL DYSFUNCTION OF THORACIC REGION: ICD-10-CM

## 2025-07-01 DIAGNOSIS — M99.03 SEGMENTAL DYSFUNCTION OF LUMBAR REGION: ICD-10-CM

## 2025-07-01 PROCEDURE — 98941 CHIROPRACT MANJ 3-4 REGIONS: CPT | Performed by: CHIROPRACTOR

## 2025-07-01 RX ORDER — ALBUTEROL SULFATE 90 UG/1
2 INHALANT RESPIRATORY (INHALATION) EVERY 6 HOURS PRN
Qty: 18 G | Refills: 11 | Status: SHIPPED | OUTPATIENT
Start: 2025-07-01
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
## 2025-07-22 ENCOUNTER — OFFICE VISIT (OUTPATIENT)
Age: 70
End: 2025-07-22

## 2025-07-22 ENCOUNTER — APPOINTMENT (OUTPATIENT)
Dept: LAB | Facility: CLINIC | Age: 70
End: 2025-07-22
Payer: COMMERCIAL

## 2025-07-22 VITALS
HEART RATE: 67 BPM | OXYGEN SATURATION: 99 % | RESPIRATION RATE: 20 BRPM | HEIGHT: 76 IN | BODY MASS INDEX: 38.36 KG/M2 | DIASTOLIC BLOOD PRESSURE: 78 MMHG | SYSTOLIC BLOOD PRESSURE: 131 MMHG | TEMPERATURE: 98.3 F | WEIGHT: 315 LBS

## 2025-07-22 DIAGNOSIS — R73.9 HYPERGLYCEMIA: ICD-10-CM

## 2025-07-22 DIAGNOSIS — R06.09 EXERTIONAL DYSPNEA: Primary | ICD-10-CM

## 2025-07-22 DIAGNOSIS — E78.49 OTHER HYPERLIPIDEMIA: ICD-10-CM

## 2025-07-22 DIAGNOSIS — R30.0 DYSURIA: ICD-10-CM

## 2025-07-22 DIAGNOSIS — I11.0 HYPERTENSIVE HEART DISEASE WITH HEART FAILURE (HCC): ICD-10-CM

## 2025-07-22 DIAGNOSIS — Z59.41 FOOD INSECURITY: ICD-10-CM

## 2025-07-22 LAB
ALBUMIN SERPL BCG-MCNC: 4.2 G/DL (ref 3.5–5)
ALP SERPL-CCNC: 139 U/L (ref 34–104)
ALT SERPL W P-5'-P-CCNC: 16 U/L (ref 7–52)
ANION GAP SERPL CALCULATED.3IONS-SCNC: 9 MMOL/L (ref 4–13)
AST SERPL W P-5'-P-CCNC: 14 U/L (ref 13–39)
BILIRUB SERPL-MCNC: 0.62 MG/DL (ref 0.2–1)
BILIRUB UR QL STRIP: NEGATIVE
BUN SERPL-MCNC: 8 MG/DL (ref 5–25)
CALCIUM SERPL-MCNC: 9.1 MG/DL (ref 8.4–10.2)
CHLORIDE SERPL-SCNC: 107 MMOL/L (ref 96–108)
CHOLEST SERPL-MCNC: 130 MG/DL (ref ?–200)
CLARITY UR: NORMAL
CO2 SERPL-SCNC: 27 MMOL/L (ref 21–32)
COLOR UR: YELLOW
CREAT SERPL-MCNC: 0.75 MG/DL (ref 0.6–1.3)
GFR SERPL CREATININE-BSD FRML MDRD: 94 ML/MIN/1.73SQ M
GLUCOSE P FAST SERPL-MCNC: 104 MG/DL (ref 65–99)
GLUCOSE UR STRIP-MCNC: NEGATIVE MG/DL
HDLC SERPL-MCNC: 37 MG/DL
HGB UR QL STRIP.AUTO: NEGATIVE
KETONES UR STRIP-MCNC: NEGATIVE MG/DL
LDLC SERPL CALC-MCNC: 70 MG/DL (ref 0–100)
LEUKOCYTE ESTERASE UR QL STRIP: NEGATIVE
MAGNESIUM SERPL-MCNC: 2.3 MG/DL (ref 1.9–2.7)
NITRITE UR QL STRIP: NEGATIVE
PH UR STRIP.AUTO: 5.5 [PH]
POTASSIUM SERPL-SCNC: 4.5 MMOL/L (ref 3.5–5.3)
PROT SERPL-MCNC: 7.1 G/DL (ref 6.4–8.4)
PROT UR STRIP-MCNC: NEGATIVE MG/DL
SODIUM SERPL-SCNC: 143 MMOL/L (ref 135–147)
SP GR UR STRIP.AUTO: 1.02 (ref 1–1.03)
TRIGL SERPL-MCNC: 116 MG/DL (ref ?–150)
UROBILINOGEN UR STRIP-ACNC: <2 MG/DL

## 2025-07-22 PROCEDURE — 99213 OFFICE O/P EST LOW 20 MIN: CPT | Performed by: FAMILY MEDICINE

## 2025-07-22 PROCEDURE — 36415 COLL VENOUS BLD VENIPUNCTURE: CPT

## 2025-07-22 PROCEDURE — 80061 LIPID PANEL: CPT

## 2025-07-22 PROCEDURE — 81003 URINALYSIS AUTO W/O SCOPE: CPT

## 2025-07-22 PROCEDURE — 83735 ASSAY OF MAGNESIUM: CPT

## 2025-07-22 PROCEDURE — G2211 COMPLEX E/M VISIT ADD ON: HCPCS | Performed by: FAMILY MEDICINE

## 2025-07-22 PROCEDURE — 80053 COMPREHEN METABOLIC PANEL: CPT

## 2025-07-22 RX ORDER — FLUTICASONE PROPIONATE AND SALMETEROL 100; 50 UG/1; UG/1
1 POWDER RESPIRATORY (INHALATION) 2 TIMES DAILY
Qty: 60 BLISTER | Refills: 5 | Status: SHIPPED | OUTPATIENT
Start: 2025-07-22 | End: 2026-01-18

## 2025-07-22 RX ORDER — TIOTROPIUM BROMIDE INHALATION SPRAY 1.56 UG/1
2 SPRAY, METERED RESPIRATORY (INHALATION) DAILY
Qty: 12 G | Refills: 3 | Status: SHIPPED | OUTPATIENT
Start: 2025-07-22

## 2025-07-22 SDOH — ECONOMIC STABILITY - FOOD INSECURITY: FOOD INSECURITY: Z59.41

## 2025-07-25 ENCOUNTER — PATIENT OUTREACH (OUTPATIENT)
Age: 70
End: 2025-07-25

## 2025-07-25 ENCOUNTER — TELEPHONE (OUTPATIENT)
Dept: BARIATRICS | Facility: CLINIC | Age: 70
End: 2025-07-25

## 2025-07-25 DIAGNOSIS — R60.0 BILATERAL EDEMA OF LOWER EXTREMITY: Primary | ICD-10-CM

## 2025-07-25 DIAGNOSIS — I71.40 ABDOMINAL AORTIC ANEURYSM (AAA) WITHOUT RUPTURE, UNSPECIFIED PART (HCC): ICD-10-CM

## 2025-07-25 DIAGNOSIS — I50.32 CHRONIC DIASTOLIC HEART FAILURE (HCC): ICD-10-CM

## 2025-07-25 NOTE — PROGRESS NOTES
SWCM received referral from provider to assist patient with food insecurity.     SWCM completed chart review. Per chart, patient also has an outstanding appointment with Weight Management.     SWCM called patient to introduce self and offer support. SWCM unable to reach patient -- Phone does not appear to be working and voicemail could not be left.     Referral placed to RN CCM Billing.     SWCM will attempt another outreach.

## 2025-07-28 ENCOUNTER — PATIENT OUTREACH (OUTPATIENT)
Age: 70
End: 2025-07-28

## 2025-07-31 ENCOUNTER — PATIENT OUTREACH (OUTPATIENT)
Age: 70
End: 2025-07-31

## 2025-08-01 ENCOUNTER — PATIENT OUTREACH (OUTPATIENT)
Age: 70
End: 2025-08-01

## 2025-08-05 ENCOUNTER — TELEPHONE (OUTPATIENT)
Age: 70
End: 2025-08-05

## 2025-08-07 ENCOUNTER — TELEPHONE (OUTPATIENT)
Dept: CARDIAC SURGERY | Facility: CLINIC | Age: 70
End: 2025-08-07

## 2025-08-11 ENCOUNTER — TELEPHONE (OUTPATIENT)
Age: 70
End: 2025-08-11

## (undated) DEVICE — BASIC DOUBLE BASIN 2-LF: Brand: MEDLINE INDUSTRIES, INC.

## (undated) DEVICE — TOWEL SET X-RAY

## (undated) DEVICE — RADIOLOGY STERILE LABELS: Brand: CENTURION

## (undated) DEVICE — SKIN MARKER DUAL TIP WITH RULER CAP, FLEXIBLE RULER AND LABELS: Brand: DEVON

## (undated) DEVICE — DRAPE SHEET THREE QUARTER

## (undated) DEVICE — FLEXIBLE ADHESIVE BANDAGE,X-LARGE: Brand: CURITY

## (undated) DEVICE — DRAPE EQUIPMENT RF WAND

## (undated) DEVICE — SYRINGE 5ML LL

## (undated) DEVICE — Device: Brand: PORTEX

## (undated) DEVICE — GLOVE SRG LF STRL BGL SKNSNS 7.5 PF

## (undated) DEVICE — NEEDLE 25GA X 1 IN SAFETY GLIDE

## (undated) DEVICE — SUT SILK 3-0 TIES 144 IN LA54G

## (undated) DEVICE — PLASTIC ADHESIVE BANDAGE: Brand: CURITY

## (undated) DEVICE — SYRINGE 10ML LL

## (undated) DEVICE — ASTOUND STANDARD SURGICAL GOWN, XL: Brand: CONVERTORS

## (undated) DEVICE — GLOVE SRG BIOGEL ORTHOPEDIC 7

## (undated) DEVICE — SPONGE: SPECIALTY K-DISS XR  100/CS: Brand: MEDICAL ACTION INDUSTRIES

## (undated) DEVICE — SYRINGE 3ML LL

## (undated) DEVICE — ELECTRODE BLADE MOD E-Z CLEAN 2.5IN 6.4CM -0012M

## (undated) DEVICE — Device

## (undated) DEVICE — VIAL DECANTER

## (undated) DEVICE — PACK GENERAL LF

## (undated) DEVICE — CHLORAPREP APPLICATOR TINTED 10.5ML ONE-STEP

## (undated) DEVICE — NEEDLE SPINAL 22G X 3.5 IN PLST HUB

## (undated) DEVICE — 3M™ STERI-STRIP™ REINFORCED ADHESIVE SKIN CLOSURES, R1547, 1/2 IN X 4 IN (12 MM X 100 MM), 6 STRIPS/ENVELOPE: Brand: 3M™ STERI-STRIP™

## (undated) DEVICE — 3000CC GUARDIAN II: Brand: GUARDIAN

## (undated) DEVICE — TIBURON SPLIT SHEET: Brand: CONVERTORS

## (undated) DEVICE — SUT MONOCRYL 4-0 PS-2 18 IN Y496G

## (undated) DEVICE — CHLORAPREP HI-LITE 26ML ORANGE

## (undated) DEVICE — SPONGE 4 X 4 XRAY 16 PLY STRL LF RFD

## (undated) DEVICE — INTENDED FOR TISSUE SEPARATION, AND OTHER PROCEDURES THAT REQUIRE A SHARP SURGICAL BLADE TO PUNCTURE OR CUT.: Brand: BARD-PARKER SAFETY BLADES SIZE 15, STERILE

## (undated) DEVICE — PAD GROUNDING ADULT

## (undated) DEVICE — NEEDLE SPINAL 22G X 5IN QUINCKE

## (undated) DEVICE — SUT SILK 0 SH 30 IN K834H

## (undated) DEVICE — SUT VICRYL 3-0 SH 27 IN J416H